# Patient Record
Sex: MALE | Race: BLACK OR AFRICAN AMERICAN | NOT HISPANIC OR LATINO | Employment: OTHER | URBAN - METROPOLITAN AREA
[De-identification: names, ages, dates, MRNs, and addresses within clinical notes are randomized per-mention and may not be internally consistent; named-entity substitution may affect disease eponyms.]

---

## 2017-01-30 ENCOUNTER — LAB VISIT (OUTPATIENT)
Dept: LAB | Facility: HOSPITAL | Age: 52
End: 2017-01-30
Attending: FAMILY MEDICINE
Payer: MEDICARE

## 2017-01-30 DIAGNOSIS — I11.9 BENIGN HYPERTENSIVE HEART DISEASE WITHOUT HEART FAILURE: ICD-10-CM

## 2017-01-30 DIAGNOSIS — M62.9 DISORDER OF MUSCLE: ICD-10-CM

## 2017-01-30 DIAGNOSIS — Z12.5 SCREENING FOR PROSTATE CANCER: ICD-10-CM

## 2017-01-30 LAB
ALBUMIN SERPL BCP-MCNC: 3.9 G/DL
ALP SERPL-CCNC: 99 U/L
ALT SERPL W/O P-5'-P-CCNC: 33 U/L
ANION GAP SERPL CALC-SCNC: 8 MMOL/L
AST SERPL-CCNC: 30 U/L
BASOPHILS # BLD AUTO: 0.03 K/UL
BASOPHILS NFR BLD: 0.6 %
BILIRUB SERPL-MCNC: 0.6 MG/DL
BUN SERPL-MCNC: 12 MG/DL
CALCIUM SERPL-MCNC: 9.5 MG/DL
CHLORIDE SERPL-SCNC: 105 MMOL/L
CHOLEST/HDLC SERPL: 4.3 {RATIO}
CO2 SERPL-SCNC: 27 MMOL/L
COMPLEXED PSA SERPL-MCNC: 0.76 NG/ML
CREAT SERPL-MCNC: 0.9 MG/DL
DIFFERENTIAL METHOD: ABNORMAL
EOSINOPHIL # BLD AUTO: 0.4 K/UL
EOSINOPHIL NFR BLD: 6.7 %
ERYTHROCYTE [DISTWIDTH] IN BLOOD BY AUTOMATED COUNT: 13.2 %
EST. GFR  (AFRICAN AMERICAN): >60 ML/MIN/1.73 M^2
EST. GFR  (NON AFRICAN AMERICAN): >60 ML/MIN/1.73 M^2
GLUCOSE SERPL-MCNC: 98 MG/DL
HCT VFR BLD AUTO: 42.1 %
HDL/CHOLESTEROL RATIO: 23.4 %
HDLC SERPL-MCNC: 197 MG/DL
HDLC SERPL-MCNC: 46 MG/DL
HGB BLD-MCNC: 14.1 G/DL
LDLC SERPL CALC-MCNC: 130 MG/DL
LYMPHOCYTES # BLD AUTO: 2.7 K/UL
LYMPHOCYTES NFR BLD: 50.8 %
MCH RBC QN AUTO: 30 PG
MCHC RBC AUTO-ENTMCNC: 33.5 %
MCV RBC AUTO: 90 FL
MONOCYTES # BLD AUTO: 0.4 K/UL
MONOCYTES NFR BLD: 7.8 %
NEUTROPHILS # BLD AUTO: 1.8 K/UL
NEUTROPHILS NFR BLD: 33.9 %
NONHDLC SERPL-MCNC: 151 MG/DL
PLATELET # BLD AUTO: 295 K/UL
PMV BLD AUTO: 9.8 FL
POTASSIUM SERPL-SCNC: 4.1 MMOL/L
PROT SERPL-MCNC: 7.8 G/DL
RBC # BLD AUTO: 4.7 M/UL
SODIUM SERPL-SCNC: 140 MMOL/L
TRIGL SERPL-MCNC: 105 MG/DL
TSH SERPL DL<=0.005 MIU/L-ACNC: 1.26 UIU/ML
WBC # BLD AUTO: 5.39 K/UL

## 2017-01-30 PROCEDURE — 84443 ASSAY THYROID STIM HORMONE: CPT

## 2017-01-30 PROCEDURE — 84153 ASSAY OF PSA TOTAL: CPT

## 2017-01-30 PROCEDURE — 85025 COMPLETE CBC W/AUTO DIFF WBC: CPT

## 2017-01-30 PROCEDURE — 36415 COLL VENOUS BLD VENIPUNCTURE: CPT | Mod: PO

## 2017-01-30 PROCEDURE — 80061 LIPID PANEL: CPT

## 2017-01-30 PROCEDURE — 80053 COMPREHEN METABOLIC PANEL: CPT

## 2017-02-01 ENCOUNTER — OFFICE VISIT (OUTPATIENT)
Dept: FAMILY MEDICINE | Facility: CLINIC | Age: 52
End: 2017-02-01
Payer: MEDICARE

## 2017-02-01 VITALS
WEIGHT: 203.5 LBS | HEIGHT: 71 IN | DIASTOLIC BLOOD PRESSURE: 76 MMHG | BODY MASS INDEX: 28.49 KG/M2 | OXYGEN SATURATION: 97 % | SYSTOLIC BLOOD PRESSURE: 124 MMHG | TEMPERATURE: 98 F | HEART RATE: 84 BPM

## 2017-02-01 DIAGNOSIS — Z00.00 ANNUAL PHYSICAL EXAM: Primary | ICD-10-CM

## 2017-02-01 PROCEDURE — 99213 OFFICE O/P EST LOW 20 MIN: CPT | Mod: PBBFAC,PO | Performed by: FAMILY MEDICINE

## 2017-02-01 PROCEDURE — 99999 PR PBB SHADOW E&M-EST. PATIENT-LVL III: CPT | Mod: PBBFAC,,, | Performed by: FAMILY MEDICINE

## 2017-02-01 PROCEDURE — 99396 PREV VISIT EST AGE 40-64: CPT | Mod: S$PBB,,, | Performed by: FAMILY MEDICINE

## 2017-02-01 NOTE — PROGRESS NOTES
Subjective:       Patient ID: Rito Conley is a 51 y.o. male.    Chief Complaint: Abdominal Pain    HPI Comments: Patient presents today for an annual exam. He has a bulging in his umbilicus. He states it is only tender to touch. He denies pain with activity.     Past Medical History:    Allergy                                                       DJD of shoulder                                 5/7/2014      Hypertension                                                  Lower back pain                                               Sleep apnea                                                  History reviewed. No pertinent surgical history.  Review of patient's family history indicates:    Hypertension                   Mother                    Cancer                         Maternal Grandmother        Comment: Breast    Cancer                         Maternal Aunt               Comment: breast    Amblyopia                      Neg Hx                    Blindness                      Neg Hx                    Cataracts                      Neg Hx                    Diabetes                       Neg Hx                    Glaucoma                       Neg Hx                    Macular degeneration           Neg Hx                    Retinal detachment             Neg Hx                    Strabismus                     Neg Hx                    Stroke                         Neg Hx                    Thyroid disease                Neg Hx                    Social History    Marital status:              Spouse name:                       Years of education:                 Number of children:               Occupational History    None on file    Social History Main Topics    Smoking status: Never Smoker                                                                   Smokeless status: Not on file                       Alcohol use: No              Drug use: No              Sexual activity: Not on  "file          Other Topics            Concern    None on file    Social History Narrative    None on file          Review of Systems   Constitutional: Negative for fatigue.   HENT: Negative for hearing loss, rhinorrhea, sneezing and sore throat.    Eyes: Negative for visual disturbance.   Respiratory: Negative for cough, chest tightness, shortness of breath and wheezing.    Cardiovascular: Negative for chest pain, palpitations and leg swelling.   Gastrointestinal: Negative for abdominal pain, constipation, diarrhea, nausea and vomiting.   Endocrine: Negative for polydipsia, polyphagia and polyuria.   Genitourinary: Negative for dysuria, frequency, hematuria and urgency.   Musculoskeletal: Negative for arthralgias and back pain.   Skin: Negative for rash.   Neurological: Negative for dizziness, syncope, light-headedness and headaches.       Objective:       Vitals:    02/01/17 1056   BP: 124/76   Pulse: 84   Temp: 98.4 °F (36.9 °C)   TempSrc: Oral   SpO2: 97%   Weight: 92.3 kg (203 lb 7.8 oz)   Height: 5' 11" (1.803 m)       Physical Exam   Constitutional: He is oriented to person, place, and time. He appears well-developed and well-nourished. No distress.   HENT:   Head: Normocephalic.   Right Ear: External ear normal.   Left Ear: External ear normal.   Mouth/Throat: Oropharynx is clear and moist. No oropharyngeal exudate.   Eyes: Conjunctivae are normal.   Neck: Normal range of motion. Neck supple. No thyromegaly present.   Cardiovascular: Normal rate, regular rhythm and normal heart sounds.  Exam reveals no gallop and no friction rub.    No murmur heard.  Pulmonary/Chest: Effort normal and breath sounds normal. No respiratory distress. He has no wheezes. He has no rales.   Abdominal: Soft. Bowel sounds are normal. He exhibits no distension and no mass. There is no tenderness. There is no rebound and no guarding.   Musculoskeletal: He exhibits no edema.   Lymphadenopathy:     He has no cervical adenopathy. "   Neurological: He is alert and oriented to person, place, and time.   Skin: No rash noted. He is not diaphoretic.   Psychiatric: He has a normal mood and affect.       Assessment:       1. Annual physical exam        Plan:       Rito was seen today for abdominal pain.    Diagnoses and all orders for this visit:    Annual physical exam       labs reviewed and noted to be normal.

## 2017-03-09 ENCOUNTER — HOSPITAL ENCOUNTER (EMERGENCY)
Facility: OTHER | Age: 52
Discharge: HOME | End: 2017-03-09
Attending: INTERNAL MEDICINE
Payer: COMMERCIAL

## 2017-03-09 VITALS
SYSTOLIC BLOOD PRESSURE: 138 MMHG | WEIGHT: 195 LBS | HEART RATE: 55 BPM | RESPIRATION RATE: 20 BRPM | HEIGHT: 71 IN | TEMPERATURE: 98 F | OXYGEN SATURATION: 100 % | DIASTOLIC BLOOD PRESSURE: 90 MMHG | BODY MASS INDEX: 27.3 KG/M2
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
## 2017-03-09 DIAGNOSIS — S39.92XA BACK INJURY, INITIAL ENCOUNTER: ICD-10-CM

## 2017-03-09 PROCEDURE — 99283 EMERGENCY DEPT VISIT LOW MDM: CPT

## 2017-03-09 RX ORDER — IBUPROFEN 800 MG/1
800 TABLET ORAL EVERY 8 HOURS PRN
Qty: 20 TABLET | Refills: 0 | Status: SHIPPED | OUTPATIENT
Start: 2017-03-09 | End: 2017-03-09

## 2017-03-09 RX ORDER — CYCLOBENZAPRINE HCL 10 MG
10 TABLET ORAL 3 TIMES DAILY PRN
Qty: 20 TABLET | Refills: 0 | Status: SHIPPED | OUTPATIENT
Start: 2017-03-09 | End: 2017-03-09

## 2017-03-09 RX ORDER — CYCLOBENZAPRINE HCL 10 MG
10 TABLET ORAL 3 TIMES DAILY PRN
Qty: 20 TABLET | Refills: 0 | Status: SHIPPED | OUTPATIENT
Start: 2017-03-09 | End: 2017-03-14

## 2017-03-09 RX ORDER — IBUPROFEN 800 MG/1
800 TABLET ORAL EVERY 8 HOURS PRN
Qty: 20 TABLET | Refills: 0 | Status: SHIPPED | OUTPATIENT
Start: 2017-03-09 | End: 2019-07-22
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
## 2017-03-10 ENCOUNTER — OFFICE VISIT (OUTPATIENT)
Dept: FAMILY MEDICINE | Facility: CLINIC | Age: 52
End: 2017-03-10
Payer: COMMERCIAL

## 2017-03-10 VITALS
HEART RATE: 69 BPM | DIASTOLIC BLOOD PRESSURE: 80 MMHG | WEIGHT: 202.38 LBS | BODY MASS INDEX: 28.33 KG/M2 | TEMPERATURE: 98 F | OXYGEN SATURATION: 99 % | HEIGHT: 71 IN | SYSTOLIC BLOOD PRESSURE: 126 MMHG
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
## 2017-03-10 DIAGNOSIS — M54.6 THORACIC SPINE PAIN: Primary | ICD-10-CM

## 2017-03-10 DIAGNOSIS — Z11.59 ENCOUNTER FOR HEPATITIS C SCREENING TEST FOR LOW RISK PATIENT: ICD-10-CM

## 2017-03-10 PROCEDURE — 99213 OFFICE O/P EST LOW 20 MIN: CPT | Mod: PBBFAC,PO | Performed by: FAMILY MEDICINE

## 2017-03-10 PROCEDURE — 99214 OFFICE O/P EST MOD 30 MIN: CPT | Mod: S$GLB,,, | Performed by: FAMILY MEDICINE

## 2017-03-10 PROCEDURE — 99999 PR PBB SHADOW E&M-EST. PATIENT-LVL III: CPT | Mod: PBBFAC,,, | Performed by: FAMILY MEDICINE
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
## 2017-03-14 ENCOUNTER — TELEPHONE (OUTPATIENT)
Dept: FAMILY MEDICINE | Facility: CLINIC | Age: 52
End: 2017-03-14

## 2017-03-14 DIAGNOSIS — M47.16 LUMBAR SPONDYLOSIS WITH MYELOPATHY: ICD-10-CM

## 2017-03-14 RX ORDER — OXYCODONE AND ACETAMINOPHEN 5; 325 MG/1; MG/1
1 TABLET ORAL EVERY 6 HOURS PRN
Qty: 60 TABLET | Refills: 0 | Status: SHIPPED | OUTPATIENT
Start: 2017-04-14 | End: 2017-06-07 | Stop reason: SDUPTHER

## 2017-03-14 RX ORDER — OXYCODONE AND ACETAMINOPHEN 5; 325 MG/1; MG/1
1 TABLET ORAL EVERY 6 HOURS PRN
Qty: 60 TABLET | Refills: 0 | Status: SHIPPED | OUTPATIENT
Start: 2017-03-14 | End: 2017-06-07 | Stop reason: SDUPTHER

## 2017-03-14 RX ORDER — OXYCODONE AND ACETAMINOPHEN 5; 325 MG/1; MG/1
1 TABLET ORAL EVERY 6 HOURS PRN
Qty: 60 TABLET | Refills: 0 | Status: SHIPPED | OUTPATIENT
Start: 2017-05-14 | End: 2017-06-07 | Stop reason: SDUPTHER

## 2017-03-14 NOTE — TELEPHONE ENCOUNTER
----- Message from Milli Joya sent at 3/14/2017  8:02 AM CDT -----  Contact: self  REFILL: oxycodone-acetaminophen (PERCOCET) 5-325 mg per tablet

## 2017-03-15 ENCOUNTER — HOSPITAL ENCOUNTER (OUTPATIENT)
Dept: RADIOLOGY | Facility: HOSPITAL | Age: 52
Discharge: HOME | End: 2017-03-15
Attending: FAMILY MEDICINE
Payer: MEDICARE

## 2017-03-15 DIAGNOSIS — M54.6 THORACIC SPINE PAIN: ICD-10-CM
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
## 2017-03-16 ENCOUNTER — TELEPHONE (OUTPATIENT)
Dept: FAMILY MEDICINE | Facility: CLINIC | Age: 52
End: 2017-03-16

## 2017-03-16 DIAGNOSIS — S22.000D COMPRESSION FRACTURE OF THORACIC VERTEBRA, WITH ROUTINE HEALING, SUBSEQUENT ENCOUNTER: Primary | ICD-10-CM

## 2017-03-16 NOTE — TELEPHONE ENCOUNTER
----- Message from Kay Bobo sent at 3/16/2017 12:30 PM CDT -----  Contact: Self  Good afternoon,    Pt is requesting orders for MRI-Sedation.    Pt can be reached at [phone].    Thank you!

## 2017-03-16 NOTE — TELEPHONE ENCOUNTER
Spoke with patient/ stated that when he was set to have his Open MRI, they informed him that due to the specific area of his back, they are not able to do an Open MRI, but then it was too late in the evening (10pm) to have patient sedated and have to now reschedule patient/ They are requesting that Dr. Huertas order an MRI- Sedation instead of an open MRI and then patient would be rescheduled/ please advise

## 2017-03-30 ENCOUNTER — HOSPITAL ENCOUNTER (OUTPATIENT)
Dept: RADIOLOGY | Facility: HOSPITAL | Age: 52
Discharge: HOME | End: 2017-03-30
Attending: FAMILY MEDICINE
Payer: MEDICARE

## 2017-03-30 DIAGNOSIS — S22.000D COMPRESSION FRACTURE OF THORACIC VERTEBRA, WITH ROUTINE HEALING, SUBSEQUENT ENCOUNTER: ICD-10-CM

## 2017-03-30 PROCEDURE — 72146 MRI CHEST SPINE W/O DYE: CPT | Mod: 26,GC,, | Performed by: RADIOLOGY

## 2017-03-30 PROCEDURE — 63600175 PHARM REV CODE 636 W HCPCS: Performed by: RADIOLOGY

## 2017-03-30 PROCEDURE — 72146 MRI CHEST SPINE W/O DYE: CPT | Mod: TC

## 2017-03-30 RX ORDER — MIDAZOLAM HYDROCHLORIDE 1 MG/ML
2 INJECTION INTRAMUSCULAR; INTRAVENOUS ONCE
Status: COMPLETED | OUTPATIENT
Start: 2017-03-30 | End: 2017-03-30

## 2017-03-30 RX ADMIN — MIDAZOLAM HYDROCHLORIDE 2 MG: 1 INJECTION, SOLUTION INTRAMUSCULAR; INTRAVENOUS at 09:03

## 2017-03-30 NOTE — PROGRESS NOTES
2mg of Versed administered prior to MRI start. Pt denies any allergies to the medication. Pt has a ride home (his spouse). Care of pt left in hands of MRI staff.

## 2017-04-04 ENCOUNTER — OFFICE VISIT (OUTPATIENT)
Dept: FAMILY MEDICINE | Facility: CLINIC | Age: 52
End: 2017-04-04
Payer: MEDICARE

## 2017-04-04 VITALS
TEMPERATURE: 98 F | DIASTOLIC BLOOD PRESSURE: 90 MMHG | WEIGHT: 201.25 LBS | SYSTOLIC BLOOD PRESSURE: 138 MMHG | BODY MASS INDEX: 28.07 KG/M2 | HEART RATE: 86 BPM | OXYGEN SATURATION: 97 %

## 2017-04-04 DIAGNOSIS — D18.09 HEMANGIOMA OF SPINE: ICD-10-CM

## 2017-04-04 DIAGNOSIS — M54.50 ACUTE MIDLINE LOW BACK PAIN WITHOUT SCIATICA: Primary | ICD-10-CM
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
## 2017-04-04 PROCEDURE — 99214 OFFICE O/P EST MOD 30 MIN: CPT | Mod: S$GLB,,, | Performed by: FAMILY MEDICINE

## 2017-04-04 PROCEDURE — 99999 PR PBB SHADOW E&M-EST. PATIENT-LVL III: CPT | Mod: PBBFAC,,, | Performed by: FAMILY MEDICINE

## 2017-04-04 PROCEDURE — 99213 OFFICE O/P EST LOW 20 MIN: CPT | Mod: PBBFAC,PO | Performed by: FAMILY MEDICINE
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
## 2017-04-19 ENCOUNTER — CLINICAL SUPPORT (OUTPATIENT)
Dept: REHABILITATION | Facility: HOSPITAL | Age: 52
End: 2017-04-19
Attending: FAMILY MEDICINE
Payer: COMMERCIAL

## 2017-04-19 DIAGNOSIS — M25.69 DECREASED ROM OF TRUNK AND BACK: Primary | ICD-10-CM

## 2017-04-19 PROCEDURE — 97161 PT EVAL LOW COMPLEX 20 MIN: CPT | Mod: PN

## 2017-04-19 PROCEDURE — G8978 MOBILITY CURRENT STATUS: HCPCS | Mod: CK,PN

## 2017-04-19 PROCEDURE — G8979 MOBILITY GOAL STATUS: HCPCS | Mod: CJ,PN

## 2017-04-19 PROCEDURE — 97110 THERAPEUTIC EXERCISES: CPT | Mod: PN
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
## 2017-04-25 ENCOUNTER — CLINICAL SUPPORT (OUTPATIENT)
Dept: REHABILITATION | Facility: HOSPITAL | Age: 52
End: 2017-04-25
Attending: FAMILY MEDICINE
Payer: COMMERCIAL

## 2017-04-25 DIAGNOSIS — M25.69 DECREASED ROM OF TRUNK AND BACK: Primary | ICD-10-CM

## 2017-04-25 PROCEDURE — 97110 THERAPEUTIC EXERCISES: CPT | Mod: PN

## 2017-04-25 NOTE — PROGRESS NOTES
Physical Therapy Daily Note     Name: Rito Moore [facility] Number: [id number]  Diagnosis:   Encounter Diagnosis   Name Primary?    Decreased ROM of trunk and back Yes     Physician: Jennifer Huertas MD    Visit #: 2 of 20  PTA Visit #: 1  Time In: 0935  Time Out: 1035  Total Treatment Time:  60'   POC expiration: 7/12/17    Subjective     Pt reports: that he was a little stiff after the eval, but he is feeling better.   Pain Scale: Rito rates pain on a scale of 0-10 to be 3 currently.    Objective     Rito received individual therapeutic exercises to develop strength, endurance, ROM, flexibility, posture and core stabilization for 45 minutes including:    Nustep x 8'   LTR with PB x 20   DKTC 20 x 3'' hold  PPT 2 x 10 x 3'' hold   Bridges 2 x 10   Supine HSS 3 x 30 '' ea    Supine hip flexor stretch 2 x 30''  Transverse Abdominus 2' x 5''   Piriformis Stretch 2 x 30''     Rito received the following manual therapy techniques STM to bilat lumbar paraspinals and superior gluteal musculature  x 10'         MHP in prone x 10'     Written Home Exercises reviewed from Eval.   Pt demo good understanding of the education provided. Rito demonstrated good return demonstration of activities.     Education provided re:   Rito verbalized good understanding of education provided.   No spiritual or educational barriers to learning provided    Assessment     Patient tolerated treatment session well.  Patient with good tolerance to exercises with decreased stiffness post treatment session.  Patient with decreased soft tissue mobility bilat lumbar paraspinals, superior gluteal musculature.  Plan to progress with strengthening and stretching as tolerated by the patient.   This is a 51 y.o. male referred to outpatient physical therapy and presents with a medical diagnosis of low back painand demonstrates limitations as described in the problem list. Pt  prognosis is Good. Pt will continue to benefit from skilled outpatient physical therapy to address the deficits listed in the problem list, provide pt/family education and to maximize pt's level of independence in the home and community environment.     Goals as follows:  GOALS  Short Term Goals: 4 weeks (5/17/17)  1. Pt will report <6/10 pain to improve activity tolerance.   2. Pt will increase MMT by 1/2 grade in order to improve functional stability and strength.   3. Pt will be able to lift medium weighted object from the floor on command with proper body mechanics in order to progress functional independence.  4. Pt will tolerate HEP to improve impairments and independence with ADL's.     Plan     Continue with established Plan of Care towards PT goals.    Therapist: Missy Hamlin, PTA  4/25/2017

## 2017-04-27 ENCOUNTER — CLINICAL SUPPORT (OUTPATIENT)
Dept: REHABILITATION | Facility: HOSPITAL | Age: 52
End: 2017-04-27
Attending: FAMILY MEDICINE
Payer: COMMERCIAL

## 2017-04-27 DIAGNOSIS — M25.69 DECREASED ROM OF TRUNK AND BACK: Primary | ICD-10-CM

## 2017-04-27 PROCEDURE — 97110 THERAPEUTIC EXERCISES: CPT | Mod: PN
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
## 2017-05-02 ENCOUNTER — CLINICAL SUPPORT (OUTPATIENT)
Dept: REHABILITATION | Facility: HOSPITAL | Age: 52
End: 2017-05-02
Attending: FAMILY MEDICINE
Payer: COMMERCIAL

## 2017-05-02 DIAGNOSIS — M25.69 DECREASED ROM OF TRUNK AND BACK: Primary | ICD-10-CM

## 2017-05-02 PROCEDURE — 97110 THERAPEUTIC EXERCISES: CPT | Mod: PN

## 2017-05-02 NOTE — PROGRESS NOTES
Physical Therapy Daily Note     Name: Rito Moore [facility] Number: [id number]  Diagnosis:   No diagnosis found.  Physician: Jennifer Huertas MD    Visit #: 4 of 20  PTA Visit #:3    Time In: 0830  Time Out:  0930  Total Treatment Time:  60'   POC expiration: 7/12/17    Subjective     Pt reports the he is doing okay today, reports that he is having some sinus trouble due to the weather.  Pt states that he continues to have stiffness in the morning.   Pain Scale: Rito rates pain on a scale of 0-10 to be 6 currently.    Objective     Rito received individual therapeutic exercises to develop strength, endurance, ROM, flexibility, posture and core stabilization for 50 minutes including:     Upright bike [place] Lvl 3 x 8'   Nustep x 8'  - np today  LTR with PB x 20   DKTC 20 x 5'' hold  PPT 2 x 10 x 3'' hold   Bridges 3 x 10   Supine HSS 3 x 30 '' ea     Supine hip flexor stretch 2 x 30''  Transverse Abdominus 2' x 5''   Piriformis Stretch 3 x 30'' ea LE   + Reverse Crunches 10 x 3'' hold   + Seated 3- way ball roll out x 3'     Rito received the following manual therapy techniques STM to bilat lumbar paraspinals and superior gluteal musculature  x 0        MHP in prone x 10'    Ice pack x 10' - np     Written Home Exercises reviewed from [facility].   Pt demo good understanding of the education provided. Rito demonstrated good return demonstration of activities.     Education provided re:   Rito verbalized good understanding of education provided.   No spiritual or educational barriers to learning provided    Assessment     Patient tolerated treatment session well.  Patient with good response to progression of core strengthening exercises without provocation of pain.  Plan to progress with strengthening and stretching as tolerated by the patient.   This is a 51 y.o. male referred to outpatient physical therapy and presents with a medical diagnosis of  low back painand demonstrates limitations as described in the problem list. Pt prognosis is Good. Pt will continue to benefit from skilled outpatient physical therapy to address the deficits listed in the problem list, provide pt/family education and to maximize pt's level of independence in the home and community environment.     Goals as follows:  GOALS  Short Term Goals: 4 weeks (5/17/17)  1. Pt will report <6/10 pain to improve activity tolerance.   2. Pt will increase MMT by 1/2 grade in order to improve functional stability and strength.   3. Pt will be able to lift medium weighted object from the floor on command with proper body mechanics in order to progress functional independence.  4. Pt will tolerate HEP to improve impairments and independence with ADL's.     Plan     Continue with established Plan of Care towards PT goals.    Therapist: Missy Hamlin, JULIA  5/2/2017

## 2017-05-03 ENCOUNTER — OFFICE VISIT (OUTPATIENT)
Dept: FAMILY MEDICINE | Facility: CLINIC | Age: 52
End: 2017-05-03
Payer: MEDICARE

## 2017-05-03 VITALS
RESPIRATION RATE: 16 BRPM | BODY MASS INDEX: 28.7 KG/M2 | HEART RATE: 84 BPM | WEIGHT: 205 LBS | OXYGEN SATURATION: 98 % | HEIGHT: 71 IN | SYSTOLIC BLOOD PRESSURE: 140 MMHG | TEMPERATURE: 99 F | DIASTOLIC BLOOD PRESSURE: 98 MMHG

## 2017-05-03 DIAGNOSIS — J01.10 ACUTE FRONTAL SINUSITIS, RECURRENCE NOT SPECIFIED: Primary | ICD-10-CM

## 2017-05-03 DIAGNOSIS — I11.9 BENIGN HYPERTENSIVE HEART DISEASE WITHOUT HEART FAILURE: ICD-10-CM

## 2017-05-03 PROCEDURE — 96372 THER/PROPH/DIAG INJ SC/IM: CPT | Mod: PBBFAC,PO

## 2017-05-03 PROCEDURE — 99214 OFFICE O/P EST MOD 30 MIN: CPT | Mod: PBBFAC,PO,25 | Performed by: PHYSICIAN ASSISTANT

## 2017-05-03 PROCEDURE — 99214 OFFICE O/P EST MOD 30 MIN: CPT | Mod: S$PBB,,, | Performed by: PHYSICIAN ASSISTANT

## 2017-05-03 PROCEDURE — 99999 PR PBB SHADOW E&M-EST. PATIENT-LVL IV: CPT | Mod: PBBFAC,,, | Performed by: PHYSICIAN ASSISTANT

## 2017-05-03 RX ORDER — TRIAMCINOLONE ACETONIDE 40 MG/ML
40 INJECTION, SUSPENSION INTRA-ARTICULAR; INTRAMUSCULAR
Status: COMPLETED | OUTPATIENT
Start: 2017-05-03 | End: 2017-05-03

## 2017-05-03 RX ADMIN — TRIAMCINOLONE ACETONIDE 40 MG: 40 INJECTION, SUSPENSION INTRA-ARTICULAR; INTRAMUSCULAR at 08:05

## 2017-05-03 NOTE — MR AVS SNAPSHOT
[facility]  [address]  [address]  [place] [address]  Phone: [phone]  Fax: [phone]                  Rito Conley   5/3/2017 8:20 AM   Office Visit    Description:  Male : 1965   Provider:  MARCELA Ley   Department:  [facility]           Reason for Visit     Sinus Problem           Diagnoses this Visit        Comments    Acute frontal sinusitis, recurrence not specified    -  Primary            To Do List           Future Appointments        Provider Department Dept Phone    2017 11:30 AM Missy Hamlin PTA [facility] [phone]    2017 9:30 AM Missy Hamlin PTA [facility] [phone]    2017 11:00 AM Lauren Shepley, PT [facility] [phone]      Goals (5 Years of Data)     None      [facility]     [facility] Nurse Care Line -  Assistance  Unless otherwise directed by your provider, please contact [facility], our nurse care line that is available for  assistance.     Registered nurses in the [facility] provide: appointment scheduling, clinical advisement, health education, and other advisory services.  Call: [phone] (toll free)               Medications           Message regarding Medications     Verify the changes and/or additions to your medication regime listed below are the same as discussed with your clinician today.  If any of these changes or additions are incorrect, please notify your healthcare provider.        These medications were administered today        Dose Freq    triamcinolone acetonide injection 40 mg 40 mg Clinic/HOD 1 time    Sig: Inject 1 mL (40 mg total) into the muscle one time.    Class: Normal    Route: Intramuscular    Cosign for Ordering: Required by Jennifer Huertas MD      STOP taking these medications     methylPREDNISolone acetate injection 40 mg            Verify  "that the below list of medications is an accurate representation of the medications you are currently taking.  If none reported, the list may be blank. If incorrect, please contact your healthcare provider. Carry this list with you in case of emergency.           Current Medications     amlodipine (NORVASC) 10 MG tablet Take 1 tablet (10 mg total) by mouth once daily.    buPROPion (WELLBUTRIN XL) 150 MG TB24 tablet Take 150 mg by mouth once daily.     fluticasone (FLONASE) 50 mcg/actuation nasal spray 1 spray by Each Nare route 2 (two) times daily.    hydrochlorothiazide (HYDRODIURIL) 50 MG tablet Take 1 tablet (50 mg total) by mouth once daily.    ibuprofen (ADVIL,MOTRIN) 800 MG tablet Take 1 tablet (800 mg total) by mouth every 8 (eight) hours as needed for Pain.    LIPITOR 40 mg tablet Take 40 mg by mouth once daily.     oxycodone-acetaminophen (PERCOCET) 5-325 mg per tablet Take 1 tablet by mouth every 6 (six) hours as needed for Pain.    oxycodone-acetaminophen (PERCOCET) 5-325 mg per tablet Take 1 tablet by mouth every 6 (six) hours as needed for Pain.    oxycodone-acetaminophen (PERCOCET) 5-325 mg per tablet Starting on May 14, 2017. Take 1 tablet by mouth every 6 (six) hours as needed for Pain.    paroxetine (PAXIL) 40 MG tablet Take 1 tablet (40 mg total) by mouth once daily.    prazosin (MINIPRESS) 2 MG Cap     valacyclovir (VALTREX) 1000 MG tablet Take 2 tablets (2,000 mg total) by mouth once daily.    zolpidem (AMBIEN) 10 mg Tab Take 1 tablet (10 mg total) by mouth nightly as needed.           Clinical Reference Information           Your Vitals Were     BP Pulse Temp Resp Height Weight    140/98 84 99 °F (37.2 °C) (Oral) 16 5' 11" (1.803 m) 93 kg (205 lb 0.4 oz)    SpO2 BMI             98% 28.6 kg/m2         Blood Pressure          Most Recent Value    BP  (!)  140/98      Allergies as of 5/3/2017     No Known Allergies      Immunizations Administered on Date of Encounter - 5/3/2017     None    "   Instructions    Call for fever 100.4 or higher, change in discharge color, no improvement in 7-10 days.     Sinusitis (No Antibiotics)    The sinuses are air-filled spaces within the bones of the face. They connect to the inside of the nose. Sinusitis is an inflammation of the tissue lining the sinus cavity. Sinus inflammation can occur during a cold. It can also be due to allergies to pollens and other particles in the air. It can cause symptoms such as sinus congestion, headache, sore throat, facial swelling and fullness. It may also cause a low-grade fever. No infection is present, and no antibiotic treatment is needed.  Home care  · Drink plenty of water, hot tea, and other liquids. This may help thin mucus. It also may promote sinus drainage.  · Heat may help soothe painful areas of the face. Use a towel soaked in hot water. Or,  the shower and direct the hot spray onto your face. Using a vaporizer along with a menthol rub at night may also help.   · An expectorant containing guaifenesin may help thin the mucus and promote drainage from the sinuses.  · Over-the-counter decongestants may be used unless a similar medicine was prescribed. Nasal sprays work the fastest. Use one that contains phenylephrine or oxymetazoline. First blow the nose gently. Then use the spray. Do not use these medicines more often than directed on the label or symptoms may get worse. You may also use tablets containing pseudoephedrine. Avoid products that combine ingredients, because side effects may be increased. Read labels. You can also ask the pharmacist for help. (NOTE: Persons with high blood pressure should not use decongestants. They can raise blood pressure.)  · Over-the-counter antihistamines may help if allergies contributed to your sinusitis.    · Use acetaminophen or ibuprofen to control pain, unless another pain medicine was prescribed. (If you have chronic liver or kidney disease or ever had a stomach ulcer, talk  with your doctor before using these medicines. Aspirin should never be used in anyone under 18 years of age who is ill with a fever. It may cause severe liver damage.)  · Use nasal rinses or irrigation as instructed by your health care provider.  · Don't smoke. This can worsen symptoms.  Follow-up care  Follow up with your healthcare provider or our staff if you are not improving within the next week.  When to seek medical advice  Call your healthcare provider if any of these occur:  · Green or yellow discharge from the nose or into the throat  · Facial pain or headache becoming more severe  · Stiff neck  · Unusual drowsiness or confusion  · Swelling of the forehead or eyelids  · Vision problems, including blurred or double vision  · Fever of 100.4ºF (38ºC) or higher, or as directed by your healthcare provider  · Seizure  · Breathing problems  · Symptoms not resolving within 10 days  Date Last Reviewed: 4/13/2015  © 9135-9649 [employer]. [address] [place] [address]. All rights reserved. This information is not intended as a substitute for professional medical care. Always follow your healthcare professional's instructions.             Language Assistance Services     ATTENTION: Language assistance services are available, free of charge. Please call [phone].      ATENCIÓN: Si habla guillaumeañol, tiene a ahumada disposición servicios gratuitos de asistencia lingüística. Llame al [phone].     [facility] Ý: N?u b?n nói Ti?ng Vi?t, có các d?ch v? h? tr? ngôn ng? mi?n phí dành cho b?n. G?i s? [phone].         [facility] Ayou[facility] complies with applicable Federal civil rights laws and does not discriminate on the basis of race, color, national origin, age, disability, or sex.

## 2017-05-03 NOTE — PATIENT INSTRUCTIONS
Call for fever 100.4 or higher, change in discharge color, no improvement in 7-10 days.     Sinusitis (No Antibiotics)    The sinuses are air-filled spaces within the bones of the face. They connect to the inside of the nose. Sinusitis is an inflammation of the tissue lining the sinus cavity. Sinus inflammation can occur during a cold. It can also be due to allergies to pollens and other particles in the air. It can cause symptoms such as sinus congestion, headache, sore throat, facial swelling and fullness. It may also cause a low-grade fever. No infection is present, and no antibiotic treatment is needed.  Home care  · Drink plenty of water, hot tea, and other liquids. This may help thin mucus. It also may promote sinus drainage.  · Heat may help soothe painful areas of the face. Use a towel soaked in hot water. Or,  the shower and direct the hot spray onto your face. Using a vaporizer along with a menthol rub at night may also help.   · An expectorant containing guaifenesin may help thin the mucus and promote drainage from the sinuses.  · Over-the-counter decongestants may be used unless a similar medicine was prescribed. Nasal sprays work the fastest. Use one that contains phenylephrine or oxymetazoline. First blow the nose gently. Then use the spray. Do not use these medicines more often than directed on the label or symptoms may get worse. You may also use tablets containing pseudoephedrine. Avoid products that combine ingredients, because side effects may be increased. Read labels. You can also ask the pharmacist for help. (NOTE: Persons with high blood pressure should not use decongestants. They can raise blood pressure.)  · Over-the-counter antihistamines may help if allergies contributed to your sinusitis.    · Use acetaminophen or ibuprofen to control pain, unless another pain medicine was prescribed. (If you have chronic liver or kidney disease or ever had a stomach ulcer, talk with your doctor  before using these medicines. Aspirin should never be used in anyone under 18 years of age who is ill with a fever. It may cause severe liver damage.)  · Use nasal rinses or irrigation as instructed by your health care provider.  · Don't smoke. This can worsen symptoms.  Follow-up care  Follow up with your healthcare provider or our staff if you are not improving within the next week.  When to seek medical advice  Call your healthcare provider if any of these occur:  · Green or yellow discharge from the nose or into the throat  · Facial pain or headache becoming more severe  · Stiff neck  · Unusual drowsiness or confusion  · Swelling of the forehead or eyelids  · Vision problems, including blurred or double vision  · Fever of 100.4ºF (38ºC) or higher, or as directed by your healthcare provider  · Seizure  · Breathing problems  · Symptoms not resolving within 10 days  Date Last Reviewed: 4/13/2015  © 7490-4510 [employer]. [address] [place] [address], [place] [address]. All rights reserved. This information is not intended as a substitute for professional medical care. Always follow your healthcare professional's instructions.

## 2017-05-03 NOTE — PROGRESS NOTES
Subjective:       Patient ID: Rito Conley is a 52 y.o. male with multiple medical diagnoses as listed in the medical history and problem list that presents for Sinus Problem  .    Chief Complaint: Sinus Problem      Sinus Problem   This is a new problem. The current episode started in the past 7 days (saturday). The problem has been gradually worsening since onset. There has been no fever. Associated symptoms include congestion, coughing, ear pain, headaches, sinus pressure (frontal ), sneezing and a sore throat (mild ). Pertinent negatives include no chills or shortness of breath. Treatments tried: mucinex, xyzal, flonase---yesterday was last use  The treatment provided mild relief.     Review of Systems   Constitutional: Negative for chills and fever.   HENT: Positive for congestion, ear pain, postnasal drip, rhinorrhea, sinus pressure (frontal ), sneezing, sore throat (mild ) and voice change.    Eyes: Negative for discharge and itching.   Respiratory: Positive for cough. Negative for chest tightness, shortness of breath and wheezing.    Gastrointestinal: Negative for diarrhea, nausea and vomiting.   Neurological: Positive for headaches.         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     Social History Main Topics    Smoking status: Never Smoker    Smokeless tobacco: Not on file    Alcohol use No    Drug use: No    Sexual activity: Not on file     Other Topics Concern    Not on file     Social History Narrative       ALLERGIES AND MEDICATIONS: updated and reviewed.  Review of patient's allergies indicates:  No Known Allergies  Current  "Outpatient Prescriptions   Medication Sig Dispense Refill    amlodipine (NORVASC) 10 MG tablet Take 1 tablet (10 mg total) by mouth once daily. 90 tablet 1    buPROPion (WELLBUTRIN XL) 150 MG TB24 tablet Take 150 mg by mouth once daily.       fluticasone (FLONASE) 50 mcg/actuation nasal spray 1 spray by Each Nare route 2 (two) times daily. 3 Bottle 3    hydrochlorothiazide (HYDRODIURIL) 50 MG tablet Take 1 tablet (50 mg total) by mouth once daily. 90 tablet 1    ibuprofen (ADVIL,MOTRIN) 800 MG tablet Take 1 tablet (800 mg total) by mouth every 8 (eight) hours as needed for Pain. 20 tablet 0    LIPITOR 40 mg tablet Take 40 mg by mouth once daily.       oxycodone-acetaminophen (PERCOCET) 5-325 mg per tablet Take 1 tablet by mouth every 6 (six) hours as needed for Pain. 60 tablet 0    oxycodone-acetaminophen (PERCOCET) 5-325 mg per tablet Take 1 tablet by mouth every 6 (six) hours as needed for Pain. 60 tablet 0    [START ON 5/14/2017] oxycodone-acetaminophen (PERCOCET) 5-325 mg per tablet Take 1 tablet by mouth every 6 (six) hours as needed for Pain. 60 tablet 0    paroxetine (PAXIL) 40 MG tablet Take 1 tablet (40 mg total) by mouth once daily. 90 tablet 1    prazosin (MINIPRESS) 2 MG Cap       valacyclovir (VALTREX) 1000 MG tablet Take 2 tablets (2,000 mg total) by mouth once daily. 60 tablet 5    zolpidem (AMBIEN) 10 mg Tab Take 1 tablet (10 mg total) by mouth nightly as needed. 30 tablet 2     No current facility-administered medications for this visit.          Objective:   BP (!) 140/98  Pulse 84  Temp 99 °F (37.2 °C) (Oral)   Resp 16  Ht 5' 11" (1.803 m)  Wt 93 kg (205 lb 0.4 oz)  SpO2 98%  BMI 28.6 kg/m2     Physical Exam   Constitutional: He is oriented to person, place, and time. No distress.   HENT:   Head: Normocephalic and atraumatic.   Right Ear: External ear and ear canal normal. Tympanic membrane is injected. No middle ear effusion.   Left Ear: External ear and ear canal normal. " Tympanic membrane is injected.  No middle ear effusion.   Nose: Mucosal edema present. No rhinorrhea. Right sinus exhibits frontal sinus tenderness. Right sinus exhibits no maxillary sinus tenderness. Left sinus exhibits frontal sinus tenderness. Left sinus exhibits no maxillary sinus tenderness.   Mouth/Throat: Uvula is midline and mucous membranes are normal. No oropharyngeal exudate or posterior oropharyngeal erythema.   PND drip  Injected pharynx    Eyes: Conjunctivae and EOM are normal.   Cardiovascular: Normal rate and regular rhythm.    Pulmonary/Chest: Effort normal and breath sounds normal. He has no wheezes.   Lymphadenopathy:     He has no cervical adenopathy.   Neurological: He is alert and oriented to person, place, and time.   Skin: Skin is warm. No erythema.           Assessment:       1. Acute frontal sinusitis, recurrence not specified    2. Benign hypertensive heart disease without heart failure        Plan:       Acute frontal sinusitis, recurrence not specified  -     triamcinolone acetonide injection 40 mg; Inject 1 mL (40 mg total) into the muscle one time.  Continue xyzal and flonase  Call for fever 100.4 or higher, change in discharge color, no improvement in 7-10 days.     If patient sends message, he can have amox 500 mg BID 10 days.    Benign hypertensive heart disease without heart failure  Patient did not take his BP medications today.   Will take it when he gets home  Aware to keep an eye on his blood pressure with shot given.             No Follow-up on file.

## 2017-05-04 ENCOUNTER — TELEPHONE (OUTPATIENT)
Dept: FAMILY MEDICINE | Facility: CLINIC | Age: 52
End: 2017-05-04

## 2017-05-04 ENCOUNTER — OFFICE VISIT (OUTPATIENT)
Dept: FAMILY MEDICINE | Facility: CLINIC | Age: 52
End: 2017-05-04
Payer: MEDICARE

## 2017-05-04 ENCOUNTER — TELEPHONE (OUTPATIENT)
Dept: REHABILITATION | Facility: HOSPITAL | Age: 52
End: 2017-05-04

## 2017-05-04 VITALS
OXYGEN SATURATION: 97 % | WEIGHT: 203.69 LBS | SYSTOLIC BLOOD PRESSURE: 142 MMHG | HEART RATE: 68 BPM | DIASTOLIC BLOOD PRESSURE: 100 MMHG | TEMPERATURE: 98 F | BODY MASS INDEX: 28.52 KG/M2 | HEIGHT: 71 IN | RESPIRATION RATE: 18 BRPM

## 2017-05-04 DIAGNOSIS — J01.10 ACUTE FRONTAL SINUSITIS, RECURRENCE NOT SPECIFIED: Primary | ICD-10-CM

## 2017-05-04 PROCEDURE — 99214 OFFICE O/P EST MOD 30 MIN: CPT | Mod: S$PBB,,, | Performed by: INTERNAL MEDICINE

## 2017-05-04 PROCEDURE — 99999 PR PBB SHADOW E&M-EST. PATIENT-LVL III: CPT | Mod: PBBFAC,,, | Performed by: INTERNAL MEDICINE

## 2017-05-04 PROCEDURE — 99213 OFFICE O/P EST LOW 20 MIN: CPT | Mod: PBBFAC,PO | Performed by: INTERNAL MEDICINE

## 2017-05-04 RX ORDER — AMOXICILLIN AND CLAVULANATE POTASSIUM 875; 125 MG/1; MG/1
1 TABLET, FILM COATED ORAL 2 TIMES DAILY
Qty: 14 TABLET | Refills: 0 | Status: SHIPPED | OUTPATIENT
Start: 2017-05-04 | End: 2017-05-11

## 2017-05-04 NOTE — PROGRESS NOTES
SUBJECTIVE     Chief Complaint   Patient presents with    Facial Pain     was seen yesterday, givenb a shot and feels worse    Cough    Chills       HPI  Rito Conley is a 52 y.o. male with multiple medical diagnoses as listed in the medical history and problem list that presents for follow-up for sinus issue x >1 week. Pt reports headache, sinus pressure, chills, and productive cough. Denies fever, but has night sweats. +post-nasal drip and B/L ear fullness. Pt was seen yesterday and received a steroid injection and reports symptoms are worse today.     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 No pertinent surgical history.    SOCIAL HISTORY:  Social History     Social History    Marital status:      Spouse name: N/A    Number of children: N/A    Years of education: N/A     Occupational History    Not on file.     Social History Main Topics    Smoking status: Never Smoker    Smokeless tobacco: Not on file    Alcohol use No    Drug use: No    Sexual activity: Not on file     Other Topics Concern    Not on file     Social History Narrative       FAMILY HISTORY:  Family History   Problem Relation Age of Onset    Hypertension Mother     Cancer Maternal Grandmother      Breast    Cancer Maternal Aunt      breast    Amblyopia Neg Hx     Blindness Neg Hx     Cataracts Neg Hx     Diabetes Neg Hx     Glaucoma Neg Hx     Macular degeneration Neg Hx     Retinal detachment Neg Hx     Strabismus Neg Hx     Stroke Neg Hx     Thyroid disease Neg Hx        ALLERGIES AND MEDICATIONS: updated and reviewed.  Review of patient's allergies indicates:  No Known Allergies  Current Outpatient Prescriptions   Medication Sig Dispense Refill    amlodipine (NORVASC) 10 MG tablet Take 1 tablet  (10 mg total) by mouth once daily. 90 tablet 1    buPROPion (WELLBUTRIN XL) 150 MG TB24 tablet Take 150 mg by mouth once daily.       fluticasone (FLONASE) 50 mcg/actuation nasal spray 1 spray by Each Nare route 2 (two) times daily. 3 Bottle 3    hydrochlorothiazide (HYDRODIURIL) 50 MG tablet Take 1 tablet (50 mg total) by mouth once daily. 90 tablet 1    ibuprofen (ADVIL,MOTRIN) 800 MG tablet Take 1 tablet (800 mg total) by mouth every 8 (eight) hours as needed for Pain. 20 tablet 0    LIPITOR 40 mg tablet Take 40 mg by mouth once daily.       oxycodone-acetaminophen (PERCOCET) 5-325 mg per tablet Take 1 tablet by mouth every 6 (six) hours as needed for Pain. 60 tablet 0    oxycodone-acetaminophen (PERCOCET) 5-325 mg per tablet Take 1 tablet by mouth every 6 (six) hours as needed for Pain. 60 tablet 0    [START ON 5/14/2017] oxycodone-acetaminophen (PERCOCET) 5-325 mg per tablet Take 1 tablet by mouth every 6 (six) hours as needed for Pain. 60 tablet 0    paroxetine (PAXIL) 40 MG tablet Take 1 tablet (40 mg total) by mouth once daily. 90 tablet 1    prazosin (MINIPRESS) 2 MG Cap       valacyclovir (VALTREX) 1000 MG tablet Take 2 tablets (2,000 mg total) by mouth once daily. 60 tablet 5    zolpidem (AMBIEN) 10 mg Tab Take 1 tablet (10 mg total) by mouth nightly as needed. 30 tablet 2    amoxicillin-clavulanate 875-125mg (AUGMENTIN) 875-125 mg per tablet Take 1 tablet by mouth 2 (two) times daily. 14 tablet 0     No current facility-administered medications for this visit.        ROS  Review of Systems   Constitutional: Positive for chills. Negative for fever.   HENT: Positive for postnasal drip and sinus pressure. Negative for hearing loss and sore throat.    Eyes: Negative for visual disturbance.   Respiratory: Positive for cough. Negative for shortness of breath.    Cardiovascular: Negative for chest pain, palpitations and leg swelling.   Gastrointestinal: Negative for abdominal pain, constipation,  "diarrhea, nausea and vomiting.   Genitourinary: Negative for dysuria, frequency and urgency.   Musculoskeletal: Negative for arthralgias, joint swelling and myalgias.   Skin: Negative for rash and wound.   Neurological: Positive for headaches.   Psychiatric/Behavioral: Negative for agitation and confusion. The patient is not nervous/anxious.          OBJECTIVE     Physical Exam  Vitals:    05/04/17 1038   BP: (!) 142/100   Pulse: 68   Resp: 18   Temp: 98 °F (36.7 °C)    Body mass index is 28.41 kg/(m^2).  Weight: 92.4 kg (203 lb 11.3 oz)   Height: 5' 11" (180.3 cm)     Physical Exam   Constitutional: He is oriented to person, place, and time. He appears well-developed and well-nourished. No distress.   HENT:   Head: Normocephalic and atraumatic.   Right Ear: Hearing, tympanic membrane and external ear normal.   Left Ear: Hearing, tympanic membrane and external ear normal.   Nose: Nose normal. Right sinus exhibits no maxillary sinus tenderness and no frontal sinus tenderness. Left sinus exhibits no maxillary sinus tenderness and no frontal sinus tenderness.   Mouth/Throat: No uvula swelling. Posterior oropharyngeal edema and posterior oropharyngeal erythema present. No tonsillar exudate.   Eyes: Conjunctivae and EOM are normal. Pupils are equal, round, and reactive to light. Right eye exhibits no discharge. Left eye exhibits no discharge. No scleral icterus.   Neck: Normal range of motion. Neck supple. No JVD present. No tracheal deviation present.   Cardiovascular: Normal rate, regular rhythm, normal heart sounds and intact distal pulses.  Exam reveals no gallop and no friction rub.    No murmur heard.  Pulmonary/Chest: Effort normal and breath sounds normal. No respiratory distress. He has no wheezes.   Abdominal: Soft. Bowel sounds are normal. He exhibits no distension and no mass. There is no tenderness. There is no rebound and no guarding.   Musculoskeletal: Normal range of motion. He exhibits no edema, " tenderness or deformity.   Neurological: He is alert and oriented to person, place, and time. He exhibits normal muscle tone. Coordination normal.   Skin: Skin is warm and dry. No rash noted. No erythema.   Psychiatric: He has a normal mood and affect. His behavior is normal. Judgment and thought content normal.         Health Maintenance       Date Due Completion Date    Hepatitis C Screening 1965 ---    Lipid Panel 1/30/2022 1/30/2017    Colonoscopy 6/17/2025 6/17/2015            ASSESSMENT     52 y.o. male with     1. Acute frontal sinusitis, recurrence not specified        PLAN:     1. Acute frontal sinusitis, recurrence not specified  - Continue therapy as discussed yesterday with Flonase and Xyzal; start Abx as below and take to completion  - amoxicillin-clavulanate 875-125mg (AUGMENTIN) 875-125 mg per tablet; Take 1 tablet by mouth 2 (two) times daily.  Dispense: 14 tablet; Refill: 0      RTC in 3 months with PCP-Dr. Kiya Puente MD  05/04/2017 11:21 AM        No Follow-up on file.

## 2017-05-04 NOTE — TELEPHONE ENCOUNTER
Spoke with patient, his not feeling any better, headaches, chills, coughing, no greenish or yellowish mucus, could you please call in antibodies per patient if so he will  script.

## 2017-05-04 NOTE — TELEPHONE ENCOUNTER
----- Message from Imelda Walls sent at 5/4/2017  8:13 AM CDT -----  Contact: self  [phone]  Pt was seen on 5-3-17, pt states that he feels worst this morning , he has the chills , bad cough and losing voice. Pt requesting antibiotics . Pls call pt [phone] to discuss. Thanks.........Melony

## 2017-05-09 ENCOUNTER — CLINICAL SUPPORT (OUTPATIENT)
Dept: REHABILITATION | Facility: HOSPITAL | Age: 52
End: 2017-05-09
Attending: FAMILY MEDICINE
Payer: MEDICARE

## 2017-05-09 DIAGNOSIS — M25.69 DECREASED ROM OF TRUNK AND BACK: Primary | ICD-10-CM

## 2017-05-09 PROCEDURE — 97110 THERAPEUTIC EXERCISES: CPT | Mod: PN

## 2017-05-09 NOTE — PROGRESS NOTES
Physical Therapy Daily Note     Name: Rito Moore [facility] Number: [id number]  Diagnosis:   Encounter Diagnosis   Name Primary?    Decreased ROM of trunk and back Yes     Physician: Jennifer Huertas MD    Visit #: 5 of 20  PTA Visit #4    Time In: 0838  Time Out:  1045  Total Treatment Time:  67' ( 1:1 with PTA for 45' of treatment session)   POC expiration: 7/12/17    Subjective     Pt reports the he is doing well today.  Patient states that he is feeling much better than the previous session.  Pain Scale: Rito rates pain on a scale of 0-10 to be 5 currently.    Objective     Rito received individual therapeutic exercises to develop strength, endurance, ROM, flexibility, posture and core stabilization for 51 minutes including:     Upright bike [place] Lvl 3 x 8'   Nustep x 8'  - np today  LTR with PB x 20   DKTC 20 x 5'' hold  PPT 2 x 10 x 3'' hold   + HL hip abduction with GTB 2 x 10   + HL hip adduction with ball squeeze 20 x 3'' hold  Bridges 3 x 10   Supine HSS 3 x 30 '' ea     Supine hip flexor stretch 2 x 30''  Transverse Abdominus 2' x 5''   Piriformis Stretch 3 x 30'' ea LE   Reverse Crunches 15 x 3'' hold   + prone hip flexor stretch 2 x 30''   Seated 3- way ball roll out x 3'     Rito received the following manual therapy techniques IASTM/ STM to bilat lumbar paraspinals and superior gluteal musculature  x 6'         MHP concurrently with exercise x 10'   Ice pack x 10' - np     Written Home Exercises reviewed from Eval.   Pt demo good understanding of the education provided. Rito demonstrated good return demonstration of activities.     Education provided re:   Rito verbalized good understanding of education provided.   No spiritual or educational barriers to learning provided    Assessment     Mr. Veras tolerated treatment session very well.  Patient continues with soft tissue restrictions throughout the bilateral lumbar  paraspinals, piriformis and quads.  Patient with good tolerance to added stretches with decreased stiffness reported.  Patient able to perform stretches/ exercises without provocation of pain and appropriate training effect achieved.  Plan to progress with strengthening and stretching as tolerated by the patient.   This is a 52 y.o. male referred to outpatient physical therapy and presents with a medical diagnosis of low back painand demonstrates limitations as described in the problem list. Pt prognosis is Good. Pt will continue to benefit from skilled outpatient physical therapy to address the deficits listed in the problem list, provide pt/family education and to maximize pt's level of independence in the home and community environment.     Goals as follows:  GOALS  Short Term Goals: 4 weeks (5/17/17)  1. Pt will report <6/10 pain to improve activity tolerance.   2. Pt will increase MMT by 1/2 grade in order to improve functional stability and strength.   3. Pt will be able to lift medium weighted object from the floor on command with proper body mechanics in order to progress functional independence.  4. Pt will tolerate HEP to improve impairments and independence with ADL's.     Plan     Continue with established Plan of Care towards PT goals.    Therapist: Missy Hamlin, PTA  5/9/2017

## 2017-05-11 ENCOUNTER — CLINICAL SUPPORT (OUTPATIENT)
Dept: REHABILITATION | Facility: HOSPITAL | Age: 52
End: 2017-05-11
Attending: FAMILY MEDICINE
Payer: MEDICARE

## 2017-05-11 DIAGNOSIS — M25.69 DECREASED ROM OF TRUNK AND BACK: Primary | ICD-10-CM

## 2017-05-11 PROCEDURE — 97110 THERAPEUTIC EXERCISES: CPT | Mod: PN

## 2017-05-11 NOTE — PROGRESS NOTES
Physical Therapy Daily Note     Name: Rito Moore [facility] Number: [id number]  Diagnosis:   Encounter Diagnosis   Name Primary?    Decreased ROM of trunk and back Yes     Physician: Jennifer Huertas MD    Visit #: 6 of 20  PTA Visit # 5    Time In: 1105  Time Out:  1145  Total Treatment Time:  40' ( 1:1 with PTA for duration of treatment session)   POC expiration: 7/12/17    Subjective     Pt reports the he is continues to feel better.  Patient requesting a shortened treatment session due to having to go to an appt at his [employer].  Pain Scale: Rito rates pain on a scale of 0-10 to be 4 currently in his low back.     Objective     Rito received individual therapeutic exercises to develop strength, endurance, ROM, flexibility, posture and core stabilization for 40 minutes including:     Upright bike [place] Lvl 3 x 8'   Nustep x 8'  - np today  LTR with PB x 20   DKTC 10 x 5'' hold  PPT 2 x 10 x 3'' hold   HL hip abduction with GTB 2 x 10   HL hip adduction with ball squeeze 20 x 3'' hold  Bridges 3 x 10   Supine HSS 3 x 30 '' ea     Supine hip flexor stretch 2 x 30''  Transverse Abdominus 2' x 5''   Piriformis Stretch 3 x 30'' ea LE   Reverse Crunches 15 x 3'' hold   Prone hip flexor stretch 2 x 30''   Seated 3- way ball roll out x 3'     Rito received the following manual therapy techniques IASTM/ STM to bilat lumbar paraspinals and superior gluteal musculature  X 0'         MHP concurrently with exercise x 10'   Ice pack x 10' - np     Written Home Exercises reviewed from Eval.   Pt demo good understanding of the education provided. Rito demonstrated good return demonstration of activities.     Education provided re:   Rito verbalized good understanding of education provided.   No spiritual or educational barriers to learning provided    Assessment     Mr. Veras tolerated treatment session very well.  Per patient request  treatment session modified.  Patient with good tolerance to exercises without provocation of pain.  Plan to progress with strengthening and stretching as tolerated by the patient.   This is a 52 y.o. male referred to outpatient physical therapy and presents with a medical diagnosis of low back painand demonstrates limitations as described in the problem list. Pt prognosis is Good. Pt will continue to benefit from skilled outpatient physical therapy to address the deficits listed in the problem list, provide pt/family education and to maximize pt's level of independence in the home and community environment.     Goals as follows:  GOALS  Short Term Goals: 4 weeks (5/17/17)  1. Pt will report <6/10 pain to improve activity tolerance.   2. Pt will increase MMT by 1/2 grade in order to improve functional stability and strength.   3. Pt will be able to lift medium weighted object from the floor on command with proper body mechanics in order to progress functional independence.  4. Pt will tolerate HEP to improve impairments and independence with ADL's.     Plan     Continue with established Plan of Care towards PT goals.    Therapist: Missy Hamlin, PTA  5/11/2017

## 2017-05-17 ENCOUNTER — CLINICAL SUPPORT (OUTPATIENT)
Dept: REHABILITATION | Facility: HOSPITAL | Age: 52
End: 2017-05-17
Attending: FAMILY MEDICINE
Payer: MEDICARE

## 2017-05-17 DIAGNOSIS — M25.69 DECREASED ROM OF TRUNK AND BACK: Primary | ICD-10-CM

## 2017-05-17 PROCEDURE — G8979 MOBILITY GOAL STATUS: HCPCS | Mod: CJ,PN

## 2017-05-17 PROCEDURE — 97110 THERAPEUTIC EXERCISES: CPT | Mod: PN

## 2017-05-17 PROCEDURE — G8978 MOBILITY CURRENT STATUS: HCPCS | Mod: CK,PN

## 2017-05-17 NOTE — PROGRESS NOTES
Physical Therapy Daily Note     Name: Rito Conley  Clinic Number: [id number]  Diagnosis:   No diagnosis found.  Physician: Jennifer Huertas MD    Visit #: 7 of 20  PTA Visit # 5    Time In: 1:05  Time Out: 2:09  Total Treatment Time: 65 minutes (1:1 with PT for 30 min)   POC expiration: 7/12/17    Subjective     Pt reports the he is getting better overall, he states 40-50% improvement since beginning therapy; continues to have some back pain but it is more tolerable.  Pain Scale: Rito rates pain on a scale of 0-10 to be 4 currently in his low back.     Objective     SFMA Screen  Squat: weight through forefeet, poor form, decreased lordosis  SLS: 20 seconds bilat  Gait: stiff     Lumbar Active Range of Motion:     % Normal Pain   Flexion Decreased, no curve reversal -      Extension Decreased slightly +        L Side Bend WFL -       R Side Bend WFL + (mild, more of a stretch)      L Rotation WFL -       R Rotation WFL + (mild)          Strength    Right LE Left LE   Hip flexion 5/5 5/5   Gluts 5/5 5/5   Hip ER 5/5 5/5   Hip IR 5/5 5/5   Hip extension 5/5 5/5   Hip abduction 5/5 5/5   Hip adduction 5/5 5/5   Knee flexion 5/5 5/5   Knee extension 5/5 5/5   Ankle dorsiflexion 5/5 5/5   Ankle plantarflexion 5/5 5/5   Ankle eversion 5/5 5/5   Ankle inversion 5/5 5/5   Great toe extension 5/5 5/5      Abdominals WFL Transversus Abdominus Palpable, improved isolation      Decreased flexibility: LE's bilaterally    Rito received individual therapeutic exercises to develop strength, endurance, ROM, flexibility, posture and core stabilization for 60 minutes including:    Upright bikex 10 min, level 7  LTR with PB x 20   DKTC with PB and UE flexion, red dowel 2 x 10  Transverse Abdominus with heel taps from table top 3 x 10  Shuttle 3 x 10, 2 cords  Piriformis stretch on shuttle 3 x 30'' with verbal cueing  Standing HS stretch 3 x 30'' ea  Standing calf  stretch 3 x 30'' ea  Ankle grab x 2 lengths of turf  Prone quad/hip flexor stretch with strap 3 x 30'' ea  Seated 3- way ball roll out x 3'       DKTC 10 x 5'' hold  PPT 2 x 10 x 3'' hold   HL hip abduction with GTB 2 x 10   HL hip adduction with ball squeeze 20 x 3'' hold  Bridges 3 x 10   Supine HSS 3 x 30 '' ea     Supine hip flexor stretch 2 x 30''  Reverse Crunches 15 x 3'' hold       Rito received the following manual therapy techniques IASTM/ STM to bilat lumbar paraspinals and superior gluteal musculature  X 0'         MHP concurrently with exercise x 0'  Ice pack x 10' - np    Education provided re: discussed current functional status, progress, and POC. Pt was instructed in HEP including: supine clams, ball squeeze, calf stretch, HS stretch, prone quad stretch, and bridges; pt demonstrated and verbalized understanding and was provided with a handout.  Rito verbalized good understanding of education provided.   No spiritual or educational barriers to learning provided    Assessment     Mr. Veras tolerated treatment session very well without visible adverse effects. Pt reported improvement in back pain following tx today. Pt with improve LE and core strength and stability, he continues to display increased tightness and decreased soft tissue mobility grossly with stiff gait pattern. Pt also presents with decreased lumbar mobility and tissue extensibility. Plt will continue to benefit from skilled PT intervention to address these limitations in order to maximize pain free functional independence.    Pt is making good progress towards established goals.    GOALS  Short Term Goals: 4 weeks (5/17/17)  1. Pt will report <6/10 pain to improve activity tolerance. Met 5/17/17  2. Pt will increase MMT by 1/2 grade in order to improve functional stability and strength. Met 5/17/17  3. Pt will be able to lift medium weighted object from the floor on command with proper body mechanics in order to progress  functional independence.  4. Pt will tolerate HEP to improve impairments and independence with ADL's. Met 5/17/17    Long Term Goals: 12 weeks (7/12/17)  1. Pt will report <5/10 pain to improve activity tolerance.  2. Pt will report <40% on FOTO to demonstrate a decrease in disability and improvement in independence with functional activities.   3. Pt will demonstrate improvement in LE flexibility in order to improve functional mobility.  4. Pt will improve lumbar ROM in order to improve functional mobility and tolerance for ADLs. progressing  5. Pt will be I with HEP for self-management of symptoms.     CMS Impairment/Limitation/Restriction for FOTO Lumbar Spine Survey  Status Limitation G-Code CMS Severity Modifier  Intake 50% 50%  Predicted 60% 40% Goal Status+ CK - At least 40 percent but less than 60 percent  5/17/2017 55% 45% Current Status CK - At least 40 percent but less than 60 percent     Plan     Continue with established Plan of Care towards PT goals.

## 2017-05-19 ENCOUNTER — CLINICAL SUPPORT (OUTPATIENT)
Dept: REHABILITATION | Facility: HOSPITAL | Age: 52
End: 2017-05-19
Attending: FAMILY MEDICINE
Payer: MEDICARE

## 2017-05-19 DIAGNOSIS — M25.69 DECREASED ROM OF TRUNK AND BACK: Primary | ICD-10-CM

## 2017-05-19 PROCEDURE — 97110 THERAPEUTIC EXERCISES: CPT | Mod: PN

## 2017-05-19 NOTE — PROGRESS NOTES
Physical Therapy Daily Note     Name: Rito Moore [facility] Number: [id number]  Diagnosis:   Encounter Diagnosis   Name Primary?    Decreased ROM of trunk and back Yes     Physician: Jennifer Huertas MD    Visit #: 8 of 20  PTA Visit # 1    Time In: 0900  Time Out: 0954  Total Treatment Time: 54 minutes (1:1 with PTA for 30 min)   POC expiration: 7/12/17    Subjective     Pt reports that he definitely got a workout last session.   Pain Scale: Rito rates pain on a scale of 0-10 to be 4 currently in his low back.     Objective     Rito received individual therapeutic exercises to develop strength, endurance, ROM, flexibility, posture and core stabilization for 54 minutes including:    Upright bikex 10 min, level 7  Dynamic warm up: Ankle grab, Monster kicks, walking lunges x 2 laps     LTR with PB x 20   DKTC with PB and UE flexion, red dowel 2 x 10  Transverse Abdominus with heel taps from table top 3 x 10  Reverse Crunches 10 x 5'' hold   Shuttle 3 x 10, 2 cords  Piriformis stretch on shuttle 3 x 30'' with verbal cueing  Standing HS stretch 3 x 30'' ea  Standing calf stretch 3 x 30'' ea  Prone quad/hip flexor stretch with strap 3 x 30'' ea  Seated 3- way ball roll out x 3'     NP:   DKTC 10 x 5'' hold  PPT 2 x 10 x 3'' hold   HL hip abduction with GTB 2 x 10   HL hip adduction with ball squeeze 20 x 3'' hold  Bridges 3 x 10   Supine HSS 3 x 30 '' ea     Supine hip flexor stretch 2 x 30''      Rito received the following manual therapy techniques IASTM/ STM to bilat lumbar paraspinals and superior gluteal musculature  X 0'         MHP concurrently with exercise x 0'  Ice pack x 10' - np    Education provided re: discussed current functional status, progress, and POC. Pt was instructed in HEP including: supine clams, ball squeeze, calf stretch, HS stretch, prone quad stretch, and bridges; pt demonstrated and verbalized understanding and was  provided with a handout.  Rito verbalized good understanding of education provided.   No spiritual or educational barriers to learning provided    Assessment     Patient tolerated treatment session well.  Patient with good tolerance to progression of dynamic warm up activities with no complaints of increased pain.  Patient with appropriate training effect achieved. Plan to progress with core strengthening and flexibility as tolerated by the patient.  Pt will continue to benefit from skilled PT intervention to address these limitations in order to maximize pain free functional independence.    Pt is making good progress towards established goals.    GOALS  Short Term Goals: 4 weeks (5/17/17)  1. Pt will report <6/10 pain to improve activity tolerance. Met 5/17/17  2. Pt will increase MMT by 1/2 grade in order to improve functional stability and strength. Met 5/17/17  3. Pt will be able to lift medium weighted object from the floor on command with proper body mechanics in order to progress functional independence.  4. Pt will tolerate HEP to improve impairments and independence with ADL's. Met 5/17/17    Long Term Goals: 12 weeks (7/12/17)  1. Pt will report <5/10 pain to improve activity tolerance.  2. Pt will report <40% on FOTO to demonstrate a decrease in disability and improvement in independence with functional activities.   3. Pt will demonstrate improvement in LE flexibility in order to improve functional mobility.  4. Pt will improve lumbar ROM in order to improve functional mobility and tolerance for ADLs. progressing  5. Pt will be I with HEP for self-management of symptoms.     CMS Impairment/Limitation/Restriction for FOTO Lumbar Spine Survey  Status Limitation G-Code CMS Severity Modifier  Intake 50% 50%  Predicted 60% 40% Goal Status+ CK - At least 40 percent but less than 60 percent  5/17/2017 55% 45% Current Status CK - At least 40 percent but less than 60 percent     Plan     Continue with  established Plan of Care towards PT goals.

## 2017-05-31 ENCOUNTER — CLINICAL SUPPORT (OUTPATIENT)
Dept: REHABILITATION | Facility: HOSPITAL | Age: 52
End: 2017-05-31
Attending: FAMILY MEDICINE
Payer: MEDICARE

## 2017-05-31 DIAGNOSIS — M25.69 DECREASED ROM OF TRUNK AND BACK: Primary | ICD-10-CM

## 2017-05-31 PROCEDURE — 97110 THERAPEUTIC EXERCISES: CPT | Mod: PN

## 2017-05-31 NOTE — PROGRESS NOTES
Physical Therapy Daily Note     Name: Rito Moore [facility] Number: [id number]  Diagnosis:   Encounter Diagnosis   Name Primary?    Decreased ROM of trunk and back Yes     Physician: Jennifer Huertas MD    Visit #: 9 of 20  PTA Visit # 2    Time In: 0805  Time Out: 0848  Total Treatment Time: 43 minutes (1:1 with PTA for 40 min)   POC expiration: 7/12/17    Subjective     Pt reports that he is having some increased back pain.  Pt states he did a lot of walking and standing on vacation.    Pain Scale: Rito rates pain on a scale of 0-10 to be 8 currently in his low back.     Objective     Rito received individual therapeutic exercises to develop strength, endurance, ROM, flexibility, posture and core stabilization for 33 minutes including:    Upright bikex 10 min, level 7  Dynamic warm up: Ankle grab, Monster kicks, walking lunges x 2 laps     LTR with PB x 20   DKTC with PB and UE flexion, red dowel 2 x 10  Transverse Abdominus with heel taps from table top 3 x 10  Reverse Crunches 10 x 5'' hold   Shuttle 3 x 10, 2 cords  Piriformis stretch on shuttle 3 x 30'' with verbal cueing  Standing HS stretch 3 x 30'' ea  Standing calf stretch 3 x 30'' ea  Prone quad/hip flexor stretch with strap 3 x 30'' ea   Seated 3- way ball roll out x 3'     NP:   DKTC 10 x 5'' hold  PPT 2 x 10 x 3'' hold   HL hip abduction with GTB 2 x 10   HL hip adduction with ball squeeze 20 x 3'' hold  Bridges 3 x 10   Supine HSS 3 x 30 '' ea     Supine hip flexor stretch 2 x 30''      Rito received the following manual therapy techniques IASTM/ STM to bilat lumbar paraspinals and superior gluteal musculature  X 0'         MHP  x 10'  Ice pack x 10' - np    Education provided re: discussed current functional status, progress, and POC. Pt was instructed in HEP including: supine clams, ball squeeze, calf stretch, HS stretch, prone quad stretch, and bridges; pt demonstrated and  verbalized understanding and was provided with a handout.  Rito verbalized good understanding of education provided.   No spiritual or educational barriers to learning provided    Assessment     Patient tolerated treatment session fairly today due to increased pain.  Patient with modified treatment session per pt request.  Pt with good tolerance to stretches with decreased pain reported post treatment session.  Plan to progress with core strengthening and flexibility as tolerated by the patient.  Pt will continue to benefit from skilled PT intervention to address these limitations in order to maximize pain free functional independence.    Pt is making good progress towards established goals.    GOALS  Short Term Goals: 4 weeks (5/17/17)  1. Pt will report <6/10 pain to improve activity tolerance. Met 5/17/17  2. Pt will increase MMT by 1/2 grade in order to improve functional stability and strength. Met 5/17/17  3. Pt will be able to lift medium weighted object from the floor on command with proper body mechanics in order to progress functional independence.  4. Pt will tolerate HEP to improve impairments and independence with ADL's. Met 5/17/17    Long Term Goals: 12 weeks (7/12/17)  1. Pt will report <5/10 pain to improve activity tolerance.  2. Pt will report <40% on FOTO to demonstrate a decrease in disability and improvement in independence with functional activities.   3. Pt will demonstrate improvement in LE flexibility in order to improve functional mobility.  4. Pt will improve lumbar ROM in order to improve functional mobility and tolerance for ADLs. progressing  5. Pt will be I with HEP for self-management of symptoms.     CMS Impairment/Limitation/Restriction for FOTO Lumbar Spine Survey  Status Limitation G-Code CMS Severity Modifier  Intake 50% 50%  Predicted 60% 40% Goal Status+ CK - At least 40 percent but less than 60 percent  5/17/2017 55% 45% Current Status CK - At least 40 percent but less  than 60 percent     Plan     Continue with established Plan of Care towards PT goals.

## 2017-06-06 ENCOUNTER — CLINICAL SUPPORT (OUTPATIENT)
Dept: REHABILITATION | Facility: HOSPITAL | Age: 52
End: 2017-06-06
Attending: FAMILY MEDICINE
Payer: MEDICARE

## 2017-06-06 DIAGNOSIS — M25.69 DECREASED ROM OF TRUNK AND BACK: Primary | ICD-10-CM

## 2017-06-06 PROCEDURE — 97110 THERAPEUTIC EXERCISES: CPT | Mod: PN

## 2017-06-06 NOTE — PROGRESS NOTES
Physical Therapy Daily Note     Name: Rito Moore [facility] Number: [id number]  Diagnosis:   Encounter Diagnosis   Name Primary?    Decreased ROM of trunk and back Yes     Physician: Jennifer Huertas MD    Visit #: 9 of 20  PTA Visit # 2    Time In: 0708  Time Out: 0800  Total Treatment Time: 52 minutes (1:1 with PTA for 30 min)   POC expiration: 7/12/17    Subjective     Pt reports that he is still having some increased pain from his vacation.  Patient reports that he had to take a pain pill prior to coming to therapy.   Pain Scale: Rito rates pain on a scale of 0-10 to be 6 currently in his low back.     Objective     Rito received individual therapeutic exercises to develop strength, endurance, ROM, flexibility, posture and core stabilization for 52 minutes including:    Upright bikex 8 min, level 5 hills   Dynamic warm up: Ankle grab, Monster kicks, walking lunges x 2 laps     LTR with PB with CL UE flexion x 20   DKTC with PB and UE flexion, red dowel 2 x 10  Transverse Abdominus with heel taps from table top 3 x 10  +Sidelying Clams 2 x 10 GTB   Bridges 3 x 10   Reverse Crunches 10 x 5'' hold   Shuttle 3 x 10, 2.5 cords  Piriformis stretch on shuttle 3 x 30'' with verbal cueing  Standing HS stretch 3 x 30'' ea  Standing calf stretch 3 x 30'' ea  Prone quad/hip flexor stretch with strap 3 x 30'' ea   Seated 3- way ball roll out x 3'     NP:   DKTC 10 x 5'' hold  PPT 2 x 10 x 3'' hold   HL hip abduction with GTB 2 x 10   HL hip adduction with ball squeeze 20 x 3'' hold  Supine HSS 3 x 30 '' ea     Supine hip flexor stretch 2 x 30''      Rito received the following manual therapy techniques IASTM/ STM to bilat lumbar paraspinals and superior gluteal musculature  X 0'         MHP  x 10' concurrently with exercises  Ice pack x 10' - np    Education provided re: discussed current functional status, progress, and POC. Pt was instructed in HEP  including: supine clams, ball squeeze, calf stretch, HS stretch, prone quad stretch, and bridges; pt demonstrated and verbalized understanding and was provided with a handout.  Rito verbalized good understanding of education provided.   No spiritual or educational barriers to learning provided    Assessment     Patient tolerated treatment session well today.  Patient with good tolerance to incorporation of supine dynamic stretching.  Patient continues to demonstrate decreased core and hip strength as needed for ease with ADLs.  Plan to progress with core strengthening and flexibility as tolerated by the patient.  Pt will continue to benefit from skilled PT intervention to address these limitations in order to maximize pain free functional independence.    Pt is making good progress towards established goals.    GOALS  Short Term Goals: 4 weeks (5/17/17)  1. Pt will report <6/10 pain to improve activity tolerance. Met 5/17/17  2. Pt will increase MMT by 1/2 grade in order to improve functional stability and strength. Met 5/17/17  3. Pt will be able to lift medium weighted object from the floor on command with proper body mechanics in order to progress functional independence.  4. Pt will tolerate HEP to improve impairments and independence with ADL's. Met 5/17/17    Long Term Goals: 12 weeks (7/12/17)  1. Pt will report <5/10 pain to improve activity tolerance.  2. Pt will report <40% on FOTO to demonstrate a decrease in disability and improvement in independence with functional activities.   3. Pt will demonstrate improvement in LE flexibility in order to improve functional mobility.  4. Pt will improve lumbar ROM in order to improve functional mobility and tolerance for ADLs. progressing  5. Pt will be I with HEP for self-management of symptoms.     CMS Impairment/Limitation/Restriction for FOTO Lumbar Spine Survey  Status Limitation G-Code CMS Severity Modifier  Intake 50% 50%  Predicted 60% 40% Goal Status+ CK  - At least 40 percent but less than 60 percent  5/17/2017 55% 45% Current Status CK - At least 40 percent but less than 60 percent     Plan     Continue with established Plan of Care towards PT goals.

## 2017-06-07 ENCOUNTER — OFFICE VISIT (OUTPATIENT)
Dept: FAMILY MEDICINE | Facility: CLINIC | Age: 52
End: 2017-06-07
Payer: MEDICARE

## 2017-06-07 VITALS
SYSTOLIC BLOOD PRESSURE: 124 MMHG | WEIGHT: 201.94 LBS | HEIGHT: 71 IN | DIASTOLIC BLOOD PRESSURE: 84 MMHG | BODY MASS INDEX: 28.27 KG/M2 | TEMPERATURE: 99 F | HEART RATE: 75 BPM | OXYGEN SATURATION: 97 %

## 2017-06-07 DIAGNOSIS — M47.16 LUMBAR SPONDYLOSIS WITH MYELOPATHY: ICD-10-CM

## 2017-06-07 DIAGNOSIS — M19.019 SHOULDER ARTHRITIS: Primary | ICD-10-CM

## 2017-06-07 PROCEDURE — 99214 OFFICE O/P EST MOD 30 MIN: CPT | Mod: S$PBB,,, | Performed by: FAMILY MEDICINE

## 2017-06-07 PROCEDURE — 99999 PR PBB SHADOW E&M-EST. PATIENT-LVL IV: CPT | Mod: PBBFAC,,, | Performed by: FAMILY MEDICINE

## 2017-06-07 PROCEDURE — 99214 OFFICE O/P EST MOD 30 MIN: CPT | Mod: PBBFAC,PO | Performed by: FAMILY MEDICINE

## 2017-06-07 RX ORDER — OXYCODONE AND ACETAMINOPHEN 5; 325 MG/1; MG/1
1 TABLET ORAL EVERY 6 HOURS PRN
Qty: 60 TABLET | Refills: 0 | Status: SHIPPED | OUTPATIENT
Start: 2017-06-15 | End: 2017-06-07 | Stop reason: SDUPTHER

## 2017-06-07 RX ORDER — OXYCODONE AND ACETAMINOPHEN 5; 325 MG/1; MG/1
1 TABLET ORAL EVERY 6 HOURS PRN
Qty: 60 TABLET | Refills: 0 | Status: SHIPPED | OUTPATIENT
Start: 2017-07-15 | End: 2017-08-17 | Stop reason: SDUPTHER

## 2017-06-07 RX ORDER — OXYCODONE AND ACETAMINOPHEN 5; 325 MG/1; MG/1
1 TABLET ORAL EVERY 6 HOURS PRN
Qty: 60 TABLET | Refills: 0 | Status: SHIPPED | OUTPATIENT
Start: 2017-08-15 | End: 2017-09-14 | Stop reason: SDUPTHER

## 2017-06-07 RX ORDER — OXYCODONE AND ACETAMINOPHEN 5; 325 MG/1; MG/1
1 TABLET ORAL EVERY 6 HOURS PRN
Qty: 60 TABLET | Refills: 0 | Status: SHIPPED | OUTPATIENT
Start: 2017-06-07 | End: 2017-07-19 | Stop reason: SDUPTHER
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
## 2017-06-08 ENCOUNTER — CLINICAL SUPPORT (OUTPATIENT)
Dept: REHABILITATION | Facility: HOSPITAL | Age: 52
End: 2017-06-08
Attending: FAMILY MEDICINE
Payer: MEDICARE

## 2017-06-08 DIAGNOSIS — M25.69 DECREASED ROM OF TRUNK AND BACK: Primary | ICD-10-CM

## 2017-06-08 PROCEDURE — G8978 MOBILITY CURRENT STATUS: HCPCS | Mod: CK,PN

## 2017-06-08 PROCEDURE — G8979 MOBILITY GOAL STATUS: HCPCS | Mod: CJ,PN

## 2017-06-08 PROCEDURE — 97110 THERAPEUTIC EXERCISES: CPT | Mod: PN

## 2017-06-08 NOTE — PROGRESS NOTES
Physical Therapy Daily Note     Name: Rito Moore [facility] Number: [id number]  Diagnosis:   Encounter Diagnosis   Name Primary?    Decreased ROM of trunk and back Yes     Physician: Jennifer Huertas MD    Visit #: 10 of 20  PTA Visit # 3    Time In: 1020  Time Out: 1105  Total Treatment Time: 45 minutes (1:1 with PTA for 40 min)   POC expiration: 7/12/17    Subjective     Pt reports that he is feeling better today.  Patient reports decreased stiffness in his back today.   Pain Scale: Rito rates pain on a scale of 0-10 to be 6 currently in his low back.     Objective     Rito received individual therapeutic exercises to develop strength, endurance, ROM, flexibility, posture and core stabilization for 45 minutes including:    Upright bikex 6 min, level 5 hills   Dynamic warm up: Ankle grab, Monster kicks, walking lunges, modified drinking birds x 1 lap    LTR with PB with CL UE flexion x 20   DKTC with PB and UE flexion, red dowel 2 x 10  Transverse Abdominus with heel taps from table top 3 x 10  Prone quad/hip flexor stretch with strap 3 x 30'' ea   Sidelying Clams 2 x 10 GTB   Bridges 3 x 10   Reverse Crunches 10 x 5'' hold   Shuttle 3 x 10, 2.5 cords  Piriformis stretch on shuttle 3 x 30'' with verbal cueing  Standing HS stretch 2 x 30'' ea  Standing calf stretch 2 x 30'' ea  Seated 3- way ball roll out x 3'     NP:   DKTC 10 x 5'' hold  PPT 2 x 10 x 3'' hold   HL hip abduction with GTB 2 x 10   HL hip adduction with ball squeeze 20 x 3'' hold  Supine HSS 3 x 30 '' ea     Supine hip flexor stretch 2 x 30''      Rito received the following manual therapy techniques IASTM/ STM to bilat lumbar paraspinals and superior gluteal musculature  X 0'         MHP  x 10' concurrently with exercises  Ice pack x 10' - np    Education provided re: discussed current functional status, progress, and POC. Pt was instructed in HEP including: supine clams, ball  squeeze, calf stretch, HS stretch, prone quad stretch, and bridges; pt demonstrated and verbalized understanding and was provided with a handout.  Rito verbalized good understanding of education provided.   No spiritual or educational barriers to learning provided    Assessment     Patient tolerated treatment session well today.  Patient with good tolerance to dynamic stretching with appropriate training effect achieved.  Patient continues to demonstrate decreased core and hip strength as needed for ease with ADLs.  Plan to progress with core strengthening and flexibility as tolerated by the patient.  Pt will continue to benefit from skilled PT intervention to address these limitations in order to maximize pain free functional independence.    Pt is making good progress towards established goals.    GOALS  Short Term Goals: 4 weeks (5/17/17)  1. Pt will report <6/10 pain to improve activity tolerance. Met 5/17/17  2. Pt will increase MMT by 1/2 grade in order to improve functional stability and strength. Met 5/17/17  3. Pt will be able to lift medium weighted object from the floor on command with proper body mechanics in order to progress functional independence.  4. Pt will tolerate HEP to improve impairments and independence with ADL's. Met 5/17/17    Long Term Goals: 12 weeks (7/12/17)  1. Pt will report <5/10 pain to improve activity tolerance.  2. Pt will report <40% on FOTO to demonstrate a decrease in disability and improvement in independence with functional activities.   3. Pt will demonstrate improvement in LE flexibility in order to improve functional mobility.  4. Pt will improve lumbar ROM in order to improve functional mobility and tolerance for ADLs. progressing  5. Pt will be I with HEP for self-management of symptoms.     G-codes entered and interpreted by supervising PT  CMS Impairment/Limitation/Restriction for FOTO Lumbar Spine Survey  Status Limitation G-Code CMS Severity Modifier  Intake 50%  50%  Predicted 60% 40% Goal Status+ CK - At least 40 percent but less than 60 percent  5/17/2017 55% 45%  6/8/2017 54% 46% Current Status CK - At least 40 percent but less than 60 percent     Plan     Continue with established Plan of Care towards PT goals.

## 2017-06-13 ENCOUNTER — TELEPHONE (OUTPATIENT)
Dept: REHABILITATION | Facility: HOSPITAL | Age: 52
End: 2017-06-13

## 2017-06-15 ENCOUNTER — CLINICAL SUPPORT (OUTPATIENT)
Dept: REHABILITATION | Facility: HOSPITAL | Age: 52
End: 2017-06-15
Attending: FAMILY MEDICINE
Payer: MEDICARE

## 2017-06-15 DIAGNOSIS — M25.69 DECREASED ROM OF TRUNK AND BACK: Primary | ICD-10-CM

## 2017-06-15 PROCEDURE — 97110 THERAPEUTIC EXERCISES: CPT | Mod: PN

## 2017-06-15 NOTE — PROGRESS NOTES
Physical Therapy Daily Note     Name: Rito Conley  Clinic Number: [id number]  Diagnosis:   Encounter Diagnosis   Name Primary?    Decreased ROM of trunk and back Yes     Physician: Jennifer Huertas MD    Visit #: 12 of 20    Time In: 10:00  Time Out: 10:45  Total Treatment Time: 45 minutes (1:1 with PT)   Referral expiration: 12/31/17  POC expiration: 7/12/17    Subjective     Pt reports that he has to leave at 10:45 today. His back pain is not bad. Pt states that overall he is very pleased with his therapy experience, with the clinic as a whole and with his therapist. Pt reports that overall he thinks his back has improved by 75-80%. Pt also states that when his back starts to flare up he is able to do some of his exercises and it feels better. Pt reports that he has an old injury and arthritis in his back and understands that this is something he will have to manage moving forward after therapy.  Pain Scale: Rito rates pain on a scale of 0-10 to be 4-5 currently in his low back.     Objective     Rito received individual therapeutic exercises to develop strength, endurance, ROM, flexibility, posture and core stabilization for 45 minutes including:    Elliptical x 5 min  Upright bikex 6 min, level 5 hills   Dynamic warm up: Ankle grab, Monster kicks, walking lunges, modified drinking birds x 1 lap    LTR with PB with CL UE flexion x 3'  DKTC with PB and UE flexion, red dowel 2 x 10  Transverse Abdominus with heel taps from table top 3 x 10  Prone quad/hip flexor stretch with strap 3 x 30'' ea   Sidelying Clams 2 x 10 GTB   Bridges 3 x 10   Reverse Crunches 10 x 5'' hold   Shuttle 3 x 10, 2.5 cords  Piriformis stretch on shuttle 3 x 30'' with verbal cueing  Standing HS stretch 3 x 30'' ea  Standing calf stretch 3 x 30'' ea  Low lunge, back knee lifted, x 30'' ea  Low lunge hip flexor stretch 3 x 30'' ea  Open book x 10 ea  Seated 3- way ball roll  out x 3'     DKTC 10 x 5'' hold  PPT 2 x 10 x 3'' hold   HL hip abduction with GTB 2 x 10   HL hip adduction with ball squeeze 20 x 3'' hold  Supine HSS 3 x 30 '' ea     Supine hip flexor stretch 2 x 30''      Rito received the following manual therapy techniques IASTM/ STM to bilat lumbar paraspinals and superior gluteal musculature  X 0'         MHP  x 10' concurrently with exercises - np  Ice pack x 10' - np    Education provided re: discussed current functional status, progress, and POC. Pt was instructed in HEP including: supine clams, ball squeeze, calf stretch, HS stretch, prone quad stretch, and bridges; pt demonstrated and verbalized understanding and was provided with a handout.  Rito verbalized good understanding of education provided.   No spiritual or educational barriers to learning provided    Assessment     Patient tolerated treatment session well today without visible adverse effects. Pt reports good overall improvement in his back; he states he will always be somewhat limited due to having an old injury and arthritis in his back. Pt continues to display decreased LE flexibility, though improving with good tolerance for stretching. Pt with improving carry over of activities and improved body awareness. Will address proper lifting mechanics moving forward in therapy to progress towards goals and will continue to progress core strengthening and flexibility/mobility. Pt will continue to benefit from skilled PT intervention to address these limitations in order to maximize pain free functional independence.    Pt is making good progress towards established goals.    GOALS  Short Term Goals: 4 weeks (5/17/17)  1. Pt will report <6/10 pain to improve activity tolerance. Met 5/17/17  2. Pt will increase MMT by 1/2 grade in order to improve functional stability and strength. Met 5/17/17  3. Pt will be able to lift medium weighted object from the floor on command with proper body mechanics in order to  progress functional independence.  4. Pt will tolerate HEP to improve impairments and independence with ADL's. Met 5/17/17    Long Term Goals: 12 weeks (7/12/17)  1. Pt will report <5/10 pain to improve activity tolerance.  2. Pt will report <40% on FOTO to demonstrate a decrease in disability and improvement in independence with functional activities.   3. Pt will demonstrate improvement in LE flexibility in order to improve functional mobility.  4. Pt will improve lumbar ROM in order to improve functional mobility and tolerance for ADLs. progressing  5. Pt will be I with HEP for self-management of symptoms.     G-codes entered and interpreted by supervising PT  CMS Impairment/Limitation/Restriction for FOTO Lumbar Spine Survey  Status Limitation G-Code CMS Severity Modifier  Intake 50% 50%  Predicted 60% 40% Goal Status+ CK - At least 40 percent but less than 60 percent  5/17/2017 55% 45%  6/8/2017 54% 46% Current Status CK - At least 40 percent but less than 60 percent     Plan     Continue with established Plan of Care towards PT goals.

## 2017-06-20 ENCOUNTER — CLINICAL SUPPORT (OUTPATIENT)
Dept: REHABILITATION | Facility: HOSPITAL | Age: 52
End: 2017-06-20
Attending: FAMILY MEDICINE
Payer: MEDICARE

## 2017-06-20 DIAGNOSIS — M25.69 DECREASED ROM OF TRUNK AND BACK: Primary | ICD-10-CM

## 2017-06-20 PROCEDURE — 97110 THERAPEUTIC EXERCISES: CPT | Mod: PN
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
## 2017-06-27 ENCOUNTER — CLINICAL SUPPORT (OUTPATIENT)
Dept: REHABILITATION | Facility: HOSPITAL | Age: 52
End: 2017-06-27
Attending: FAMILY MEDICINE
Payer: MEDICARE

## 2017-06-27 DIAGNOSIS — M25.69 DECREASED ROM OF TRUNK AND BACK: Primary | ICD-10-CM

## 2017-06-27 PROCEDURE — 97110 THERAPEUTIC EXERCISES: CPT | Mod: PN

## 2017-06-27 NOTE — PROGRESS NOTES
Physical Therapy Daily Note     Name: Rito Moore [facility] Number: [id number]  Diagnosis:   Encounter Diagnosis   Name Primary?    Decreased ROM of trunk and back Yes     Physician: Jennifer Huertas MD    Visit #: 14 of 20    Time In: 0650  Time Out:  0744  Total Treatment Time: 54  minutes (1:1 with PTA)   Referral expiration: 12/31/17  POC expiration: 7/12/17    Subjective     Pt reports that he is doing good today.    Pain Scale: Rito rates tightness on a scale of 0-10 to be 3 currently in his low back.     Objective       Rito received individual therapeutic exercises to develop strength, endurance, ROM, flexibility, posture and core stabilization for 54  minutes including:    Elliptical x 5 min  Upright bikex 6 min, level 5 hills   Dynamic warm up: Ankle grab, Monster kicks, walking lunges, modified drinking birds x 1 lap    LTR with PB with CL UE flexion x 10  DKTC with PB and UE flexion, red dowel 2 x 10  Reverse Crunches 15 x 5'' hold   Transverse Abdominus with heel taps from table top 3 x 10    Modified Planks  3 x 20''  Bridges 3 x 10   Standing HS stretch 3 x 30'' ea  Standing calf stretch 3 x 30'' ea  Low lunge, back knee lifted, 3 x 30'' ea  Low lunge hip flexor stretch 3 x 30'' ea  Shuttle 3 x 10, 2.5 cords  Piriformis stretch on shuttle 3 x 30'' with verbal cueing  Open book x 10 ea    + Core Torso Rotation 1 x 15 x 22#   + Lumbar Extension 1 x 15 x 40#     Not performed:   Seated 3- way ball roll out x 3'   Prone quad/hip flexor stretch with strap 3 x 30'' ea   Sidelying Clams 2 x 10 GTB         Rito received the following manual therapy techniques IASTM/ STM to bilat lumbar paraspinals and superior gluteal musculature  X 0'         MHP  x 10' concurrently with exercises - np  Ice pack x 10' - np    Education provided re: discussed current functional status, progress, and POC.   Rito verbalized good understanding of education  provided.   No spiritual or educational barriers to learning provided    Assessment     Mr. Veras tolerated treatment session well today.  Patient with good tolerance to exercises with appropriate training effect achieved.  Patient able to progress with core strengthening exercises without provocation of pain.   Plan to progress with strengthening and flexibility to ensure progress towards discharge goals.  Pt will continue to benefit from skilled PT intervention to address these limitations in order to maximize pain free functional independence.    Pt is making good progress towards established goals.    GOALS  Short Term Goals: 4 weeks (5/17/17)  1. Pt will report <6/10 pain to improve activity tolerance. Met 5/17/17  2. Pt will increase MMT by 1/2 grade in order to improve functional stability and strength. Met 5/17/17  3. Pt will be able to lift medium weighted object from the floor on command with proper body mechanics in order to progress functional independence.  4. Pt will tolerate HEP to improve impairments and independence with ADL's. Met 5/17/17    Long Term Goals: 12 weeks (7/12/17)  1. Pt will report <5/10 pain to improve activity tolerance.  2. Pt will report <40% on FOTO to demonstrate a decrease in disability and improvement in independence with functional activities.   3. Pt will demonstrate improvement in LE flexibility in order to improve functional mobility.  4. Pt will improve lumbar ROM in order to improve functional mobility and tolerance for ADLs. progressing  5. Pt will be I with HEP for self-management of symptoms.     G-codes entered and interpreted by supervising PT  CMS Impairment/Limitation/Restriction for FOTO Lumbar Spine Survey  Status Limitation G-Code CMS Severity Modifier  Intake 50% 50%  Predicted 60% 40% Goal Status+ CK - At least 40 percent but less than 60 percent  5/17/2017 55% 45%  6/8/2017 54% 46% Current Status CK - At least 40 percent but less than 60 percent     Plan      Continue with established Plan of Care towards PT goals.

## 2017-06-29 ENCOUNTER — CLINICAL SUPPORT (OUTPATIENT)
Dept: REHABILITATION | Facility: HOSPITAL | Age: 52
End: 2017-06-29
Attending: FAMILY MEDICINE
Payer: MEDICARE

## 2017-06-29 DIAGNOSIS — M25.69 DECREASED ROM OF TRUNK AND BACK: Primary | ICD-10-CM

## 2017-06-29 DIAGNOSIS — G89.29 CHRONIC BILATERAL LOW BACK PAIN WITHOUT SCIATICA: ICD-10-CM

## 2017-06-29 DIAGNOSIS — M54.50 CHRONIC BILATERAL LOW BACK PAIN WITHOUT SCIATICA: ICD-10-CM

## 2017-06-29 PROCEDURE — 97110 THERAPEUTIC EXERCISES: CPT | Mod: PN

## 2017-06-29 NOTE — PROGRESS NOTES
Physical Therapy Daily Note     Name: Rito Moore [facility] Number: [id number]  Diagnosis:   No diagnosis found.  Physician: Jennifer Huertas MD    Visit #: 15 of 20    Time In: 10:20  Time Out: 10:42  Total Treatment Time: 22 minutes (1:1 with PT)   Referral expiration: 12/31/17  POC expiration: 7/12/17    Subjective     Pt reports that he s not feeling well today, he has an upset stomach and his back is also bothering him; he states he thinks he bent to pick something up and made it hurt but also his stomach is making him feel bad overall.  Pain Scale: Rito rates tightness/LBP on a scale of 0-10 to be 6 currently in his low back.     Objective       Rito received individual therapeutic exercises to develop strength, endurance, ROM, flexibility, posture and core stabilization for 15 minutes including:    Elliptical x 5 min  Upright bikex 6 min, level 5 hills   Dynamic warm up: Ankle grab, Monster kicks, walking lunges, modified drinking birds x 1 lap  Shuttle 3 x 10, 2.5 cords  Piriformis stretch on shuttle 3 x 30'' with verbal cueing    Open book x 10 ea  LTR with PB with CL UE flexion x 10  DKTC with PB and UE flexion, red dowel 2 x 10  Reverse Crunches 15 x 5'' hold   Transverse Abdominus with heel taps from table top 3 x 10    Modified Planks  3 x 20''  Bridges 3 x 10   Standing HS stretch 3 x 30'' ea  Standing calf stretch 3 x 30'' ea  Low lunge, back knee lifted, 3 x 30'' ea  Low lunge hip flexor stretch 3 x 30'' ea    + Core Torso Rotation 1 x 15 x 22#   + Lumbar Extension 1 x 15 x 40#     Seated 3- way ball roll out x 3'   Prone quad/hip flexor stretch with strap 3 x 30'' ea   Sidelying Clams 2 x 10 GTB     Rito received the following manual therapy techniques IASTM/ STM to bilat lumbar paraspinals and superior gluteal musculature  X 0'         MHP  x 10' concurrently with exercises - np  Ice pack x 10' - np    Education provided re:  discussed current functional status, progress, and POC. Rito verbalized good understanding of education provided.   No spiritual or educational barriers to learning provided    Assessment     Pt's session cut short today due to stomach upset. Discussed POC today with discussion of having two more weeks of therapy in order to continue to progress therapeutic exercise and progress towards discharge. Pt continues to present with decreased lumbar mobility, decreased flexibility and decreased core strength and stability. Pt will continue to benefit from skilled PT intervention to address these limitations in order to maximize pain free functional independence.    Pt is making good progress towards established goals.    GOALS  Short Term Goals: 4 weeks (5/17/17)  1. Pt will report <6/10 pain to improve activity tolerance. Met 5/17/17  2. Pt will increase MMT by 1/2 grade in order to improve functional stability and strength. Met 5/17/17  3. Pt will be able to lift medium weighted object from the floor on command with proper body mechanics in order to progress functional independence.  4. Pt will tolerate HEP to improve impairments and independence with ADL's. Met 5/17/17    Long Term Goals: 12 weeks (7/12/17)  1. Pt will report <5/10 pain to improve activity tolerance.  2. Pt will report <40% on FOTO to demonstrate a decrease in disability and improvement in independence with functional activities.   3. Pt will demonstrate improvement in LE flexibility in order to improve functional mobility.  4. Pt will improve lumbar ROM in order to improve functional mobility and tolerance for ADLs. progressing  5. Pt will be I with HEP for self-management of symptoms.     G-codes entered and interpreted by supervising PT  CMS Impairment/Limitation/Restriction for FOTO Lumbar Spine Survey  Status Limitation G-Code CMS Severity Modifier  Intake 50% 50%  Predicted 60% 40% Goal Status+ CK - At least 40 percent but less than 60  percent  5/17/2017 55% 45%  6/8/2017 54% 46% Current Status CK - At least 40 percent but less than 60 percent     Plan     Continue with established Plan of Care towards PT goals.

## 2017-07-06 ENCOUNTER — CLINICAL SUPPORT (OUTPATIENT)
Dept: REHABILITATION | Facility: HOSPITAL | Age: 52
End: 2017-07-06
Attending: FAMILY MEDICINE
Payer: MEDICARE

## 2017-07-06 DIAGNOSIS — M25.69 DECREASED ROM OF TRUNK AND BACK: Primary | ICD-10-CM

## 2017-07-06 DIAGNOSIS — M54.50 CHRONIC BILATERAL LOW BACK PAIN WITHOUT SCIATICA: ICD-10-CM

## 2017-07-06 DIAGNOSIS — G89.29 CHRONIC BILATERAL LOW BACK PAIN WITHOUT SCIATICA: ICD-10-CM

## 2017-07-06 PROCEDURE — 97110 THERAPEUTIC EXERCISES: CPT | Mod: PN

## 2017-07-06 NOTE — PROGRESS NOTES
Physical Therapy Daily Note     Name: Rito Moore [place]  Clinic Number: [id number]  Diagnosis:   Encounter Diagnoses   Name Primary?    Decreased ROM of trunk and back Yes    Chronic bilateral low back pain without sciatica      Physician: Jennifer Huertas MD    Visit #: 16 of 20    Time In: 0940  Time Out: 1030  Total Treatment Time: 50'  minutes (1:1 with PTA for 30' of treatment session)   Referral expiration: 12/31/17  POC expiration: 7/12/17    Subjective     Pt reports that he is feeling better today.  Pt states that he is feeling much better since the start of therapy.  Pt states that he knows that he isn't going to fully get rid of the pain and its something that he is going to have to deal with.  Pt reports very inimal pain in his back today.  Pain Scale: Rito rates tightness/LBP on a scale of 0-10 to be 3 currently in his low back.     Objective       Rito received individual therapeutic exercises to develop strength, endurance, ROM, flexibility, posture and core stabilization for 50 minutes including:    Elliptical x 5 min  Upright bikex 6 min, level 5 hills   Dynamic warm up: Ankle grab, Monster kicks, walking lunges, modified drinking birds x 1 lap  Shuttle 3 x 10, 3 cords  Piriformis stretch on shuttle 3 x 30'' with verbal cueing    Open book x 10 ea  LTR with PB with CL UE flexion x 10  DKTC with PB and UE flexion, red dowel 2 x 10  Reverse Crunches 15 x 5'' hold   Transverse Abdominus with heel taps from table top 3 x 10    Modified Planks  3 x 20''  Bridges 3 x 10   Standing HS stretch 3 x 30'' ea  Standing calf stretch 3 x 30'' ea  Low lunge, back knee lifted, 3 x 30'' ea  Low lunge hip flexor stretch 3 x 30'' ea    Core Torso Rotation 2x 10 x 22#    Lumbar Extension 2 x 10 x 68#     Seated 3- way ball roll out x 3'   Prone quad/hip flexor stretch with strap 3 x 30'' ea   Sidelying Clams 2 x 10 GTB     Rito received the following  manual therapy techniques IASTM/ STM to bilat lumbar paraspinals and superior gluteal musculature  X 0'         MHP  x 10' concurrently with exercises - np  Ice pack x 10' - np    Education provided re: discussed current functional status, progress, and POC. Rito verbalized good understanding of education provided.   No spiritual or educational barriers to learning provided    Assessment     Mr. Veras tolerated treatment session very well.  Pt continues to respond well to exercises with appropriate training effect achieved.  Patient continues with decreased core strength and flexibility.  Pt will continue to benefit from skilled PT intervention to address these limitations in order to maximize pain free functional independence.    Pt is making good progress towards established goals.    GOALS  Short Term Goals: 4 weeks (5/17/17)  1. Pt will report <6/10 pain to improve activity tolerance. Met 5/17/17  2. Pt will increase MMT by 1/2 grade in order to improve functional stability and strength. Met 5/17/17  3. Pt will be able to lift medium weighted object from the floor on command with proper body mechanics in order to progress functional independence.  4. Pt will tolerate HEP to improve impairments and independence with ADL's. Met 5/17/17    Long Term Goals: 12 weeks (7/12/17)  1. Pt will report <5/10 pain to improve activity tolerance.  2. Pt will report <40% on FOTO to demonstrate a decrease in disability and improvement in independence with functional activities.   3. Pt will demonstrate improvement in LE flexibility in order to improve functional mobility.  4. Pt will improve lumbar ROM in order to improve functional mobility and tolerance for ADLs. progressing  5. Pt will be I with HEP for self-management of symptoms.     G-codes entered and interpreted by supervising PT  CMS Impairment/Limitation/Restriction for FOTO Lumbar Spine Survey  Status Limitation G-Code CMS Severity Modifier  Intake 50%  50%  Predicted 60% 40% Goal Status+ CK - At least 40 percent but less than 60 percent  5/17/2017 55% 45%  6/8/2017 54% 46% Current Status CK - At least 40 percent but less than 60 percent     Plan     Continue with established Plan of Care towards PT goals.

## 2017-07-11 ENCOUNTER — TELEPHONE (OUTPATIENT)
Dept: REHABILITATION | Facility: HOSPITAL | Age: 52
End: 2017-07-11

## 2017-07-13 ENCOUNTER — CLINICAL SUPPORT (OUTPATIENT)
Dept: REHABILITATION | Facility: HOSPITAL | Age: 52
End: 2017-07-13
Attending: FAMILY MEDICINE
Payer: MEDICARE

## 2017-07-13 DIAGNOSIS — M25.69 DECREASED ROM OF TRUNK AND BACK: Primary | ICD-10-CM

## 2017-07-13 DIAGNOSIS — M54.50 CHRONIC BILATERAL LOW BACK PAIN WITHOUT SCIATICA: ICD-10-CM

## 2017-07-13 DIAGNOSIS — G89.29 CHRONIC BILATERAL LOW BACK PAIN WITHOUT SCIATICA: ICD-10-CM

## 2017-07-13 PROCEDURE — 97110 THERAPEUTIC EXERCISES: CPT | Mod: PN

## 2017-07-13 NOTE — PROGRESS NOTES
Physical Therapy Daily Note     Name: Rito Moore [place]  Clinic Number: [id number]  Diagnosis:   Encounter Diagnoses   Name Primary?    Decreased ROM of trunk and back Yes    Chronic bilateral low back pain without sciatica      Physician: Jennifer Huertas MD    Visit #: 17 of 20    Time In: 1030  Time Out: 1130  Total Treatment Time: 60'  minutes (1:1 with PTA for 30' of treatment session)   Referral expiration: 12/31/17  POC expiration: 7/12/17    Subjective     Pt reports that he is doing well today.   Pain Scale: Rito rates tightness/LBP on a scale of 0-10 to be 0 currently in his low back.     Objective       Rito received individual therapeutic exercises to develop strength, endurance, ROM, flexibility, posture and core stabilization for 50 minutes including:    Elliptical x 5 min  Upright bikex 6 min, level 5 hills   Dynamic warm up: Ankle grab, Monster kicks, walking lunges, modified drinking birds x 1 lap  Shuttle 3 x 10, 3 cords  Piriformis stretch on shuttle 3 x 30'' with verbal cueing    Open book x 10 ea  LTR with PB with CL UE flexion x 10  DKTC with PB and UE flexion, red dowel 2 x 10  Reverse Crunches 15 x 5'' hold   Transverse Abdominus with heel taps from table top 3 x 10    Modified Planks  3 x 20''  Bridges 3 x 10   Standing HS stretch 3 x 30'' ea  Standing calf stretch 3 x 30'' ea  Low lunge, back knee lifted, 3 x 30'' ea  Low lunge hip flexor stretch 3 x 30'' ea    Core Torso Rotation 2x 10 x 22#    Lumbar Extension 2 x 10 x 68#     Seated 3- way ball roll out x 3'   Prone quad/hip flexor stretch with strap 3 x 30'' ea   Sidelying Clams 2 x 10 GTB     Rito received the following manual therapy techniques IASTM/ STM to bilat lumbar paraspinals and superior gluteal musculature  X 0'         MHP  x 10' concurrently with exercises - np  Ice pack x 10' - np    Education provided re: discussed current functional status, progress, and  FREDO Veras verbalized good understanding of education provided.   No spiritual or educational barriers to learning provided    Assessment     Mr. Veras tolerated treatment session very well.  Pt continues to respond well to exercises with appropriate training effect achieved.  Patient continues with decreased core strength and flexibility.  Pt will continue to benefit from skilled PT intervention to address these limitations in order to maximize pain free functional independence.    Pt is making good progress towards established goals.    GOALS  Short Term Goals: 4 weeks (5/17/17)  1. Pt will report <6/10 pain to improve activity tolerance. Met 5/17/17  2. Pt will increase MMT by 1/2 grade in order to improve functional stability and strength. Met 5/17/17  3. Pt will be able to lift medium weighted object from the floor on command with proper body mechanics in order to progress functional independence.  4. Pt will tolerate HEP to improve impairments and independence with ADL's. Met 5/17/17    Long Term Goals: 12 weeks (7/12/17)  1. Pt will report <5/10 pain to improve activity tolerance.  2. Pt will report <40% on FOTO to demonstrate a decrease in disability and improvement in independence with functional activities.   3. Pt will demonstrate improvement in LE flexibility in order to improve functional mobility.  4. Pt will improve lumbar ROM in order to improve functional mobility and tolerance for ADLs. progressing  5. Pt will be I with HEP for self-management of symptoms.     G-codes entered and interpreted by supervising PT  CMS Impairment/Limitation/Restriction for FOTO Lumbar Spine Survey  Status Limitation G-Code CMS Severity Modifier  Intake 50% 50%  Predicted 60% 40% Goal Status+ CK - At least 40 percent but less than 60 percent  5/17/2017 55% 45%  6/8/2017 54% 46% Current Status CK - At least 40 percent but less than 60 percent     Plan     Continue with established Plan of Care towards PT goals.

## 2017-07-18 ENCOUNTER — CLINICAL SUPPORT (OUTPATIENT)
Dept: REHABILITATION | Facility: HOSPITAL | Age: 52
End: 2017-07-18
Attending: FAMILY MEDICINE
Payer: MEDICARE

## 2017-07-18 DIAGNOSIS — G89.29 CHRONIC BILATERAL LOW BACK PAIN WITHOUT SCIATICA: ICD-10-CM

## 2017-07-18 DIAGNOSIS — M25.69 DECREASED ROM OF TRUNK AND BACK: Primary | ICD-10-CM

## 2017-07-18 DIAGNOSIS — M54.50 CHRONIC BILATERAL LOW BACK PAIN WITHOUT SCIATICA: ICD-10-CM

## 2017-07-18 PROCEDURE — 97110 THERAPEUTIC EXERCISES: CPT | Mod: PN

## 2017-07-18 PROCEDURE — G8980 MOBILITY D/C STATUS: HCPCS | Mod: CK,PN

## 2017-07-18 PROCEDURE — G8979 MOBILITY GOAL STATUS: HCPCS | Mod: CK,PN

## 2017-07-18 NOTE — PROGRESS NOTES
"                                                    Physical Therapy Discharge Note     Name: Rito Moore [place]  Clinic Number: [id number]  Diagnosis:   Encounter Diagnoses   Name Primary?    Decreased ROM of trunk and back Yes    Chronic bilateral low back pain without sciatica      Physician: Jennifer Huertas MD    Visit #: 18 of 20    Time In: 905  Time Out: 1000  Total Treatment Time: 55'  minutes (1:1 with PT for 55' of treatment session)   Referral expiration: 12/31/17  POC expiration: 7/12/17    Subjective     Pt reports that he is experiencing "a little tightness" in low back. He is prepared for discharge this session. He experienced 6/10 pain in past 2 weeks while performing lifting object from floor.  Pain Scale: Rito rates tightness/LBP on a scale of 0-10 to be 0 currently in his low back.     Objective     Lumbar Active Range of Motion: 2 inclinometers for Flex/Ext (T12/L1 and S2); Goniometer for SB; Observation for Rot    % Normal Pain   Flexion 70/40 - 30    Extension  25/10 - 15                                           + ( mild at end range)    L Side Bend 15 -       R Side Bend 25    L Rotation WFL -       R Rotation WFL           Strength    Right LE Left LE   Hip flexion 5/5 5/5   Gluts 5/5 5/5   Hip ER 5/5 5/5   Hip IR 5/5 5/5   Hip extension 5/5 5/5   Hip abduction 5/5 5/5   Hip adduction 5/5 5/5   Knee flexion 5/5 4+/5   Knee extension 5/5 5/5   Ankle dorsiflexion 5/5 5/5   Ankle plantarflexion 5/5 5/5   Ankle eversion 5/5 5/5   Ankle inversion 5/5 5/5   Great toe extension 5/5 5/5        Rito received individual therapeutic exercises to develop strength, endurance, ROM, flexibility, posture and core stabilization for 50 minutes including:    Upright bikex 6 min, level 5 hills   Dynamic warm up: Ankle grab, Monster kicks, walking lunges, modified drinking birds x 1 lap  Shuttle 3 x 10, 3 cords  Piriformis stretch on shuttle 3 x 30'' with verbal cueing  Standing HS stretch 3 x 30'' " ea  Standing calf stretch 3 x 30'' ea  Core Torso Rotation 2x 10 x 22#   Lumbar Extension 2 x 10 x 68#   Lifting Body Mechancs with weighed-box from floor to chest-height shelf      Rito received the following manual therapy techniques IASTM/ STM to bilat lumbar paraspinals and superior gluteal musculature  X 0'         MHP  x 10' concurrently with exercises - np  Ice pack x 10' - np    Education provided re: discussed current functional status, progress, and POC. Rito verbalized good understanding of education provided.   No spiritual or educational barriers to learning provided    Assessment     Mr. Veras tolerated treatment session very well, with discharge note performed. Pt has achieved 4/4 short term goals and 3/5 long term goals, with recent heightened report of subjective pain. Pt demonstrates normalized, age-appropriate LE strength, with no symptom provocation with maximal isometric contraction at available end-range. Pt with improved lumbar AROM during this episode of care, with minimal tightness/pain experienced. Pt with good demonstration and understanding of lifting/body mechanics with weighted objects from floor, with decrease of symptoms following education. Pt is safe to be discharged from outpatient physical therapy and is at low risk for re-injury.    GOALS  Short Term Goals: 4 weeks (5/17/17)  1. Pt will report <6/10 pain to improve activity tolerance. Met 5/17/17  2. Pt will increase MMT by 1/2 grade in order to improve functional stability and strength. Met 5/17/17  3. Pt will be able to lift medium weighted object from the floor on command with proper body mechanics in order to progress functional independence. Met  4. Pt will tolerate HEP to improve impairments and independence with ADL's. Met 5/17/17    Long Term Goals: 12 weeks (7/12/17)  1. Pt will report <5/10 pain to improve activity tolerance. Not Met   2. Pt will report <40% on FOTO to demonstrate a decrease in disability and  improvement in independence with functional activities. Not Met  3. Pt will demonstrate improvement in LE flexibility in order to improve functional mobility. Met  4. Pt will improve lumbar ROM in order to improve functional mobility and tolerance for ADLs. Met  5. Pt will be I with HEP for self-management of symptoms. Met    G-codes entered and interpreted by supervising PT  CMS Impairment/Limitation/Restriction for FOTO Lumbar Spine Survey  Status Limitation G-Code CMS Severity Modifier  Intake 50% 50%  Predicted 60% 40% Goal Status+ CK - At least 40 percent but less than 60 percent  5/17/2017 55% 45%  6/8/2017 54% 46% Current Status CK - At least 40 percent but less than 60 percent  7/18/17 60% 41% Discharge Status CK - At least 40 percent but less than 60 percent     Plan     Patient to be discharged from outpatient physical therapy. Pt will contact MD or PT for any significant decline in functional status.    Rito Sheriff, PT  07/18/2017

## 2017-07-19 ENCOUNTER — OFFICE VISIT (OUTPATIENT)
Dept: FAMILY MEDICINE | Facility: CLINIC | Age: 52
End: 2017-07-19
Payer: MEDICARE

## 2017-07-19 DIAGNOSIS — I10 ESSENTIAL HYPERTENSION: Primary | ICD-10-CM

## 2017-07-19 DIAGNOSIS — E78.5 HYPERLIPIDEMIA, UNSPECIFIED HYPERLIPIDEMIA TYPE: ICD-10-CM

## 2017-07-19 DIAGNOSIS — M47.816 OSTEOARTHRITIS OF LUMBAR SPINE, UNSPECIFIED SPINAL OSTEOARTHRITIS COMPLICATION STATUS: ICD-10-CM

## 2017-07-19 DIAGNOSIS — Z11.59 ENCOUNTER FOR HEPATITIS C SCREENING TEST FOR LOW RISK PATIENT: ICD-10-CM

## 2017-07-19 PROCEDURE — 99999 PR PBB SHADOW E&M-EST. PATIENT-LVL III: CPT | Mod: PBBFAC,,, | Performed by: FAMILY MEDICINE

## 2017-07-19 PROCEDURE — 99213 OFFICE O/P EST LOW 20 MIN: CPT | Mod: PBBFAC,PO | Performed by: FAMILY MEDICINE

## 2017-07-19 PROCEDURE — 99214 OFFICE O/P EST MOD 30 MIN: CPT | Mod: S$PBB,,, | Performed by: FAMILY MEDICINE

## 2017-07-19 NOTE — PROGRESS NOTES
"Subjective:       Patient ID: Rito Conley is a 52 y.o. male.    Chief Complaint: Follow Up/ PT/ Release for work    Patient presents for follow up. He is here for a release to return to work. He is pleased with his results from physical therapy. He states the stiffness and pain have improved significantly and his range of motions.     He has hypertension, but has just taken his medication and has had coffee. He states his blood pressure is normally controlled 120's-130's systolic over 80's diastolic.       Review of Systems   Constitutional: Positive for activity change. Negative for fatigue.   Respiratory: Negative for cough, chest tightness, shortness of breath and wheezing.    Cardiovascular: Negative for chest pain, palpitations and leg swelling.   Gastrointestinal: Negative for abdominal pain, nausea and vomiting.   Endocrine: Negative for polydipsia, polyphagia and polyuria.   Neurological: Negative for dizziness, syncope, light-headedness and headaches.       Objective:       Vitals:    07/19/17 0856 07/19/17 0915   BP: (!) 140/100 124/84   Pulse: 73    Temp: 98.4 °F (36.9 °C)    TempSrc: Oral    SpO2: 98%    Weight: 93.4 kg (205 lb 14.6 oz)    Height: 5' 11" (1.803 m)        Physical Exam   Constitutional: He is oriented to person, place, and time. He appears well-developed and well-nourished. No distress.   HENT:   Head: Normocephalic and atraumatic.   Eyes: Conjunctivae are normal.   Neck: Normal range of motion. Neck supple. Carotid bruit is not present.   Cardiovascular: Normal rate, regular rhythm and normal heart sounds.  Exam reveals no gallop and no friction rub.    No murmur heard.  Pulmonary/Chest: Effort normal and breath sounds normal. No respiratory distress. He has no wheezes. He has no rales.   Musculoskeletal: He exhibits no edema.        Lumbar back: He exhibits normal range of motion, no tenderness, no bony tenderness, no swelling, no pain and no spasm.   Lymphadenopathy:     He " has no cervical adenopathy.   Neurological: He is alert and oriented to person, place, and time.   Skin: He is not diaphoretic.   Psychiatric: He has a normal mood and affect.       Assessment:       1. Essential hypertension    2. Hyperlipidemia, unspecified hyperlipidemia type    3. Encounter for hepatitis C screening test for low risk patient    4. Osteoarthritis of lumbar spine, unspecified spinal osteoarthritis complication status        Plan:       Rito was seen today for follow up/ pt/ release for work.    Diagnoses and all orders for this visit:    Essential hypertension  -     Comprehensive metabolic panel; Future  -     Lipid panel; Future  -     TSH; Future  -     CBC auto differential; Future  Blood pressure is controlled on current regimen and he is due for labs. He will come in for documented normal blood pressure    Hyperlipidemia, unspecified hyperlipidemia type  -     Comprehensive metabolic panel; Future  -     Lipid panel; Future  -     TSH; Future  Due for labs    Encounter for hepatitis C screening test for low risk patient  -     Hepatitis C antibody; Future  Ordered hepatitis C screening    Osteoarthritis of lumbar spine, unspecified spinal osteoarthritis complication status  He was given a note to return to normal activiety.

## 2017-07-23 VITALS
SYSTOLIC BLOOD PRESSURE: 124 MMHG | HEIGHT: 71 IN | DIASTOLIC BLOOD PRESSURE: 84 MMHG | TEMPERATURE: 98 F | BODY MASS INDEX: 28.83 KG/M2 | HEART RATE: 73 BPM | WEIGHT: 205.94 LBS | OXYGEN SATURATION: 98 %

## 2017-07-25 ENCOUNTER — CLINICAL SUPPORT (OUTPATIENT)
Dept: FAMILY MEDICINE | Facility: CLINIC | Age: 52
End: 2017-07-25
Payer: MEDICARE

## 2017-07-25 ENCOUNTER — TELEPHONE (OUTPATIENT)
Dept: FAMILY MEDICINE | Facility: CLINIC | Age: 52
End: 2017-07-25

## 2017-07-25 VITALS — DIASTOLIC BLOOD PRESSURE: 82 MMHG | SYSTOLIC BLOOD PRESSURE: 128 MMHG

## 2017-07-25 PROCEDURE — 99211 OFF/OP EST MAY X REQ PHY/QHP: CPT | Mod: PBBFAC,PO

## 2017-07-25 PROCEDURE — 99999 PR PBB SHADOW E&M-EST. PATIENT-LVL I: CPT | Mod: PBBFAC,,,

## 2017-07-25 NOTE — TELEPHONE ENCOUNTER
Patient here for Blood pressure check, [phone].  Patient states that he has been compliant with his blood pressure medication.  He took his last dose this morning.  Message will be routed to Dr Huertas.

## 2017-07-25 NOTE — PROGRESS NOTES
Patient here for Blood pressure check, [phone].  Patient states that he has been compliant with his blood pressure medication.  He took his last dose this morning.  Message will be routed to Dr Huertas.

## 2017-08-15 ENCOUNTER — LAB VISIT (OUTPATIENT)
Dept: LAB | Facility: HOSPITAL | Age: 52
End: 2017-08-15
Attending: FAMILY MEDICINE
Payer: MEDICARE

## 2017-08-15 DIAGNOSIS — I10 ESSENTIAL HYPERTENSION: ICD-10-CM

## 2017-08-15 DIAGNOSIS — Z11.59 ENCOUNTER FOR HEPATITIS C SCREENING TEST FOR LOW RISK PATIENT: ICD-10-CM

## 2017-08-15 DIAGNOSIS — E78.5 HYPERLIPIDEMIA, UNSPECIFIED HYPERLIPIDEMIA TYPE: ICD-10-CM

## 2017-08-15 LAB
ALBUMIN SERPL BCP-MCNC: 4 G/DL
ALP SERPL-CCNC: 100 U/L
ALT SERPL W/O P-5'-P-CCNC: 45 U/L
ANION GAP SERPL CALC-SCNC: 9 MMOL/L
AST SERPL-CCNC: 38 U/L
BASOPHILS # BLD AUTO: 0.02 K/UL
BASOPHILS NFR BLD: 0.4 %
BILIRUB SERPL-MCNC: 0.6 MG/DL
BUN SERPL-MCNC: 13 MG/DL
CALCIUM SERPL-MCNC: 10 MG/DL
CHLORIDE SERPL-SCNC: 103 MMOL/L
CHOLEST/HDLC SERPL: 3.9 {RATIO}
CO2 SERPL-SCNC: 28 MMOL/L
CREAT SERPL-MCNC: 1.1 MG/DL
DIFFERENTIAL METHOD: NORMAL
EOSINOPHIL # BLD AUTO: 0.2 K/UL
EOSINOPHIL NFR BLD: 4.3 %
ERYTHROCYTE [DISTWIDTH] IN BLOOD BY AUTOMATED COUNT: 14.4 %
EST. GFR  (AFRICAN AMERICAN): >60 ML/MIN/1.73 M^2
EST. GFR  (NON AFRICAN AMERICAN): >60 ML/MIN/1.73 M^2
GLUCOSE SERPL-MCNC: 96 MG/DL
HCT VFR BLD AUTO: 44.3 %
HCV AB SERPL QL IA: NEGATIVE
HCV AB SERPL QL IA: NEGATIVE
HDL/CHOLESTEROL RATIO: 25.5 %
HDLC SERPL-MCNC: 239 MG/DL
HDLC SERPL-MCNC: 61 MG/DL
HGB BLD-MCNC: 14.7 G/DL
LDLC SERPL CALC-MCNC: 162.4 MG/DL
LYMPHOCYTES # BLD AUTO: 2.2 K/UL
LYMPHOCYTES NFR BLD: 47.2 %
MCH RBC QN AUTO: 30.3 PG
MCHC RBC AUTO-ENTMCNC: 33.2 G/DL
MCV RBC AUTO: 91 FL
MONOCYTES # BLD AUTO: 0.4 K/UL
MONOCYTES NFR BLD: 8.8 %
NEUTROPHILS # BLD AUTO: 1.8 K/UL
NEUTROPHILS NFR BLD: 39.1 %
NONHDLC SERPL-MCNC: 178 MG/DL
PLATELET # BLD AUTO: 285 K/UL
PMV BLD AUTO: 10.2 FL
POTASSIUM SERPL-SCNC: 4.4 MMOL/L
PROT SERPL-MCNC: 8.4 G/DL
RBC # BLD AUTO: 4.85 M/UL
SODIUM SERPL-SCNC: 140 MMOL/L
TRIGL SERPL-MCNC: 78 MG/DL
TSH SERPL DL<=0.005 MIU/L-ACNC: 1.45 UIU/ML
WBC # BLD AUTO: 4.66 K/UL

## 2017-08-15 PROCEDURE — 36415 COLL VENOUS BLD VENIPUNCTURE: CPT | Mod: PO

## 2017-08-15 PROCEDURE — 84443 ASSAY THYROID STIM HORMONE: CPT

## 2017-08-15 PROCEDURE — 86803 HEPATITIS C AB TEST: CPT

## 2017-08-15 PROCEDURE — 80053 COMPREHEN METABOLIC PANEL: CPT

## 2017-08-15 PROCEDURE — 80061 LIPID PANEL: CPT

## 2017-08-15 PROCEDURE — 85025 COMPLETE CBC W/AUTO DIFF WBC: CPT

## 2017-08-17 ENCOUNTER — OFFICE VISIT (OUTPATIENT)
Dept: FAMILY MEDICINE | Facility: CLINIC | Age: 52
End: 2017-08-17
Payer: MEDICARE

## 2017-08-17 VITALS
BODY MASS INDEX: 28.61 KG/M2 | DIASTOLIC BLOOD PRESSURE: 90 MMHG | TEMPERATURE: 98 F | WEIGHT: 204.38 LBS | HEIGHT: 71 IN | SYSTOLIC BLOOD PRESSURE: 140 MMHG | HEART RATE: 80 BPM | OXYGEN SATURATION: 97 %

## 2017-08-17 DIAGNOSIS — E78.5 HYPERLIPIDEMIA, UNSPECIFIED HYPERLIPIDEMIA TYPE: ICD-10-CM

## 2017-08-17 DIAGNOSIS — R35.0 URINARY FREQUENCY: Primary | ICD-10-CM

## 2017-08-17 LAB
BILIRUB SERPL-MCNC: NORMAL MG/DL
BLOOD URINE, POC: NORMAL
COLOR, POC UA: NORMAL
GLUCOSE UR QL STRIP: NORMAL
KETONES UR QL STRIP: NORMAL
LEUKOCYTE ESTERASE URINE, POC: NORMAL
NITRITE, POC UA: NORMAL
PH, POC UA: 5
PROTEIN, POC: NORMAL
SPECIFIC GRAVITY, POC UA: 1.02
UROBILINOGEN, POC UA: NORMAL

## 2017-08-17 PROCEDURE — 87086 URINE CULTURE/COLONY COUNT: CPT

## 2017-08-17 PROCEDURE — 99214 OFFICE O/P EST MOD 30 MIN: CPT | Mod: PBBFAC,PO | Performed by: FAMILY MEDICINE

## 2017-08-17 PROCEDURE — 99999 PR PBB SHADOW E&M-EST. PATIENT-LVL IV: CPT | Mod: PBBFAC,,, | Performed by: FAMILY MEDICINE

## 2017-08-17 PROCEDURE — 99214 OFFICE O/P EST MOD 30 MIN: CPT | Mod: S$PBB,,, | Performed by: FAMILY MEDICINE

## 2017-08-17 PROCEDURE — 3080F DIAST BP >= 90 MM HG: CPT | Mod: ,,, | Performed by: FAMILY MEDICINE

## 2017-08-17 PROCEDURE — 81002 URINALYSIS NONAUTO W/O SCOPE: CPT | Mod: PBBFAC,PO | Performed by: FAMILY MEDICINE

## 2017-08-17 PROCEDURE — 3077F SYST BP >= 140 MM HG: CPT | Mod: ,,, | Performed by: FAMILY MEDICINE

## 2017-08-17 RX ORDER — ATORVASTATIN CALCIUM 40 MG
40 TABLET ORAL DAILY
Qty: 30 TABLET | Refills: 5 | Status: SHIPPED | OUTPATIENT
Start: 2017-08-17 | End: 2017-08-17 | Stop reason: SDUPTHER

## 2017-08-17 RX ORDER — TAMSULOSIN HYDROCHLORIDE 0.4 MG/1
0.4 CAPSULE ORAL DAILY
Qty: 30 CAPSULE | Refills: 11 | Status: SHIPPED | OUTPATIENT
Start: 2017-08-17 | End: 2018-04-09 | Stop reason: SDUPTHER

## 2017-08-17 RX ORDER — TAMSULOSIN HYDROCHLORIDE 0.4 MG/1
0.4 CAPSULE ORAL DAILY
Qty: 30 CAPSULE | Refills: 11 | Status: SHIPPED | OUTPATIENT
Start: 2017-08-17 | End: 2017-08-17 | Stop reason: SDUPTHER

## 2017-08-17 RX ORDER — ATORVASTATIN CALCIUM 40 MG
40 TABLET ORAL DAILY
Qty: 30 TABLET | Refills: 5 | Status: SHIPPED | OUTPATIENT
Start: 2017-08-17 | End: 2017-08-25 | Stop reason: SDUPTHER

## 2017-08-17 NOTE — PROGRESS NOTES
"Subjective:       Patient ID: Rito Conley is a 52 y.o. male.    Chief Complaint: Urinary Frequency and Results    Patient presents with urinary frequency. He states he has to urinate frequently. He states he is worried his bladder is not emptying. He has to go 2-3 times at night. He states he urinates and he feels like he doesn't empty is bladder.   He is not and has never been a smoker. He states he has problems maintaining erections, but feels it is due to his blood pressure medications. He states when he is off his blood pressure medication he has no issues with this.     his labs were reviewed and he has not been taking his lipitor.   Review of Systems   Genitourinary: Positive for frequency and urgency. Negative for dysuria and hematuria.       Objective:       Vitals:    08/17/17 1002   BP: (!) 140/90   Pulse: 80   Temp: 98.2 °F (36.8 °C)   TempSrc: Oral   SpO2: 97%   Weight: 92.7 kg (204 lb 5.9 oz)   Height: 5' 11" (1.803 m)       Physical Exam   Constitutional: He is oriented to person, place, and time. He appears well-developed and well-nourished. No distress.   HENT:   Head: Normocephalic and atraumatic.   Neck: Normal range of motion. Neck supple.   Cardiovascular: Normal rate, regular rhythm and normal heart sounds.  Exam reveals no gallop and no friction rub.    No murmur heard.  Pulmonary/Chest: Effort normal and breath sounds normal. No respiratory distress. He has no wheezes. He has no rales. He exhibits no tenderness.   Abdominal: There is no CVA tenderness.   Musculoskeletal: He exhibits no edema.   Neurological: He is oriented to person, place, and time.   Skin: He is not diaphoretic.   Psychiatric: He has a normal mood and affect.       Assessment:       1. Urinary frequency    2. Hyperlipidemia, unspecified hyperlipidemia type        Plan:       Rito was seen today for urinary frequency and results.    Diagnoses and all orders for this visit:    Urinary frequency  -     POCT urine " dipstick without microscope  -     Urine culture  -     Ambulatory referral to Urology  -     tamsulosin (FLOMAX) 0.4 mg Cp24; Take 1 capsule (0.4 mg total) by mouth once daily.  Ordered urine culture and restarting flomax for his prostates    Hyperlipidemia, unspecified hyperlipidemia type    -     LIPITOR 40 mg tablet; Take 1 tablet (40 mg total) by mouth once daily.  Refilled lipitor. He will start taking this as he has not been taking his medication and recheck in 2 months.

## 2017-08-19 LAB — BACTERIA UR CULT: NORMAL

## 2017-08-25 DIAGNOSIS — E78.5 HYPERLIPIDEMIA, UNSPECIFIED HYPERLIPIDEMIA TYPE: ICD-10-CM

## 2017-08-25 RX ORDER — ATORVASTATIN CALCIUM 40 MG
40 TABLET ORAL DAILY
Qty: 30 TABLET | Refills: 5 | Status: SHIPPED | OUTPATIENT
Start: 2017-08-25 | End: 2017-12-04 | Stop reason: SDUPTHER

## 2017-08-29 ENCOUNTER — OFFICE VISIT (OUTPATIENT)
Dept: FAMILY MEDICINE | Facility: CLINIC | Age: 52
End: 2017-08-29
Payer: MEDICARE

## 2017-08-29 VITALS
OXYGEN SATURATION: 96 % | BODY MASS INDEX: 28.73 KG/M2 | SYSTOLIC BLOOD PRESSURE: 140 MMHG | HEART RATE: 71 BPM | DIASTOLIC BLOOD PRESSURE: 100 MMHG | TEMPERATURE: 98 F | HEIGHT: 71 IN | WEIGHT: 205.25 LBS

## 2017-08-29 DIAGNOSIS — K21.9 GASTROESOPHAGEAL REFLUX DISEASE, ESOPHAGITIS PRESENCE NOT SPECIFIED: Primary | ICD-10-CM

## 2017-08-29 PROCEDURE — 3077F SYST BP >= 140 MM HG: CPT | Mod: ,,, | Performed by: FAMILY MEDICINE

## 2017-08-29 PROCEDURE — 3080F DIAST BP >= 90 MM HG: CPT | Mod: ,,, | Performed by: FAMILY MEDICINE

## 2017-08-29 PROCEDURE — 99999 PR PBB SHADOW E&M-EST. PATIENT-LVL III: CPT | Mod: PBBFAC,,, | Performed by: FAMILY MEDICINE

## 2017-08-29 PROCEDURE — 99213 OFFICE O/P EST LOW 20 MIN: CPT | Mod: PBBFAC,PO | Performed by: FAMILY MEDICINE

## 2017-08-29 PROCEDURE — 99214 OFFICE O/P EST MOD 30 MIN: CPT | Mod: S$PBB,,, | Performed by: FAMILY MEDICINE

## 2017-08-29 NOTE — PROGRESS NOTES
"Subjective:       Patient ID: Rito Conley is a 52 y.o. male.    Chief Complaint: Gastroesophageal Reflux (burning in throat and belching)    Patient presents with a burning sensation in his esophagus and in his stomach. He states he ate a hamburger and felt like it wouldn't digest and felt like it was sitting in his stomach and had indigestion. He states it started this Friday. He states he only had it this week. He has a lot of belching.       Gastroesophageal Reflux   He complains of abdominal pain, belching, chest pain and heartburn. He reports no coughing, no dysphagia, no nausea or no sore throat. This is a chronic problem. The current episode started in the past 7 days. The problem has been unchanged. The heartburn is located in the substernum. The symptoms are aggravated by certain foods. He has tried nothing for the symptoms. The treatment provided no relief.     Review of Systems   HENT: Negative for sore throat.    Respiratory: Negative for cough.    Cardiovascular: Positive for chest pain.   Gastrointestinal: Positive for abdominal pain and heartburn. Negative for dysphagia and nausea.       Objective:       Vitals:    08/29/17 0830   BP: (!) 140/100   Pulse: 71   Temp: 97.9 °F (36.6 °C)   TempSrc: Oral   SpO2: 96%   Weight: 93.1 kg (205 lb 4 oz)   Height: 5' 11" (1.803 m)       Physical Exam   Constitutional: He is oriented to person, place, and time. He appears well-developed and well-nourished. No distress.   HENT:   Head: Normocephalic and atraumatic.   Eyes: Conjunctivae are normal.   Neck: Normal range of motion. Neck supple.   Cardiovascular: Normal rate, regular rhythm and normal heart sounds.  Exam reveals no gallop and no friction rub.    No murmur heard.  Pulmonary/Chest: Effort normal and breath sounds normal. No respiratory distress. He has no wheezes. He has no rales.   Abdominal: Soft. Bowel sounds are normal. He exhibits no distension and no mass. There is tenderness in the " epigastric area. There is no guarding.   Neurological: He is alert and oriented to person, place, and time.   Skin: He is not diaphoretic.       Assessment:       1. Gastroesophageal reflux disease, esophagitis presence not specified        Plan:       Rito was seen today for gastroesophageal reflux.    Diagnoses and all orders for this visit:    Gastroesophageal reflux disease, esophagitis presence not specified  -     ranitidine (ZANTAC) 150 MG tablet; Take 1 tablet (150 mg total) by mouth 2 (two) times daily.    Other orders  -     Discontinue: ranitidine (ZANTAC) 150 MG tablet; Take 1 tablet (150 mg total) by mouth 2 (two) times daily.

## 2017-09-14 ENCOUNTER — OFFICE VISIT (OUTPATIENT)
Dept: FAMILY MEDICINE | Facility: CLINIC | Age: 52
End: 2017-09-14
Payer: MEDICARE

## 2017-09-14 VITALS
HEART RATE: 86 BPM | WEIGHT: 206.81 LBS | DIASTOLIC BLOOD PRESSURE: 82 MMHG | BODY MASS INDEX: 28.84 KG/M2 | OXYGEN SATURATION: 97 % | TEMPERATURE: 98 F | SYSTOLIC BLOOD PRESSURE: 132 MMHG

## 2017-09-14 DIAGNOSIS — M47.16 LUMBAR SPONDYLOSIS WITH MYELOPATHY: ICD-10-CM

## 2017-09-14 DIAGNOSIS — I11.9 BENIGN HYPERTENSIVE HEART DISEASE WITHOUT HEART FAILURE: ICD-10-CM

## 2017-09-14 PROCEDURE — 99213 OFFICE O/P EST LOW 20 MIN: CPT | Mod: PBBFAC,PO | Performed by: FAMILY MEDICINE

## 2017-09-14 PROCEDURE — 99999 PR PBB SHADOW E&M-EST. PATIENT-LVL III: CPT | Mod: PBBFAC,,, | Performed by: FAMILY MEDICINE

## 2017-09-14 PROCEDURE — 3075F SYST BP GE 130 - 139MM HG: CPT | Mod: ,,, | Performed by: FAMILY MEDICINE

## 2017-09-14 PROCEDURE — 99214 OFFICE O/P EST MOD 30 MIN: CPT | Mod: S$PBB,,, | Performed by: FAMILY MEDICINE

## 2017-09-14 PROCEDURE — 3079F DIAST BP 80-89 MM HG: CPT | Mod: ,,, | Performed by: FAMILY MEDICINE

## 2017-09-14 RX ORDER — OXYCODONE AND ACETAMINOPHEN 5; 325 MG/1; MG/1
1 TABLET ORAL EVERY 6 HOURS PRN
Qty: 60 TABLET | Refills: 0 | Status: SHIPPED | OUTPATIENT
Start: 2017-09-14 | End: 2017-11-06 | Stop reason: SDUPTHER

## 2017-09-14 RX ORDER — AMLODIPINE BESYLATE 10 MG/1
10 TABLET ORAL DAILY
Qty: 90 TABLET | Refills: 1 | Status: SHIPPED | OUTPATIENT
Start: 2017-09-14 | End: 2018-05-08 | Stop reason: SDUPTHER

## 2017-09-14 RX ORDER — OXYCODONE AND ACETAMINOPHEN 5; 325 MG/1; MG/1
1 TABLET ORAL EVERY 6 HOURS PRN
Qty: 60 TABLET | Refills: 0 | Status: SHIPPED | OUTPATIENT
Start: 2017-10-14 | End: 2017-11-06 | Stop reason: SDUPTHER

## 2017-09-14 NOTE — PROGRESS NOTES
Subjective:       Patient ID: Rito Conley is a 52 y.o. male.    Chief Complaint: Renew Meds    Patient presents for follow up on his back pain. He went to a football game and did a lot of standing and his back pain flared up. He has not had worsening back pain than his typical pain or flare ups.  HE HAS NEUROPATHY, BUT IT HAS NOT WORSENED. HE HAD NO LOSS OF URINE OR BOWEL CONTROL.     He has hypertension and it is improving with blood pressure medication. He needs refills of his medication.       Review of Systems   Constitutional: Negative for fatigue.   Respiratory: Negative for cough, chest tightness, shortness of breath and wheezing.    Cardiovascular: Negative for chest pain, palpitations and leg swelling.   Gastrointestinal: Negative for abdominal pain, nausea and vomiting.   Endocrine: Negative for polydipsia, polyphagia and polyuria.   Neurological: Negative for dizziness, syncope, light-headedness and headaches.       Objective:       Vitals:    09/14/17 1148   BP: 132/82   Pulse: 86   Temp: 98.3 °F (36.8 °C)   TempSrc: Oral   SpO2: 97%   Weight: 93.8 kg (206 lb 12.7 oz)       Physical Exam   Constitutional: He is oriented to person, place, and time. He appears well-developed and well-nourished. No distress.   HENT:   Head: Normocephalic and atraumatic.   Eyes: Conjunctivae are normal.   Neck: Normal range of motion. Neck supple. Carotid bruit is not present.   Cardiovascular: Normal rate, regular rhythm and normal heart sounds.  Exam reveals no gallop and no friction rub.    No murmur heard.  Pulmonary/Chest: Effort normal and breath sounds normal. No respiratory distress. He has no wheezes. He has no rales.   Musculoskeletal:        Lumbar back: He exhibits decreased range of motion, tenderness and pain. He exhibits no bony tenderness, no edema, no deformity, no laceration and no spasm.   Lymphadenopathy:     He has no cervical adenopathy.   Neurological: He is alert and oriented to person,  place, and time.   Skin: He is not diaphoretic.   Psychiatric: He has a normal mood and affect.       Assessment:       1. Benign hypertensive heart disease without heart failure    2. Lumbar spondylosis with myelopathy        Plan:       Rito was seen today for renew meds.    Diagnoses and all orders for this visit:    Benign hypertensive heart disease without heart failure  -     amlodipine (NORVASC) 10 MG tablet; Take 1 tablet (10 mg total) by mouth once daily.  Stable. Refilled meds.     Lumbar spondylosis with myelopathy  -     oxycodone-acetaminophen (PERCOCET) 5-325 mg per tablet; Take 1 tablet by mouth every 6 (six) hours as needed for Pain.  -     oxycodone-acetaminophen (PERCOCET) 5-325 mg per tablet; Take 1 tablet by mouth every 6 (six) hours as needed for Pain.  Given a 2 months supply of medication as he is only having a flareup

## 2017-09-21 ENCOUNTER — OFFICE VISIT (OUTPATIENT)
Dept: UROLOGY | Facility: CLINIC | Age: 52
End: 2017-09-21
Payer: MEDICARE

## 2017-09-21 VITALS
WEIGHT: 205.94 LBS | HEART RATE: 70 BPM | BODY MASS INDEX: 28.83 KG/M2 | SYSTOLIC BLOOD PRESSURE: 102 MMHG | DIASTOLIC BLOOD PRESSURE: 62 MMHG | HEIGHT: 71 IN

## 2017-09-21 DIAGNOSIS — R35.1 NOCTURIA MORE THAN TWICE PER NIGHT: ICD-10-CM

## 2017-09-21 DIAGNOSIS — R35.0 URINARY FREQUENCY: ICD-10-CM

## 2017-09-21 DIAGNOSIS — N40.1 BPH WITH OBSTRUCTION/LOWER URINARY TRACT SYMPTOMS: Primary | ICD-10-CM

## 2017-09-21 DIAGNOSIS — N13.8 BPH WITH OBSTRUCTION/LOWER URINARY TRACT SYMPTOMS: Primary | ICD-10-CM

## 2017-09-21 DIAGNOSIS — N52.9 ED (ERECTILE DYSFUNCTION) OF ORGANIC ORIGIN: ICD-10-CM

## 2017-09-21 LAB
BILIRUB SERPL-MCNC: NORMAL MG/DL
BLOOD URINE, POC: NORMAL
COLOR, POC UA: YELLOW
GLUCOSE UR QL STRIP: NORMAL
KETONES UR QL STRIP: NORMAL
LEUKOCYTE ESTERASE URINE, POC: NORMAL
NITRITE, POC UA: NORMAL
PH, POC UA: 5
PROTEIN, POC: NORMAL
SPECIFIC GRAVITY, POC UA: 1030
UROBILINOGEN, POC UA: NORMAL

## 2017-09-21 PROCEDURE — 3078F DIAST BP <80 MM HG: CPT | Mod: ,,, | Performed by: UROLOGY

## 2017-09-21 PROCEDURE — 81001 URINALYSIS AUTO W/SCOPE: CPT | Mod: PBBFAC | Performed by: UROLOGY

## 2017-09-21 PROCEDURE — 99213 OFFICE O/P EST LOW 20 MIN: CPT | Mod: PBBFAC | Performed by: UROLOGY

## 2017-09-21 PROCEDURE — 99999 PR PBB SHADOW E&M-EST. PATIENT-LVL III: CPT | Mod: PBBFAC,,, | Performed by: UROLOGY

## 2017-09-21 PROCEDURE — 99204 OFFICE O/P NEW MOD 45 MIN: CPT | Mod: S$PBB,,, | Performed by: UROLOGY

## 2017-09-21 PROCEDURE — 3074F SYST BP LT 130 MM HG: CPT | Mod: ,,, | Performed by: UROLOGY

## 2017-09-21 RX ORDER — TADALAFIL 20 MG/1
20 TABLET ORAL DAILY PRN
Qty: 6 TABLET | Refills: 11 | Status: SHIPPED | OUTPATIENT
Start: 2017-09-21 | End: 2017-12-04 | Stop reason: SDUPTHER

## 2017-09-21 NOTE — LETTER
September 21, 2017      Jennifer Huertas MD  [address]  [place] [address]           [facility]  [address] [facility] [address]  [facility] [address]  Phone: [phone]          Patient: Rito Conley   MR Number: [id number]   YOB: 1965   Date of Visit: 9/21/2017       Dear Dr. Jennifer Huertas:    Thank you for referring Rito Conley to me for evaluation. Attached you will find relevant portions of my assessment and plan of care.    If you have questions, please do not hesitate to call me. I look forward to following Rito Conley along with you.    Sincerely,    TREVOR Colindres MD    Enclosure  CC:  No Recipients    If you would like to receive this communication electronically, please contact [email or web address] or [phone] to request more information on [facility] [facility] access.    For providers and/or their staff who would like to refer a patient to [facility], please contact us through our one-stop-shop provider referral line, [facility], at [phone].    If you feel you have received this communication in error or would no longer like to receive these types of communications, please e-mail [email or web address]

## 2017-09-21 NOTE — PROGRESS NOTES
Subjective:       Patient ID: Rito Conley is a 52 y.o. male who was referred by Jennifer Huertas MD    Chief Complaint:   Chief Complaint   Patient presents with    Urinary Frequency     new pt coming in for urinary freq. an prostate exam        Benign Prostatic Hyperplasia  He patient reports frequency, incomplete emptying and nocturia one time a night. He denies straining, urgency and weak stream. The patient states symptoms are of mild severity. Onset of symptoms was a few years ago and was gradual in onset.  He has no personal history and no family history of prostate cancer. He reports a history of no complicating symptoms. He denies flank pain, gross hematuria, kidney stones and recurrent UTI.  He is currently taking Flomax.  He started this about one month ago and feels that his urinary issues have resolved..    Erectile Dysfunction  Patient complains of erectile dysfunction. Onset of dysfunction was several years ago and was gradual in onset.  Patient states the nature of difficulty is maintaining erection. Full erections occur with intercourse. Partial erections occur with intercourse. Libido is not affected. Risk factors for ED include cardiovascular disease. Patient denies history of pelvic radiation. Previous treatment of ED includes Viagra.      ACTIVE MEDICAL ISSUES:  Patient Active Problem List   Diagnosis    Benign hypertensive heart disease without heart failure    DJD of shoulder    DJD (degenerative joint disease), lumbar    Allergic rhinitis    Chronic bilateral low back pain without sciatica    Lumbar scoliosis    Back injury    Decreased ROM of trunk and back       PAST MEDICAL HISTORY  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    Hypertension     Lower back pain     PTSD (post-traumatic stress disorder)     Sleep apnea        PAST SURGICAL HISTORY:  History reviewed. No pertinent surgical history.    SOCIAL HISTORY:  Social  History   Substance Use Topics    Smoking status: Never Smoker    Smokeless tobacco: Never Used    Alcohol use No       FAMILY HISTORY:  Family History   Problem Relation Age of Onset    Hypertension Mother     Cancer Maternal Grandmother      Breast    Cancer Maternal Aunt      breast    Amblyopia Neg Hx     Blindness Neg Hx     Cataracts Neg Hx     Diabetes Neg Hx     Glaucoma Neg Hx     Macular degeneration Neg Hx     Retinal detachment Neg Hx     Strabismus Neg Hx     Stroke Neg Hx     Thyroid disease Neg Hx        ALLERGIES AND MEDICATIONS: updated and reviewed.  Review of patient's allergies indicates:  No Known Allergies  Current Outpatient Prescriptions   Medication Sig    amlodipine (NORVASC) 10 MG tablet Take 1 tablet (10 mg total) by mouth once daily.    buPROPion (WELLBUTRIN XL) 150 MG TB24 tablet Take 150 mg by mouth once daily.     fluticasone (FLONASE) 50 mcg/actuation nasal spray 1 spray by Each Nare route 2 (two) times daily.    hydrochlorothiazide (HYDRODIURIL) 50 MG tablet Take 1 tablet (50 mg total) by mouth once daily.    ibuprofen (ADVIL,MOTRIN) 800 MG tablet Take 1 tablet (800 mg total) by mouth every 8 (eight) hours as needed for Pain.    LIPITOR 40 mg tablet Take 1 tablet (40 mg total) by mouth once daily.    oxycodone-acetaminophen (PERCOCET) 5-325 mg per tablet Take 1 tablet by mouth every 6 (six) hours as needed for Pain.    [START ON 10/14/2017] oxycodone-acetaminophen (PERCOCET) 5-325 mg per tablet Take 1 tablet by mouth every 6 (six) hours as needed for Pain.    paroxetine (PAXIL) 40 MG tablet Take 1 tablet (40 mg total) by mouth once daily.    ranitidine (ZANTAC) 150 MG tablet Take 1 tablet (150 mg total) by mouth 2 (two) times daily.    tamsulosin (FLOMAX) 0.4 mg Cp24 Take 1 capsule (0.4 mg total) by mouth once daily.    zolpidem (AMBIEN) 10 mg Tab Take 1 tablet (10 mg total) by mouth nightly as needed.    tadalafil (CIALIS) 20 MG Tab Take 1 tablet (20 mg  "total) by mouth daily as needed.     No current facility-administered medications for this visit.        Review of Systems   Constitutional: Negative for activity change, fatigue, fever and unexpected weight change.   HENT: Negative for congestion.    Eyes: Negative for redness.   Respiratory: Negative for chest tightness and shortness of breath.    Cardiovascular: Negative for chest pain and leg swelling.   Gastrointestinal: Negative for abdominal pain, constipation, diarrhea, nausea and vomiting.   Genitourinary: Negative for dysuria, flank pain, frequency, hematuria, penile pain, penile swelling, scrotal swelling, testicular pain and urgency.   Musculoskeletal: Negative for arthralgias and back pain.   Neurological: Negative for dizziness and light-headedness.   Psychiatric/Behavioral: Negative for behavioral problems and confusion. The patient is not nervous/anxious.    All other systems reviewed and are negative.      Objective:      Vitals:    09/21/17 0947   BP: 102/62   Pulse: 70   Weight: 93.4 kg (205 lb 14.6 oz)   Height: 5' 11" (1.803 m)     Physical Exam   Nursing note and vitals reviewed.  Constitutional: He is oriented to person, place, and time. He appears well-developed and well-nourished.   HENT:   Head: Normocephalic.   Eyes: Conjunctivae are normal.   Neck: Normal range of motion. No tracheal deviation present. No thyromegaly present.   Cardiovascular: Normal rate and normal heart sounds.    Pulmonary/Chest: Effort normal and breath sounds normal. No respiratory distress. He has no wheezes.   Abdominal: Soft. He exhibits no distension and no mass. There is no hepatosplenomegaly. There is no tenderness. There is no rebound, no guarding and no CVA tenderness. No hernia. Hernia confirmed negative in the right inguinal area and confirmed negative in the left inguinal area.   Genitourinary: Rectum normal, testes normal and penis normal. Rectal exam shows no external hemorrhoid, no mass and no " tenderness. Prostate is enlarged. Prostate is not tender. Right testis shows no mass and no tenderness. Left testis shows no mass and no tenderness.       Musculoskeletal: He exhibits no edema or tenderness.   Lymphadenopathy: No inguinal adenopathy noted on the right or left side.   Neurological: He is alert and oriented to person, place, and time.   Skin: Skin is warm and dry. No rash noted. No erythema.     Psychiatric: He has a normal mood and affect. His behavior is normal. Judgment and thought content normal.       Urine dipstick shows negative for all components.  Micro exam: negative for WBC's or RBC's.    Assessment:       1. BPH with obstruction/lower urinary tract symptoms    2. Urinary frequency    3. Nocturia more than twice per night    4. ED (erectile dysfunction) of organic origin          Plan:       1. BPH with obstruction/lower urinary tract symptoms    - POCT urinalysis, dipstick or tablet reag  - Prostate Specific Antigen, Diagnostic; Future    2. Urinary frequency  Stay on Flomax    3. Nocturia more than twice per night  Limit evening fluids    4. ED (erectile dysfunction) of organic origin    - tadalafil (CIALIS) 20 MG Tab; Take 1 tablet (20 mg total) by mouth daily as needed.  Dispense: 6 tablet; Refill: 11            Return in about 6 months (around 3/21/2018) for Follow up, Review PSA.

## 2017-11-06 ENCOUNTER — OFFICE VISIT (OUTPATIENT)
Dept: FAMILY MEDICINE | Facility: CLINIC | Age: 52
End: 2017-11-06
Payer: MEDICARE

## 2017-11-06 VITALS
SYSTOLIC BLOOD PRESSURE: 120 MMHG | HEART RATE: 92 BPM | RESPIRATION RATE: 16 BRPM | BODY MASS INDEX: 28.92 KG/M2 | DIASTOLIC BLOOD PRESSURE: 84 MMHG | WEIGHT: 206.56 LBS | HEIGHT: 71 IN | OXYGEN SATURATION: 97 %

## 2017-11-06 DIAGNOSIS — A08.4 VIRAL GASTROENTERITIS: Primary | ICD-10-CM

## 2017-11-06 DIAGNOSIS — M47.16 LUMBAR SPONDYLOSIS WITH MYELOPATHY: ICD-10-CM

## 2017-11-06 DIAGNOSIS — K43.9 VENTRAL HERNIA WITHOUT OBSTRUCTION OR GANGRENE: ICD-10-CM

## 2017-11-06 PROCEDURE — 99213 OFFICE O/P EST LOW 20 MIN: CPT | Mod: PBBFAC,PO | Performed by: FAMILY MEDICINE

## 2017-11-06 PROCEDURE — 99214 OFFICE O/P EST MOD 30 MIN: CPT | Mod: S$PBB,,, | Performed by: FAMILY MEDICINE

## 2017-11-06 PROCEDURE — 99999 PR PBB SHADOW E&M-EST. PATIENT-LVL III: CPT | Mod: PBBFAC,,, | Performed by: FAMILY MEDICINE

## 2017-11-06 RX ORDER — OXYCODONE AND ACETAMINOPHEN 5; 325 MG/1; MG/1
1 TABLET ORAL EVERY 6 HOURS PRN
Qty: 60 TABLET | Refills: 0 | Status: SHIPPED | OUTPATIENT
Start: 2017-11-06 | End: 2017-12-04 | Stop reason: SDUPTHER

## 2017-11-06 RX ORDER — LEVOCETIRIZINE DIHYDROCHLORIDE 5 MG/1
5 TABLET, FILM COATED ORAL NIGHTLY
Qty: 30 TABLET | Refills: 5 | Status: SHIPPED | OUTPATIENT
Start: 2017-11-06 | End: 2018-04-03

## 2017-11-06 RX ORDER — LEVOCETIRIZINE DIHYDROCHLORIDE 5 MG/1
5 TABLET, FILM COATED ORAL NIGHTLY
Qty: 30 TABLET | Refills: 5 | Status: SHIPPED | OUTPATIENT
Start: 2017-11-06 | End: 2017-11-06 | Stop reason: SDUPTHER

## 2017-11-06 NOTE — PROGRESS NOTES
Subjective:       Patient ID: Rito Conley is a 52 y.o. male.    Chief Complaint: Abdominal Pain (Hernia); Emesis; and Renew Sinus Medications    Abdominal Pain   This is a recurrent problem. The current episode started yesterday. The onset quality is sudden. The problem occurs intermittently. The problem has been unchanged. The pain is located in the periumbilical region. The pain is at a severity of 8/10. The pain is moderate. The quality of the pain is sharp. Associated symptoms include nausea and vomiting. Pertinent negatives include no constipation, diarrhea or fever. Associated symptoms comments: +vomiting x 1. The pain is aggravated by vomiting. The pain is relieved by being still. He has tried antacids for the symptoms. The treatment provided mild relief. His past medical history is significant for GERD.     Review of Systems   Constitutional: Negative for fever.   Gastrointestinal: Positive for abdominal pain, nausea and vomiting. Negative for constipation and diarrhea.       Objective:      Physical Exam   Constitutional: He is oriented to person, place, and time. He appears well-developed and well-nourished. No distress.   HENT:   Head: Normocephalic and atraumatic.   Cardiovascular: Normal rate, regular rhythm and normal heart sounds.  Exam reveals no gallop and no friction rub.    No murmur heard.  Abdominal: Soft. Normal appearance and bowel sounds are normal. There is tenderness in the periumbilical area. There is no rigidity, no rebound, no guarding, no CVA tenderness, no tenderness at McBurney's point and negative Mendez's sign.       Neurological: He is alert and oriented to person, place, and time.   Skin: He is not diaphoretic.       Assessment:       1. Viral gastroenteritis    2. Lumbar spondylosis with myelopathy    3. Ventral hernia without obstruction or gangrene        Plan:       Rito was seen today for abdominal pain (hernia), emesis and renew sinus medications.    Diagnoses  and all orders for this visit:    Viral gastroenteritis  resolved  Lumbar spondylosis with myelopathy  -     oxyCODONE-acetaminophen (PERCOCET) 5-325 mg per tablet; Take 1 tablet by mouth every 6 (six) hours as needed for Pain.    Ventral hernia without obstruction or gangrene  Wait a few days and if it doesn't resolve general surgery referral.   Other orders  -     levocetirizine (XYZAL) 5 MG tablet; Take 1 tablet (5 mg total) by mouth every evening.

## 2017-12-04 ENCOUNTER — OFFICE VISIT (OUTPATIENT)
Dept: FAMILY MEDICINE | Facility: CLINIC | Age: 52
End: 2017-12-04
Payer: MEDICARE

## 2017-12-04 VITALS
DIASTOLIC BLOOD PRESSURE: 90 MMHG | WEIGHT: 206.13 LBS | SYSTOLIC BLOOD PRESSURE: 140 MMHG | BODY MASS INDEX: 28.86 KG/M2 | OXYGEN SATURATION: 97 % | TEMPERATURE: 98 F | HEART RATE: 88 BPM | HEIGHT: 71 IN

## 2017-12-04 DIAGNOSIS — G89.29 CHRONIC LEFT SHOULDER PAIN: Primary | ICD-10-CM

## 2017-12-04 DIAGNOSIS — M25.512 CHRONIC LEFT SHOULDER PAIN: Primary | ICD-10-CM

## 2017-12-04 DIAGNOSIS — K21.9 GASTROESOPHAGEAL REFLUX DISEASE, ESOPHAGITIS PRESENCE NOT SPECIFIED: ICD-10-CM

## 2017-12-04 DIAGNOSIS — E78.5 HYPERLIPIDEMIA, UNSPECIFIED HYPERLIPIDEMIA TYPE: ICD-10-CM

## 2017-12-04 DIAGNOSIS — M47.16 LUMBAR SPONDYLOSIS WITH MYELOPATHY: ICD-10-CM

## 2017-12-04 DIAGNOSIS — T75.3XXD MOTION SICKNESS, SUBSEQUENT ENCOUNTER: ICD-10-CM

## 2017-12-04 PROCEDURE — 99213 OFFICE O/P EST LOW 20 MIN: CPT | Mod: PBBFAC,PO | Performed by: FAMILY MEDICINE

## 2017-12-04 PROCEDURE — 99999 PR PBB SHADOW E&M-EST. PATIENT-LVL III: CPT | Mod: PBBFAC,,, | Performed by: FAMILY MEDICINE

## 2017-12-04 PROCEDURE — 96372 THER/PROPH/DIAG INJ SC/IM: CPT | Mod: S$PBB,,, | Performed by: FAMILY MEDICINE

## 2017-12-04 PROCEDURE — 99214 OFFICE O/P EST MOD 30 MIN: CPT | Mod: S$PBB,25,, | Performed by: FAMILY MEDICINE

## 2017-12-04 RX ORDER — SCOLOPAMINE TRANSDERMAL SYSTEM 1 MG/1
1 PATCH, EXTENDED RELEASE TRANSDERMAL
Qty: 24 PATCH | Refills: 0 | Status: SHIPPED | OUTPATIENT
Start: 2017-12-04 | End: 2017-12-14

## 2017-12-04 RX ORDER — ATORVASTATIN CALCIUM 40 MG
40 TABLET ORAL DAILY
Qty: 30 TABLET | Refills: 5 | Status: SHIPPED | OUTPATIENT
Start: 2017-12-04 | End: 2018-07-25 | Stop reason: SDUPTHER

## 2017-12-04 RX ORDER — OXYCODONE AND ACETAMINOPHEN 5; 325 MG/1; MG/1
1 TABLET ORAL EVERY 6 HOURS PRN
Qty: 60 TABLET | Refills: 0 | Status: SHIPPED | OUTPATIENT
Start: 2017-12-04 | End: 2018-02-16

## 2017-12-04 RX ORDER — SCOLOPAMINE TRANSDERMAL SYSTEM 1 MG/1
1 PATCH, EXTENDED RELEASE TRANSDERMAL
Qty: 24 PATCH | Refills: 0 | Status: SHIPPED | OUTPATIENT
Start: 2017-12-04 | End: 2017-12-04 | Stop reason: SDUPTHER

## 2017-12-04 RX ORDER — TADALAFIL 20 MG/1
TABLET, FILM COATED ORAL
COMMUNITY
Start: 2017-11-07 | End: 2018-05-01

## 2017-12-04 RX ORDER — METHYLPREDNISOLONE ACETATE 80 MG/ML
40 INJECTION, SUSPENSION INTRA-ARTICULAR; INTRALESIONAL; INTRAMUSCULAR; SOFT TISSUE
Status: COMPLETED | OUTPATIENT
Start: 2017-12-04 | End: 2017-12-04

## 2017-12-04 RX ORDER — OXYCODONE AND ACETAMINOPHEN 5; 325 MG/1; MG/1
1 TABLET ORAL EVERY 6 HOURS PRN
Qty: 60 TABLET | Refills: 0 | Status: ON HOLD | OUTPATIENT
Start: 2018-02-04 | End: 2018-02-19 | Stop reason: HOSPADM

## 2017-12-04 RX ORDER — OXYCODONE AND ACETAMINOPHEN 5; 325 MG/1; MG/1
1 TABLET ORAL EVERY 6 HOURS PRN
Qty: 60 TABLET | Refills: 0 | Status: SHIPPED | OUTPATIENT
Start: 2018-01-04 | End: 2018-02-16

## 2017-12-04 RX ADMIN — METHYLPREDNISOLONE ACETATE 40 MG: 80 INJECTION, SUSPENSION INTRA-ARTICULAR; INTRALESIONAL; INTRAMUSCULAR; SOFT TISSUE at 11:12

## 2017-12-04 NOTE — PROGRESS NOTES
"Subjective:       Patient ID: Rito Conley is a 52 y.o. male.    Chief Complaint: Hernia; Medication Refill; and Injections (L shoulder)    Patient presents for refills of his medications.     He also wants to have an injection in his shoulder. He states his left shoulder is worsening. He feels it more when he is sleeping and when he starts moving in the morning. He takes percocet for back pain, but finds it is achy despite this. He is going on a cruise and wants to have his medications done.       Medication Refill       Review of Systems    Objective:       Vitals:    12/04/17 0955   BP: (!) 140/90   Pulse: 88   Temp: 98.1 °F (36.7 °C)   TempSrc: Oral   SpO2: 97%   Weight: 93.5 kg (206 lb 2.1 oz)   Height: 5' 11" (1.803 m)       Physical Exam   Constitutional: He is oriented to person, place, and time. He appears well-developed and well-nourished. No distress.   HENT:   Head: Normocephalic and atraumatic.   Cardiovascular: Normal rate, regular rhythm and normal heart sounds.  Exam reveals no gallop and no friction rub.    No murmur heard.  Pulmonary/Chest: Effort normal and breath sounds normal. No respiratory distress. He has no wheezes. He has no rales.   Musculoskeletal: He exhibits no edema.        Left shoulder: He exhibits decreased range of motion, tenderness and pain. He exhibits no swelling, no effusion, no crepitus and normal strength.   Neurological: He is alert and oriented to person, place, and time.   Skin: He is not diaphoretic.         Date: 12/4/2017  Time:10:48 AM  Name of the procedure being done: SHOULDER INJECTION, LEFT  Indications: SHOULDER PAIN AND LIMITED RANGE OF MOTION  Patient consent:  Patient was given informed consent. Informed of risks associated with this, which include bleeding, infection, and pain. Patient advised that oral anti-inflammatories are another option other than an injection. He has had the procedure before and was successful so would like to proceed with " this. Pertinent Lab Values: N/A  Type of Anesthesia used: 1% lidocaine with epinephrine  Description of the procedure: Using sterile prep, local anesthesia with 40mg of kenalog was injected into the posterior aspect of the shoulder joint under the acromion.. A bandage was applied after.   Complications:none  Estimated blood loss: none  Disposition: Pt tolerated the procedure well      Assessment:       1. Chronic left shoulder pain    2. Gastroesophageal reflux disease, esophagitis presence not specified    3. Hyperlipidemia, unspecified hyperlipidemia type    4. Lumbar spondylosis with myelopathy    5. Motion sickness, subsequent encounter        Plan:       Rito was seen today for hernia, medication refill and injections.    Diagnoses and all orders for this visit:    Chronic left shoulder pain  -     methylPREDNISolone acetate injection 40 mg; Inject 0.5 mLs (40 mg total) into the articular space one time.  shoulder injection given without complication    Gastroesophageal reflux disease, esophagitis presence not specified  -     ranitidine (ZANTAC) 150 MG tablet; Take 1 tablet (150 mg total) by mouth 2 (two) times daily.    Hyperlipidemia, unspecified hyperlipidemia type  -     LIPITOR 40 mg tablet; Take 1 tablet (40 mg total) by mouth once daily.  Stable. Refilled meds.     Lumbar spondylosis with myelopathy  -     oxyCODONE-acetaminophen (PERCOCET) 5-325 mg per tablet; Take 1 tablet by mouth every 6 (six) hours as needed for Pain.  -     oxyCODONE-acetaminophen (PERCOCET) 5-325 mg per tablet; Take 1 tablet by mouth every 6 (six) hours as needed for Pain.  -     oxyCODONE-acetaminophen (PERCOCET) 5-325 mg per tablet; Take 1 tablet by mouth every 6 (six) hours as needed for Pain.  Stable. Refilled meds.     Motion sickness, subsequent encounter  -     scopolamine (TRANSDERM-SCOP) 1.3-1.5 mg (1 mg over 3 days); Place 1 patch onto the skin Every 3 (three) days. Apply behind ear 30 min prior to event. Replace q3  days.

## 2018-01-11 ENCOUNTER — OFFICE VISIT (OUTPATIENT)
Dept: FAMILY MEDICINE | Facility: CLINIC | Age: 53
End: 2018-01-11
Payer: MEDICARE

## 2018-01-11 VITALS
RESPIRATION RATE: 12 BRPM | SYSTOLIC BLOOD PRESSURE: 110 MMHG | WEIGHT: 207.88 LBS | OXYGEN SATURATION: 97 % | HEART RATE: 77 BPM | DIASTOLIC BLOOD PRESSURE: 70 MMHG | BODY MASS INDEX: 29.1 KG/M2 | TEMPERATURE: 98 F | HEIGHT: 71 IN

## 2018-01-11 DIAGNOSIS — K43.9 VENTRAL HERNIA WITHOUT OBSTRUCTION OR GANGRENE: Primary | ICD-10-CM

## 2018-01-11 PROCEDURE — 99999 PR PBB SHADOW E&M-EST. PATIENT-LVL III: CPT | Mod: PBBFAC,,, | Performed by: FAMILY MEDICINE

## 2018-01-11 PROCEDURE — 99213 OFFICE O/P EST LOW 20 MIN: CPT | Mod: S$PBB,,, | Performed by: FAMILY MEDICINE

## 2018-01-11 PROCEDURE — 99213 OFFICE O/P EST LOW 20 MIN: CPT | Mod: PBBFAC,PO | Performed by: FAMILY MEDICINE

## 2018-01-11 NOTE — PROGRESS NOTES
"Subjective:       Patient ID: Rito Conley is a 52 y.o. male.    Chief Complaint: Hernia    Patient presents for his abdominal hernia concerns. He states it has become more uncomfortable more recently. He states he would like to get this over with and have the surgery to correct this  He gabriel bowel obstruction. He has regular stools.     "This note will not be shared with the patient."      Review of Systems    Objective:       Vitals:    01/11/18 1138   BP: 110/70   Pulse: 77   Resp: 12   Temp: 97.9 °F (36.6 °C)   TempSrc: Oral   SpO2: 97%   Weight: 94.3 kg (207 lb 14.3 oz)   Height: 5' 11" (1.803 m)       Physical Exam   Constitutional: He is oriented to person, place, and time. He appears well-developed and well-nourished. No distress.   HENT:   Head: Normocephalic and atraumatic.   Cardiovascular: Normal rate, regular rhythm and normal heart sounds.  Exam reveals no gallop and no friction rub.    No murmur heard.  Pulmonary/Chest: Effort normal and breath sounds normal. No respiratory distress. He has no wheezes. He has no rales.   Abdominal: Soft. Normal appearance and bowel sounds are normal. A hernia is present. Hernia confirmed positive in the ventral area.       Neurological: He is alert and oriented to person, place, and time.   Skin: He is not diaphoretic.       Assessment:       1. Ventral hernia without obstruction or gangrene        Plan:       Rito was seen today for hernia.    Diagnoses and all orders for this visit:    Ventral hernia without obstruction or gangrene  -     Ambulatory referral to General Surgery           "

## 2018-01-16 ENCOUNTER — TELEPHONE (OUTPATIENT)
Dept: FAMILY MEDICINE | Facility: CLINIC | Age: 53
End: 2018-01-16

## 2018-01-24 ENCOUNTER — OFFICE VISIT (OUTPATIENT)
Dept: SURGERY | Facility: CLINIC | Age: 53
End: 2018-01-24
Payer: MEDICARE

## 2018-01-24 VITALS
SYSTOLIC BLOOD PRESSURE: 143 MMHG | HEART RATE: 81 BPM | BODY MASS INDEX: 28.63 KG/M2 | HEIGHT: 71 IN | TEMPERATURE: 98 F | WEIGHT: 204.5 LBS | DIASTOLIC BLOOD PRESSURE: 89 MMHG

## 2018-01-24 DIAGNOSIS — K42.9 UMBILICAL HERNIA WITHOUT OBSTRUCTION AND WITHOUT GANGRENE: Primary | ICD-10-CM

## 2018-01-24 PROCEDURE — 99999 PR PBB SHADOW E&M-EST. PATIENT-LVL III: CPT | Mod: PBBFAC,,, | Performed by: SURGERY

## 2018-01-24 PROCEDURE — 99203 OFFICE O/P NEW LOW 30 MIN: CPT | Mod: S$PBB,,, | Performed by: SURGERY

## 2018-01-24 PROCEDURE — 99213 OFFICE O/P EST LOW 20 MIN: CPT | Mod: PBBFAC | Performed by: SURGERY

## 2018-01-24 NOTE — PATIENT INSTRUCTIONS
What Is a Hernia?    A hernia is when an organ or tissue pushes through a weak area in the belly (abdominal) wall. This weak area may be present at birth. Or it may be caused by abdominal strain over time. If not treated, a hernia can get worse with time and physical stress.  When a bulge forms  When there is a weak area in the abdominal wall, an organ or tissue can push outward. This often causes a bulge that you can see under your skin. The bulge may get bigger when you stand up. It may go away when you lie down. You may also feel some pressure or mild pain when lifting, coughing, urinating, or doing other activities.  Types of hernias  The type of hernia you have depends on its location. Most hernias form in the groin at or near the internal ring. This is the entrance to a canal between the abdomen and groin. Hernias can also occur in the abdomen, thigh, or genitals.  · An incisional hernia occurs at the site of a previous surgical incision.  · An umbilical hernia occurs at the navel.  · An indirect inguinal hernia occurs in the groin at the internal ring.  · A direct inguinal hernia occurs in the groin near the internal ring.  · A femoral hernia occurs just below the groin.  · An epigastric hernia occurs in the upper abdomen at the midline.  Diagnosis  In most cases, your healthcare provider can diagnose a hernia by doing a physical exam.  In some cases it might not be clear why you have a swelling in the belly wall. Then your provider may order an imaging test such as an ultrasound. This can help with the diagnosis.  Surgery  A hernia will not heal on its own. Surgery is needed to fix the weak spot in the abdominal wall. If not treated, a hernia can get larger. It can also cause serious health problems. The good news is that hernia surgery can be done quickly and safely. In some cases, you can go home the same day as your surgery.   When to call your provider  Call your healthcare provider right away if the  swelling around your hernia becomes larger, firmer, or more painful. These may be signs that your intestines are stuck in the abdominal wall. This is an emergency. The hernia must be repaired right away to avoid serious problems.  Date Last Reviewed: 7/1/2016  © 7231-6346 [employer]. [address] [place] [address]. All rights reserved. This information is not intended as a substitute for professional medical care. Always follow your healthcare professional's instructions.        How a Hernia Develops  Although a hernia bulge may appear suddenly, hernias often take years to develop. They grow larger as pressure inside the body presses the intestines or other tissues out through a weak area in the abdominal wall, often at the belly button or a site of previous surgery. With time, these tissues can bulge out beneath the skin.  Stages of hernia development    The wall weakens or tears. The abdominal lining bulges out through a weak area and begins to form a hernia sac. The sac may contain fat, intestine, or other tissues. At this point, the hernia may or may not cause a visible bulge.        The intestine pushes into the sac. As the intestine pushes further into the sac, it forms a visible bulge. The bulge may flatten when you lie down or push against it. This is called a reducible hernia and does not cause any immediate danger.             The intestine may become trapped. The sac containing the intestine may become trapped by muscle (incarcerated). If this happens, you wont be able to flatten the bulge. You may also have pain. Prompt treatment is needed.        The intestine may become strangulated. If the intestine is tightly trapped, it becomes strangulated. The strangulated area loses blood supply and may die. This can cause severe pain and block the intestine. Emergency surgery is needed.   Date Last Reviewed: 8/1/2016  © 2247-5484 [employer]. [address] [place] [address],  [place] [address]. All rights reserved. This information is not intended as a substitute for professional medical care. Always follow your healthcare professional's instructions.        Having Hernia Surgery: Patch Repair  Surgery treats a hernia by repairing the weakness in the abdominal wall. An incision is made so the surgeon has a direct view of the hernia. The repair is then done through this incision (open surgery). To repair the defect, special mesh materials are used to patch the weak area and make a tension-free repair. Follow your healthcare providers advice on how to get ready for the procedure. You can usually go home the same day as your surgery. In some cases, though, you may need to stay in the hospital overnight.  Getting ready for surgery  Your healthcare provider will talk with you about preparing for surgery. Follow all the instructions youre given and be sure to:   · Tell your healthcare provider about any medicines, supplements, or herbs you take. This includes both prescription and over-the-counter items.  · Stop taking aspirin, ibuprofen, naproxen and other NSAIDs as directed.  · Arrange for an adult family member or friend to give you a ride home after surgery.  · Stop smoking. Smoking affects blood flow and can slow healing.  · Gently wash the surgical area the night before surgery.  · Follow any directions you are given for not eating or drinking before surgery.     Repair in Front           Repair in Back           Combination Repair      The day of surgery  Arrive at the hospital or surgical center at your scheduled time. Youll be asked to change into a patient gown. Youll then be given an IV to provide fluids and medicine. Shortly before surgery, an anesthesiologist or nurse anesthetist will talk with you. He or she will explain the types of anesthesia used to prevent pain during surgery. You will have one or more of the following:  · Monitored sedation to make you relaxed and  sleepy.  · Local anesthesia to numb the surgical site.  · Regional anesthesia to numb specific areas of your body.  · General anesthesia to let you sleep during surgery.  During the surgery  Most hernias are treated using tension-free repairs. This is surgery that uses special mesh materials to repair the weak area. Unlike traditional repairs, the abdominal fascia (tissue around the muscle that gives strength to the abdominal wall) isnt sewn together. Instead, the mesh covers the weak area like a patch. This repairs the defect without tension on the muscles. It also makes it less likely to happen again. The mesh is made of strong, flexible plastic that stays in the body. Over time, nearby tissues grow into the mesh to strengthen the repair.  After surgery  When the procedure is over, youll be taken to the recovery area to rest. Your blood pressure and heart rate will be monitored. Youll also have a bandage over the surgical site. To help reduce discomfort, youll be given pain medicines. You may also be given breathing exercises to keep your lungs clear. Later, youll be asked to get up and walk. This helps prevent blood clots in the legs. You can go home when your healthcare provider says youre ready.     Risks and complications  Hernia surgery is safe, but does have risks including:  · Bleeding  · Infection  · Anesthesia risks  · Mesh complications  · Inability to urinate   · Bowel or bladder injury   · Numbness or pain in the groin or leg  · Risk the hernia will happen again  · Damage to the testicles or testicular function      Date Last Reviewed: 10/1/2016  © 7565-9924 [employer]. [address] [place] [address], [place] [address]. All rights reserved. This information is not intended as a substitute for professional medical care. Always follow your healthcare professional's instructions.

## 2018-01-24 NOTE — LETTER
January 24, 2018      Jennifer Huertas MD  [address] [address]           [facility] - General Surgery  [address]  [place] [address]  Phone: [phone]          Patient: Rito Conley   MR Number: [id number]   YOB: 1965   Date of Visit: 1/24/2018       Dear Dr. Jennifer Huertas:    Thank you for referring Rito Conley to me for evaluation. Attached you will find relevant portions of my assessment and plan of care.    If you have questions, please do not hesitate to call me. I look forward to following Rito Conley along with you.    Sincerely,    Gael Benson MD    Enclosure  CC:  No Recipients    If you would like to receive this communication electronically, please contact [email or web address] or [phone] to request more information on [employer] Link access.    For providers and/or their staff who would like to refer a patient to [facility], please contact us through our one-stop-shop provider referral line, [facility], at [phone].    If you feel you have received this communication in error or would no longer like to receive these types of communications, please e-mail [email or web address]

## 2018-01-24 NOTE — PROGRESS NOTES
"GENERAL SURGERY CLINIC NOTE    Rito Cnoley is a 52 y.o.  male with Hx of HTN, PTSD, LADI, umbilical hernia who presents to clinic today for evaluation. He reports first noticing a bulge at the umbilicus in Feb 2017 but was asymptomatic at that time. He began having intermittent pain at the site in Aug 2017. Pain described as sudden and sharp. Worse with prolonged standing and sexual intercourse. Improved with lying supine. Bulge has always been reducible. Feels that is becoming larger and occasionally feels "more stuck." Episodes of pain occur approx once every 1-2 weeks. No longer feels that the pain is something he can tolerate long-term and would like to have this repaired.    No prior abdominal surgeries.  No antiplatelet or anticoagulant agents.  Never smoker.    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    Social History     Social History    Marital status:      Spouse name: N/A    Number of children: N/A    Years of education: N/A     Occupational History    Not on file.     Social History Main Topics    Smoking status: Never Smoker    Smokeless tobacco: Never Used    Alcohol use No    Drug use: No    Sexual activity: Yes     Other Topics Concern    Not on file     Social History Narrative    No narrative on file     Review of patient's allergies indicates:  No Known Allergies      PHYSICAL EXAM:  Vitals:    01/24/18 1108   BP: (!) 143/89   Pulse: 81   Temp: 98.1 °F (36.7 °C) "       General: NAD  Neuro: AAOx3  Cardio: S1 and S2, RRR  Resp: CTAB, breathing even and unlabored  Abd: Soft, ND, reducible (with firm gentle pressure while supine) umbilical hernia, no overlying skin changes, mild tenderness to palpation at base of umbilicus  Ext: Warm and well perfused      ASSESSMENT/PLAN:  52 y.o. male with symptomatic umbilical hernia    - Plan for umbilical hernia repair with/without mesh  - Risks, benefits, alternatives to the procedure discussed with the patient who wishes to proceed  - All questions and concerns addressed  - Consents obtained today      Charan Conti MD  Surgery Resident, PGY-III  Pager: [phone]  1/24/2018 11:17 AM

## 2018-01-25 ENCOUNTER — HOSPITAL ENCOUNTER (OUTPATIENT)
Dept: CARDIOLOGY | Facility: HOSPITAL | Age: 53
Discharge: HOME | End: 2018-01-25
Attending: SURGERY
Payer: MEDICARE

## 2018-01-25 DIAGNOSIS — K42.9 UMBILICAL HERNIA WITHOUT OBSTRUCTION AND WITHOUT GANGRENE: Primary | ICD-10-CM

## 2018-01-25 DIAGNOSIS — K42.9 UMBILICAL HERNIA WITHOUT OBSTRUCTION AND WITHOUT GANGRENE: ICD-10-CM

## 2018-01-25 PROCEDURE — 93010 ELECTROCARDIOGRAM REPORT: CPT | Mod: ,,, | Performed by: INTERNAL MEDICINE

## 2018-02-14 DIAGNOSIS — K42.9 UMBILICAL HERNIA: ICD-10-CM

## 2018-02-14 RX ORDER — SODIUM CHLORIDE 9 MG/ML
INJECTION, SOLUTION INTRAVENOUS CONTINUOUS
Status: CANCELLED | OUTPATIENT
Start: 2018-02-14

## 2018-02-16 ENCOUNTER — TELEPHONE (OUTPATIENT)
Dept: SURGERY | Facility: CLINIC | Age: 53
End: 2018-02-16

## 2018-02-19 ENCOUNTER — ANESTHESIA (OUTPATIENT)
Dept: SURGERY | Facility: HOSPITAL | Age: 53
End: 2018-02-19
Payer: MEDICARE

## 2018-02-19 ENCOUNTER — HOSPITAL ENCOUNTER (OUTPATIENT)
Facility: HOSPITAL | Age: 53
Discharge: HOME | End: 2018-02-19
Attending: SURGERY | Admitting: SURGERY
Payer: MEDICARE

## 2018-02-19 ENCOUNTER — ANESTHESIA EVENT (OUTPATIENT)
Dept: SURGERY | Facility: HOSPITAL | Age: 53
End: 2018-02-19
Payer: MEDICARE

## 2018-02-19 ENCOUNTER — SURGERY (OUTPATIENT)
Age: 53
End: 2018-02-19

## 2018-02-19 VITALS
SYSTOLIC BLOOD PRESSURE: 141 MMHG | RESPIRATION RATE: 16 BRPM | OXYGEN SATURATION: 95 % | HEART RATE: 71 BPM | DIASTOLIC BLOOD PRESSURE: 97 MMHG | TEMPERATURE: 97 F

## 2018-02-19 DIAGNOSIS — K42.9 UMBILICAL HERNIA: ICD-10-CM

## 2018-02-19 PROCEDURE — 25000003 PHARM REV CODE 250: Performed by: ANESTHESIOLOGY

## 2018-02-19 PROCEDURE — 36000706: Performed by: SURGERY

## 2018-02-19 PROCEDURE — 36000707: Performed by: SURGERY

## 2018-02-19 PROCEDURE — 63600175 PHARM REV CODE 636 W HCPCS: Performed by: PHYSICIAN ASSISTANT

## 2018-02-19 PROCEDURE — D9220A PRA ANESTHESIA: Mod: ANES,,, | Performed by: ANESTHESIOLOGY

## 2018-02-19 PROCEDURE — 94761 N-INVAS EAR/PLS OXIMETRY MLT: CPT

## 2018-02-19 PROCEDURE — 49585 PR REPAIR UMBILICAL HERN,5+Y/O,REDUC: CPT | Mod: ,,, | Performed by: SURGERY

## 2018-02-19 PROCEDURE — 37000009 HC ANESTHESIA EA ADD 15 MINS: Performed by: SURGERY

## 2018-02-19 PROCEDURE — S0020 INJECTION, BUPIVICAINE HYDRO: HCPCS | Performed by: SURGERY

## 2018-02-19 PROCEDURE — 27000221 HC OXYGEN, UP TO 24 HOURS

## 2018-02-19 PROCEDURE — 25000003 PHARM REV CODE 250: Performed by: PHYSICIAN ASSISTANT

## 2018-02-19 PROCEDURE — 71000015 HC POSTOP RECOV 1ST HR: Performed by: SURGERY

## 2018-02-19 PROCEDURE — 25000003 PHARM REV CODE 250: Performed by: STUDENT IN AN ORGANIZED HEALTH CARE EDUCATION/TRAINING PROGRAM

## 2018-02-19 PROCEDURE — 63600175 PHARM REV CODE 636 W HCPCS: Performed by: NURSE ANESTHETIST, CERTIFIED REGISTERED

## 2018-02-19 PROCEDURE — 25000003 PHARM REV CODE 250: Performed by: NURSE ANESTHETIST, CERTIFIED REGISTERED

## 2018-02-19 PROCEDURE — 71000033 HC RECOVERY, INTIAL HOUR: Performed by: SURGERY

## 2018-02-19 PROCEDURE — 37000008 HC ANESTHESIA 1ST 15 MINUTES: Performed by: SURGERY

## 2018-02-19 PROCEDURE — 25000003 PHARM REV CODE 250: Performed by: SURGERY

## 2018-02-19 PROCEDURE — D9220A PRA ANESTHESIA: Mod: CRNA,,, | Performed by: NURSE ANESTHETIST, CERTIFIED REGISTERED

## 2018-02-19 RX ORDER — LIDOCAINE HYDROCHLORIDE 10 MG/ML
1 INJECTION, SOLUTION EPIDURAL; INFILTRATION; INTRACAUDAL; PERINEURAL ONCE
Status: COMPLETED | OUTPATIENT
Start: 2018-02-19 | End: 2018-02-19

## 2018-02-19 RX ORDER — LIDOCAINE HCL/PF 100 MG/5ML
SYRINGE (ML) INTRAVENOUS
Status: DISCONTINUED | OUTPATIENT
Start: 2018-02-19 | End: 2018-02-19

## 2018-02-19 RX ORDER — GLYCOPYRROLATE 0.2 MG/ML
INJECTION INTRAMUSCULAR; INTRAVENOUS
Status: DISCONTINUED | OUTPATIENT
Start: 2018-02-19 | End: 2018-02-19

## 2018-02-19 RX ORDER — DEXAMETHASONE SODIUM PHOSPHATE 4 MG/ML
INJECTION, SOLUTION INTRA-ARTICULAR; INTRALESIONAL; INTRAMUSCULAR; INTRAVENOUS; SOFT TISSUE
Status: DISCONTINUED | OUTPATIENT
Start: 2018-02-19 | End: 2018-02-19

## 2018-02-19 RX ORDER — FENTANYL CITRATE 50 UG/ML
INJECTION, SOLUTION INTRAMUSCULAR; INTRAVENOUS
Status: DISCONTINUED | OUTPATIENT
Start: 2018-02-19 | End: 2018-02-19

## 2018-02-19 RX ORDER — ROCURONIUM BROMIDE 10 MG/ML
INJECTION, SOLUTION INTRAVENOUS
Status: DISCONTINUED | OUTPATIENT
Start: 2018-02-19 | End: 2018-02-19

## 2018-02-19 RX ORDER — CEFAZOLIN SODIUM 1 G/3ML
2 INJECTION, POWDER, FOR SOLUTION INTRAMUSCULAR; INTRAVENOUS
Status: COMPLETED | OUTPATIENT
Start: 2018-02-19 | End: 2018-02-19

## 2018-02-19 RX ORDER — FENTANYL CITRATE 50 UG/ML
25 INJECTION, SOLUTION INTRAMUSCULAR; INTRAVENOUS EVERY 5 MIN PRN
Status: DISCONTINUED | OUTPATIENT
Start: 2018-02-19 | End: 2018-02-19

## 2018-02-19 RX ORDER — ONDANSETRON 2 MG/ML
4 INJECTION INTRAMUSCULAR; INTRAVENOUS ONCE AS NEEDED
Status: DISCONTINUED | OUTPATIENT
Start: 2018-02-19 | End: 2018-02-19 | Stop reason: HOSPADM

## 2018-02-19 RX ORDER — BUPIVACAINE HYDROCHLORIDE 5 MG/ML
INJECTION, SOLUTION EPIDURAL; INTRACAUDAL
Status: DISCONTINUED | OUTPATIENT
Start: 2018-02-19 | End: 2018-02-19 | Stop reason: HOSPADM

## 2018-02-19 RX ORDER — PROPOFOL 10 MG/ML
VIAL (ML) INTRAVENOUS
Status: DISCONTINUED | OUTPATIENT
Start: 2018-02-19 | End: 2018-02-19

## 2018-02-19 RX ORDER — MIDAZOLAM HYDROCHLORIDE 1 MG/ML
INJECTION, SOLUTION INTRAMUSCULAR; INTRAVENOUS
Status: DISCONTINUED | OUTPATIENT
Start: 2018-02-19 | End: 2018-02-19

## 2018-02-19 RX ORDER — SODIUM CHLORIDE 0.9 % (FLUSH) 0.9 %
3 SYRINGE (ML) INJECTION
Status: DISCONTINUED | OUTPATIENT
Start: 2018-02-19 | End: 2018-02-19 | Stop reason: HOSPADM

## 2018-02-19 RX ORDER — NEOSTIGMINE METHYLSULFATE 1 MG/ML
INJECTION, SOLUTION INTRAVENOUS
Status: DISCONTINUED | OUTPATIENT
Start: 2018-02-19 | End: 2018-02-19

## 2018-02-19 RX ORDER — OXYCODONE HYDROCHLORIDE 5 MG/1
5 TABLET ORAL EVERY 4 HOURS PRN
Status: DISCONTINUED | OUTPATIENT
Start: 2018-02-19 | End: 2018-02-19 | Stop reason: HOSPADM

## 2018-02-19 RX ORDER — OXYCODONE AND ACETAMINOPHEN 5; 325 MG/1; MG/1
1 TABLET ORAL EVERY 4 HOURS PRN
Qty: 30 TABLET | Refills: 0 | Status: SHIPPED | OUTPATIENT
Start: 2018-02-19 | End: 2018-02-19

## 2018-02-19 RX ORDER — SODIUM CHLORIDE 9 MG/ML
INJECTION, SOLUTION INTRAVENOUS CONTINUOUS
Status: DISCONTINUED | OUTPATIENT
Start: 2018-02-19 | End: 2018-02-19 | Stop reason: HOSPADM

## 2018-02-19 RX ORDER — OXYCODONE AND ACETAMINOPHEN 5; 325 MG/1; MG/1
1 TABLET ORAL EVERY 4 HOURS PRN
Qty: 30 TABLET | Refills: 0 | Status: SHIPPED | OUTPATIENT
Start: 2018-02-19 | End: 2018-02-21 | Stop reason: SDUPTHER

## 2018-02-19 RX ORDER — FENTANYL CITRATE 50 UG/ML
25 INJECTION, SOLUTION INTRAMUSCULAR; INTRAVENOUS EVERY 5 MIN PRN
Status: DISCONTINUED | OUTPATIENT
Start: 2018-02-19 | End: 2018-02-19 | Stop reason: HOSPADM

## 2018-02-19 RX ORDER — ONDANSETRON 2 MG/ML
INJECTION INTRAMUSCULAR; INTRAVENOUS
Status: DISCONTINUED | OUTPATIENT
Start: 2018-02-19 | End: 2018-02-19

## 2018-02-19 RX ADMIN — ROCURONIUM BROMIDE 5 MG: 10 INJECTION, SOLUTION INTRAVENOUS at 09:02

## 2018-02-19 RX ADMIN — BUPIVACAINE HYDROCHLORIDE 8 ML: 5 INJECTION, SOLUTION EPIDURAL; INTRACAUDAL; PERINEURAL at 09:02

## 2018-02-19 RX ADMIN — FENTANYL CITRATE 50 MCG: 50 INJECTION, SOLUTION INTRAMUSCULAR; INTRAVENOUS at 09:02

## 2018-02-19 RX ADMIN — MIDAZOLAM HYDROCHLORIDE 2 MG: 1 INJECTION, SOLUTION INTRAMUSCULAR; INTRAVENOUS at 09:02

## 2018-02-19 RX ADMIN — ONDANSETRON 4 MG: 2 INJECTION INTRAMUSCULAR; INTRAVENOUS at 09:02

## 2018-02-19 RX ADMIN — LIDOCAINE HYDROCHLORIDE 150 MG: 20 INJECTION, SOLUTION INTRAVENOUS at 09:02

## 2018-02-19 RX ADMIN — SODIUM CHLORIDE: 0.9 INJECTION, SOLUTION INTRAVENOUS at 08:02

## 2018-02-19 RX ADMIN — OXYCODONE HYDROCHLORIDE 5 MG: 5 TABLET ORAL at 10:02

## 2018-02-19 RX ADMIN — LIDOCAINE HYDROCHLORIDE 50 MG: 20 INJECTION, SOLUTION INTRAVENOUS at 09:02

## 2018-02-19 RX ADMIN — LIDOCAINE HYDROCHLORIDE 10 MG: 10 INJECTION, SOLUTION EPIDURAL; INFILTRATION; INTRACAUDAL; PERINEURAL at 08:02

## 2018-02-19 RX ADMIN — ROCURONIUM BROMIDE 30 MG: 10 INJECTION, SOLUTION INTRAVENOUS at 09:02

## 2018-02-19 RX ADMIN — DEXAMETHASONE SODIUM PHOSPHATE 4 MG: 4 INJECTION, SOLUTION INTRAMUSCULAR; INTRAVENOUS at 09:02

## 2018-02-19 RX ADMIN — NEOSTIGMINE METHYLSULFATE 5 MG: 1 INJECTION INTRAVENOUS at 09:02

## 2018-02-19 RX ADMIN — GLYCOPYRROLATE 0.8 MG: 0.2 INJECTION, SOLUTION INTRAMUSCULAR; INTRAVENOUS at 09:02

## 2018-02-19 RX ADMIN — PROPOFOL 150 MG: 10 INJECTION, EMULSION INTRAVENOUS at 09:02

## 2018-02-19 RX ADMIN — CEFAZOLIN 2 G: 330 INJECTION, POWDER, FOR SOLUTION INTRAMUSCULAR; INTRAVENOUS at 09:02

## 2018-02-19 NOTE — ANESTHESIA PREPROCEDURE EVALUATION
2018  Rito Conley is a 52 y.o., male.  Pre-operative evaluation for REPAIR-HERNIA-UMBILICAL (5 YRS +) Open with or without Mesh (N/A)    Chief Complaint: umbilical hernia    PMH:  HTN  HLP  GERD  Lumbar spondylosis    History reviewed. No pertinent surgical history.      Vital Signs Range (Last 24H):  Temp:  [36.7 °C (98 °F)]   Pulse:  [73]   Resp:  [16]   BP: (130)/(85)   SpO2:  [97 %]       CBC:     Recent Labs  Lab 18  0958   WBC 5.59   RBC 4.82   HGB 14.6   HCT 43.0      MCV 89   MCH 30.3   MCHC 34.0       CMP:   Recent Labs  Lab 18  0958      K 3.8      CO2 26   BUN 14   CREATININE 1.0      CALCIUM 9.5       INR:  No results for input(s): PT, INR, PROTIME, APTT in the last 720 hours.      Diagnostic Studies:      EKD Echo:    Anesthesia Evaluation    I have reviewed the Patient Summary Reports.    I have reviewed the Nursing Notes.   I have reviewed the Medications.     Review of Systems  Anesthesia Hx:  No problems with previous Anesthesia    Social:  Non-Smoker, No Alcohol Use    Pulmonary:  Pulmonary Normal    Neurological:  Neurology Normal        Physical Exam  General:  Obesity    Airway/Jaw/Neck:  Airway Findings: Mouth Opening: Normal Tongue: Normal  General Airway Assessment: Good  Mallampati: I  TM Distance: Normal, at least 6 cm  Jaw/Neck Findings:  Neck ROM: Normal ROM      Dental:  Dental Findings: In tact   Chest/Lungs:  Chest/Lungs Findings: Clear to auscultation, Normal Respiratory Rate     Heart/Vascular:  Heart Findings: Rate: Normal  Rhythm: Regular Rhythm  Sounds: Normal        Mental Status:  Mental Status Findings:  Cooperative, Alert and Oriented         Anesthesia Plan  Type of Anesthesia, risks & benefits discussed:  Anesthesia Type:  general  Patient's Preference:   Intra-op Monitoring Plan:   Intra-op Monitoring  Plan Comments:   Post Op Pain Control Plan:   Post Op Pain Control Plan Comments:   Induction:   IV  Beta Blocker:  Patient is not currently on a Beta-Blocker (No further documentation required).       Informed Consent: Patient understands risks and agrees with Anesthesia plan.  Questions answered. Anesthesia consent signed with patient.  ASA Score: 2     Day of Surgery Review of History & Physical:    H&P update referred to the surgeon.         Ready For Surgery From Anesthesia Perspective.

## 2018-02-19 NOTE — OP NOTE
DATE OF PROCEDURE:  02/19/2018.    PREOPERATIVE DIAGNOSIS:  Umbilical hernia.    POSTOPERATIVE DIAGNOSIS:  Umbilical hernia.    PROCEDURE PERFORMED:  Repair of umbilical hernia.    SURGEON:  Gael Benson M.D.    ASSISTANT:  Kapil Wagner M.D. (RES).    ANESTHESIA:  General.    CLINICAL NOTE:  The patient is a 52-year-old male with a symptomatic umbilical   hernia.    PROCEDURE NOTE IN DETAIL:  Following induction of adequate general anesthesia,   the patient's abdomen was prepped and draped with ChloraPrep.  We made a   transverse incision over the lower aspect of the abdominal wall and dissected   down to the aponeurosis.  We dissected about the umbilical stalk and transected   it with cautery.  This revealed the defect, which was small, at the area of the   umbilical stalk.  We closed this with interrupted 0-Prolene sutures and then   tacked down the umbilical stalk with 3-0 Vicryl sutures and closed the   subcutaneous tissue with 3-0 Vicryl.  We then closed the skin with subcuticular   4-0 Monocryl suture.  Sterile dressings were applied and the procedure was   terminated with the patient tolerating it well.    ESTIMATED BLOOD LOSS:  5 mL.      MCT/HN  dd: 02/19/2018 10:38:19 (CST)  td: 02/19/2018 11:44:18 (CST)  Doc ID   #[id number]  Job ID #[id number]    CC:

## 2018-02-19 NOTE — BRIEF OP NOTE
[facility]  Brief Operative Note     SUMMARY     Surgery Date: 2/19/2018     Surgeon(s) and Role:     * Kapil Wagner MD - Resident - Assisting     * Gael Benson MD - Primary        Pre-op Diagnosis:  Umbilical hernia without obstruction and without gangrene [K42.9]    Post-op Diagnosis:  Post-Op Diagnosis Codes:     * Umbilical hernia without obstruction and without gangrene [K42.9]    Procedure(s) (LRB):  REPAIR-HERNIA-UMBILICAL (5 YRS +) Open without Mesh (N/A)    Anesthesia: General    Description of the findings of the procedure: umbilical hernia repair    Findings/Key Components: Sub-centimeter defect closed primarily    Estimated Blood Loss: * No values recorded between 2/19/2018  9:24 AM and 2/19/2018  9:48 AM *         Specimens:   Specimen (12h ago through future)    None          Discharge Note    SUMMARY     Admit Date: 2/19/2018    Discharge Date and Time:  02/19/2018 9:49 AM    Hospital Course (synopsis of major diagnoses, care, treatment, and services provided during the course of the hospital stay): Pt was brought to OR for repair of umbilical hernia. Pt tolerated the procedure well and was brought to PACU for recovery. Once patient recovered from anesthesia and met discharge criteria, he was discharged home.         Final Diagnosis: Post-Op Diagnosis Codes:     * Umbilical hernia without obstruction and without gangrene [K42.9]    Disposition: Home or Self Care    Follow Up/Patient Instructions:     Medications:  Reconciled Home Medications:   Current Discharge Medication List      CONTINUE these medications which have CHANGED    Details   oxyCODONE-acetaminophen (PERCOCET) 5-325 mg per tablet Take 1 tablet by mouth every 4 (four) hours as needed for Pain ((after surgery)).  Qty: 30 tablet, Refills: 0         CONTINUE these medications which have NOT CHANGED    Details   amlodipine (NORVASC) 10 MG tablet Take 1 tablet (10 mg total) by mouth once daily.  Qty:  90 tablet, Refills: 1    Associated Diagnoses: Benign hypertensive heart disease without heart failure      buPROPion (WELLBUTRIN XL) 150 MG TB24 tablet Take 150 mg by mouth once daily.       fluticasone (FLONASE) 50 mcg/actuation nasal spray 1 spray by Each Nare route 2 (two) times daily.  Qty: 3 Bottle, Refills: 3    Associated Diagnoses: Asthma with allergic rhinitis, unspecified asthma severity, uncomplicated      hydrochlorothiazide (HYDRODIURIL) 50 MG tablet Take 1 tablet (50 mg total) by mouth once daily.  Qty: 90 tablet, Refills: 1    Associated Diagnoses: Benign hypertensive heart disease without heart failure      ibuprofen (ADVIL,MOTRIN) 800 MG tablet Take 1 tablet (800 mg total) by mouth every 8 (eight) hours as needed for Pain.  Qty: 20 tablet, Refills: 0      levocetirizine (XYZAL) 5 MG tablet Take 1 tablet (5 mg total) by mouth every evening.  Qty: 30 tablet, Refills: 5      LIPITOR 40 mg tablet Take 1 tablet (40 mg total) by mouth once daily.  Qty: 30 tablet, Refills: 5    Associated Diagnoses: Hyperlipidemia, unspecified hyperlipidemia type      paroxetine (PAXIL) 40 MG tablet Take 1 tablet (40 mg total) by mouth once daily.  Qty: 90 tablet, Refills: 1    Associated Diagnoses: Major depression      ranitidine (ZANTAC) 150 MG tablet Take 1 tablet (150 mg total) by mouth 2 (two) times daily.  Qty: 60 tablet, Refills: 5    Associated Diagnoses: Gastroesophageal reflux disease, esophagitis presence not specified      tamsulosin (FLOMAX) 0.4 mg Cp24 Take 1 capsule (0.4 mg total) by mouth once daily.  Qty: 30 capsule, Refills: 11    Associated Diagnoses: Urinary frequency      zolpidem (AMBIEN) 10 mg Tab Take 1 tablet (10 mg total) by mouth nightly as needed.  Qty: 30 tablet, Refills: 2    Associated Diagnoses: Insomnia, unspecified type      CIALIS 20 mg Tab              Discharge Procedure Orders  Diet general     Other restrictions (specify):   Order Comments: May resume diet as tolerated.   No heavy  lifting or strenuous exercise until cleared by physician.  May shower in 48 hours; no baths or submerging in water (swimming,etc) for at least 2 weeks  Keep incision clean with soap and water.  Leave steri strips in place they will fall off on their own  Monitor for temp > 101.4, bleeding, redness, purulent drainage, or any extreme pain. If any occur, please call MD or go to ER.  Please resume all home meds and take newly prescribed meds.  Follow up with Dr. Benson in 2 weeks in clinic for post-op check. If no appointment made in 1 week, please call clinic.       Follow-up Information     Gael Benson MD In 2 weeks.    Specialty:  General Surgery  Why:  s/p umbilical hernia repair  Contact information:  Joanna ARCOS  [facility] [id number] [phone]

## 2018-02-19 NOTE — DISCHARGE INSTRUCTIONS
Hernia (Adult)    A hernia can happen when there is a weakness or defect in the wall of the abdomen or groin. Intestines or nearby tissues may move from their usual location and push through the weakness in the wall. This can cause a hernia (bulge) you may see or feel.  Causes and Risk Factors   A hernia may be present at birth. Or it may be caused by the wear and tear of daily living. Certain factors can make a hernia more likely. These can include:  · Heavy lifting  · Straining, whether from lifting, movement, or constipation  · Chronic cough  · Injury to the abdominal wall  · Excess weight  · Pregnancy  · Prior surgery  · Older age  · Family history of hernia  Symptoms  Symptoms of a hernia may come on suddenly. Or they may appear slowly over time. Some common symptoms include:  · Bulge in the groin area, around the navel, or in the scrotum (the bulge may get bigger when you stand and go away when you lie down)  · Pain or pressure around the bulge  · Pain during activities such as lifting, coughing, or sneezing  · A feeling of weakness or pressure in the groin  · Pain or swelling in the scrotum  Types of hernias  There are different types of hernia. The type you have depends on its location:  · Inguinal: This type is in the groin or scrotum. It is more common in men.  · Femoral: This type is in the groin, upper thigh (where the leg bends), or labia. It is more common in women.  · Ventral: This type is in the abdominal wall.  · Umbilical: This type occurs around the navel (belly button).  · Incisional: This type occurs at the site of a previous surgery.  The condition of the hernia can help determine how urgently it needs to be treated.  · Reducible: It goes back in by itself, or it can be pushed back in.  · Irreducible: It cant be pushed back in.  · Incarcerated/Strangulated: The intestine is trapped (incarcerated). If this happens, you wont be able to push the bulge back in. If the incarcerated hernia isnt  treated, it may become strangulated. This means the area loses blood supply and the tissue may die. This requires emergency surgery! Treatment is needed right away!  In most cases, a hernia will not heal on its own. Surgery is usually needed to repair the defect in the abdominal wall or groin. Youll be told more about surgery, if needed.  If your symptoms are not severe, treatment may sometimes be delayed. In such cases, regular follow-up visits with the provider will be needed. Youll be asked to keep track of your symptoms and to watch for signs of more serious problems. You may also be given guidelines similar to the home care instructions below.  Home Care  To help keep a hernia from getting worse, you may be advised to:  · Avoid heavy lifting and straining as directed.  · Take steps to prevent constipation, such as eating more fiber and drinking more water. This may help reduce straining that can occur when having a bowel movement. Reducing straining may help keep your symptoms from getting worse.  · Maintain a healthy weight or lose excess weight. This can help reduce strain on abdominal muscles and tissues.  · Stop smoking. This can help prevent coughing that may also strain abdominal muscles and tissues.  Follow-up care  Follow up with your healthcare provider, or as directed. If imaging tests were done, they will be reviewed a doctor. You will be told the results and any new findings that may affect your care.  When to seek medical advice  Call your healthcare provider right away if any of these occur:  · Hernia hardens, swells, or grows larger  · Hernia can no longer be pushed back in  · Pain moves to the lower right abdomen (just below the waistline), or spreads to the back  Call 911  Call 911 right away if any of these occur:  · Nausea and vomiting  · Severe pain, redness, or tenderness in the area near the hernia  · Pain worsens quickly and doesnt get better  · Inability to have a bowel movement or  pass gas  · Fever of 100.4°F (38°C) or higher  · Trouble breathing  · Fainting  · Rapid heart rate  · Vomiting blood  · Large amounts of blood in stool  Date Last Reviewed: 6/9/2015  © 9157-2416 [employer] [address] [place] [address], [place] [address]. All rights reserved. This information is not intended as a substitute for professional medical care. Always follow your healthcare professional's instructions.

## 2018-02-19 NOTE — TRANSFER OF CARE
Anesthesia Transfer of Care Note    Patient: Rito Conley    Procedure(s) Performed: Procedure(s) (LRB):  REPAIR-HERNIA-UMBILICAL (5 YRS +) Open without Mesh (N/A)    Patient location: PACU    Anesthesia Type: general    Transport from OR: Transported from OR on 6-10 L/min O2 by face mask with adequate spontaneous ventilation    Post pain: adequate analgesia    Post assessment: no apparent anesthetic complications and tolerated procedure well    Post vital signs: stable    Level of consciousness: awake and responds to stimulation    Nausea/Vomiting: no nausea/vomiting    Complications: none    Transfer of care protocol was followed      Last vitals:   Visit Vitals  BP (!) 163/107 (BP Location: Left arm, Patient Position: Lying)   Pulse 83   Temp 36.2 °C (97.2 °F) (Axillary)   Resp 16   SpO2 100%

## 2018-02-19 NOTE — INTERVAL H&P NOTE
The patient has been examined and the H&P has been reviewed:    I concur with the findings and no changes have occurred since H&P was written.    Anesthesia/Surgery risks, benefits and alternative options discussed and understood by patient/family.

## 2018-02-19 NOTE — ANESTHESIA POSTPROCEDURE EVALUATION
Anesthesia Post Evaluation    Patient: Rito Conley    Procedure(s) Performed: Procedure(s) (LRB):  REPAIR-HERNIA-UMBILICAL (5 YRS +) Open without Mesh (N/A)    Final Anesthesia Type: general  Patient location during evaluation: PACU  Patient participation: Yes- Able to Participate  Level of consciousness: awake and alert and oriented  Post-procedure vital signs: reviewed and stable  Pain management: adequate  Airway patency: patent  PONV status at discharge: No PONV  Anesthetic complications: no      Cardiovascular status: hemodynamically stable  Respiratory status: unassisted  Hydration status: euvolemic  Follow-up not needed.        Visit Vitals  BP (!) 141/97 (BP Location: Left arm, Patient Position: Lying)   Pulse 71   Temp 36.3 °C (97.3 °F) (Temporal)   Resp 16   SpO2 95%       Pain/Rodo Score: Pain Assessment Performed: Yes (2/19/2018 11:28 AM)  Presence of Pain: complains of pain/discomfort (2/19/2018 11:28 AM)  Pain Rating Prior to Med Admin: 4 (2/19/2018 10:10 AM)  Rodo Score: 10 (2/19/2018 10:50 AM)

## 2018-02-19 NOTE — H&P (VIEW-ONLY)
"GENERAL SURGERY CLINIC NOTE    Rito Conley is a 52 y.o.  male with Hx of HTN, PTSD, LADI, umbilical hernia who presents to clinic today for evaluation. He reports first noticing a bulge at the umbilicus in Feb 2017 but was asymptomatic at that time. He began having intermittent pain at the site in Aug 2017. Pain described as sudden and sharp. Worse with prolonged standing and sexual intercourse. Improved with lying supine. Bulge has always been reducible. Feels that is becoming larger and occasionally feels "more stuck." Episodes of pain occur approx once every 1-2 weeks. No longer feels that the pain is something he can tolerate long-term and would like to have this repaired.    No prior abdominal surgeries.  No antiplatelet or anticoagulant agents.  Never smoker.    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    Social History     Social History    Marital status:      Spouse name: N/A    Number of children: N/A    Years of education: N/A     Occupational History    Not on file.     Social History Main Topics    Smoking status: Never Smoker    Smokeless tobacco: Never Used    Alcohol use No    Drug use: No    Sexual activity: Yes     Other Topics Concern    Not on file     Social History Narrative    No narrative on file     Review of patient's allergies indicates:  No Known Allergies      PHYSICAL EXAM:  Vitals:    01/24/18 1108   BP: (!) 143/89   Pulse: 81   Temp: 98.1 °F (36.7 °C) "       General: NAD  Neuro: AAOx3  Cardio: S1 and S2, RRR  Resp: CTAB, breathing even and unlabored  Abd: Soft, ND, reducible (with firm gentle pressure while supine) umbilical hernia, no overlying skin changes, mild tenderness to palpation at base of umbilicus  Ext: Warm and well perfused      ASSESSMENT/PLAN:  52 y.o. male with symptomatic umbilical hernia    - Plan for umbilical hernia repair with/without mesh  - Risks, benefits, alternatives to the procedure discussed with the patient who wishes to proceed  - All questions and concerns addressed  - Consents obtained today      Charan Conti MD  Surgery Resident, PGY-III  Pager: [phone]  1/24/2018 11:17 AM

## 2018-02-21 ENCOUNTER — OFFICE VISIT (OUTPATIENT)
Dept: FAMILY MEDICINE | Facility: CLINIC | Age: 53
End: 2018-02-21
Payer: MEDICARE

## 2018-02-21 VITALS
HEART RATE: 72 BPM | OXYGEN SATURATION: 97 % | DIASTOLIC BLOOD PRESSURE: 84 MMHG | SYSTOLIC BLOOD PRESSURE: 126 MMHG | WEIGHT: 205.69 LBS | BODY MASS INDEX: 28.8 KG/M2 | HEIGHT: 71 IN | TEMPERATURE: 98 F

## 2018-02-21 DIAGNOSIS — G89.29 CHRONIC BILATERAL LOW BACK PAIN WITHOUT SCIATICA: Primary | ICD-10-CM

## 2018-02-21 DIAGNOSIS — M54.50 CHRONIC BILATERAL LOW BACK PAIN WITHOUT SCIATICA: Primary | ICD-10-CM

## 2018-02-21 DIAGNOSIS — K59.00 CONSTIPATION, UNSPECIFIED CONSTIPATION TYPE: ICD-10-CM

## 2018-02-21 PROCEDURE — 99214 OFFICE O/P EST MOD 30 MIN: CPT | Mod: S$PBB,,, | Performed by: FAMILY MEDICINE

## 2018-02-21 PROCEDURE — 99213 OFFICE O/P EST LOW 20 MIN: CPT | Mod: PBBFAC,PO | Performed by: FAMILY MEDICINE

## 2018-02-21 PROCEDURE — 99999 PR PBB SHADOW E&M-EST. PATIENT-LVL III: CPT | Mod: PBBFAC,,, | Performed by: FAMILY MEDICINE

## 2018-02-21 RX ORDER — OXYCODONE AND ACETAMINOPHEN 5; 325 MG/1; MG/1
1 TABLET ORAL EVERY 4 HOURS PRN
Qty: 60 TABLET | Refills: 0 | Status: SHIPPED | OUTPATIENT
Start: 2018-02-21 | End: 2018-03-06

## 2018-02-21 RX ORDER — OXYCODONE AND ACETAMINOPHEN 5; 325 MG/1; MG/1
1 TABLET ORAL EVERY 4 HOURS PRN
Qty: 60 TABLET | Refills: 0 | Status: SHIPPED | OUTPATIENT
Start: 2018-03-21 | End: 2018-03-06 | Stop reason: SDUPTHER

## 2018-02-21 RX ORDER — DOCUSATE SODIUM 100 MG/1
100 CAPSULE, LIQUID FILLED ORAL 2 TIMES DAILY
Qty: 60 CAPSULE | Refills: 1 | Status: SHIPPED | OUTPATIENT
Start: 2018-02-21 | End: 2018-03-06

## 2018-02-21 RX ORDER — OXYCODONE AND ACETAMINOPHEN 5; 325 MG/1; MG/1
1 TABLET ORAL EVERY 4 HOURS PRN
Qty: 60 TABLET | Refills: 0 | Status: ON HOLD | OUTPATIENT
Start: 2018-04-21 | End: 2018-05-02 | Stop reason: HOSPADM

## 2018-02-21 NOTE — PROGRESS NOTES
"Subjective:       Patient ID: Rito Conley is a 52 y.o. male.    Chief Complaint: Constipation (Had hernia surgery on Monday. No Bm since then) and Medication Refill    Patient presents for follow-up after his surgery . He had has hernia repair 2 days ago. He has not had a bowel movement since then. He has no problems with urination. He has no fever or chills. He has no swelling in his legs. He has a little pain in the navel. He has used minimal pain medication, but has not used a stool softner with this.     He also needs a refill of his medicaitons for his chronic lower back pain. He is functional and is able to get things done that he needs to take care of. He denies side effects other than constipation. His bacck pain is stable and has no new symptoms associated with his back pain.       Review of Systems   Constitutional: Positive for activity change. Negative for appetite change, chills, diaphoresis, fatigue and fever.   Respiratory: Negative for apnea, cough and shortness of breath.    Gastrointestinal: Positive for constipation. Negative for abdominal distention, blood in stool, diarrhea, nausea and vomiting.   Genitourinary: Negative for dysuria and hematuria.       Objective:       Vitals:    02/21/18 0931   BP: 126/84   Pulse: 72   Temp: 97.6 °F (36.4 °C)   TempSrc: Oral   SpO2: 97%   Weight: 93.3 kg (205 lb 11 oz)   Height: 5' 11" (1.803 m)       Physical Exam   Constitutional: He is oriented to person, place, and time. He appears well-developed and well-nourished. No distress.   HENT:   Head: Normocephalic and atraumatic.   Eyes: Conjunctivae are normal.   Neck: Normal range of motion. Neck supple.   Cardiovascular: Normal rate, regular rhythm and normal heart sounds.  Exam reveals no gallop and no friction rub.    No murmur heard.  Pulmonary/Chest: Effort normal and breath sounds normal. No respiratory distress. He has no wheezes. He has no rales.   Abdominal: Soft. Bowel sounds are normal. " He exhibits no distension and no mass. There is no guarding.       Neurological: He is alert and oriented to person, place, and time.   Skin: He is not diaphoretic.       Assessment:       1. Chronic bilateral low back pain without sciatica    2. Constipation, unspecified constipation type        Plan:       Rito was seen today for constipation and medication refill.    Diagnoses and all orders for this visit:    Chronic bilateral low back pain without sciatica  -     oxyCODONE-acetaminophen (PERCOCET) 5-325 mg per tablet; Take 1 tablet by mouth every 4 (four) hours as needed for Pain ((after surgery)).  -     oxyCODONE-acetaminophen (PERCOCET) 5-325 mg per tablet; Take 1 tablet by mouth every 4 (four) hours as needed for Pain ((after surgery)).  -     oxyCODONE-acetaminophen (PERCOCET) 5-325 mg per tablet; Take 1 tablet by mouth every 4 (four) hours as needed for Pain ((after surgery)).  Stable. Refilled meds.   Follow-up in about 3 months (around 5/21/2018).  Constipation, unspecified constipation type  -     docusate sodium (COLACE) 100 MG capsule; Take 1 capsule (100 mg total) by mouth 2 (two) times daily.    He needs to take a stool softener with his pain medication.

## 2018-03-06 ENCOUNTER — OFFICE VISIT (OUTPATIENT)
Dept: SURGERY | Facility: CLINIC | Age: 53
End: 2018-03-06
Payer: MEDICARE

## 2018-03-06 VITALS
SYSTOLIC BLOOD PRESSURE: 143 MMHG | HEIGHT: 71 IN | WEIGHT: 205 LBS | DIASTOLIC BLOOD PRESSURE: 95 MMHG | HEART RATE: 80 BPM | BODY MASS INDEX: 28.7 KG/M2

## 2018-03-06 DIAGNOSIS — Z09 S/P UMBILICAL HERNIA REPAIR, FOLLOW-UP EXAM: Primary | ICD-10-CM

## 2018-03-06 PROCEDURE — 99999 PR PBB SHADOW E&M-EST. PATIENT-LVL III: CPT | Mod: PBBFAC,,, | Performed by: PHYSICIAN ASSISTANT

## 2018-03-06 PROCEDURE — 99024 POSTOP FOLLOW-UP VISIT: CPT | Mod: POP,,, | Performed by: PHYSICIAN ASSISTANT

## 2018-03-06 PROCEDURE — 99213 OFFICE O/P EST LOW 20 MIN: CPT | Mod: PBBFAC | Performed by: PHYSICIAN ASSISTANT

## 2018-03-06 NOTE — PROGRESS NOTES
SUBJECTIVE:  The patient is a 52 y.o. y/o male 2 weeks s/p umbilical hernia repair. He denies pain, fevers, chills, nausea, vomiting, diarrhea, or constipation. Eating well with normal appetite and bowel function. Denies redness around or drainage from incisions.    OBJECTIVE:  GEN: male in NAD  ABD: soft, non-tender, non-distended  INCISIONS: clean, dry and intact, healing well without signs of infection or hernia, small seroma palpated    ASSESSMENT/PLAN:  Doing well 2 weeks s/p umbilical hernia repair. Patient is advised to avoid heavy lifting or strenuous activity for another 2-4 weeks. May resume light cardio. Patient may bathe and continue to take a regular diet. Will follow-up with me on an as-needed basis. All questions answered; patient is comfortable with follow-up plan.

## 2018-03-26 ENCOUNTER — OFFICE VISIT (OUTPATIENT)
Dept: FAMILY MEDICINE | Facility: CLINIC | Age: 53
End: 2018-03-26
Payer: MEDICARE

## 2018-03-26 VITALS
SYSTOLIC BLOOD PRESSURE: 130 MMHG | HEART RATE: 83 BPM | OXYGEN SATURATION: 95 % | WEIGHT: 209.44 LBS | HEIGHT: 71 IN | DIASTOLIC BLOOD PRESSURE: 90 MMHG | TEMPERATURE: 98 F | BODY MASS INDEX: 29.32 KG/M2 | RESPIRATION RATE: 17 BRPM

## 2018-03-26 DIAGNOSIS — J30.2 ACUTE SEASONAL ALLERGIC RHINITIS, UNSPECIFIED TRIGGER: Primary | ICD-10-CM

## 2018-03-26 LAB
CTP QC/QA: YES
S PYO RRNA THROAT QL PROBE: NEGATIVE

## 2018-03-26 PROCEDURE — 87880 STREP A ASSAY W/OPTIC: CPT | Mod: PBBFAC,PN | Performed by: FAMILY MEDICINE

## 2018-03-26 PROCEDURE — 99213 OFFICE O/P EST LOW 20 MIN: CPT | Mod: PBBFAC,PN | Performed by: FAMILY MEDICINE

## 2018-03-26 PROCEDURE — 99214 OFFICE O/P EST MOD 30 MIN: CPT | Mod: S$PBB,,, | Performed by: FAMILY MEDICINE

## 2018-03-26 PROCEDURE — 99999 PR PBB SHADOW E&M-EST. PATIENT-LVL III: CPT | Mod: PBBFAC,,, | Performed by: FAMILY MEDICINE

## 2018-03-26 RX ORDER — LEVOCETIRIZINE DIHYDROCHLORIDE 5 MG/1
5 TABLET, FILM COATED ORAL NIGHTLY
Qty: 30 TABLET | Refills: 3 | Status: SHIPPED | OUTPATIENT
Start: 2018-03-26 | End: 2019-01-23 | Stop reason: SDUPTHER

## 2018-03-26 RX ORDER — PREDNISONE 20 MG/1
20 TABLET ORAL DAILY
Qty: 3 TABLET | Refills: 0 | Status: SHIPPED | OUTPATIENT
Start: 2018-03-26 | End: 2018-03-29

## 2018-03-26 RX ORDER — BENZONATATE 200 MG/1
200 CAPSULE ORAL 3 TIMES DAILY PRN
Qty: 30 CAPSULE | Refills: 0 | Status: SHIPPED | OUTPATIENT
Start: 2018-03-26 | End: 2018-04-05

## 2018-03-26 RX ORDER — FLUTICASONE PROPIONATE 50 MCG
2 SPRAY, SUSPENSION (ML) NASAL DAILY
Qty: 1 BOTTLE | Refills: 3 | Status: SHIPPED | OUTPATIENT
Start: 2018-03-26 | End: 2018-04-03

## 2018-03-26 NOTE — PROGRESS NOTES
"Subjective:       Patient ID: Rito Conley is a 52 y.o. male.    Chief Complaint: Sinus Problem; Cough; and Nasal Congestion    HPI   52 year old male comes in because of 3 days of worsening cough. Reports that cough is productive of mucus, he feels a postnasal drip, is having sneezing and some runny nose, and nose feels very congested. He is also having sinus pressure. States that yesterday voice started to change some. There is no fever of chills. He has a slightly sore throat but is able to swallow. There is no shortness of breath.    Review of Systems   Constitutional: Negative for chills and fever.   HENT: Positive for congestion, rhinorrhea, sinus pressure, sneezing, sore throat and voice change.    Eyes: Negative for pain, redness and itching.   Respiratory: Positive for cough. Negative for shortness of breath and wheezing.    Cardiovascular: Negative for chest pain.       Objective:     BP (!) 130/90 (BP Location: Right arm, Patient Position: Sitting, BP Method: Medium (Manual))   Pulse 83   Temp 98.4 °F (36.9 °C) (Oral)   Resp 17   Ht 5' 11" (1.803 m)   Wt 95 kg (209 lb 7 oz)   SpO2 95%   BMI 29.21 kg/m²     Physical Exam   Constitutional: He appears well-developed and well-nourished. No distress.   HENT:   Head: Normocephalic and atraumatic.   Right Ear: Tympanic membrane, external ear and ear canal normal.   Left Ear: External ear and ear canal normal.   Nose: Mucosal edema and rhinorrhea present. No epistaxis.  No foreign bodies. Right sinus exhibits no maxillary sinus tenderness and no frontal sinus tenderness. Left sinus exhibits no maxillary sinus tenderness and no frontal sinus tenderness.   Mouth/Throat: Posterior oropharyngeal erythema present. No oropharyngeal exudate. Tonsils are 1+ on the right. Tonsils are 1+ on the left. No tonsillar exudate.   Eyes: EOM are normal. Pupils are equal, round, and reactive to light. Right conjunctiva is injected. Left conjunctiva is injected. "   Neck: Normal range of motion. Neck supple. No tracheal deviation present.   Cardiovascular: Normal rate, regular rhythm, normal heart sounds and intact distal pulses.    No murmur heard.  Pulmonary/Chest: Effort normal and breath sounds normal. He has no wheezes. He has no rales.   Lymphadenopathy:     He has cervical adenopathy.   Neurological: He is alert.   Skin: He is not diaphoretic.       Assessment:       1. Acute seasonal allergic rhinitis, unspecified trigger    2. Sore throat    3. Cough        Plan:     Rito was seen today for sinus problem, cough and nasal congestion.    Diagnoses and all orders for this visit:    Acute seasonal allergic rhinitis, unspecified trigger  -     predniSONE (DELTASONE) 20 MG tablet; Take 1 tablet (20 mg total) by mouth once daily.  -     levocetirizine (XYZAL) 5 MG tablet; Take 1 tablet (5 mg total) by mouth every evening.  -     fluticasone (FLONASE) 50 mcg/actuation nasal spray; 2 sprays (100 mcg total) by Each Nare route once daily.  -     benzonatate (TESSALON) 200 MG capsule; Take 1 capsule (200 mg total) by mouth 3 (three) times daily as needed for Cough.  Patient educated on allergies and symptoms they can cause.  Continue Flonase.  Start 3 day course of steroid to calm down symptoms.  Start Xyzal so that by time presdnisone done Xyzal in system.    Sore throat  -     POCT Rapid Strep A  Rapid strep negative. Likely from postnasal drip.    Cough  -     benzonatate (TESSALON) 200 MG capsule; Take 1 capsule (200 mg total) by mouth 3 (three) times daily as needed for Cough.  Tessalon for symptom control

## 2018-04-02 ENCOUNTER — LAB VISIT (OUTPATIENT)
Dept: LAB | Facility: HOSPITAL | Age: 53
End: 2018-04-02
Attending: UROLOGY
Payer: MEDICARE

## 2018-04-02 DIAGNOSIS — N13.8 BPH WITH OBSTRUCTION/LOWER URINARY TRACT SYMPTOMS: ICD-10-CM

## 2018-04-02 DIAGNOSIS — N40.1 BPH WITH OBSTRUCTION/LOWER URINARY TRACT SYMPTOMS: ICD-10-CM

## 2018-04-02 LAB — COMPLEXED PSA SERPL-MCNC: 0.43 NG/ML

## 2018-04-02 PROCEDURE — 36415 COLL VENOUS BLD VENIPUNCTURE: CPT | Mod: PO

## 2018-04-02 PROCEDURE — 84153 ASSAY OF PSA TOTAL: CPT

## 2018-04-03 ENCOUNTER — OFFICE VISIT (OUTPATIENT)
Dept: UROLOGY | Facility: CLINIC | Age: 53
End: 2018-04-03
Payer: MEDICARE

## 2018-04-03 VITALS
HEART RATE: 68 BPM | WEIGHT: 208.31 LBS | HEIGHT: 71 IN | BODY MASS INDEX: 29.16 KG/M2 | DIASTOLIC BLOOD PRESSURE: 82 MMHG | SYSTOLIC BLOOD PRESSURE: 120 MMHG

## 2018-04-03 DIAGNOSIS — R33.9 INCOMPLETE EMPTYING OF BLADDER: ICD-10-CM

## 2018-04-03 DIAGNOSIS — N40.1 BPH WITH OBSTRUCTION/LOWER URINARY TRACT SYMPTOMS: Primary | ICD-10-CM

## 2018-04-03 DIAGNOSIS — R35.1 NOCTURIA MORE THAN TWICE PER NIGHT: ICD-10-CM

## 2018-04-03 DIAGNOSIS — N52.9 ED (ERECTILE DYSFUNCTION) OF ORGANIC ORIGIN: ICD-10-CM

## 2018-04-03 DIAGNOSIS — N13.8 BPH WITH OBSTRUCTION/LOWER URINARY TRACT SYMPTOMS: Primary | ICD-10-CM

## 2018-04-03 LAB
BILIRUB SERPL-MCNC: NORMAL MG/DL
BLOOD URINE, POC: NORMAL
COLOR, POC UA: YELLOW
GLUCOSE UR QL STRIP: NORMAL
KETONES UR QL STRIP: NORMAL
LEUKOCYTE ESTERASE URINE, POC: NORMAL
NITRITE, POC UA: NORMAL
PH, POC UA: 6
PROTEIN, POC: NORMAL
SPECIFIC GRAVITY, POC UA: 1030
UROBILINOGEN, POC UA: NORMAL

## 2018-04-03 PROCEDURE — 99999 PR PBB SHADOW E&M-EST. PATIENT-LVL III: CPT | Mod: PBBFAC,,, | Performed by: UROLOGY

## 2018-04-03 PROCEDURE — 99214 OFFICE O/P EST MOD 30 MIN: CPT | Mod: S$PBB,,, | Performed by: UROLOGY

## 2018-04-03 PROCEDURE — 81001 URINALYSIS AUTO W/SCOPE: CPT | Mod: PBBFAC | Performed by: UROLOGY

## 2018-04-03 PROCEDURE — 99213 OFFICE O/P EST LOW 20 MIN: CPT | Mod: PBBFAC | Performed by: UROLOGY

## 2018-04-03 NOTE — PROGRESS NOTES
Subjective:       Patient ID: Rito Conley is a 52 y.o. male who was last seen in this office Visit date not found    Chief Complaint:   Chief Complaint   Patient presents with    Benign Prostatic Hypertrophy     6 month f/u with psa        Benign Prostatic Hyperplasia  He patient reports frequency, incomplete emptying and nocturia one time a night. He denies straining, urgency and weak stream. The patient states symptoms are of mild severity. Onset of symptoms was a few years ago and was gradual in onset.  He has no personal history and no family history of prostate cancer. He reports a history of no complicating symptoms. He denies flank pain, gross hematuria, kidney stones and recurrent UTI.  He is currently taking Flomax.     Erectile Dysfunction  Patient complains of erectile dysfunction. Onset of dysfunction was several years ago and was gradual in onset.  Patient states the nature of difficulty is maintaining erection. Full erections occur with intercourse. Partial erections occur with intercourse. Libido is not affected. Risk factors for ED include cardiovascular disease. Patient denies history of pelvic radiation. Previous treatment of ED includes Viagra.      ACTIVE MEDICAL ISSUES:  Patient Active Problem List   Diagnosis    Benign hypertensive heart disease without heart failure    DJD of shoulder    DJD (degenerative joint disease), lumbar    Allergic rhinitis    Chronic bilateral low back pain without sciatica    Lumbar scoliosis    Back injury    Decreased ROM of trunk and back    Umbilical hernia       ALLERGIES AND MEDICATIONS: updated and reviewed.  Review of patient's allergies indicates:  No Known Allergies  Current Outpatient Prescriptions   Medication Sig    amlodipine (NORVASC) 10 MG tablet Take 1 tablet (10 mg total) by mouth once daily.    benzonatate (TESSALON) 200 MG capsule Take 1 capsule (200 mg total) by mouth 3 (three) times daily as needed for Cough.     buPROPion (WELLBUTRIN XL) 150 MG TB24 tablet Take 150 mg by mouth once daily.     CIALIS 20 mg Tab     fluticasone (FLONASE) 50 mcg/actuation nasal spray 1 spray by Each Nare route 2 (two) times daily. (Patient taking differently: 1 spray by Each Nare route 2 (two) times daily as needed. )    hydrochlorothiazide (HYDRODIURIL) 50 MG tablet Take 1 tablet (50 mg total) by mouth once daily.    ibuprofen (ADVIL,MOTRIN) 800 MG tablet Take 1 tablet (800 mg total) by mouth every 8 (eight) hours as needed for Pain.    levocetirizine (XYZAL) 5 MG tablet Take 1 tablet (5 mg total) by mouth every evening.    LIPITOR 40 mg tablet Take 1 tablet (40 mg total) by mouth once daily.    [START ON 4/21/2018] oxyCODONE-acetaminophen (PERCOCET) 5-325 mg per tablet Take 1 tablet by mouth every 4 (four) hours as needed for Pain ((after surgery)).    paroxetine (PAXIL) 40 MG tablet Take 1 tablet (40 mg total) by mouth once daily.    ranitidine (ZANTAC) 150 MG tablet Take 1 tablet (150 mg total) by mouth 2 (two) times daily. (Patient taking differently: Take 150 mg by mouth once daily. )    tamsulosin (FLOMAX) 0.4 mg Cp24 Take 1 capsule (0.4 mg total) by mouth once daily. (Patient taking differently: Take 0.4 mg by mouth every evening. )    zolpidem (AMBIEN) 10 mg Tab Take 1 tablet (10 mg total) by mouth nightly as needed.     No current facility-administered medications for this visit.        Review of Systems   Constitutional: Negative for activity change, fatigue, fever and unexpected weight change.   HENT: Negative for congestion.    Eyes: Negative for redness.   Respiratory: Negative for chest tightness and shortness of breath.    Cardiovascular: Negative for chest pain and leg swelling.   Gastrointestinal: Negative for abdominal pain, constipation, diarrhea, nausea and vomiting.   Genitourinary: Negative for dysuria, flank pain, frequency, hematuria, penile pain, penile swelling, scrotal swelling, testicular pain and  "urgency.   Musculoskeletal: Negative for arthralgias and back pain.   Neurological: Negative for dizziness and light-headedness.   Psychiatric/Behavioral: Negative for behavioral problems and confusion. The patient is not nervous/anxious.    All other systems reviewed and are negative.      Objective:      Vitals:    04/03/18 1610   BP: 120/82   Pulse: 68   Weight: 94.5 kg (208 lb 5.4 oz)   Height: 5' 11" (1.803 m)     Physical Exam   Nursing note and vitals reviewed.  Constitutional: He is oriented to person, place, and time. He appears well-developed and well-nourished.   HENT:   Head: Normocephalic.   Eyes: Conjunctivae are normal.   Neck: Normal range of motion. Neck supple. No tracheal deviation present. No thyromegaly present.   Cardiovascular: Normal rate and normal heart sounds.    Pulmonary/Chest: Effort normal and breath sounds normal. No respiratory distress. He has no wheezes.   Abdominal: Soft. Bowel sounds are normal. There is no hepatosplenomegaly. There is no tenderness. There is no rebound and no CVA tenderness. No hernia.   Musculoskeletal: Normal range of motion. He exhibits no edema or tenderness.   Lymphadenopathy:     He has no cervical adenopathy.   Neurological: He is alert and oriented to person, place, and time.   Skin: Skin is warm and dry. No rash noted. No erythema.     Psychiatric: He has a normal mood and affect. His behavior is normal. Judgment and thought content normal.       Urine dipstick shows negative for all components.  Micro exam: negative for WBC's or RBC's.    PSA DIAGNOSTIC 0.00 - 4.00 ng/mL 0.43    Comments: PSA Expected levels:   Hormonal Therapy: <0.05 ng/ml   Prostatectomy: <0.01 ng/ml   Radiation Therapy: <1.00 ng/ml    Resulting Agency  [facility]   Narrative     6 months      Specimen Collected: 04/02/18 07:29   Last Resulted: 04/02/18 12:51          Assessment:       1. BPH with obstruction/lower urinary tract symptoms    2. Nocturia more than twice per night    3. " Incomplete emptying of bladder    4. ED (erectile dysfunction) of organic origin          Plan:       1. BPH with obstruction/lower urinary tract symptoms  Stay on Flomax  - POCT urinalysis, dipstick or tablet reag    2. Nocturia more than twice per night  Limit evening fluids    3. Incomplete emptying of bladder      4. ED (erectile dysfunction) of organic origin  Cialis            Follow-up in about 6 months (around 10/3/2018) for Follow up.

## 2018-04-09 ENCOUNTER — OFFICE VISIT (OUTPATIENT)
Dept: FAMILY MEDICINE | Facility: CLINIC | Age: 53
End: 2018-04-09
Payer: MEDICARE

## 2018-04-09 VITALS
DIASTOLIC BLOOD PRESSURE: 80 MMHG | HEART RATE: 80 BPM | WEIGHT: 207.88 LBS | TEMPERATURE: 98 F | OXYGEN SATURATION: 99 % | BODY MASS INDEX: 29.1 KG/M2 | HEIGHT: 71 IN | SYSTOLIC BLOOD PRESSURE: 130 MMHG

## 2018-04-09 DIAGNOSIS — M94.0 COSTOCHONDRITIS: Primary | ICD-10-CM

## 2018-04-09 DIAGNOSIS — I10 ESSENTIAL HYPERTENSION: ICD-10-CM

## 2018-04-09 DIAGNOSIS — R35.0 URINARY FREQUENCY: ICD-10-CM

## 2018-04-09 DIAGNOSIS — K21.9 GASTROESOPHAGEAL REFLUX DISEASE, ESOPHAGITIS PRESENCE NOT SPECIFIED: ICD-10-CM

## 2018-04-09 PROCEDURE — 99214 OFFICE O/P EST MOD 30 MIN: CPT | Mod: S$PBB,,, | Performed by: FAMILY MEDICINE

## 2018-04-09 PROCEDURE — 99999 PR PBB SHADOW E&M-EST. PATIENT-LVL IV: CPT | Mod: PBBFAC,,, | Performed by: FAMILY MEDICINE

## 2018-04-09 PROCEDURE — 99214 OFFICE O/P EST MOD 30 MIN: CPT | Mod: PBBFAC,PO | Performed by: FAMILY MEDICINE

## 2018-04-09 RX ORDER — TAMSULOSIN HYDROCHLORIDE 0.4 MG/1
0.4 CAPSULE ORAL DAILY
Qty: 30 CAPSULE | Refills: 11 | Status: SHIPPED | OUTPATIENT
Start: 2018-04-09 | End: 2019-03-04

## 2018-04-09 NOTE — PROGRESS NOTES
"Subjective:       Patient ID: Rito Conley is a 52 y.o. male.    Chief Complaint: Chest Congestion    Chest Pain    This is a new problem. The current episode started 1 to 4 weeks ago. The onset quality is sudden. The problem has been unchanged. The pain is present in the substernal region. The pain is mild. Quality: aching and pulling sensation. The pain does not radiate. Pertinent negatives include no cough, dizziness, exertional chest pressure, near-syncope or shortness of breath.   he has hypertension and his blood pressure is controlled. He is due for labs. He has no swelling, shortness of breath, headache, or palpitations  Review of Systems   Respiratory: Negative for cough and shortness of breath.    Cardiovascular: Positive for chest pain. Negative for near-syncope.   Neurological: Negative for dizziness.       Objective:       Vitals:    04/09/18 1520   BP: 130/80   Pulse: 80   Temp: 98.1 °F (36.7 °C)   TempSrc: Oral   SpO2: 99%   Weight: 94.3 kg (207 lb 14.3 oz)   Height: 5' 11" (1.803 m)       Physical Exam   Constitutional: He appears well-developed and well-nourished. No distress.   HENT:   Head: Normocephalic and atraumatic.   Cardiovascular: Normal rate, regular rhythm and normal heart sounds.  Exam reveals no gallop and no friction rub.    No murmur heard.  Pulmonary/Chest: Effort normal and breath sounds normal. No respiratory distress. He has no wheezes. He has no rales. He exhibits tenderness. He exhibits no mass, no crepitus, no deformity, no swelling and no retraction.   Skin: He is not diaphoretic.       Assessment:       1. Costochondritis    2. Urinary frequency    3. Gastroesophageal reflux disease, esophagitis presence not specified    4. Essential hypertension        Plan:       Rito was seen today for chest congestion.    Diagnoses and all orders for this visit:    Costochondritis  NSAID as needed for chest pain    Urinary frequency  -     tamsulosin (FLOMAX) 0.4 mg Cp24; " Take 1 capsule (0.4 mg total) by mouth once daily.    Gastroesophageal reflux disease, esophagitis presence not specified  -     ranitidine (ZANTAC) 150 MG tablet; Take 1 tablet (150 mg total) by mouth 2 (two) times daily.    Essential hypertension  -     CBC auto differential; Future  -     Comprehensive metabolic panel; Future  -     Lipid panel; Future  -     TSH; Future  Stable AND DUE FOR LABS

## 2018-04-16 ENCOUNTER — PATIENT MESSAGE (OUTPATIENT)
Dept: RESEARCH | Facility: HOSPITAL | Age: 53
End: 2018-04-16

## 2018-05-01 ENCOUNTER — TELEPHONE (OUTPATIENT)
Dept: SURGERY | Facility: CLINIC | Age: 53
End: 2018-05-01

## 2018-05-01 ENCOUNTER — ANESTHESIA EVENT (OUTPATIENT)
Dept: SURGERY | Facility: HOSPITAL | Age: 53
DRG: 355 | End: 2018-05-01
Payer: MEDICARE

## 2018-05-01 ENCOUNTER — HOSPITAL ENCOUNTER (INPATIENT)
Facility: HOSPITAL | Age: 53
LOS: 1 days | Discharge: HOME | DRG: 355 | End: 2018-05-02
Attending: EMERGENCY MEDICINE | Admitting: SURGERY
Payer: MEDICARE

## 2018-05-01 DIAGNOSIS — K46.0 HERNIA WITH OBSTRUCTION: ICD-10-CM

## 2018-05-01 DIAGNOSIS — K43.6 VENTRAL HERNIA WITH OBSTRUCTION AND WITHOUT GANGRENE: ICD-10-CM

## 2018-05-01 DIAGNOSIS — K42.0 UMBILICAL HERNIA WITH OBSTRUCTION, WITHOUT GANGRENE: Primary | ICD-10-CM

## 2018-05-01 PROBLEM — K43.9 VENTRAL HERNIA WITHOUT OBSTRUCTION OR GANGRENE: Status: ACTIVE | Noted: 2018-05-01

## 2018-05-01 PROCEDURE — 63600175 PHARM REV CODE 636 W HCPCS: Performed by: SURGERY

## 2018-05-01 PROCEDURE — 96376 TX/PRO/DX INJ SAME DRUG ADON: CPT

## 2018-05-01 PROCEDURE — 96374 THER/PROPH/DIAG INJ IV PUSH: CPT

## 2018-05-01 PROCEDURE — 99024 POSTOP FOLLOW-UP VISIT: CPT | Mod: AI,,, | Performed by: SURGERY

## 2018-05-01 PROCEDURE — 11000001 HC ACUTE MED/SURG PRIVATE ROOM

## 2018-05-01 PROCEDURE — 96375 TX/PRO/DX INJ NEW DRUG ADDON: CPT

## 2018-05-01 PROCEDURE — 25000003 PHARM REV CODE 250: Performed by: SURGERY

## 2018-05-01 PROCEDURE — 63600175 PHARM REV CODE 636 W HCPCS: Performed by: EMERGENCY MEDICINE

## 2018-05-01 PROCEDURE — 25000003 PHARM REV CODE 250: Performed by: EMERGENCY MEDICINE

## 2018-05-01 PROCEDURE — 96361 HYDRATE IV INFUSION ADD-ON: CPT

## 2018-05-01 PROCEDURE — 96372 THER/PROPH/DIAG INJ SC/IM: CPT

## 2018-05-01 PROCEDURE — 99283 EMERGENCY DEPT VISIT LOW MDM: CPT | Mod: ,,, | Performed by: EMERGENCY MEDICINE

## 2018-05-01 PROCEDURE — 99285 EMERGENCY DEPT VISIT HI MDM: CPT | Mod: 25

## 2018-05-01 RX ORDER — ENOXAPARIN SODIUM 100 MG/ML
40 INJECTION SUBCUTANEOUS EVERY 24 HOURS
Status: DISCONTINUED | OUTPATIENT
Start: 2018-05-01 | End: 2018-05-02 | Stop reason: HOSPADM

## 2018-05-01 RX ORDER — HYDROMORPHONE HYDROCHLORIDE 1 MG/ML
1 INJECTION, SOLUTION INTRAMUSCULAR; INTRAVENOUS; SUBCUTANEOUS
Status: COMPLETED | OUTPATIENT
Start: 2018-05-01 | End: 2018-05-01

## 2018-05-01 RX ORDER — SODIUM CHLORIDE 0.9 % (FLUSH) 0.9 %
3 SYRINGE (ML) INJECTION EVERY 8 HOURS
Status: DISCONTINUED | OUTPATIENT
Start: 2018-05-01 | End: 2018-05-02 | Stop reason: HOSPADM

## 2018-05-01 RX ORDER — ONDANSETRON 2 MG/ML
8 INJECTION INTRAMUSCULAR; INTRAVENOUS
Status: COMPLETED | OUTPATIENT
Start: 2018-05-01 | End: 2018-05-01

## 2018-05-01 RX ORDER — DEXTROSE MONOHYDRATE, SODIUM CHLORIDE, AND POTASSIUM CHLORIDE 50; 1.49; 9 G/1000ML; G/1000ML; G/1000ML
INJECTION, SOLUTION INTRAVENOUS CONTINUOUS
Status: DISCONTINUED | OUTPATIENT
Start: 2018-05-01 | End: 2018-05-02

## 2018-05-01 RX ORDER — HYDROMORPHONE HYDROCHLORIDE 1 MG/ML
0.5 INJECTION, SOLUTION INTRAMUSCULAR; INTRAVENOUS; SUBCUTANEOUS
Status: DISCONTINUED | OUTPATIENT
Start: 2018-05-01 | End: 2018-05-02 | Stop reason: HOSPADM

## 2018-05-01 RX ORDER — ONDANSETRON 8 MG/1
8 TABLET, ORALLY DISINTEGRATING ORAL EVERY 8 HOURS PRN
Status: DISCONTINUED | OUTPATIENT
Start: 2018-05-01 | End: 2018-05-02 | Stop reason: HOSPADM

## 2018-05-01 RX ADMIN — HYDROMORPHONE HYDROCHLORIDE 0.5 MG: 1 INJECTION, SOLUTION INTRAMUSCULAR; INTRAVENOUS; SUBCUTANEOUS at 11:05

## 2018-05-01 RX ADMIN — DEXTROSE MONOHYDRATE, SODIUM CHLORIDE, AND POTASSIUM CHLORIDE: 50; 9; 1.49 INJECTION, SOLUTION INTRAVENOUS at 07:05

## 2018-05-01 RX ADMIN — HYDROMORPHONE HYDROCHLORIDE 1 MG: 1 INJECTION, SOLUTION INTRAMUSCULAR; INTRAVENOUS; SUBCUTANEOUS at 05:05

## 2018-05-01 RX ADMIN — HYDROMORPHONE HYDROCHLORIDE 0.5 MG: 1 INJECTION, SOLUTION INTRAMUSCULAR; INTRAVENOUS; SUBCUTANEOUS at 08:05

## 2018-05-01 RX ADMIN — SODIUM CHLORIDE 1000 ML: 0.9 INJECTION, SOLUTION INTRAVENOUS at 05:05

## 2018-05-01 RX ADMIN — ENOXAPARIN SODIUM 40 MG: 100 INJECTION SUBCUTANEOUS at 07:05

## 2018-05-01 RX ADMIN — ONDANSETRON 8 MG: 2 INJECTION INTRAMUSCULAR; INTRAVENOUS at 05:05

## 2018-05-01 NOTE — TELEPHONE ENCOUNTER
Pt called to report that he has severe abdominal pain, 'a hard knot' at the umbilicus with nausea and vomiting.  He states that he passing very little gas.  He is s/p Umbilical Hernia repair from 2/19/18.  Per NAREN Bailey, he is to go to the ER for evaluation.  He verbalized understanding.

## 2018-05-01 NOTE — TELEPHONE ENCOUNTER
----- Message from Mercedes Ham sent at 5/1/2018  6:39 AM CDT -----  Contact: self  Patient states had hernia surgery 2/19/2018.   Pt states experiencing hard knot and severe pain in the navel area.   Pt states throwing up need appointment for today   Please call pt at [phone]

## 2018-05-01 NOTE — TELEPHONE ENCOUNTER
Pt called to report that he went to Urgent Care, a CT Scan was performed and he has an umbilical hernia containing obstructed small bowel.  He will be transferred to the hospital for further care.

## 2018-05-01 NOTE — ED PROVIDER NOTES
Encounter Date: 5/1/2018    SCRIBE #1 NOTE: I, Abdi Boland, am scribing for, and in the presence of,  Dr. Chavez . I have scribed the entire note.     I, Dr. Jaden Chavez, personally performed the services described in this documentation. All medical record entries made by the scribe were at my direction and in my presence.  I have reviewed the chart and agree that the record reflects my personal performance and is accurate and complete.  Jaden Chavez MD.  6:08 PM 05/02/2018      History     Chief Complaint   Patient presents with    Abdominal Pain     Pt transferred from [facility] for general surgery consult.  Pt dx with umbilical hernia and bowel obstruction.  Pt s/p hernia repair in February.      Time patient was seen by the provider: 4:50 PM    The patient is a 52 y.o. male with co-morbidities including: HTN, and lower back pain s/p hernia repair who presents to the ED as a transfer from [facility] for a general surgery consult. Pt dx with an umbilical hernia and bowel obstruction. He endorses abdominal pain with associated nausea and vomiting for 2 days.      The history is provided by the patient.     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    Hypertension     Lower back pain     PTSD (post-traumatic stress disorder)     Sleep apnea     uses CPAP 1-2x/week     Past Surgical History:   Procedure Laterality Date    HERNIA REPAIR       Family History   Problem Relation Age of Onset    Hypertension Mother     Cancer Maternal Grandmother      Breast    Cancer Maternal Aunt      breast    Amblyopia Neg Hx     Blindness Neg Hx     Cataracts Neg Hx     Diabetes Neg Hx     Glaucoma Neg Hx     Macular degeneration Neg Hx     Retinal detachment Neg Hx     Strabismus Neg Hx     Stroke Neg Hx     Thyroid disease Neg Hx      Social History   Substance Use Topics    Smoking status: Never Smoker     Smokeless tobacco: Never Used    Alcohol use 2.4 - 3.6 oz/week     4 - 6 Glasses of wine per week      Comment: a week.      Review of Systems   Constitutional: Negative for fever.   HENT: Negative for sore throat.    Respiratory: Negative for shortness of breath.    Cardiovascular: Negative for chest pain.   Gastrointestinal: Positive for abdominal pain, nausea and vomiting.   Genitourinary: Negative for dysuria.   Musculoskeletal: Negative for back pain.   Skin: Negative for rash.   Neurological: Negative for weakness.   Hematological: Does not bruise/bleed easily.       Physical Exam     Initial Vitals [05/01/18 1531]   BP Pulse Resp Temp SpO2   (!) 178/115 86 18 98.4 °F (36.9 °C) 96 %      MAP       136         Physical Exam    Nursing note and vitals reviewed.  Constitutional: He appears well-developed and well-nourished.   HENT:   Head: Normocephalic and atraumatic.   Eyes: Conjunctivae and EOM are normal. Pupils are equal, round, and reactive to light.   Neck: Normal range of motion. Neck supple. No tracheal deviation present.   Cardiovascular: Normal rate, regular rhythm, normal heart sounds and intact distal pulses.   Pulmonary/Chest: Breath sounds normal. No respiratory distress. He has no wheezes. He has no rhonchi. He has no rales. He exhibits no tenderness.   Ventral hernia that is firm and tender to palpation just above his belly button.    Abdominal: Soft. Bowel sounds are normal. He exhibits no distension and no mass. There is no tenderness. There is no rebound and no guarding.   Musculoskeletal: Normal range of motion. He exhibits no edema.   Neurological: He is alert and oriented to person, place, and time. He has normal strength. No sensory deficit.   Skin: Skin is warm and dry. Capillary refill takes less than 2 seconds.   Psychiatric: He has a normal mood and affect. His behavior is normal. Judgment and thought content normal.         ED Course   Procedures  Labs Reviewed             Medical  Decision Making:   History:   Old Medical Records: I decided to obtain old medical records.            Scribe Attestation:   Scribe #1: I performed the above scribed service and the documentation accurately describes the services I performed. I attest to the accuracy of the note.               Clinical Impression:   The primary encounter diagnosis was Umbilical hernia with obstruction, without gangrene. Diagnoses of Ventral hernia with obstruction and without gangrene and Hernia with obstruction were also pertinent to this visit.                           Manuel Chavez MD  05/02/18 [id number]

## 2018-05-01 NOTE — ED NOTES
LOC: The patient is awake, alert, aware of environment with an appropriate affect. Oriented x4, speaking appropriately  APPEARANCE: Pt resting comfortably, in no acute distress, pt is clean and well groomed, clothing properly fastened  SKIN:The skin is warm and dry, color consistent with ethnicity, patient has normal skin turgor and moist mucus membranes  MUSCULOSKELETAL: Patient moving all extremities spontaneously, no obvious swelling or deformities noted.  NEUROLOGIC: PERRLA, facial expression is symmetrical, patient moving all extremities spontaneously, normal sensation in all extremities when touched with a finger.  Follows all commands appropriately  HEAD/FACE: The head is rounded; normocephalic and symmetrical.  RESPIRATORY:Airway is open and patent, respirations are spontaneous, patient has a normal effort and rate, no accessory muscle use noted. RR 20.   CARDIAC: Normal rate and rhythm, no peripheral edema noted, capillary refill < 3 seconds, bilateral radial pulses 2+.  ABDOMEN: tenderness noted, abdominal distention noted, normoactive bowel sounds present in all four quadrants. Normal stool pattern. Jugular veins are non-distended.

## 2018-05-01 NOTE — H&P
Surgery H and P  Attending: Marcelino  Resident: Davi   CC: Abdominal pain, nausea, and vomiting.    HPI: Rito Conley is a pleasant 52 y.o. man with umbilical hernia repaired on 2/19/18, who presents with obstructive symptoms.    The patient had been doing well since surgery.  The repair was primary, as the defect was small.    Last night, around 11 PM, started having nausea, vomiting, and abdominal pain.  The vomiting was non stop last night.  Today, he has thrown up 4 times. He is still passing gas, but has not had a bowel movement since then.    Pain is well controlled with IV medication given in the ED.    He first presented to an [facility] urgent care.  WBC 6.3, CO2 25.  HR in 70s here.  CT scan revealed incarcerated bowel ventral hernia, without signs of strangulation.  It is causing a small bowel obstruction.     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    Hypertension     Lower back pain     PTSD (post-traumatic stress disorder)     Sleep apnea     uses CPAP 1-2x/week       Past Surgical History:   Procedure Laterality Date    HERNIA REPAIR         Family History   Problem Relation Age of Onset    Hypertension Mother     Cancer Maternal Grandmother      Breast    Cancer Maternal Aunt      breast    Amblyopia Neg Hx     Blindness Neg Hx     Cataracts Neg Hx     Diabetes Neg Hx     Glaucoma Neg Hx     Macular degeneration Neg Hx     Retinal detachment Neg Hx     Strabismus Neg Hx     Stroke Neg Hx     Thyroid disease Neg Hx        Social History     Social History    Marital status:      Spouse name: N/A    Number of children: N/A    Years of education: N/A     Occupational History    Not on file.     Social History Main Topics    Smoking status: Never Smoker    Smokeless tobacco: Never Used    Alcohol use 2.4 - 3.6 oz/week     4 - 6 Glasses of wine per week      Comment: a week.     Drug use: No    Sexual activity: Yes      Other Topics Concern    Not on file     Social History Narrative    No narrative on file       Current Facility-Administered Medications on File Prior to Encounter   Medication Dose Route Frequency Provider Last Rate Last Dose    [COMPLETED] ketorolac injection 30 mg  30 mg Intravenous ED 1 Time Hung Osman MD   30 mg at 05/01/18 1006     Current Outpatient Prescriptions on File Prior to Encounter   Medication Sig Dispense Refill    amlodipine (NORVASC) 10 MG tablet Take 1 tablet (10 mg total) by mouth once daily. 90 tablet 1    buPROPion (WELLBUTRIN XL) 150 MG TB24 tablet Take 150 mg by mouth once daily.       fluticasone (FLONASE) 50 mcg/actuation nasal spray 1 spray by Each Nare route 2 (two) times daily. (Patient taking differently: 1 spray by Each Nare route 2 (two) times daily as needed. ) 3 Bottle 3    hydrochlorothiazide (HYDRODIURIL) 50 MG tablet Take 1 tablet (50 mg total) by mouth once daily. 90 tablet 1    levocetirizine (XYZAL) 5 MG tablet Take 1 tablet (5 mg total) by mouth every evening. 30 tablet 3    LIPITOR 40 mg tablet Take 1 tablet (40 mg total) by mouth once daily. 30 tablet 5    oxyCODONE-acetaminophen (PERCOCET) 5-325 mg per tablet Take 1 tablet by mouth every 4 (four) hours as needed for Pain ((after surgery)). 60 tablet 0    paroxetine (PAXIL) 40 MG tablet Take 1 tablet (40 mg total) by mouth once daily. 90 tablet 1    ranitidine (ZANTAC) 150 MG tablet Take 1 tablet (150 mg total) by mouth 2 (two) times daily. 60 tablet 5    tamsulosin (FLOMAX) 0.4 mg Cp24 Take 1 capsule (0.4 mg total) by mouth once daily. 30 capsule 11    zolpidem (AMBIEN) 10 mg Tab Take 1 tablet (10 mg total) by mouth nightly as needed. 30 tablet 2    ibuprofen (ADVIL,MOTRIN) 800 MG tablet Take 1 tablet (800 mg total) by mouth every 8 (eight) hours as needed for Pain. 20 tablet 0    [DISCONTINUED] CIALIS 20 mg Tab          Review of patient's allergies indicates:  No Known Allergies    ROS:  "Constitutional: no fever or chills, pain controlled   Respiratory: no cough or shortness of breath   Cardiovascular: no chest pain or palpitations   Genitourinary: no hematuria or dysuria   Hematologic/Lymphatic: no easy bruising or lymphadenopathy   Musculoskeletal: no arthralgias or myalgias   Neurological: no seizures or tremors     Phys:  Vitals:    05/01/18 1531   BP: (!) 178/115   Pulse: 86   Resp: 18   Temp: 98.4 °F (36.9 °C)   SpO2: 96%   Weight: 93 kg (205 lb 0.4 oz)   Height: 5' 11" (1.803 m)     Gen: NAD   HEENT: NCAT, trachea midline  CV: RRR, no m/r/g   Pulm: Unlabored  Abd: Soft, minimal ttp to a bulge just superior to umbilicus.  Otherwise nttp. No rebound, guarding.   Extremities: no cyanosis or edema, or clubbing  Skin: Skin color, texture, turgor normal. No rashes or lesions     A/P 52 year old man with recurrent, incarcerated ventral hernia causing SBO.    Admit to Surgery, Dr. Benson  NGT, NPO, IVF  Pain and nausea control  Serial abdominal exams  To OR tomorrow morning for umbilical hernia repair, possible SBR, possible with mesh  Will monitor for need for more urgent surgery tonight  Consents obtained    John Tomlinson MD  General Surgery, PGY-4  [phone]     "

## 2018-05-01 NOTE — ED TRIAGE NOTES
Pt arrives to the ED via EMS with CC of abdominal pain. Pt reports that he had hernia repair in February. Pt reports vomiting that started last night and has continued to today. Pt reports that he came from Urgent Care and was told he had a bowel obstruction. Pt denies any fever or diarrhea. Pt is alert and oriented and in no acute distress at this time.

## 2018-05-01 NOTE — TELEPHONE ENCOUNTER
----- Message from Hipolito Hunt sent at 5/1/2018 11:28 AM CDT -----  Lea    Pt said he spoke with you earlier and was told to go to Urgent Care. Pt said he is being transferred and want to know should he come to [facility]. Please call pt at [phone].    Pt said he had a CT scan done and it is hernia.

## 2018-05-02 ENCOUNTER — ANESTHESIA (OUTPATIENT)
Dept: SURGERY | Facility: HOSPITAL | Age: 53
DRG: 355 | End: 2018-05-02
Payer: MEDICARE

## 2018-05-02 VITALS
SYSTOLIC BLOOD PRESSURE: 160 MMHG | DIASTOLIC BLOOD PRESSURE: 94 MMHG | HEART RATE: 83 BPM | TEMPERATURE: 98 F | WEIGHT: 205 LBS | OXYGEN SATURATION: 95 % | RESPIRATION RATE: 16 BRPM | BODY MASS INDEX: 28.7 KG/M2 | HEIGHT: 71 IN

## 2018-05-02 LAB
ANION GAP SERPL CALC-SCNC: 9 MMOL/L
BASOPHILS # BLD AUTO: 0.01 K/UL
BASOPHILS NFR BLD: 0.2 %
BUN SERPL-MCNC: 12 MG/DL
CALCIUM SERPL-MCNC: 9 MG/DL
CHLORIDE SERPL-SCNC: 104 MMOL/L
CO2 SERPL-SCNC: 24 MMOL/L
CREAT SERPL-MCNC: 1 MG/DL
DIFFERENTIAL METHOD: NORMAL
EOSINOPHIL # BLD AUTO: 0.1 K/UL
EOSINOPHIL NFR BLD: 1.2 %
ERYTHROCYTE [DISTWIDTH] IN BLOOD BY AUTOMATED COUNT: 13.7 %
EST. GFR  (AFRICAN AMERICAN): >60 ML/MIN/1.73 M^2
EST. GFR  (NON AFRICAN AMERICAN): >60 ML/MIN/1.73 M^2
GLUCOSE SERPL-MCNC: 126 MG/DL
HCT VFR BLD AUTO: 43.5 %
HGB BLD-MCNC: 14 G/DL
IMM GRANULOCYTES # BLD AUTO: 0.03 K/UL
IMM GRANULOCYTES NFR BLD AUTO: 0.5 %
LYMPHOCYTES # BLD AUTO: 1.3 K/UL
LYMPHOCYTES NFR BLD: 19 %
MAGNESIUM SERPL-MCNC: 2.2 MG/DL
MCH RBC QN AUTO: 29.3 PG
MCHC RBC AUTO-ENTMCNC: 32.2 G/DL
MCV RBC AUTO: 91 FL
MONOCYTES # BLD AUTO: 0.6 K/UL
MONOCYTES NFR BLD: 8.9 %
NEUTROPHILS # BLD AUTO: 4.7 K/UL
NEUTROPHILS NFR BLD: 70.2 %
NRBC BLD-RTO: 0 /100 WBC
PHOSPHATE SERPL-MCNC: 3.2 MG/DL
PLATELET # BLD AUTO: 324 K/UL
PMV BLD AUTO: 9.3 FL
POTASSIUM SERPL-SCNC: 3.5 MMOL/L
RBC # BLD AUTO: 4.78 M/UL
SODIUM SERPL-SCNC: 137 MMOL/L
WBC # BLD AUTO: 6.63 K/UL

## 2018-05-02 PROCEDURE — 71000033 HC RECOVERY, INTIAL HOUR: Performed by: SURGERY

## 2018-05-02 PROCEDURE — 25000003 PHARM REV CODE 250: Performed by: SURGERY

## 2018-05-02 PROCEDURE — 37000008 HC ANESTHESIA 1ST 15 MINUTES: Performed by: SURGERY

## 2018-05-02 PROCEDURE — D9220A PRA ANESTHESIA: Mod: CRNA,,, | Performed by: NURSE ANESTHETIST, CERTIFIED REGISTERED

## 2018-05-02 PROCEDURE — 36000707: Performed by: SURGERY

## 2018-05-02 PROCEDURE — 83735 ASSAY OF MAGNESIUM: CPT

## 2018-05-02 PROCEDURE — 63600175 PHARM REV CODE 636 W HCPCS: Performed by: SURGERY

## 2018-05-02 PROCEDURE — C1781 MESH (IMPLANTABLE): HCPCS | Performed by: SURGERY

## 2018-05-02 PROCEDURE — 63600175 PHARM REV CODE 636 W HCPCS: Performed by: NURSE ANESTHETIST, CERTIFIED REGISTERED

## 2018-05-02 PROCEDURE — 85025 COMPLETE CBC W/AUTO DIFF WBC: CPT

## 2018-05-02 PROCEDURE — 36415 COLL VENOUS BLD VENIPUNCTURE: CPT

## 2018-05-02 PROCEDURE — D9220A PRA ANESTHESIA: Mod: ANES,,, | Performed by: ANESTHESIOLOGY

## 2018-05-02 PROCEDURE — 25000003 PHARM REV CODE 250: Performed by: ANESTHESIOLOGY

## 2018-05-02 PROCEDURE — 63600175 PHARM REV CODE 636 W HCPCS: Performed by: ANESTHESIOLOGY

## 2018-05-02 PROCEDURE — 49561 PR REPAIR INCISIONAL HERNIA,STRANG: CPT | Mod: 79,GC,, | Performed by: SURGERY

## 2018-05-02 PROCEDURE — 25000003 PHARM REV CODE 250: Performed by: NURSE ANESTHETIST, CERTIFIED REGISTERED

## 2018-05-02 PROCEDURE — 94761 N-INVAS EAR/PLS OXIMETRY MLT: CPT

## 2018-05-02 PROCEDURE — 84100 ASSAY OF PHOSPHORUS: CPT

## 2018-05-02 PROCEDURE — 88302 TISSUE EXAM BY PATHOLOGIST: CPT | Mod: 26,,, | Performed by: PATHOLOGY

## 2018-05-02 PROCEDURE — 49568 PR IMPLANT MESH HERNIA REPAIR/DEBRIDEMENT CLOSURE: CPT | Mod: 79,GC,, | Performed by: SURGERY

## 2018-05-02 PROCEDURE — 36000706: Performed by: SURGERY

## 2018-05-02 PROCEDURE — 80048 BASIC METABOLIC PNL TOTAL CA: CPT

## 2018-05-02 PROCEDURE — 71000039 HC RECOVERY, EACH ADD'L HOUR: Performed by: SURGERY

## 2018-05-02 PROCEDURE — 0WUF0JZ SUPPLEMENT ABDOMINAL WALL WITH SYNTHETIC SUBSTITUTE, OPEN APPROACH: ICD-10-PCS | Performed by: SURGERY

## 2018-05-02 PROCEDURE — 88302 TISSUE EXAM BY PATHOLOGIST: CPT | Performed by: PATHOLOGY

## 2018-05-02 PROCEDURE — S0020 INJECTION, BUPIVICAINE HYDRO: HCPCS | Performed by: SURGERY

## 2018-05-02 PROCEDURE — 27000221 HC OXYGEN, UP TO 24 HOURS

## 2018-05-02 PROCEDURE — 37000009 HC ANESTHESIA EA ADD 15 MINS: Performed by: SURGERY

## 2018-05-02 PROCEDURE — A4216 STERILE WATER/SALINE, 10 ML: HCPCS | Performed by: SURGERY

## 2018-05-02 DEVICE — PATCH HERNIA MESH VENTRALEX: Type: IMPLANTABLE DEVICE | Site: ABDOMEN | Status: FUNCTIONAL

## 2018-05-02 RX ORDER — ONDANSETRON 2 MG/ML
INJECTION INTRAMUSCULAR; INTRAVENOUS
Status: DISCONTINUED
Start: 2018-05-02 | End: 2018-05-02 | Stop reason: HOSPADM

## 2018-05-02 RX ORDER — BUPROPION HYDROCHLORIDE 150 MG/1
150 TABLET ORAL DAILY
Status: DISCONTINUED | OUTPATIENT
Start: 2018-05-02 | End: 2018-05-02 | Stop reason: HOSPADM

## 2018-05-02 RX ORDER — OXYCODONE AND ACETAMINOPHEN 5; 325 MG/1; MG/1
1 TABLET ORAL EVERY 4 HOURS PRN
Status: DISCONTINUED | OUTPATIENT
Start: 2018-05-02 | End: 2018-05-02 | Stop reason: HOSPADM

## 2018-05-02 RX ORDER — CEFAZOLIN SODIUM 1 G/3ML
2 INJECTION, POWDER, FOR SOLUTION INTRAMUSCULAR; INTRAVENOUS
Status: DISCONTINUED | OUTPATIENT
Start: 2018-05-02 | End: 2018-05-02

## 2018-05-02 RX ORDER — PAROXETINE 10 MG/1
40 TABLET, FILM COATED ORAL DAILY
Status: DISCONTINUED | OUTPATIENT
Start: 2018-05-02 | End: 2018-05-02 | Stop reason: HOSPADM

## 2018-05-02 RX ORDER — PHENYLEPHRINE HYDROCHLORIDE 10 MG/ML
INJECTION INTRAVENOUS
Status: DISCONTINUED | OUTPATIENT
Start: 2018-05-02 | End: 2018-05-02

## 2018-05-02 RX ORDER — HYDRALAZINE HYDROCHLORIDE 20 MG/ML
10 INJECTION INTRAMUSCULAR; INTRAVENOUS EVERY 6 HOURS PRN
Status: DISCONTINUED | OUTPATIENT
Start: 2018-05-02 | End: 2018-05-02 | Stop reason: HOSPADM

## 2018-05-02 RX ORDER — DEXAMETHASONE SODIUM PHOSPHATE 4 MG/ML
INJECTION, SOLUTION INTRA-ARTICULAR; INTRALESIONAL; INTRAMUSCULAR; INTRAVENOUS; SOFT TISSUE
Status: DISCONTINUED | OUTPATIENT
Start: 2018-05-02 | End: 2018-05-02

## 2018-05-02 RX ORDER — SUCCINYLCHOLINE CHLORIDE 20 MG/ML
INJECTION INTRAMUSCULAR; INTRAVENOUS
Status: DISCONTINUED | OUTPATIENT
Start: 2018-05-02 | End: 2018-05-02

## 2018-05-02 RX ORDER — FENTANYL CITRATE 50 UG/ML
INJECTION, SOLUTION INTRAMUSCULAR; INTRAVENOUS
Status: DISCONTINUED | OUTPATIENT
Start: 2018-05-02 | End: 2018-05-02

## 2018-05-02 RX ORDER — PROPOFOL 10 MG/ML
VIAL (ML) INTRAVENOUS
Status: DISCONTINUED | OUTPATIENT
Start: 2018-05-02 | End: 2018-05-02

## 2018-05-02 RX ORDER — OXYCODONE AND ACETAMINOPHEN 5; 325 MG/1; MG/1
1 TABLET ORAL EVERY 4 HOURS PRN
Qty: 31 TABLET | Refills: 0 | Status: SHIPPED | OUTPATIENT
Start: 2018-05-02 | End: 2018-05-08 | Stop reason: SDUPTHER

## 2018-05-02 RX ORDER — LIDOCAINE HCL/PF 100 MG/5ML
SYRINGE (ML) INTRAVENOUS
Status: DISCONTINUED | OUTPATIENT
Start: 2018-05-02 | End: 2018-05-02

## 2018-05-02 RX ORDER — ACETAMINOPHEN 10 MG/ML
INJECTION, SOLUTION INTRAVENOUS
Status: DISCONTINUED | OUTPATIENT
Start: 2018-05-02 | End: 2018-05-02

## 2018-05-02 RX ORDER — ROCURONIUM BROMIDE 10 MG/ML
INJECTION, SOLUTION INTRAVENOUS
Status: DISCONTINUED | OUTPATIENT
Start: 2018-05-02 | End: 2018-05-02

## 2018-05-02 RX ORDER — TAMSULOSIN HYDROCHLORIDE 0.4 MG/1
0.4 CAPSULE ORAL DAILY
Status: DISCONTINUED | OUTPATIENT
Start: 2018-05-02 | End: 2018-05-02 | Stop reason: HOSPADM

## 2018-05-02 RX ORDER — SODIUM CHLORIDE 0.9 % (FLUSH) 0.9 %
3 SYRINGE (ML) INJECTION
Status: DISCONTINUED | OUTPATIENT
Start: 2018-05-02 | End: 2018-05-02 | Stop reason: HOSPADM

## 2018-05-02 RX ORDER — BUPIVACAINE HYDROCHLORIDE 5 MG/ML
INJECTION, SOLUTION EPIDURAL; INTRACAUDAL
Status: DISCONTINUED | OUTPATIENT
Start: 2018-05-02 | End: 2018-05-02 | Stop reason: HOSPADM

## 2018-05-02 RX ORDER — ATORVASTATIN CALCIUM 20 MG/1
40 TABLET, FILM COATED ORAL DAILY
Status: DISCONTINUED | OUTPATIENT
Start: 2018-05-02 | End: 2018-05-02 | Stop reason: HOSPADM

## 2018-05-02 RX ORDER — FENTANYL CITRATE 50 UG/ML
25 INJECTION, SOLUTION INTRAMUSCULAR; INTRAVENOUS EVERY 5 MIN PRN
Status: DISCONTINUED | OUTPATIENT
Start: 2018-05-02 | End: 2018-05-02 | Stop reason: HOSPADM

## 2018-05-02 RX ORDER — MIDAZOLAM HYDROCHLORIDE 1 MG/ML
INJECTION, SOLUTION INTRAMUSCULAR; INTRAVENOUS
Status: DISCONTINUED | OUTPATIENT
Start: 2018-05-02 | End: 2018-05-02

## 2018-05-02 RX ORDER — FLUTICASONE PROPIONATE 50 MCG
1 SPRAY, SUSPENSION (ML) NASAL DAILY
Status: DISCONTINUED | OUTPATIENT
Start: 2018-05-02 | End: 2018-05-02 | Stop reason: HOSPADM

## 2018-05-02 RX ORDER — AMLODIPINE BESYLATE 10 MG/1
10 TABLET ORAL DAILY
Status: DISCONTINUED | OUTPATIENT
Start: 2018-05-02 | End: 2018-05-02 | Stop reason: HOSPADM

## 2018-05-02 RX ORDER — BACITRACIN 50000 [IU]/1
INJECTION, POWDER, FOR SOLUTION INTRAMUSCULAR
Status: DISCONTINUED | OUTPATIENT
Start: 2018-05-02 | End: 2018-05-02 | Stop reason: HOSPADM

## 2018-05-02 RX ORDER — SODIUM CHLORIDE 9 MG/ML
INJECTION, SOLUTION INTRAVENOUS CONTINUOUS PRN
Status: DISCONTINUED | OUTPATIENT
Start: 2018-05-02 | End: 2018-05-02

## 2018-05-02 RX ORDER — ONDANSETRON 2 MG/ML
INJECTION INTRAMUSCULAR; INTRAVENOUS
Status: DISCONTINUED | OUTPATIENT
Start: 2018-05-02 | End: 2018-05-02

## 2018-05-02 RX ADMIN — ACETAMINOPHEN 1000 MG: 10 INJECTION, SOLUTION INTRAVENOUS at 08:05

## 2018-05-02 RX ADMIN — DEXTROSE MONOHYDRATE, SODIUM CHLORIDE, AND POTASSIUM CHLORIDE: 50; 9; 1.49 INJECTION, SOLUTION INTRAVENOUS at 05:05

## 2018-05-02 RX ADMIN — PAROXETINE 40 MG: 10 TABLET, FILM COATED ORAL at 01:05

## 2018-05-02 RX ADMIN — SODIUM CHLORIDE: 0.9 INJECTION, SOLUTION INTRAVENOUS at 07:05

## 2018-05-02 RX ADMIN — HYDROMORPHONE HYDROCHLORIDE 0.5 MG: 1 INJECTION, SOLUTION INTRAMUSCULAR; INTRAVENOUS; SUBCUTANEOUS at 06:05

## 2018-05-02 RX ADMIN — DEXTROSE MONOHYDRATE, SODIUM CHLORIDE, AND POTASSIUM CHLORIDE: 50; 9; 1.49 INJECTION, SOLUTION INTRAVENOUS at 09:05

## 2018-05-02 RX ADMIN — PROPOFOL 170 MG: 10 INJECTION, EMULSION INTRAVENOUS at 07:05

## 2018-05-02 RX ADMIN — FENTANYL CITRATE 75 MCG: 50 INJECTION, SOLUTION INTRAMUSCULAR; INTRAVENOUS at 07:05

## 2018-05-02 RX ADMIN — BUPROPION HYDROCHLORIDE 150 MG: 150 TABLET, EXTENDED RELEASE ORAL at 11:05

## 2018-05-02 RX ADMIN — HYDROMORPHONE HYDROCHLORIDE 0.5 MG: 1 INJECTION, SOLUTION INTRAMUSCULAR; INTRAVENOUS; SUBCUTANEOUS at 02:05

## 2018-05-02 RX ADMIN — MIDAZOLAM HYDROCHLORIDE 2 MG: 1 INJECTION, SOLUTION INTRAMUSCULAR; INTRAVENOUS at 07:05

## 2018-05-02 RX ADMIN — CEFAZOLIN 2 G: 330 INJECTION, POWDER, FOR SOLUTION INTRAMUSCULAR; INTRAVENOUS at 08:05

## 2018-05-02 RX ADMIN — ATORVASTATIN CALCIUM 40 MG: 20 TABLET, FILM COATED ORAL at 09:05

## 2018-05-02 RX ADMIN — HYDROMORPHONE HYDROCHLORIDE 0.5 MG: 1 INJECTION, SOLUTION INTRAMUSCULAR; INTRAVENOUS; SUBCUTANEOUS at 03:05

## 2018-05-02 RX ADMIN — SODIUM CHLORIDE, PRESERVATIVE FREE 3 ML: 5 INJECTION INTRAVENOUS at 05:05

## 2018-05-02 RX ADMIN — SUCCINYLCHOLINE CHLORIDE 160 MG: 20 INJECTION, SOLUTION INTRAMUSCULAR; INTRAVENOUS at 07:05

## 2018-05-02 RX ADMIN — DEXAMETHASONE SODIUM PHOSPHATE 4 MG: 4 INJECTION, SOLUTION INTRAMUSCULAR; INTRAVENOUS at 08:05

## 2018-05-02 RX ADMIN — FENTANYL CITRATE 75 MCG: 50 INJECTION, SOLUTION INTRAMUSCULAR; INTRAVENOUS at 08:05

## 2018-05-02 RX ADMIN — ONDANSETRON 4 MG: 2 INJECTION INTRAMUSCULAR; INTRAVENOUS at 08:05

## 2018-05-02 RX ADMIN — OXYCODONE HYDROCHLORIDE AND ACETAMINOPHEN 1 TABLET: 5; 325 TABLET ORAL at 09:05

## 2018-05-02 RX ADMIN — TAMSULOSIN HYDROCHLORIDE 0.4 MG: 0.4 CAPSULE ORAL at 09:05

## 2018-05-02 RX ADMIN — ROCURONIUM BROMIDE 5 MG: 10 INJECTION, SOLUTION INTRAVENOUS at 07:05

## 2018-05-02 RX ADMIN — AMLODIPINE BESYLATE 10 MG: 10 TABLET ORAL at 09:05

## 2018-05-02 RX ADMIN — LIDOCAINE HYDROCHLORIDE 100 MG: 20 INJECTION, SOLUTION INTRAVENOUS at 07:05

## 2018-05-02 RX ADMIN — ROCURONIUM BROMIDE 45 MG: 10 INJECTION, SOLUTION INTRAVENOUS at 08:05

## 2018-05-02 RX ADMIN — PHENYLEPHRINE HYDROCHLORIDE 100 MCG: 10 INJECTION INTRAVENOUS at 08:05

## 2018-05-02 NOTE — OP NOTE
DATE OF PROCEDURE:  05/02/2018    PREOPERATIVE DIAGNOSIS:  Incarcerated ventral hernia with obstruction.    POSTOPERATIVE DIAGNOSIS:  Incarcerated ventral hernia with obstruction.    PROCEDURES:  1.  Open repair of incarcerated ventral hernia.  2.  Implantation of prosthetic mesh.    SURGEON:  Gael Benson M.D.    ASSISTANT:  Antonio Ivory M.D. (RES).    ANESTHESIA:  General.    CLINICAL NOTE:  The patient is a 52-year-old male with an incarcerated ventral   hernia and evidence of intestinal obstruction.    PROCEDURE NOTE:  Following induction of adequate general anesthesia, the   patient's abdomen was prepped and draped with ChloraPrep.  We then made a   supraumbilical incision over the palpable hernia, dissected down and encountered   an incarcerated hernia sac.  We dissected the hernia sac away from its   attachments to surrounding subcutaneous tissue and then opened the sac and   reduced the contents of small bowel, which was viable.  We then excised the sac   and placed an underlay of prosthetic mesh and secured it to the abdominal wall   with U stitches of #1 PDS.  This closed the defect in a tension free fashion and   also allowed primary repair of the defect on top of the mesh with an additional   layer of #1 PDS.  We then irrigated and obtained hemostasis with cautery and   then closed the subcutaneous tissue with Vicryl and the skin with subcuticular   4-0 Monocryl suture.  Sterile dressings were applied and the procedure was   terminated with the patient tolerating it well.    ESTIMATED BLOOD LOSS:  20 mL      MCT/HN  dd: 05/02/2018 12:52:48 (CDT)  td: 05/02/2018 15:52:35 (CDT)  Doc ID   #[id number]  Job ID #[id number]    CC:

## 2018-05-02 NOTE — BRIEF OP NOTE
[facility]  Brief Operative Note    SUMMARY     Surgery Date: 5/2/2018     Surgeon(s) and Role:     * Gael Benson MD - Primary     * Antonio Ivoyr MD - Resident - Assisting        Pre-op Diagnosis:  Umbilical hernia with obstruction, without gangrene [K42.0]    Post-op Diagnosis:  Post-Op Diagnosis Codes:     * Umbilical hernia with obstruction, without gangrene [K42.0]    Procedure(s) (LRB):  REPAIR-HERNIA-UMBILICAL-INCARC (5 YRS +) (N/A)    Anesthesia: General    Description of Procedure: ventral hernia, separate from prior repair, reduced and repaired with underlay mesh, closed fascia overtop      Estimated Blood Loss: 10 cc         Specimens:   Specimen (12h ago through future)    Start     Ordered    Signed and Held  Specimen to Pathology - Surgery  Once      Signed and Held

## 2018-05-02 NOTE — NURSING TRANSFER
Nursing Transfer Note      5/2/2018     Transfer from PACU to room 505    Transfer via stretcher    Transfer with IVFs/pump    Transported by PACU PCT    Medicines sent: none    Chart send with patient:yes      Notified: 5th floor RN, wife

## 2018-05-02 NOTE — ED NOTES
Attempted NG tube. As I was advancing, pt pulled out tube and stated that he couldn't do it. Pt refused a second attempt. Will notify sx. Pt denies any pain at present.

## 2018-05-02 NOTE — ANESTHESIA PREPROCEDURE EVALUATION
05/01/2018  Rito Conley is a 52 y.o., male.  Pre-operative evaluation for REPAIR-HERNIA-UMBILICAL-INCARC (5 YRS +) (N/A)    Chief Complaint: umbilical hernia    PMH:  HTN  HLP  GERD  Lumbar spondylosis    Past Surgical History:   Procedure Laterality Date    HERNIA REPAIR       Vital Signs Range (Last 24H):  Temp:  [36.3 °C (97.4 °F)-36.9 °C (98.4 °F)]   Pulse:  [70-86]   Resp:  [16-18]   BP: (134-178)/()   SpO2:  [94 %-99 %]       CBC:   No results for input(s): WBC, RBC, HGB, HCT, PLT, MCV, MCH, MCHC in the last 720 hours.    CMP: No results for input(s): NA, K, CL, CO2, BUN, CREATININE, GLU, MG, PHOS, CALCIUM, ALBUMIN, PROT, ALKPHOS, ALT, AST, BILITOT in the last 720 hours.    INR:  No results for input(s): PT, INR, PROTIME, APTT in the last 720 hours.    Diagnostic Studies:    EKG:  Normal sinus rhythm  ST and T wave abnormality, consider inferior ischemia  Abnormal ECG  No previous ECGs available  Confirmed by Omid Jones MD ([id number]) on 1/25/2018 6:54:03 PM    Pre-op Assessment    I have reviewed the Patient Summary Reports.     I have reviewed the Nursing Notes.   I have reviewed the Medications.     Review of Systems  Anesthesia Hx:  No problems with previous Anesthesia  History of prior surgery of interest to airway management or planning: Denies Family Hx of Anesthesia complications.   Denies Personal Hx of Anesthesia complications.   Social:  Non-Smoker, No Alcohol Use    Cardiovascular:   Hypertension    Pulmonary:   Sleep Apnea    Hepatic/GI:   GERD    Musculoskeletal:  Spine Disorders: lumbar    Neurological:  Neurology Normal        Physical Exam  General:  Obesity    Airway/Jaw/Neck:  Airway Findings: Mouth Opening: Normal Tongue: Normal  General Airway Assessment: Good  Mallampati: I  TM Distance: Normal, at least 6 cm  Jaw/Neck Findings:  Neck ROM: Normal ROM       Dental:  Dental Findings: In tact   Chest/Lungs:  Chest/Lungs Findings: Clear to auscultation, Normal Respiratory Rate     Heart/Vascular:  Heart Findings: Rate: Normal  Rhythm: Regular Rhythm  Sounds: Normal        Mental Status:  Mental Status Findings:  Cooperative, Alert and Oriented         Anesthesia Plan  Type of Anesthesia, risks & benefits discussed:  Anesthesia Type:  general  Patient's Preference:   Intra-op Monitoring Plan: standard ASA monitors  Intra-op Monitoring Plan Comments:   Post Op Pain Control Plan: multimodal analgesia, IV/PO Opioids PRN and per primary service following discharge from PACU  Post Op Pain Control Plan Comments:   Induction:   IV  Beta Blocker:  Patient is not currently on a Beta-Blocker (No further documentation required).       Informed Consent: Patient understands risks and agrees with Anesthesia plan.  Questions answered. Anesthesia consent signed with patient.  ASA Score: 2     Day of Surgery Review of History & Physical:    H&P update referred to the surgeon.     Anesthesia Plan Notes: True RSI        Ready For Surgery From Anesthesia Perspective.

## 2018-05-02 NOTE — PROGRESS NOTES
Discharge Note: Pt discharged home alert and oriented x4, skin warm to touch. abd drsging clean ,dry and intact.Pt ambulating in [place], states feels great.

## 2018-05-02 NOTE — PROGRESS NOTES
[facility]  General Surgery  Progress Note    Subjective:     Post-Op Info:  Procedure(s) (LRB):  REPAIR-HERNIA-UMBILICAL-INCARC (5 YRS +) (N/A)         Interval History:   Patient seen and examined, no acute events overnight  Reports feeling better this AM  Denies nausea, vomiting  Has been NPO p MN in anticipation of hernia repair    Medications:  Continuous Infusions:   dextrose 5 % and 0.9 % NaCl with KCl 20 mEq 125 mL/hr at 05/02/18 0533     Scheduled Meds:   enoxaparin  40 mg Subcutaneous Daily    sodium chloride 0.9%  3 mL Intravenous Q8H     PRN Meds:HYDROmorphone, ondansetron     Review of patient's allergies indicates:  No Known Allergies  Objective:     Vital Signs (Most Recent):  Temp: 98.5 °F (36.9 °C) (05/02/18 0411)  Pulse: 76 (05/02/18 0411)  Resp: 18 (05/02/18 0411)  BP: (!) 153/97 (05/02/18 0411)  SpO2: (!) 94 % (05/02/18 0411) Vital Signs (24h Range):  Temp:  [97.4 °F (36.3 °C)-98.5 °F (36.9 °C)] 98.5 °F (36.9 °C)  Pulse:  [70-86] 76  Resp:  [16-18] 18  SpO2:  [94 %-99 %] 94 %  BP: (134-178)/() 153/97     Weight: 93 kg (205 lb)  Body mass index is 28.59 kg/m².    Intake/Output - Last 3 Shifts       04/30 0700 - 05/01 0659 05/01 0700 - 05/02 0659    IV Piggyback  1000    Total Intake(mL/kg)  1000 (10.8)    Urine (mL/kg/hr)  325    Total Output   325    Net   +675                Physical Exam   Constitutional: He appears well-developed and well-nourished. No distress.   HENT:   Head: Normocephalic and atraumatic.   Cardiovascular: Normal rate and regular rhythm.    Pulmonary/Chest: Effort normal. No respiratory distress.   Abdominal:   Soft, +TTP around umbilicus, hernia palpated, unable to be reduced, no rebound, or guarding, no peritoneal signs     Significant Diagnostics:  CT 5/1/18  Midline anterior abdominal wall hernia containing small bowel with associated obstruction as further discussed above.  There is mild mesenteric stranding as well as trace fluid collecting  in the pelvis.    Assessment/Plan:     * Abdominal hernia with obstruction and without gangrene    51 yo male s/p umbilical hernia repair 2/19/18, now with incarcerated incisional hernia with obstruction    -NPO  -IVF - d5 @ 125  -to OR today for hernia repair  -consents obtained and in chart  -pain/nausea meds PRN  -DVT/GI prophylaxis            Lorie Gage PA-C   [id number]  General Surgery  [facility]

## 2018-05-02 NOTE — ED NOTES
Sx at bedside. Sx wanted to attempt NG tube but patient refused. Pt understands risks of not having a NG tube over night.

## 2018-05-02 NOTE — PROGRESS NOTES
Reexamined patient.  Some increase in abdominal pain.    HR 80s.    Hernia is mildly tender to palpation, and without rebound tenderness.  The remainder of the abdomen is soft, nontender, and without peritoneal signs.    The patient had undergone one attempt at NGT placement, and is refusing further attempts.  I counseled him that refusing NGT may lead to bowel ischemia requiring resection, which may preclude an optimal hernia repair.  Furthermore, NGT placement is strongly recommended prior to anesthesia induction in order to help with risks of aspiration.  He understands and still declines.    Will reexamine him later tonight

## 2018-05-02 NOTE — DISCHARGE INSTRUCTIONS
POST-OPERATIVE INSTRUCTIONS FOR INGUINAL AND VENTRAL HERNIAS     1. Please do not lift anything over 10 pounds until you have been released to do so during your office visit (post-operatively).    2. Please do not drive while taking pain medications.    3. We recommend a high fiber diet and use of stool softeners while on narcotics as they might cause constipation.     4. The dressing that you have should be removed 48 hours after surgery, after which time the wound may be cleaned with Dial soap and water.  You may shower at this time. No bathes or submerging incision in water for at least 2 weeks.      5. You have small pieces of tape called steri strips which will either fall off on their own or may be removed in 10-14 days time.     6. Please call general surgery office and make an appointment for 10 - 14 days after surgery for a post-operative visit.     7. If you have a ventral hernia repair, you will need to wear an abdominal binder while you are up and about.

## 2018-05-02 NOTE — ASSESSMENT & PLAN NOTE
53 yo male s/p umbilical hernia repair 2/19/18, now with incarcerated incisional hernia with obstruction    -NPO  -IVF - d5 @ 125  -to OR today for hernia repair  -consents obtained and in chart  -pain/nausea meds PRN  -DVT/GI prophylaxis

## 2018-05-02 NOTE — PROGRESS NOTES
MD on call paged for PRN med for pain 1-6/10.    MD BONILLA'd administration of Hydromorphone 0.5 mg for pain 7-10/10 as a one time dose.  Will continue to monitor

## 2018-05-02 NOTE — SUBJECTIVE & OBJECTIVE
Interval History:   Patient seen and examined, no acute events overnight  Reports feeling better this AM  Denies nausea, vomiting  Has been NPO p MN in anticipation of hernia repair    Medications:  Continuous Infusions:   dextrose 5 % and 0.9 % NaCl with KCl 20 mEq 125 mL/hr at 05/02/18 0533     Scheduled Meds:   enoxaparin  40 mg Subcutaneous Daily    sodium chloride 0.9%  3 mL Intravenous Q8H     PRN Meds:HYDROmorphone, ondansetron     Review of patient's allergies indicates:  No Known Allergies  Objective:     Vital Signs (Most Recent):  Temp: 98.5 °F (36.9 °C) (05/02/18 0411)  Pulse: 76 (05/02/18 0411)  Resp: 18 (05/02/18 0411)  BP: (!) 153/97 (05/02/18 0411)  SpO2: (!) 94 % (05/02/18 0411) Vital Signs (24h Range):  Temp:  [97.4 °F (36.3 °C)-98.5 °F (36.9 °C)] 98.5 °F (36.9 °C)  Pulse:  [70-86] 76  Resp:  [16-18] 18  SpO2:  [94 %-99 %] 94 %  BP: (134-178)/() 153/97     Weight: 93 kg (205 lb)  Body mass index is 28.59 kg/m².    Intake/Output - Last 3 Shifts       04/30 0700 - 05/01 0659 05/01 0700 - 05/02 0659    IV Piggyback  1000    Total Intake(mL/kg)  1000 (10.8)    Urine (mL/kg/hr)  325    Total Output   325    Net   +675                Physical Exam   Constitutional: He appears well-developed and well-nourished. No distress.   HENT:   Head: Normocephalic and atraumatic.   Cardiovascular: Normal rate and regular rhythm.    Pulmonary/Chest: Effort normal. No respiratory distress.   Abdominal:   Soft, +TTP around umbilicus, hernia palpated, unable to be reduced, no rebound, or guarding, no peritoneal signs     Significant Diagnostics:  CT 5/1/18  Midline anterior abdominal wall hernia containing small bowel with associated obstruction as further discussed above.  There is mild mesenteric stranding as well as trace fluid collecting in the pelvis.

## 2018-05-02 NOTE — PROGRESS NOTES
Pt asked to call  To get an update on his discharge. Dr. Norah Dexter ordered an advanced regular diet and stated if the pt can tolerate dinner without N/V she will put in pt discharge orders.

## 2018-05-02 NOTE — ED NOTES
Attempted calling report multiple times. Another nurse is assigned to patient. Will call back in 10 min.

## 2018-05-02 NOTE — TRANSFER OF CARE
"Anesthesia Transfer of Care Note    Patient: Rito Conley    Procedure(s) Performed: Procedure(s) (LRB):  REPAIR-HERNIA-UMBILICAL-INCARC (5 YRS +) (N/A)    Patient location: PACU    Anesthesia Type: general    Transport from OR: Transported from OR on 6-10 L/min O2 by face mask with adequate spontaneous ventilation    Post pain: adequate analgesia    Post assessment: no apparent anesthetic complications    Post vital signs: stable    Level of consciousness: awake    Nausea/Vomiting: no nausea/vomiting    Complications: none    Transfer of care protocol was followed      Last vitals:   Visit Vitals  BP (!) 158/98 (BP Location: Right arm, Patient Position: Lying)   Pulse 74   Temp 36.3 °C (97.3 °F) (Axillary)   Resp 20   Ht 5' 11" (1.803 m)   Wt 93 kg (205 lb)   SpO2 100%   BMI 28.59 kg/m²     "

## 2018-05-03 ENCOUNTER — PATIENT OUTREACH (OUTPATIENT)
Dept: ADMINISTRATIVE | Facility: CLINIC | Age: 53
End: 2018-05-03

## 2018-05-03 ENCOUNTER — TELEPHONE (OUTPATIENT)
Dept: FAMILY MEDICINE | Facility: CLINIC | Age: 53
End: 2018-05-03

## 2018-05-03 RX ORDER — TRAMADOL HYDROCHLORIDE 50 MG/1
50 TABLET ORAL EVERY 12 HOURS PRN
Qty: 30 TABLET | Refills: 0 | Status: SHIPPED | OUTPATIENT
Start: 2018-05-03 | End: 2019-07-22

## 2018-05-03 NOTE — DISCHARGE SUMMARY
[facility]  General Surgery  Discharge Summary      Patient Name: Rito Conley  MRN: [id number]  Admission Date: 5/1/2018  Hospital Length of Stay: 1 days  Discharge Date and Time:  05/03/2018 7:30 AM  Attending Physician: Gael Benson MD  Discharging Provider: Lorie Gage PA-C  Primary Care Provider: Jennifer Huertas MD    HPI:   HPI: Rito Conley is a pleasant 52 y.o. man with umbilical hernia repaired on 2/19/18, who presents with obstructive symptoms.     The patient had been doing well since surgery.  The repair was primary, as the defect was small.     Last night, around 11 PM, started having nausea, vomiting, and abdominal pain.  The vomiting was non stop last night.  Today, he has thrown up 4 times. He is still passing gas, but has not had a bowel movement since then.     Pain is well controlled with IV medication given in the ED.     He first presented to an [facility] urgent care.  WBC 6.3, CO2 25.  HR in 70s here.  CT scan revealed incarcerated bowel ventral hernia, without signs of strangulation.  It is causing a small bowel obstruction.     Procedure(s):  REPAIR-HERNIA-VENTRAL-INITIAL with MESH      Indwelling Lines/Drains at time of discharge:   Lines/Drains/Airways          No matching active lines, drains, or airways        Hospital Course:   Patient presented to [facility] with incarcerated ventral hernia. He was transferred to [facility] for higher level of care/ He underwent: Procedure(s):  REPAIR-HERNIA-VENTRAL-INITIAL with MESH. He tolerated the procedure well and was transferred to the floor after recovery from anesthesia. Please see the operative note for further procedure details. Vitals remained stable, and he was afebrile all throughout the post operative period. Labs were reviewed and electrolytes were replaced appropriately. Physical exam was appropriate for post operative state.  Diet was advanced, and he was able to tolerate a regular diet prior to  discharge. He was ambulating without difficulty and had normal bowel function prior to discharge. Patient was deemed suitable for discharge on 1 Day Post-Op. He was discharged home with medications and instructions as below. He voiced understanding of the instructions prior to discharge.     For more thorough information, please refer to the hospital record and operative report.    Consults:   Consults         Status Ordering Provider     Inpatient consult to General surgery  Once     Provider:  (Not yet assigned)    Completed KRISTI VARGAS          Significant Diagnostic Studies: Labs:   BMP:   Recent Labs  Lab 05/02/18  0449   *      K 3.5      CO2 24   BUN 12   CREATININE 1.0   CALCIUM 9.0   MG 2.2   , CMP   Recent Labs  Lab 05/02/18  0449      K 3.5      CO2 24   *   BUN 12   CREATININE 1.0   CALCIUM 9.0   ANIONGAP 9   ESTGFRAFRICA >60.0   EGFRNONAA >60.0   , CBC   Recent Labs  Lab 05/02/18  0449   WBC 6.63   HGB 14.0   HCT 43.5       All labs within the past 24 hours have been reviewed    Pending Diagnostic Studies:     None        Final Active Diagnoses:    Diagnosis Date Noted POA    PRINCIPAL PROBLEM:  Abdominal hernia with obstruction and without gangrene [K46.0] 05/01/2018 Yes      Problems Resolved During this Admission:    Diagnosis Date Noted Date Resolved POA      Patient Active Problem List   Diagnosis    Benign hypertensive heart disease without heart failure    DJD of shoulder    DJD (degenerative joint disease), lumbar    Allergic rhinitis    Chronic bilateral low back pain without sciatica    Lumbar scoliosis    Back injury    Decreased ROM of trunk and back    Umbilical hernia    Ventral hernia without obstruction or gangrene    Abdominal hernia with obstruction and without gangrene     Discharged Condition: good    Disposition: Home or Self Care    Follow Up:  Follow-up Information     Gael Benson MD In 2 weeks.    Specialty:   General Surgery  Contact information:  [address]  [facility] [address]  [phone]                 Patient Instructions:     Activity as tolerated     Shower on day dressing removed (No bath)     Weight bearing restrictions (specify)     Notify your health care provider if you experience any of the following:  temperature >100.4     Notify your health care provider if you experience any of the following:  persistent nausea and vomiting or diarrhea     Notify your health care provider if you experience any of the following:  severe uncontrolled pain     Notify your health care provider if you experience any of the following:  redness, tenderness, or signs of infection (pain, swelling, redness, odor or green/yellow discharge around incision site)     Notify your health care provider if you experience any of the following:  difficulty breathing or increased cough     Notify your health care provider if you experience any of the following:  severe persistent headache     Notify your health care provider if you experience any of the following:  worsening rash     Notify your health care provider if you experience any of the following:  persistent dizziness, light-headedness, or visual disturbances     Notify your health care provider if you experience any of the following:  increased confusion or weakness     Remove dressing in 24 hours       Medications:  Reconciled Home Medications:      Medication List      CHANGE how you take these medications    fluticasone 50 mcg/actuation nasal spray  Commonly known as:  FLONASE  1 spray by Each Nare route 2 (two) times daily.  What changed:  · when to take this  · reasons to take this     oxyCODONE-acetaminophen 5-325 mg per tablet  Commonly known as:  PERCOCET  Take 1 tablet by mouth every 4 (four) hours as needed.  What changed:  reasons to take this        CONTINUE taking these medications    amLODIPine 10 MG tablet  Commonly known as:  NORVASC  Take 1 tablet (10 mg  total) by mouth once daily.     buPROPion 150 MG TB24 tablet  Commonly known as:  WELLBUTRIN XL  Take 150 mg by mouth once daily.     hydroCHLOROthiazide 50 MG tablet  Commonly known as:  HYDRODIURIL  Take 1 tablet (50 mg total) by mouth once daily.     ibuprofen 800 MG tablet  Commonly known as:  ADVIL,MOTRIN  Take 1 tablet (800 mg total) by mouth every 8 (eight) hours as needed for Pain.     levocetirizine 5 MG tablet  Commonly known as:  XYZAL  Take 1 tablet (5 mg total) by mouth every evening.     LIPITOR 40 MG tablet  Generic drug:  atorvastatin  Take 1 tablet (40 mg total) by mouth once daily.     paroxetine 40 MG tablet  Commonly known as:  PAXIL  Take 1 tablet (40 mg total) by mouth once daily.     ranitidine 150 MG tablet  Commonly known as:  ZANTAC  Take 1 tablet (150 mg total) by mouth 2 (two) times daily.     tamsulosin 0.4 mg Cp24  Commonly known as:  FLOMAX  Take 1 capsule (0.4 mg total) by mouth once daily.     zolpidem 10 mg Tab  Commonly known as:  AMBIEN  Take 1 tablet (10 mg total) by mouth nightly as needed.        STOP taking these medications    CIALIS 20 MG Tab  Generic drug:  tadalafil          Time spent on the discharge of patient: 2 minutes    Lorie Gage PA-C  General Surgery  [facility]

## 2018-05-03 NOTE — ASSESSMENT & PLAN NOTE
51 yo male s/p umbilical hernia repair 2/19/18, now with incarcerated incisional hernia with obstruction    -NPO  -IVF - d5 @ 125  -to OR today for hernia repair  -consents obtained and in chart  -pain/nausea meds PRN  -DVT/GI prophylaxis

## 2018-05-03 NOTE — TELEPHONE ENCOUNTER
Patient stated he was prescribed Percocet yesterday for post surgery use.  Requesting Tramadol, because it is not that strong,  to use during the day for pain Please advise.

## 2018-05-03 NOTE — PATIENT INSTRUCTIONS
Discharge Instructions for Open Hernia Repair  You had a procedure called open hernia repair. Also called a rupture, a hernia is a tear or weakness in the wall of the belly. This weakness may be present at birth. Or it can be caused by the wear and tear of daily living. Hernias may get worse with time or with physical stress. But surgery can help repair the weakness and eliminate symptoms.  Activity after surgery  Recommendations include the following:  · After surgery, take it easy for the rest of the day. If you had general anesthesia, dont use machinery or power tools, drink alcohol, or make any major decisions for at least the first 24 hours.  · Dont drive while you are still taking opioid pain medicine, and dont drive until you are able to step firmly on the brake pedal without hesitation.  · Ask others to help with chores and errands while you recover.  · Dont lift anything heavier than 10 pounds until your healthcare provider says its OK.  · Dont mow the lawn, use a vacuum , or do other strenuous activities until your healthcare provider says its OK.  · Walk as often as you feel able.  · Continue the coughing and deep breathing exercises that you learned in the hospital.  · Ask your healthcare provider when you can expect to return to work.  · Avoid constipation:  ? Eat fruits, vegetables, and whole grains.  ? Drink 6 to 8 glasses of water a day, unless otherwise instructed.  ? Use a laxative or a mild stool softener as instructed by your healthcare provider.  Bandage and incision care  Tips include the following:  · Do not get the bandage or wound wet for 48 hours.  · If strips of tape were used to close your incision, dont pull them off. Let them fall off on their own.  · Remove any gauze bandage in 48 hours.  · Wash your incision with mild soap and water. Pat it dry. Dont use oils, powders, or lotions on your incision. Do not soak your incision or take tub baths until cleared by your  healthcare provider.  Follow-up care  Keep follow-up appointments during your recovery. These allow your healthcare provider to check your progress and make sure youre healing well. You may also need to have your stitches, staples, or bandage removed. During office visits, tell your healthcare provider if you have any new symptoms. And be sure to ask any questions you have.     When to call your healthcare provider  Call your healthcare provider immediately if you have any of the following:  · A large amount of swelling or bruising (some testicular swelling and bruising is common)  · Bleeding  · Increasing pain  · Increased redness or drainage of the incision  · Fever of 100.4°F (38.0°C) or higher, or as directed by your healthcare provider  · Trouble urinating  · Nausea or vomiting   Date Last Reviewed: 7/1/2016  © 5428-1697 [employer]. [address] [place] [address], [place] [address]. All rights reserved. This information is not intended as a substitute for professional medical care. Always follow your healthcare professional's instructions.

## 2018-05-03 NOTE — TELEPHONE ENCOUNTER
----- Message from Milli Joya sent at 5/3/2018  7:23 AM CDT -----  Contact: self  Patient called requesting Tramadol, that he can take during the day for pain. Please contact him at [phone].    Thanks!

## 2018-05-08 ENCOUNTER — OFFICE VISIT (OUTPATIENT)
Dept: FAMILY MEDICINE | Facility: CLINIC | Age: 53
End: 2018-05-08
Payer: MEDICARE

## 2018-05-08 VITALS
TEMPERATURE: 98 F | DIASTOLIC BLOOD PRESSURE: 90 MMHG | BODY MASS INDEX: 28.35 KG/M2 | WEIGHT: 203.25 LBS | HEART RATE: 70 BPM | OXYGEN SATURATION: 97 % | SYSTOLIC BLOOD PRESSURE: 140 MMHG

## 2018-05-08 DIAGNOSIS — I11.9 BENIGN HYPERTENSIVE HEART DISEASE WITHOUT HEART FAILURE: ICD-10-CM

## 2018-05-08 DIAGNOSIS — Z09 HOSPITAL DISCHARGE FOLLOW-UP: Primary | ICD-10-CM

## 2018-05-08 PROCEDURE — 99214 OFFICE O/P EST MOD 30 MIN: CPT | Mod: S$PBB,,, | Performed by: FAMILY MEDICINE

## 2018-05-08 PROCEDURE — 99213 OFFICE O/P EST LOW 20 MIN: CPT | Mod: PBBFAC,PO | Performed by: FAMILY MEDICINE

## 2018-05-08 PROCEDURE — 99999 PR PBB SHADOW E&M-EST. PATIENT-LVL III: CPT | Mod: PBBFAC,,, | Performed by: FAMILY MEDICINE

## 2018-05-08 RX ORDER — OXYCODONE AND ACETAMINOPHEN 5; 325 MG/1; MG/1
1 TABLET ORAL EVERY 4 HOURS PRN
Qty: 31 TABLET | Refills: 0 | Status: SHIPPED | OUTPATIENT
Start: 2018-07-02 | End: 2018-07-25 | Stop reason: SDUPTHER

## 2018-05-08 RX ORDER — AMLODIPINE BESYLATE 10 MG/1
10 TABLET ORAL DAILY
Qty: 90 TABLET | Refills: 1 | Status: SHIPPED | OUTPATIENT
Start: 2018-05-08 | End: 2019-03-11 | Stop reason: SDUPTHER

## 2018-05-08 RX ORDER — OXYCODONE AND ACETAMINOPHEN 5; 325 MG/1; MG/1
1 TABLET ORAL EVERY 4 HOURS PRN
Qty: 31 TABLET | Refills: 0 | Status: SHIPPED | OUTPATIENT
Start: 2018-06-02 | End: 2018-07-25 | Stop reason: SDUPTHER

## 2018-05-08 NOTE — PROGRESS NOTES
Subjective:       Patient ID: Rito Conley is a 53 y.o. male.    Chief Complaint: Hospital Follow Up (Hernia)    PATIENT PRESENTS FOR HOSPITAL DISHARGE. HE STATES HE WENT TO THE HOSPITAL WITH ABDOMINAL TIGHTNESS AND VOMITING. HE WENT TO THE ED AND WAS DIAGNOSED WITH AN OBSTRUCTION.   HPI:   HPI: Rito Conley is a pleasant 52 y.o. man with umbilical hernia repaired on 2/19/18, who presents with obstructive symptoms.     The patient had been doing well since surgery.  The repair was primary, as the defect was small.     Last night, around 11 PM, started having nausea, vomiting, and abdominal pain.  The vomiting was non stop last night.  Today, he has thrown up 4 times. He is still passing gas, but has not had a bowel movement since then.     Pain is well controlled with IV medication given in the ED.     He first presented to an [facility] urgent care.  WBC 6.3, CO2 25.  HR in 70s here.  CT scan revealed incarcerated bowel ventral hernia, without signs of strangulation.  It is causing a small bowel obstruction.      Procedure(s):  REPAIR-HERNIA-VENTRAL-INITIAL with MESH      Indwelling Lines/Drains at time of discharge:   Lines/Drains/Airways          No matching active lines, drains, or airways        Hospital Course:   Patient presented to OSH with incarcerated ventral hernia. He was transferred to [facility] for higher level of care/ He underwent: Procedure(s):  REPAIR-HERNIA-VENTRAL-INITIAL with MESH. He tolerated the procedure well and was transferred to the floor after recovery from anesthesia. Please see the operative note for further procedure details. Vitals remained stable, and he was afebrile all throughout the post operative period. Labs were reviewed and electrolytes were replaced appropriately. Physical exam was appropriate for post operative state.  Diet was advanced, and he was able to tolerate a regular diet prior to discharge. He was ambulating without difficulty and had normal bowel  function prior to discharge. Patient was deemed suitable for discharge on 1 Day Post-Op. He was discharged home with medications and instructions as below. He voiced understanding of the instructions prior to discharge.      For more thorough information, please refer to the hospital record and operative report.     Consults:   Consults         Status Ordering Provider        Inpatient consult to General surgery  Once    Provider:  (Not yet assigned)   Completed KRISTI VARGAS           HE NOW IS HERE AND STATES HE IS FEELING MUCH BETTER. HE STATES HE HAS HAD A BOWEL MOVEMENT YESTERDAY AND TODAY. HE IS USING TRAMADOL FOR MILD PAIN AND HAS HELD OFF ONT HE PERCOCET UNLESS HE NEEDS. HE HAS FEVER, BUT HAS SOME CHILLS. HE STATES THE PAIN HAS IMPROVED. HE IS DUE TO SEE THE SURGEON NEXT WEEK.       Review of Systems    Objective:       Vitals:    05/08/18 0834   BP: (!) 140/90   Pulse: 70   Temp: 97.7 °F (36.5 °C)   TempSrc: Oral   SpO2: 97%   Weight: 92.2 kg (203 lb 4.2 oz)       Physical Exam   Constitutional: He is oriented to person, place, and time. He appears well-developed and well-nourished. No distress.   HENT:   Head: Normocephalic and atraumatic.   Mouth/Throat: No oropharyngeal exudate.   Eyes: Conjunctivae are normal.   Neck: Normal range of motion. Neck supple. Carotid bruit is not present.   Pulmonary/Chest: Effort normal and breath sounds normal. No respiratory distress. He has no wheezes. He has no rales.   Abdominal: Bowel sounds are normal. He exhibits no distension and no mass. There is no tenderness. There is no rebound and no guarding. No hernia.   Incisions are clean, dry, and intact   Musculoskeletal: He exhibits no edema.   Lymphadenopathy:     He has no cervical adenopathy.   Neurological: He is alert and oriented to person, place, and time.   Skin: He is not diaphoretic.   Psychiatric: He has a normal mood and affect.       Assessment:       1. Hospital discharge follow-up    2. Benign  hypertensive heart disease without heart failure        Plan:       Rito was seen today for hospital follow up.    Diagnoses and all orders for this visit:    Hospital discharge follow-up  She is doing well. He has no fever, chills, abdominal pain. He is eating regularly and having normal bowel movements. Return to ED  For recurrent abdominal pain. He needs followup with surgeon next week for routine checkup.       Benign hypertensive heart disease without heart failure  -     amLODIPine (NORVASC) 10 MG tablet; Take 1 tablet (10 mg total) by mouth once daily.  Stable. Refilled meds.     Other orders  -     oxyCODONE-acetaminophen (PERCOCET) 5-325 mg per tablet; Take 1 tablet by mouth every 4 (four) hours as needed.  -     oxyCODONE-acetaminophen (PERCOCET) 5-325 mg per tablet; Take 1 tablet by mouth every 4 (four) hours as needed.

## 2018-05-09 ENCOUNTER — OFFICE VISIT (OUTPATIENT)
Dept: SURGERY | Facility: CLINIC | Age: 53
End: 2018-05-09
Payer: MEDICARE

## 2018-05-09 VITALS
HEART RATE: 96 BPM | DIASTOLIC BLOOD PRESSURE: 80 MMHG | HEIGHT: 71 IN | BODY MASS INDEX: 28.29 KG/M2 | WEIGHT: 202.06 LBS | SYSTOLIC BLOOD PRESSURE: 125 MMHG | TEMPERATURE: 98 F

## 2018-05-09 DIAGNOSIS — Z98.890 S/P REPAIR OF VENTRAL HERNIA: Primary | ICD-10-CM

## 2018-05-09 DIAGNOSIS — Z87.19 S/P REPAIR OF VENTRAL HERNIA: Primary | ICD-10-CM

## 2018-05-09 PROCEDURE — 99024 POSTOP FOLLOW-UP VISIT: CPT | Mod: POP,,, | Performed by: PHYSICIAN ASSISTANT

## 2018-05-09 PROCEDURE — 99213 OFFICE O/P EST LOW 20 MIN: CPT | Mod: PBBFAC | Performed by: PHYSICIAN ASSISTANT

## 2018-05-09 PROCEDURE — 99999 PR PBB SHADOW E&M-EST. PATIENT-LVL III: CPT | Mod: PBBFAC,,, | Performed by: PHYSICIAN ASSISTANT

## 2018-05-09 NOTE — PROGRESS NOTES
HPI:  The patient is status post-ventral hernia repair on 5/3/18. He has 0/10 pain. He is eating well. The patient denies nausea, vomiting, fever, sweats or problems with the bowels. The patient complains of drainage from his midline incision. He states this drainage began yesterday, and it is constant. He has been covering it with gauze and mepilex.     PHYSICAL EXAM:  Physical Exam   Constitutional: He is oriented to person, place, and time. He appears well-developed and well-nourished. No distress.   HENT:   Head: Normocephalic and atraumatic.   Eyes: EOM are normal. No scleral icterus.   Neck: Normal range of motion. Neck supple.   Cardiovascular: Normal rate and regular rhythm.    Pulmonary/Chest: Effort normal. No respiratory distress.   Abdominal:   Soft, no TTP, steri-strips in place - old blood draining from middle of incision, no erythema/no signs or symptoms of infection   Neurological: He is alert and oriented to person, place, and time.   Skin: Skin is warm and dry.       A/P  54 yo male s/p ventral hernia repair  -likely old hematoma at incision  -placed pressure dressing over wound, change daily  -ok to shower  -regular diet  -RTC one week for wound check

## 2018-05-16 ENCOUNTER — OFFICE VISIT (OUTPATIENT)
Dept: SURGERY | Facility: CLINIC | Age: 53
End: 2018-05-16
Payer: MEDICARE

## 2018-05-16 VITALS
TEMPERATURE: 98 F | WEIGHT: 201.81 LBS | SYSTOLIC BLOOD PRESSURE: 131 MMHG | HEIGHT: 71 IN | HEART RATE: 80 BPM | DIASTOLIC BLOOD PRESSURE: 79 MMHG | BODY MASS INDEX: 28.25 KG/M2

## 2018-05-16 DIAGNOSIS — Z98.890 S/P REPAIR OF VENTRAL HERNIA: Primary | ICD-10-CM

## 2018-05-16 DIAGNOSIS — Z87.19 S/P REPAIR OF VENTRAL HERNIA: Primary | ICD-10-CM

## 2018-05-16 PROCEDURE — 99999 PR PBB SHADOW E&M-EST. PATIENT-LVL III: CPT | Mod: PBBFAC,,, | Performed by: PHYSICIAN ASSISTANT

## 2018-05-16 PROCEDURE — 99213 OFFICE O/P EST LOW 20 MIN: CPT | Mod: PBBFAC | Performed by: PHYSICIAN ASSISTANT

## 2018-05-16 PROCEDURE — 99024 POSTOP FOLLOW-UP VISIT: CPT | Mod: POP,,, | Performed by: PHYSICIAN ASSISTANT

## 2018-05-16 NOTE — PROGRESS NOTES
HPI:  The patient is status post-ventral hernia repair on 5/3/18. He has 0/10 pain. He is eating well. The patient denies nausea, vomiting, fever, sweats or problems with the bowels. Drainage from midline incision stopped a few days ago. He is no longer taking any pain medicine, and is in good spirits.     PHYSICAL EXAM:  Physical Exam   Constitutional: He is oriented to person, place, and time. He appears well-developed and well-nourished. No distress.   HENT:   Head: Normocephalic and atraumatic.   Eyes: EOM are normal. No scleral icterus.   Neck: Normal range of motion. Neck supple.   Cardiovascular: Normal rate and regular rhythm.    Pulmonary/Chest: Effort normal. No respiratory distress.   Abdominal:   Soft, no TTP, non-distended, incision healing well   Neurological: He is alert and oriented to person, place, and time.   Skin: Skin is warm and dry.       A/P  54 yo male s/p ventral hernia repair  -patient doing well after procedure  -ok to shower  -regular diet  -RTC PRN

## 2018-06-19 ENCOUNTER — PATIENT MESSAGE (OUTPATIENT)
Dept: ADMINISTRATIVE | Facility: OTHER | Age: 53
End: 2018-06-19

## 2018-07-25 ENCOUNTER — OFFICE VISIT (OUTPATIENT)
Dept: FAMILY MEDICINE | Facility: CLINIC | Age: 53
End: 2018-07-25
Payer: MEDICARE

## 2018-07-25 VITALS
TEMPERATURE: 98 F | WEIGHT: 207.25 LBS | HEIGHT: 71 IN | HEART RATE: 70 BPM | OXYGEN SATURATION: 99 % | BODY MASS INDEX: 29.02 KG/M2

## 2018-07-25 DIAGNOSIS — E78.5 HYPERLIPIDEMIA, UNSPECIFIED HYPERLIPIDEMIA TYPE: ICD-10-CM

## 2018-07-25 DIAGNOSIS — M54.5 CHRONIC LOW BACK PAIN, UNSPECIFIED BACK PAIN LATERALITY, WITH SCIATICA PRESENCE UNSPECIFIED: ICD-10-CM

## 2018-07-25 DIAGNOSIS — N40.0 BENIGN PROSTATIC HYPERPLASIA, UNSPECIFIED WHETHER LOWER URINARY TRACT SYMPTOMS PRESENT: Primary | ICD-10-CM

## 2018-07-25 DIAGNOSIS — G89.29 CHRONIC LOW BACK PAIN, UNSPECIFIED BACK PAIN LATERALITY, WITH SCIATICA PRESENCE UNSPECIFIED: ICD-10-CM

## 2018-07-25 DIAGNOSIS — R35.1 NOCTURIA: ICD-10-CM

## 2018-07-25 PROCEDURE — 99214 OFFICE O/P EST MOD 30 MIN: CPT | Mod: S$PBB,,, | Performed by: FAMILY MEDICINE

## 2018-07-25 PROCEDURE — 99212 OFFICE O/P EST SF 10 MIN: CPT | Mod: PBBFAC,PO | Performed by: FAMILY MEDICINE

## 2018-07-25 PROCEDURE — 99999 PR PBB SHADOW E&M-EST. PATIENT-LVL II: CPT | Mod: PBBFAC,,, | Performed by: FAMILY MEDICINE

## 2018-07-25 RX ORDER — OXYCODONE AND ACETAMINOPHEN 5; 325 MG/1; MG/1
1 TABLET ORAL EVERY 4 HOURS PRN
Qty: 31 TABLET | Refills: 0 | Status: SHIPPED | OUTPATIENT
Start: 2018-09-02 | End: 2018-10-11 | Stop reason: SDUPTHER

## 2018-07-25 RX ORDER — OXYCODONE AND ACETAMINOPHEN 5; 325 MG/1; MG/1
1 TABLET ORAL EVERY 4 HOURS PRN
Qty: 31 TABLET | Refills: 0 | Status: SHIPPED | OUTPATIENT
Start: 2018-08-02 | End: 2018-10-11 | Stop reason: SDUPTHER

## 2018-07-25 RX ORDER — ATORVASTATIN CALCIUM 40 MG
40 TABLET ORAL DAILY
Qty: 30 TABLET | Refills: 5 | Status: SHIPPED | OUTPATIENT
Start: 2018-07-25 | End: 2019-01-23 | Stop reason: SDUPTHER

## 2018-07-25 NOTE — PROGRESS NOTES
"Subjective:       Patient ID: Rito Conley is a 53 y.o. male.    Chief Complaint: Urinary Frequency and Discuss Medications/ Renew    Patient is concerned that he is urinating more at night. He states his urine flow is normal. He was taking Flomax and [facility]. He states he felt a little nauseated and like he didn't sleep well on this. He stopped this and restarted his Flomax. He drinks at  Up to 830 in the evening and goes to bed at 9-10 p.m.. He states he has strong flow and has no discomfort. His urine is a normal color      Urinary Frequency    Associated symptoms include frequency.     Review of Systems   Genitourinary: Positive for frequency.       Objective:       Vitals:    07/25/18 1127   Pulse: 70   Temp: 98.3 °F (36.8 °C)   TempSrc: Oral   SpO2: 99%   Weight: 94 kg (207 lb 3.7 oz)   Height: 5' 11" (1.803 m)       Physical Exam   Constitutional: He appears well-developed and well-nourished. No distress.   HENT:   Head: Normocephalic and atraumatic.   Neck: Normal range of motion. Neck supple.   Cardiovascular: Normal rate, regular rhythm and normal heart sounds.  Exam reveals no gallop and no friction rub.    No murmur heard.  Pulmonary/Chest: Effort normal and breath sounds normal. No respiratory distress. He has no wheezes. He has no rales. He exhibits no tenderness.   Abdominal: There is no CVA tenderness.   Skin: He is not diaphoretic.   Psychiatric: He has a normal mood and affect.       Assessment:       1. Benign prostatic hyperplasia, unspecified whether lower urinary tract symptoms present    2. Hyperlipidemia, unspecified hyperlipidemia type    3. Nocturia    4. Chronic low back pain, unspecified back pain laterality, with sciatica presence unspecified        Plan:       Rito was seen today for urinary frequency and discuss medications/ renew.    Diagnoses and all orders for this visit:    Benign prostatic hyperplasia, unspecified whether lower " urinary tract symptoms present    Hyperlipidemia, unspecified hyperlipidemia type  -     LIPITOR 40 mg tablet; Take 1 tablet (40 mg total) by mouth once daily.  -     Lipid panel; Future  Stable AND DUE FOR LABS      Nocturia  -     POCT urine dipstick without microscope  -     Urinalysis; Future  Will culture urine as urinalysis appears normal. Likely due to BPH. Decrease fluid intake at least 2 hours prior to bedtime.     Chronic low back pain, unspecified back pain laterality, with sciatica presence unspecified  -     oxyCODONE-acetaminophen (PERCOCET) 5-325 mg per tablet; Take 1 tablet by mouth every 4 (four) hours as needed.  -     oxyCODONE-acetaminophen (PERCOCET) 5-325 mg per tablet; Take 1 tablet by mouth every 4 (four) hours as needed.  sgiven meds for august and september

## 2018-08-16 ENCOUNTER — OFFICE VISIT (OUTPATIENT)
Dept: FAMILY MEDICINE | Facility: CLINIC | Age: 53
End: 2018-08-16
Payer: MEDICARE

## 2018-08-16 VITALS
BODY MASS INDEX: 28.83 KG/M2 | SYSTOLIC BLOOD PRESSURE: 144 MMHG | HEART RATE: 79 BPM | HEIGHT: 71 IN | DIASTOLIC BLOOD PRESSURE: 88 MMHG | WEIGHT: 205.94 LBS | OXYGEN SATURATION: 98 % | TEMPERATURE: 99 F

## 2018-08-16 DIAGNOSIS — M54.5 CHRONIC LOW BACK PAIN, UNSPECIFIED BACK PAIN LATERALITY, WITH SCIATICA PRESENCE UNSPECIFIED: Primary | ICD-10-CM

## 2018-08-16 DIAGNOSIS — G89.29 CHRONIC LOW BACK PAIN, UNSPECIFIED BACK PAIN LATERALITY, WITH SCIATICA PRESENCE UNSPECIFIED: Primary | ICD-10-CM

## 2018-08-16 PROCEDURE — 99213 OFFICE O/P EST LOW 20 MIN: CPT | Mod: S$PBB,,, | Performed by: FAMILY MEDICINE

## 2018-08-16 PROCEDURE — 99213 OFFICE O/P EST LOW 20 MIN: CPT | Mod: PBBFAC,PO | Performed by: FAMILY MEDICINE

## 2018-08-16 PROCEDURE — 99999 PR PBB SHADOW E&M-EST. PATIENT-LVL III: CPT | Mod: PBBFAC,,, | Performed by: FAMILY MEDICINE
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
## 2018-10-11 ENCOUNTER — OFFICE VISIT (OUTPATIENT)
Dept: FAMILY MEDICINE | Facility: CLINIC | Age: 53
End: 2018-10-11
Payer: MEDICARE

## 2018-10-11 VITALS
TEMPERATURE: 98 F | HEIGHT: 71 IN | WEIGHT: 209.19 LBS | OXYGEN SATURATION: 97 % | DIASTOLIC BLOOD PRESSURE: 80 MMHG | HEART RATE: 78 BPM | BODY MASS INDEX: 29.29 KG/M2 | SYSTOLIC BLOOD PRESSURE: 136 MMHG

## 2018-10-11 DIAGNOSIS — M54.5 CHRONIC LOW BACK PAIN, UNSPECIFIED BACK PAIN LATERALITY, WITH SCIATICA PRESENCE UNSPECIFIED: ICD-10-CM

## 2018-10-11 DIAGNOSIS — K21.9 GASTROESOPHAGEAL REFLUX DISEASE, ESOPHAGITIS PRESENCE NOT SPECIFIED: ICD-10-CM

## 2018-10-11 DIAGNOSIS — G89.29 CHRONIC LOW BACK PAIN, UNSPECIFIED BACK PAIN LATERALITY, WITH SCIATICA PRESENCE UNSPECIFIED: ICD-10-CM

## 2018-10-11 DIAGNOSIS — M77.8 TENDINITIS OF SHOULDER, UNSPECIFIED LATERALITY: Primary | ICD-10-CM

## 2018-10-11 PROCEDURE — 99999 PR PBB SHADOW E&M-EST. PATIENT-LVL III: CPT | Mod: PBBFAC,,, | Performed by: FAMILY MEDICINE

## 2018-10-11 PROCEDURE — 99214 OFFICE O/P EST MOD 30 MIN: CPT | Mod: S$PBB,,, | Performed by: FAMILY MEDICINE

## 2018-10-11 PROCEDURE — 99213 OFFICE O/P EST LOW 20 MIN: CPT | Mod: PBBFAC,PO | Performed by: FAMILY MEDICINE

## 2018-10-11 RX ORDER — OXYCODONE AND ACETAMINOPHEN 5; 325 MG/1; MG/1
1 TABLET ORAL EVERY 4 HOURS PRN
Qty: 31 TABLET | Refills: 0 | Status: SHIPPED | OUTPATIENT
Start: 2018-10-11 | End: 2019-01-23 | Stop reason: SDUPTHER

## 2018-10-11 RX ORDER — OXYCODONE AND ACETAMINOPHEN 5; 325 MG/1; MG/1
1 TABLET ORAL EVERY 4 HOURS PRN
Qty: 31 TABLET | Refills: 0 | Status: SHIPPED | OUTPATIENT
Start: 2018-11-11 | End: 2019-01-23 | Stop reason: SDUPTHER

## 2018-10-11 RX ORDER — OXYCODONE AND ACETAMINOPHEN 5; 325 MG/1; MG/1
1 TABLET ORAL EVERY 4 HOURS PRN
Qty: 31 TABLET | Refills: 0 | Status: SHIPPED | OUTPATIENT
Start: 2018-12-11 | End: 2019-01-23 | Stop reason: SDUPTHER

## 2018-10-11 NOTE — PROGRESS NOTES
Subjective:       Patient ID: Rito Conley is a 53 y.o. male.    Chief Complaint: Left Shoulder Pain    Patient presents with recurrence of his left shoulder pain. He states he has pain rotation. He states it doesn't limit his activity. He fatmata numbness or tingling down his arm and he had no chest pain.     PATIENT PRESENTS FOR CONCERNS ABOUT HER CHRONIC LOWER BACK PAIN, WHICH HE HAS HAD FOR SEVERAL YEARS. HE HAS HAD CHRONIC BACK PAIN, WHICH HE HAS BEEN TREATED OR SEVERAL YEARS. HE HAS BEEN TREATED HERE SINCE 2015. HE WAS DIAGNOSED WITH  SERVICE CONNECTED DJD OF THE LUMBAR SPINE SINCE October 2006. HE HAS RECEIVED CONTINUOUS AND ONGOING TREATMENT WITH PERCOCET, PHYSICAL THERAPY, AND A [facility]-ISSUED BACK BRACE.      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  No date: Hypertension  No date: Lower back pain  No date: PTSD (post-traumatic stress disorder)  No date: Sleep apnea      Comment:  uses CPAP 1-2x/week   Past Surgical History:  6/17/2015: COLONOSCOPY; N/A      Comment:  Performed by West Bertrand MD at [facility] ENDO  No date: HERNIA REPAIR  2/19/2018: REPAIR-HERNIA-UMBILICAL (5 YRS +) Open without Mesh; N/A      Comment:  Performed by Gael Benson MD at [facility] OR [address] [place] [address]  5/2/2018: REPAIR-HERNIA-VENTRAL-INITIAL with MESH      Comment:  Performed by Gael Benson MD at [facility] OR [address] [place] [address]  Review of patient's family history indicates:  Problem: Hypertension      Relation: Mother          Age of Onset: (Not Specified)  Problem: Cancer      Relation: Maternal Grandmother          Age of Onset: (Not Specified)          Comment: Breast  Problem: Cancer      Relation: Maternal Aunt          Age of Onset: (Not Specified)          Comment: breast  Problem: Amblyopia      Relation: Neg Hx          Age of Onset: (Not Specified)  Problem: Blindness      Relation: Neg Hx          Age of Onset: (Not Specified)  Problem: Cataracts      Relation: Neg Hx          Age of Onset:  "(Not Specified)  Problem: Diabetes      Relation: Neg Hx          Age of Onset: (Not Specified)  Problem: Glaucoma      Relation: Neg Hx          Age of Onset: (Not Specified)  Problem: Macular degeneration      Relation: Neg Hx          Age of Onset: (Not Specified)  Problem: Retinal detachment      Relation: Neg Hx          Age of Onset: (Not Specified)  Problem: Strabismus      Relation: Neg Hx          Age of Onset: (Not Specified)  Problem: Stroke      Relation: Neg Hx          Age of Onset: (Not Specified)  Problem: Thyroid disease      Relation: Neg Hx          Age of Onset: (Not Specified)    Social History    Socioeconomic History      Marital status:       Spouse name: Not on file      Number of children: Not on file      Years of education: Not on file      Highest education level: Not on file    Social Needs      Financial resource strain: Not on file      Food insecurity - worry: Not on file      Food insecurity - inability: Not on file      Transportation needs - medical: Not on file      Transportation needs - non-medical: Not on file    Occupational History      Not on file    Tobacco Use      Smoking status: Never Smoker      Smokeless tobacco: Never Used    Substance and Sexual Activity      Alcohol use: Yes        Alcohol/week: 2.4 - 3.6 oz        Types: 4 - 6 Glasses of wine per week        Comment: a week.       Drug use: No      Sexual activity: Yes    Other Topics      Concerns:        Not on file    Social History Narrative      Not on file          Review of Systems    Objective:       Vitals:    10/11/18 0954   BP: 136/80   Pulse: 78   Temp: 98.3 °F (36.8 °C)   TempSrc: Oral   SpO2: 97%   Weight: 94.9 kg (209 lb 3.5 oz)   Height: 5' 11" (1.803 m)       Physical Exam      Date: 10/11/2018  Time:10:50 AM  Name of the procedure being done: SHOULDER INJECTION, RIGHT  Indications: SHOULDER PAIN AND LIMITED RANGE OF MOTION  Patient consent:  Patient was given informed consent. Informed of " risks associated with this, which include bleeding, infection, and pain. Patient advised that oral anti-inflammatories are another option other than an injection. He has had the procedure before and was successful so would like to proceed with this. Pertinent Lab Values: N/A  Type of Anesthesia used: 1% lidocaine with epinephrine  Description of the procedure: Using sterile prep, local anesthesia with 40mg of depomedrol was injected into the posterior aspect of the shoulder joint under the acromion.. A bandage was applied after.   Complications:none  Estimated blood loss: none  Disposition: Pt tolerated the procedure well      Assessment:       1. Chronic low back pain, unspecified back pain laterality, with sciatica presence unspecified    2. Gastroesophageal reflux disease, esophagitis presence not specified        Plan:       Rito was seen today for left shoulder pain.    Diagnoses and all orders for this visit:    Chronic low back pain, unspecified back pain laterality, with sciatica presence unspecified  -     oxyCODONE-acetaminophen (PERCOCET) 5-325 mg per tablet; Take 1 tablet by mouth every 4 (four) hours as needed.  -     oxyCODONE-acetaminophen (PERCOCET) 5-325 mg per tablet; Take 1 tablet by mouth every 4 (four) hours as needed.  -     oxyCODONE-acetaminophen (PERCOCET) 5-325 mg per tablet; Take 1 tablet by mouth every 4 (four) hours as needed.    Gastroesophageal reflux disease, esophagitis presence not specified  -     ranitidine (ZANTAC) 150 MG tablet; Take 1 tablet (150 mg total) by mouth 2 (two) times daily.    Other orders  -     methylPREDNISolone acetate injection 40 mg; Inject 1 mL (40 mg total) into the muscle one time.

## 2018-10-16 RX ORDER — METHYLPREDNISOLONE ACETATE 40 MG/ML
40 INJECTION, SUSPENSION INTRA-ARTICULAR; INTRALESIONAL; INTRAMUSCULAR; SOFT TISSUE
Status: DISCONTINUED | OUTPATIENT
Start: 2018-10-11 | End: 2018-10-16

## 2018-10-16 RX ORDER — METHYLPREDNISOLONE ACETATE 40 MG/ML
40 INJECTION, SUSPENSION INTRA-ARTICULAR; INTRALESIONAL; INTRAMUSCULAR; SOFT TISSUE
Status: DISCONTINUED | OUTPATIENT
Start: 2018-10-16 | End: 2019-03-11 | Stop reason: ALTCHOICE

## 2018-10-22 ENCOUNTER — OFFICE VISIT (OUTPATIENT)
Dept: FAMILY MEDICINE | Facility: CLINIC | Age: 53
End: 2018-10-22
Payer: MEDICARE

## 2018-10-22 ENCOUNTER — HOSPITAL ENCOUNTER (OUTPATIENT)
Dept: RADIOLOGY | Facility: HOSPITAL | Age: 53
Discharge: HOME | End: 2018-10-22
Attending: FAMILY MEDICINE
Payer: MEDICARE

## 2018-10-22 VITALS
TEMPERATURE: 98 F | DIASTOLIC BLOOD PRESSURE: 84 MMHG | BODY MASS INDEX: 29.27 KG/M2 | OXYGEN SATURATION: 97 % | SYSTOLIC BLOOD PRESSURE: 124 MMHG | HEART RATE: 78 BPM | WEIGHT: 209.88 LBS

## 2018-10-22 DIAGNOSIS — M79.652 PAIN OF LEFT THIGH: ICD-10-CM

## 2018-10-22 DIAGNOSIS — R23.3 SPONTANEOUS BRUISING: ICD-10-CM

## 2018-10-22 DIAGNOSIS — R23.3 SPONTANEOUS BRUISING: Primary | ICD-10-CM

## 2018-10-22 PROCEDURE — 99999 PR PBB SHADOW E&M-EST. PATIENT-LVL IV: CPT | Mod: PBBFAC,,, | Performed by: FAMILY MEDICINE

## 2018-10-22 PROCEDURE — 93971 EXTREMITY STUDY: CPT | Mod: 26,,, | Performed by: RADIOLOGY

## 2018-10-22 PROCEDURE — 93971 EXTREMITY STUDY: CPT | Mod: TC

## 2018-10-22 PROCEDURE — 99214 OFFICE O/P EST MOD 30 MIN: CPT | Mod: PBBFAC,25,PO | Performed by: FAMILY MEDICINE

## 2018-10-22 PROCEDURE — 99214 OFFICE O/P EST MOD 30 MIN: CPT | Mod: S$PBB,,, | Performed by: FAMILY MEDICINE
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
## 2018-10-24 ENCOUNTER — HOSPITAL ENCOUNTER (EMERGENCY)
Facility: HOSPITAL | Age: 53
Discharge: HOME | End: 2018-10-24
Attending: EMERGENCY MEDICINE
Payer: MEDICARE

## 2018-10-24 VITALS
RESPIRATION RATE: 18 BRPM | TEMPERATURE: 98 F | HEART RATE: 89 BPM | DIASTOLIC BLOOD PRESSURE: 77 MMHG | SYSTOLIC BLOOD PRESSURE: 129 MMHG | WEIGHT: 209 LBS | OXYGEN SATURATION: 99 % | BODY MASS INDEX: 29.26 KG/M2 | HEIGHT: 71 IN
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
## 2018-10-24 DIAGNOSIS — S46.911A STRAIN OF RIGHT SHOULDER, INITIAL ENCOUNTER: ICD-10-CM

## 2018-10-24 PROCEDURE — 99283 EMERGENCY DEPT VISIT LOW MDM: CPT

## 2018-10-24 PROCEDURE — 25000003 PHARM REV CODE 250: Performed by: EMERGENCY MEDICINE

## 2018-10-24 RX ORDER — KETOROLAC TROMETHAMINE 10 MG/1
10 TABLET, FILM COATED ORAL
Status: COMPLETED | OUTPATIENT
Start: 2018-10-24 | End: 2018-10-24

## 2018-10-24 RX ORDER — KETOROLAC TROMETHAMINE 10 MG/1
10 TABLET, FILM COATED ORAL EVERY 6 HOURS
Qty: 10 TABLET | Refills: 0 | Status: SHIPPED | OUTPATIENT
Start: 2018-10-24 | End: 2019-03-11 | Stop reason: ALTCHOICE

## 2018-10-24 RX ADMIN — KETOROLAC TROMETHAMINE 10 MG: 10 TABLET, FILM COATED ORAL at 03:10
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
## 2018-10-24 NOTE — DISCHARGE INSTRUCTIONS
Use ice off and on for 2 days then change to heat if pain is still present.  Call your doctor for close follow-up

## 2019-01-23 ENCOUNTER — OFFICE VISIT (OUTPATIENT)
Dept: FAMILY MEDICINE | Facility: CLINIC | Age: 54
End: 2019-01-23
Payer: MEDICARE

## 2019-01-23 VITALS
TEMPERATURE: 98 F | BODY MASS INDEX: 28.96 KG/M2 | HEART RATE: 84 BPM | WEIGHT: 207.69 LBS | DIASTOLIC BLOOD PRESSURE: 84 MMHG | OXYGEN SATURATION: 96 % | SYSTOLIC BLOOD PRESSURE: 130 MMHG

## 2019-01-23 DIAGNOSIS — J30.2 ACUTE SEASONAL ALLERGIC RHINITIS: ICD-10-CM

## 2019-01-23 DIAGNOSIS — E78.5 HYPERLIPIDEMIA, UNSPECIFIED HYPERLIPIDEMIA TYPE: ICD-10-CM

## 2019-01-23 DIAGNOSIS — M67.432 GANGLION CYST OF DORSUM OF LEFT WRIST: Primary | ICD-10-CM

## 2019-01-23 DIAGNOSIS — G89.29 CHRONIC LOW BACK PAIN, UNSPECIFIED BACK PAIN LATERALITY, WITH SCIATICA PRESENCE UNSPECIFIED: ICD-10-CM

## 2019-01-23 DIAGNOSIS — L91.8 SKIN TAG: ICD-10-CM

## 2019-01-23 DIAGNOSIS — M54.5 CHRONIC LOW BACK PAIN, UNSPECIFIED BACK PAIN LATERALITY, WITH SCIATICA PRESENCE UNSPECIFIED: ICD-10-CM

## 2019-01-23 PROCEDURE — 99999 PR PBB SHADOW E&M-EST. PATIENT-LVL IV: ICD-10-PCS | Mod: PBBFAC,,, | Performed by: FAMILY MEDICINE

## 2019-01-23 PROCEDURE — 88305 TISSUE EXAM BY PATHOLOGIST: CPT | Performed by: PATHOLOGY

## 2019-01-23 PROCEDURE — 99999 PR PBB SHADOW E&M-EST. PATIENT-LVL IV: CPT | Mod: PBBFAC,,, | Performed by: FAMILY MEDICINE

## 2019-01-23 PROCEDURE — 99214 OFFICE O/P EST MOD 30 MIN: CPT | Mod: PBBFAC,25,PO | Performed by: FAMILY MEDICINE

## 2019-01-23 PROCEDURE — 88305 TISSUE EXAM BY PATHOLOGIST: CPT | Mod: 26,,, | Performed by: PATHOLOGY

## 2019-01-23 PROCEDURE — 99214 OFFICE O/P EST MOD 30 MIN: CPT | Mod: S$PBB,,, | Performed by: FAMILY MEDICINE

## 2019-01-23 PROCEDURE — 88305 TISSUE SPECIMEN TO PATHOLOGY, INTERNAL MEDICINE: ICD-10-PCS | Mod: 26,,, | Performed by: PATHOLOGY

## 2019-01-23 PROCEDURE — 99214 PR OFFICE/OUTPT VISIT, EST, LEVL IV, 30-39 MIN: ICD-10-PCS | Mod: S$PBB,,, | Performed by: FAMILY MEDICINE

## 2019-01-23 RX ORDER — ATORVASTATIN CALCIUM 40 MG
40 TABLET ORAL DAILY
Qty: 30 TABLET | Refills: 5 | Status: SHIPPED | OUTPATIENT
Start: 2019-01-23 | End: 2019-09-12 | Stop reason: SDUPTHER

## 2019-01-23 RX ORDER — OXYCODONE AND ACETAMINOPHEN 5; 325 MG/1; MG/1
1 TABLET ORAL EVERY 4 HOURS PRN
Qty: 31 TABLET | Refills: 0 | Status: SHIPPED | OUTPATIENT
Start: 2019-02-23 | End: 2019-04-29 | Stop reason: SDUPTHER

## 2019-01-23 RX ORDER — OXYCODONE AND ACETAMINOPHEN 5; 325 MG/1; MG/1
1 TABLET ORAL EVERY 4 HOURS PRN
Qty: 31 TABLET | Refills: 0 | Status: SHIPPED | OUTPATIENT
Start: 2019-03-23 | End: 2019-04-29 | Stop reason: SDUPTHER

## 2019-01-23 RX ORDER — OXYCODONE AND ACETAMINOPHEN 5; 325 MG/1; MG/1
1 TABLET ORAL EVERY 4 HOURS PRN
Qty: 31 TABLET | Refills: 0 | Status: SHIPPED | OUTPATIENT
Start: 2019-01-23 | End: 2019-04-29 | Stop reason: SDUPTHER

## 2019-01-23 RX ORDER — LEVOCETIRIZINE DIHYDROCHLORIDE 5 MG/1
5 TABLET, FILM COATED ORAL NIGHTLY
Qty: 30 TABLET | Refills: 3 | Status: SHIPPED | OUTPATIENT
Start: 2019-01-23 | End: 2019-07-30 | Stop reason: SDUPTHER

## 2019-01-27 NOTE — PROGRESS NOTES
Subjective:       Patient ID: Rito Conley is a 53 y.o. male.    Chief Complaint: Check Knot on left wrist (increasing in size) and Skin Tags    53 year old male with   presents for concerns aobut a kin tag on the left side of his neck. He states it is irritating as it rubs against his chain. He would like this removed.    He also has a cyst along the dorsum aspect of his wrist. He state it is getting larger and would like to have this removed.     He is also due for refills of his chronic pain medications for his chronic lower back pain. His back pain is stable and controlled with the chronic pain medication. He denies leg weakness or leg numbness.        Review of Systems    Objective:       Vitals:    01/23/19 0948   BP: 130/84   Pulse: 84   Temp: 98.4 °F (36.9 °C)   TempSrc: Oral   SpO2: 96%   Weight: 94.2 kg (207 lb 10.8 oz)       Physical Exam   Constitutional: He is oriented to person, place, and time. He appears well-developed and well-nourished. No distress.   HENT:   Head: Normocephalic and atraumatic.   Eyes: Conjunctivae are normal.   Neck: Normal range of motion. Neck supple.   Cardiovascular: Normal rate, regular rhythm and normal heart sounds. Exam reveals no gallop and no friction rub.   No murmur heard.  Pulmonary/Chest: Effort normal and breath sounds normal. No respiratory distress. He has no wheezes. He has no rales.   Musculoskeletal: He exhibits no edema.        Lumbar back: He exhibits no tenderness, no bony tenderness, no swelling, no edema and no deformity.   Neurological: He is alert and oriented to person, place, and time.   Skin: He is not diaphoretic.           1.    Date:1/23/19  Time:  Name of the procedure being done: skin tag removal  Indications: skin tag causing irritation  Patient consent:  ü The risks and alternatives to the procedure were explained to the patient.The patient was given the opportunity to ask questions and that the patient consented to the procedure in  writing  Pertinent Lab Values:  i.e. coags, CBC  Type of Anesthesia used: i.e. 1% lidocaine with epinephrine  Description of the procedure: Using sterile prep, local anesthesia was given to the re. 1cc of liocaine was injected and a wheel was made. A #11 blade was used to cut the skin tag off and it was placed in formalin and sent for pathology. There was minimal bleeding. Silver nitrate stick was used for hemostasis. Area was cleaned with hibiclenz and a bandaid was applied.   Complications:none  Estimated blood loss ( if indicated)  Disposition: Pt tolerated the procedure well    Assessment:       1. Ganglion cyst of dorsum of left wrist    2. Acute seasonal allergic rhinitis    3. Chronic low back pain, unspecified back pain laterality, with sciatica presence unspecified    4. Hyperlipidemia, unspecified hyperlipidemia type    5. Skin tag        Plan:       Rito was seen today for check knot on left wrist (increasing in size) and skin tags.    Diagnoses and all orders for this visit:    Ganglion cyst of dorsum of left wrist  -     Ambulatory consult to Orthopedics  Referral to ortho for removal  Acute seasonal allergic rhinitis  -     levocetirizine (XYZAL) 5 MG tablet; Take 1 tablet (5 mg total) by mouth every evening.    Chronic low back pain, unspecified back pain laterality, with sciatica presence unspecified  -     oxyCODONE-acetaminophen (PERCOCET) 5-325 mg per tablet; Take 1 tablet by mouth every 4 (four) hours as needed.  -     oxyCODONE-acetaminophen (PERCOCET) 5-325 mg per tablet; Take 1 tablet by mouth every 4 (four) hours as needed.  -     oxyCODONE-acetaminophen (PERCOCET) 5-325 mg per tablet; Take 1 tablet by mouth every 4 (four) hours as needed.  Stable. Refilled meds.   Follow-up in about 3 months (around 4/23/2019).    Hyperlipidemia, unspecified hyperlipidemia type  -     LIPITOR 40 mg tablet; Take 1 tablet (40 mg total) by mouth once daily.  Stable. Refilled meds.     Skin tag  -     Tissue  Specimen To Pathology, Internal Medicine

## 2019-01-29 ENCOUNTER — OFFICE VISIT (OUTPATIENT)
Dept: FAMILY MEDICINE | Facility: CLINIC | Age: 54
End: 2019-01-29
Payer: MEDICARE

## 2019-01-29 VITALS
HEART RATE: 93 BPM | BODY MASS INDEX: 29.02 KG/M2 | OXYGEN SATURATION: 97 % | DIASTOLIC BLOOD PRESSURE: 80 MMHG | SYSTOLIC BLOOD PRESSURE: 120 MMHG | WEIGHT: 207.25 LBS | HEIGHT: 71 IN

## 2019-01-29 DIAGNOSIS — R51.9 SINUS HEADACHE: Primary | ICD-10-CM

## 2019-01-29 PROCEDURE — 99213 PR OFFICE/OUTPT VISIT, EST, LEVL III, 20-29 MIN: ICD-10-PCS | Mod: S$PBB,,, | Performed by: FAMILY MEDICINE

## 2019-01-29 PROCEDURE — 99213 OFFICE O/P EST LOW 20 MIN: CPT | Mod: PBBFAC,PO,25 | Performed by: FAMILY MEDICINE

## 2019-01-29 PROCEDURE — 99999 PR PBB SHADOW E&M-EST. PATIENT-LVL III: CPT | Mod: PBBFAC,,, | Performed by: FAMILY MEDICINE

## 2019-01-29 PROCEDURE — 99999 PR PBB SHADOW E&M-EST. PATIENT-LVL III: ICD-10-PCS | Mod: PBBFAC,,, | Performed by: FAMILY MEDICINE

## 2019-01-29 PROCEDURE — 96372 THER/PROPH/DIAG INJ SC/IM: CPT | Mod: PBBFAC,PO

## 2019-01-29 PROCEDURE — 99213 OFFICE O/P EST LOW 20 MIN: CPT | Mod: S$PBB,,, | Performed by: FAMILY MEDICINE

## 2019-01-29 RX ORDER — METHYLPREDNISOLONE ACETATE 40 MG/ML
40 INJECTION, SUSPENSION INTRA-ARTICULAR; INTRALESIONAL; INTRAMUSCULAR; SOFT TISSUE
Status: COMPLETED | OUTPATIENT
Start: 2019-01-29 | End: 2019-01-29

## 2019-01-29 RX ADMIN — METHYLPREDNISOLONE ACETATE 40 MG: 40 INJECTION, SUSPENSION INTRA-ARTICULAR; INTRALESIONAL; INTRAMUSCULAR; SOFT TISSUE at 10:01

## 2019-01-29 NOTE — PROGRESS NOTES
Subjective:       Patient ID: Rito Conley is a 53 y.o. male.    Chief Complaint: Sinus Problem    Sinusitis   This is a new problem. The current episode started in the past 7 days. The problem is unchanged. There has been no fever. He is experiencing no pain. Associated symptoms include coughing, headaches and sinus pressure. Pertinent negatives include no ear pain, sneezing or sore throat. Treatments tried: VICKS, LEVOCETIRIZINE, AND FLONASE. The treatment provided mild relief.       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    Hypertension     Lower back pain     PTSD (post-traumatic stress disorder)     Sleep apnea     uses CPAP 1-2x/week      Past Surgical History:   Procedure Laterality Date    COLONOSCOPY N/A 6/17/2015    Performed by [facility]rand [facility] at [facility]O    HERNIA REPAIR      REPAIR-HERNIA-UMBILICAL (5 YRS +) Open without Mesh N/A 2/19/2018    Performed by Gael Benson MD at [facility]    REPAIR-HERNIA-VENTRAL-INITIAL with MESH  5/2/2018    Performed by Gael Benson MD at [facility] OR 2ND FLR     Family History   Problem Relation Age of Onset    Hypertension Mother     Cancer Maternal Grandmother         Breast    Cancer Maternal Aunt         breast    Amblyopia Neg Hx     Blindness Neg Hx     Cataracts Neg Hx     Diabetes Neg Hx     Glaucoma Neg Hx     Macular degeneration Neg Hx     Retinal detachment Neg Hx     Strabismus Neg Hx     Stroke Neg Hx     Thyroid disease Neg Hx      Social History     Socioeconomic History    Marital status:      Spouse name: Not on file    Number of children: Not on file    Years of education: Not on file    Highest education level: Not on file   Social Needs    Financial resource strain: Not on file    Food insecurity - worry: Not on file    Food insecurity - inability: Not on file    Transportation needs - medical: Not on file    Transportation needs -  "non-medical: Not on file   Occupational History    Not on file   Tobacco Use    Smoking status: Never Smoker    Smokeless tobacco: Never Used   Substance and Sexual Activity    Alcohol use: Yes     Alcohol/week: 2.4 - 3.6 oz     Types: 4 - 6 Glasses of wine per week     Comment: a week.     Drug use: No    Sexual activity: Yes   Other Topics Concern    Not on file   Social History Narrative    Not on file       Review of Systems   HENT: Positive for postnasal drip, rhinorrhea, sinus pressure and sinus pain. Negative for ear discharge, ear pain, sneezing and sore throat.    Respiratory: Positive for cough.    Neurological: Positive for headaches.       Objective:       Vitals:    01/29/19 0953   BP: 120/80   Pulse: 93   SpO2: 97%   Weight: 94 kg (207 lb 3.7 oz)   Height: 5' 11" (1.803 m)     Physical Exam   Constitutional: He is oriented to person, place, and time. He appears well-developed and well-nourished. No distress.   HENT:   Head: Normocephalic and atraumatic.   Right Ear: External ear normal.   Left Ear: External ear normal.   Mouth/Throat: Oropharynx is clear and moist. No oropharyngeal exudate.   Neck: Normal range of motion. Neck supple.   Cardiovascular: Normal rate, regular rhythm and normal heart sounds. Exam reveals no gallop and no friction rub.   No murmur heard.  Pulmonary/Chest: Effort normal and breath sounds normal. No stridor. No respiratory distress. He has no wheezes. He has no rales. He exhibits no tenderness.   Lymphadenopathy:     He has cervical adenopathy.   Neurological: He is alert and oriented to person, place, and time.   Skin: He is not diaphoretic.       Assessment:       1. Sinus headache        Plan:       Rito was seen today for sinus problem.    Diagnoses and all orders for this visit:    Sinus headache  -     methylPREDNISolone acetate injection 40 mg      - continue symptomatic treatment with rest, increase fluid intake, tylenol or ibuprofen PRN fever or body " aches.

## 2019-01-30 ENCOUNTER — PES CALL (OUTPATIENT)
Dept: ADMINISTRATIVE | Facility: CLINIC | Age: 54
End: 2019-01-30

## 2019-02-06 ENCOUNTER — TELEPHONE (OUTPATIENT)
Dept: FAMILY MEDICINE | Facility: CLINIC | Age: 54
End: 2019-02-06

## 2019-02-06 NOTE — TELEPHONE ENCOUNTER
----- Message from Poly Agrawal sent at 2/6/2019  3:14 PM CST -----  Contact: self [phone]  Pt returned a missed call to staff

## 2019-02-06 NOTE — TELEPHONE ENCOUNTER
----- Message from Denise Conley sent at 2/6/2019  2:45 PM CST -----  Contact: Self   Patient called to check the status of his wrist surgery procedure. Please call at [phone].

## 2019-02-06 NOTE — TELEPHONE ENCOUNTER
Advised patient that he can call [facility] Referral Department or Orthopedic Surgery Department to schedule procedure.  Patient verbalized understanding.

## 2019-02-07 NOTE — TELEPHONE ENCOUNTER
----- Message from Major Warner sent at 2/6/2019  4:17 PM CST -----  Contact: Self/[phone]  The patient returned the staff call.            Thank you

## 2019-02-25 ENCOUNTER — TELEPHONE (OUTPATIENT)
Dept: FAMILY MEDICINE | Facility: CLINIC | Age: 54
End: 2019-02-25

## 2019-02-25 DIAGNOSIS — Z12.5 SCREENING FOR PROSTATE CANCER: Primary | ICD-10-CM

## 2019-02-25 NOTE — TELEPHONE ENCOUNTER
----- Message from Raza Manning sent at 2/25/2019  9:35 AM CST -----  Contact: Self/-[phone]  Type:  Patient Requesting Referral    Who Called: Patient    Referral to What Specialty: Urology    Reason for Referral: Check Prostate     Does the patient want the referral with a specific physician?: No    Patient Requesting a Call Back?: Yes     Best Call Back Number:  [phone]    Thank you.

## 2019-03-04 ENCOUNTER — OFFICE VISIT (OUTPATIENT)
Dept: UROLOGY | Facility: CLINIC | Age: 54
End: 2019-03-04
Payer: MEDICARE

## 2019-03-04 VITALS
HEART RATE: 68 BPM | RESPIRATION RATE: 14 BRPM | SYSTOLIC BLOOD PRESSURE: 122 MMHG | DIASTOLIC BLOOD PRESSURE: 84 MMHG | HEIGHT: 71 IN | WEIGHT: 207.25 LBS | BODY MASS INDEX: 29.02 KG/M2

## 2019-03-04 DIAGNOSIS — N13.8 BPH WITH OBSTRUCTION/LOWER URINARY TRACT SYMPTOMS: ICD-10-CM

## 2019-03-04 DIAGNOSIS — R35.0 FREQUENCY OF URINATION: Primary | ICD-10-CM

## 2019-03-04 DIAGNOSIS — R35.1 NOCTURIA: ICD-10-CM

## 2019-03-04 DIAGNOSIS — N40.1 BPH WITH OBSTRUCTION/LOWER URINARY TRACT SYMPTOMS: ICD-10-CM

## 2019-03-04 PROCEDURE — 99999 PR PBB SHADOW E&M-EST. PATIENT-LVL III: CPT | Mod: PBBFAC,,, | Performed by: UROLOGY

## 2019-03-04 PROCEDURE — 99214 OFFICE O/P EST MOD 30 MIN: CPT | Mod: S$PBB,,, | Performed by: UROLOGY

## 2019-03-04 PROCEDURE — 99214 PR OFFICE/OUTPT VISIT, EST, LEVL IV, 30-39 MIN: ICD-10-PCS | Mod: S$PBB,,, | Performed by: UROLOGY

## 2019-03-04 PROCEDURE — 99999 PR PBB SHADOW E&M-EST. PATIENT-LVL III: ICD-10-PCS | Mod: PBBFAC,,, | Performed by: UROLOGY

## 2019-03-04 PROCEDURE — 99213 OFFICE O/P EST LOW 20 MIN: CPT | Mod: PBBFAC | Performed by: UROLOGY

## 2019-03-04 RX ORDER — OXYBUTYNIN CHLORIDE 5 MG/1
5 TABLET, EXTENDED RELEASE ORAL DAILY
Qty: 30 TABLET | Refills: 11 | Status: SHIPPED | OUTPATIENT
Start: 2019-03-04 | End: 2019-07-22 | Stop reason: SDUPTHER

## 2019-03-04 NOTE — PROGRESS NOTES
"Subjective:       Rito Conley is a 53 y.o. male who is an established patient with Dr Colindres, though is new to me was seen for evaluation of urinary issues.      Last saw Bernadine 4/18. Reported frequency, VON, nocturia x 1. Denies straining, urgency, weak stream. Was on Flomax. Also saw  for ED.    He is referred back now with c/o urinary frequency and worsening nocturia. Nocturia x 2-3. Strong flow. Mild urgency. Denies UI. Out of Flomax x 1 week though symptoms pre-date that. Has not noted change with Flomax. Denies hematuria, dysuria.     PSA 4/18 - 0.43    PVR (bladder scan) today - 0cc      The following portions of the patient's history were reviewed and updated as appropriate: allergies, current medications, past family history, past medical history, past social history, past surgical history and problem list.    Review of Systems  Constitutional: no fever or chills  ENT: no nasal congestion or sore throat  Respiratory: no cough or shortness of breath  Cardiovascular: no chest pain or palpitations  Gastrointestinal: no nausea or vomiting, tolerating diet  Genitourinary: as per HPI  Hematologic/Lymphatic: no easy bruising or lymphadenopathy  Musculoskeletal: no arthralgias or myalgias  Skin: no rashes or lesions  Neurological: no seizures or tremors  Behavioral/Psych: no auditory or visual hallucinations       Objective:    Vitals: /84   Pulse 68   Resp 14   Ht 5' 11" (1.803 m)   Wt 94 kg (207 lb 3.7 oz)   BMI 28.90 kg/m²     Physical Exam   General: well developed, well nourished in no acute distress  Head: normocephalic, atraumatic  Neck: supple, trachea midline, no obvious enlargement of thyroid  HEENT: EOMI, mucus membranes moist, sclera anicteric, no hearing impairment  Lungs: symmetric expansion, non-labored breathing  Cardiovascular: regular rate and rhythm, normal pulses  Abdomen: soft, non tender, non distended, no palpable masses, no hepatosplenomegaly, no hernias, bladder " nonpalpable. No CVA tenderness.  Musculoskeletal: no peripheral edema, normal ROM in bilateral upper and lower extremities  Lymphatics: no cervical or inguinal lymphadenopathy  Skin: no rashes or lesions  Neuro: alert and oriented x 3, no gross deficits  Psych: normal judgment and insight, normal mood/affect and non-anxious  Genitourinary:   patient declined exam   YOSHI: patient declined exam      Lab Review   Urine analysis today in clinic shows Negative     Lab Results   Component Value Date    WBC 6.51 10/22/2018    HGB 14.3 10/22/2018    HCT 43.0 10/22/2018    MCV 92 10/22/2018     10/22/2018     Lab Results   Component Value Date    CREATININE 1.1 10/22/2018    BUN 14 10/22/2018     Lab Results   Component Value Date    PSA 0.76 01/30/2017     Lab Results   Component Value Date    PSADIAG 0.43 04/02/2018       Imaging  NA       Assessment/Plan:      1. Frequency of urination    - Seems like OAB component   - Limited response to Flomax   - Trial Ditropan 5mg. Discussed possible SE.      2. Nocturia    - Reduce PM fluids   - Ditropan 5mg      3. BPH with obstruction/lower urinary tract symptoms    - Limited response with Flomax   - Okay to stop       Follow up in 6 week with PVR, PSA/YOSHI

## 2019-03-04 NOTE — LETTER
March 4, 2019      Jennifer Huertas MD  [address]  [place] [address]           [facility]  [address] [facility] [address]  [place] [address]  Phone: [phone]  Fax: [phone]          Patient: Rito Conley   MR Number: [id number]   YOB: 1965   Date of Visit: 3/4/2019       Dear Dr. Jennifer Huertas:    Thank you for referring Rito Conley to me for evaluation. Attached you will find relevant portions of my assessment and plan of care.    If you have questions, please do not hesitate to call me. I look forward to following Rito Conley along with you.    Sincerely,    Jory Mauricio MD    Enclosure  CC:  No Recipients    If you would like to receive this communication electronically, please contact [email or web address] or [phone] to request more information on [employer] access.    For providers and/or their staff who would like to refer a patient to [facility], please contact us through our one-stop-shop provider referral line, [facility], at [phone].    If you feel you have received this communication in error or would no longer like to receive these types of communications, please e-mail [email or web address]

## 2019-03-11 ENCOUNTER — OFFICE VISIT (OUTPATIENT)
Dept: FAMILY MEDICINE | Facility: CLINIC | Age: 54
End: 2019-03-11
Payer: MEDICARE

## 2019-03-11 VITALS
RESPIRATION RATE: 16 BRPM | BODY MASS INDEX: 28.86 KG/M2 | WEIGHT: 206.13 LBS | SYSTOLIC BLOOD PRESSURE: 130 MMHG | TEMPERATURE: 98 F | OXYGEN SATURATION: 98 % | DIASTOLIC BLOOD PRESSURE: 80 MMHG | HEART RATE: 70 BPM | HEIGHT: 71 IN

## 2019-03-11 DIAGNOSIS — J30.9 ALLERGIC RHINITIS, UNSPECIFIED SEASONALITY, UNSPECIFIED TRIGGER: ICD-10-CM

## 2019-03-11 DIAGNOSIS — K21.9 GASTROESOPHAGEAL REFLUX DISEASE, ESOPHAGITIS PRESENCE NOT SPECIFIED: ICD-10-CM

## 2019-03-11 DIAGNOSIS — M19.012 PRIMARY OSTEOARTHRITIS OF LEFT SHOULDER: ICD-10-CM

## 2019-03-11 DIAGNOSIS — M41.9 SCOLIOSIS OF LUMBAR SPINE, UNSPECIFIED SCOLIOSIS TYPE: ICD-10-CM

## 2019-03-11 DIAGNOSIS — M47.816 OSTEOARTHRITIS OF LUMBAR SPINE, UNSPECIFIED SPINAL OSTEOARTHRITIS COMPLICATION STATUS: ICD-10-CM

## 2019-03-11 DIAGNOSIS — N13.8 BPH WITH OBSTRUCTION/LOWER URINARY TRACT SYMPTOMS: ICD-10-CM

## 2019-03-11 DIAGNOSIS — F43.10 PTSD (POST-TRAUMATIC STRESS DISORDER): ICD-10-CM

## 2019-03-11 DIAGNOSIS — Z00.00 ENCOUNTER FOR PREVENTIVE HEALTH EXAMINATION: Primary | ICD-10-CM

## 2019-03-11 DIAGNOSIS — N40.1 BPH WITH OBSTRUCTION/LOWER URINARY TRACT SYMPTOMS: ICD-10-CM

## 2019-03-11 DIAGNOSIS — I11.9 BENIGN HYPERTENSIVE HEART DISEASE WITHOUT HEART FAILURE: ICD-10-CM

## 2019-03-11 PROBLEM — K43.9 VENTRAL HERNIA WITHOUT OBSTRUCTION OR GANGRENE: Status: RESOLVED | Noted: 2018-05-01 | Resolved: 2019-03-11

## 2019-03-11 PROBLEM — M25.69 DECREASED ROM OF TRUNK AND BACK: Status: RESOLVED | Noted: 2017-04-19 | Resolved: 2019-03-11

## 2019-03-11 PROBLEM — K42.9 UMBILICAL HERNIA: Status: RESOLVED | Noted: 2018-02-19 | Resolved: 2019-03-11

## 2019-03-11 PROBLEM — K46.0 ABDOMINAL HERNIA WITH OBSTRUCTION AND WITHOUT GANGRENE: Status: RESOLVED | Noted: 2018-05-01 | Resolved: 2019-03-11

## 2019-03-11 PROBLEM — R35.1 NOCTURIA: Status: RESOLVED | Noted: 2019-03-04 | Resolved: 2019-03-11

## 2019-03-11 PROBLEM — R35.0 FREQUENCY OF URINATION: Status: RESOLVED | Noted: 2019-03-04 | Resolved: 2019-03-11

## 2019-03-11 PROCEDURE — 99999 PR PBB SHADOW E&M-EST. PATIENT-LVL V: CPT | Mod: PBBFAC,,, | Performed by: PHYSICIAN ASSISTANT

## 2019-03-11 PROCEDURE — 99215 OFFICE O/P EST HI 40 MIN: CPT | Mod: PBBFAC,PO | Performed by: PHYSICIAN ASSISTANT

## 2019-03-11 PROCEDURE — 99999 PR PBB SHADOW E&M-EST. PATIENT-LVL V: ICD-10-PCS | Mod: PBBFAC,,, | Performed by: PHYSICIAN ASSISTANT

## 2019-03-11 PROCEDURE — G0439 PPPS, SUBSEQ VISIT: HCPCS | Mod: S$GLB,,, | Performed by: PHYSICIAN ASSISTANT

## 2019-03-11 PROCEDURE — G0439 PR MEDICARE ANNUAL WELLNESS SUBSEQUENT VISIT: ICD-10-PCS | Mod: S$GLB,,, | Performed by: PHYSICIAN ASSISTANT

## 2019-03-11 RX ORDER — TAMSULOSIN HYDROCHLORIDE 0.4 MG/1
CAPSULE ORAL
COMMUNITY
End: 2019-04-29

## 2019-03-11 RX ORDER — PAROXETINE HYDROCHLORIDE 20 MG/1
TABLET, FILM COATED ORAL
COMMUNITY

## 2019-03-11 RX ORDER — AMLODIPINE BESYLATE 10 MG/1
10 TABLET ORAL DAILY
Qty: 90 TABLET | Refills: 1 | Status: SHIPPED | OUTPATIENT
Start: 2019-03-11 | End: 2019-10-30 | Stop reason: SDUPTHER

## 2019-03-11 NOTE — PATIENT INSTRUCTIONS
Counseling and Referral of Other Preventative  (Italic type indicates deductible and co-insurance are waived)    Patient Name: Rito Conley  Today's Date: 3/11/2019    Health Maintenance       Date Due Completion Date    Lipid Panel 10/22/2023 10/22/2018    Colonoscopy 07/03/2025 7/3/2015        No orders of the defined types were placed in this encounter.    The following information is provided to all patients.  This information is to help you find resources for any of the problems found today that may be affecting your health:                Living healthy guide: www.[facility].[place].[facility]      Understanding Diabetes: www.diabetes.org      Eating healthy: www.cdc.gov/healthyweight      CDC home safety checklist: www.cdc.gov/steadi/patient.html      Agency on Aging: www.goea.[place].[facility]      Alcoholics anonymous (AA): www.aa.org      Physical Activity: www.corby.nih.gov/xu4jqvk      Tobacco use: www.quitwithusla.org

## 2019-03-11 NOTE — PROGRESS NOTES
"Rito Conley presented for a  Medicare AWV and comprehensive Health Risk Assessment today. The following components were reviewed and updated:    · Medical history  · Family History  · Social history  · Allergies and Current Medications  · Health Risk Assessment  · Health Maintenance  · Care Team     ** See Completed Assessments for Annual Wellness Visit within the encounter summary.**       The following assessments were completed:  · Living Situation  · CAGE  · Depression Screening  · Timed Get Up and Go  · Whisper Test  · Cognitive Function Screening  · Nutrition Screening  · ADL Screening  · PAQ Screening      Vitals:    03/11/19 1001   BP: 130/80   Pulse: 70   Resp: 16   Temp: 98 °F (36.7 °C)   TempSrc: Oral   SpO2: 98%   Weight: 93.5 kg (206 lb 2.1 oz)   Height: 5' 11" (1.803 m)     Body mass index is 28.75 kg/m².  Physical Exam   Constitutional: He is oriented to person, place, and time. No distress.   HENT:   Head: Normocephalic and atraumatic.   Eyes: Conjunctivae and EOM are normal.   Cardiovascular: Normal rate and regular rhythm.   Pulmonary/Chest: Effort normal and breath sounds normal.   Neurological: He is alert and oriented to person, place, and time.   Psychiatric: He has a normal mood and affect.         Diagnoses and health risks identified today and associated recommendations/orders:    1. Benign hypertensive heart disease without heart failure  The current medical regimen is effective;  continue present plan and medications.  - amLODIPine (NORVASC) 10 MG tablet; Take 1 tablet (10 mg total) by mouth once daily.  Dispense: 90 tablet; Refill: 1    2. Gastroesophageal reflux disease, esophagitis presence not specified  The current medical regimen is effective;  continue present plan and medications.  - ranitidine (ZANTAC) 150 MG tablet; Take 1 tablet (150 mg total) by mouth 2 (two) times daily.  Dispense: 180 tablet; Refill: 1    3. Encounter for preventive health examination  Doing well " overall    4. BPH with obstruction/lower urinary tract symptoms  The current medical regimen is effective;  continue present plan and medications.  Continue to follow up with urology    5. Allergic rhinitis, unspecified seasonality, unspecified trigger  The current medical regimen is effective;  continue present plan and medications.    6. Osteoarthritis of lumbar spine, unspecified spinal osteoarthritis complication status  The current medical regimen is effective;  continue present plan and medications.'    7. Primary osteoarthritis of left shoulder  The current medical regimen is effective;  continue present plan and medications.    8. Scoliosis of lumbar spine, unspecified scoliosis type  The current medical regimen is effective;  continue present plan and medications.    9. PTSD (post-traumatic stress disorder)  The current medical regimen is effective;  continue present plan and medications.  [facility]     POA form filled and signed. Sent for scanning. He wants to leave his living will up to his wife.       Provided Rito with a 5-10 year written screening schedule and personal prevention plan. Recommendations were developed using the USPSTF age appropriate recommendations. Education, counseling, and referrals were provided as needed. After Visit Summary printed and given to patient which includes a list of additional screenings\tests needed.    No Follow-up on file.    MARCELA Warner

## 2019-04-08 ENCOUNTER — LAB VISIT (OUTPATIENT)
Dept: LAB | Facility: HOSPITAL | Age: 54
End: 2019-04-08
Attending: UROLOGY
Payer: MEDICARE

## 2019-04-08 DIAGNOSIS — R35.0 FREQUENCY OF URINATION: ICD-10-CM

## 2019-04-08 DIAGNOSIS — R35.1 NOCTURIA: ICD-10-CM

## 2019-04-08 DIAGNOSIS — N13.8 BPH WITH OBSTRUCTION/LOWER URINARY TRACT SYMPTOMS: ICD-10-CM

## 2019-04-08 DIAGNOSIS — N40.1 BPH WITH OBSTRUCTION/LOWER URINARY TRACT SYMPTOMS: ICD-10-CM

## 2019-04-08 LAB — COMPLEXED PSA SERPL-MCNC: 0.48 NG/ML (ref 0–4)

## 2019-04-08 PROCEDURE — 36415 COLL VENOUS BLD VENIPUNCTURE: CPT

## 2019-04-08 PROCEDURE — 84153 ASSAY OF PSA TOTAL: CPT

## 2019-04-15 ENCOUNTER — OFFICE VISIT (OUTPATIENT)
Dept: UROLOGY | Facility: CLINIC | Age: 54
End: 2019-04-15
Payer: MEDICARE

## 2019-04-15 VITALS — HEIGHT: 71 IN | RESPIRATION RATE: 16 BRPM | BODY MASS INDEX: 28.98 KG/M2 | WEIGHT: 207 LBS

## 2019-04-15 DIAGNOSIS — R35.1 NOCTURIA: ICD-10-CM

## 2019-04-15 DIAGNOSIS — N40.1 BPH WITH OBSTRUCTION/LOWER URINARY TRACT SYMPTOMS: ICD-10-CM

## 2019-04-15 DIAGNOSIS — N13.8 BPH WITH OBSTRUCTION/LOWER URINARY TRACT SYMPTOMS: ICD-10-CM

## 2019-04-15 DIAGNOSIS — R35.0 FREQUENCY OF URINATION: Primary | ICD-10-CM

## 2019-04-15 PROCEDURE — 99213 OFFICE O/P EST LOW 20 MIN: CPT | Mod: PBBFAC | Performed by: UROLOGY

## 2019-04-15 PROCEDURE — 99214 PR OFFICE/OUTPT VISIT, EST, LEVL IV, 30-39 MIN: ICD-10-PCS | Mod: S$PBB,,, | Performed by: UROLOGY

## 2019-04-15 PROCEDURE — 99999 PR PBB SHADOW E&M-EST. PATIENT-LVL III: ICD-10-PCS | Mod: PBBFAC,,, | Performed by: UROLOGY

## 2019-04-15 PROCEDURE — 99999 PR PBB SHADOW E&M-EST. PATIENT-LVL III: CPT | Mod: PBBFAC,,, | Performed by: UROLOGY

## 2019-04-15 PROCEDURE — 99214 OFFICE O/P EST MOD 30 MIN: CPT | Mod: S$PBB,,, | Performed by: UROLOGY

## 2019-04-15 NOTE — PROGRESS NOTES
"Subjective:       Rito Conley is a 53 y.o. male who is an established patient with Dr Colindres, though is new to me was seen for evaluation of urinary issues.      Last saw Bernadine 4/18. Reported frequency, VON, nocturia x 1. Denies straining, urgency, weak stream. Was on Flomax. Also saw  for ED.    He is referred back now with c/o urinary frequency and worsening nocturia. Nocturia x 2-3. Strong flow. Mild urgency. Denies UI. Out of Flomax x 1 week though symptoms pre-date that. Has not noted change with Flomax. Denies hematuria, dysuria.     PSA 4/18 - 0.43  PSA 4/19 - 0.48    PVR (bladder scan) initial visit - 0cc     04/15/2019  PSA normal. Given trial Ditropan last visit - some improvement. Strong flow. Nocturia x 1.       The following portions of the patient's history were reviewed and updated as appropriate: allergies, current medications, past family history, past medical history, past social history, past surgical history and problem list.    Review of Systems  Constitutional: no fever or chills  ENT: no nasal congestion or sore throat  Respiratory: no cough or shortness of breath  Cardiovascular: no chest pain or palpitations  Gastrointestinal: no nausea or vomiting, tolerating diet  Genitourinary: as per HPI  Hematologic/Lymphatic: no easy bruising or lymphadenopathy  Musculoskeletal: no arthralgias or myalgias  Skin: no rashes or lesions  Neurological: no seizures or tremors  Behavioral/Psych: no auditory or visual hallucinations       Objective:    Vitals: Resp 16   Ht 5' 11" (1.803 m)   Wt 93.9 kg (207 lb)   BMI 28.87 kg/m²     Physical Exam   General: well developed, well nourished in no acute distress  Head: normocephalic, atraumatic  Neck: supple, trachea midline, no obvious enlargement of thyroid  HEENT: EOMI, mucus membranes moist, sclera anicteric, no hearing impairment  Lungs: symmetric expansion, non-labored breathing  Cardiovascular: regular rate and rhythm, normal " pulses  Abdomen: soft, non tender, non distended, no palpable masses, no hepatosplenomegaly, no hernias, bladder nonpalpable. No CVA tenderness.  Musculoskeletal: no peripheral edema, normal ROM in bilateral upper and lower extremities  Lymphatics: no cervical or inguinal lymphadenopathy  Skin: no rashes or lesions  Neuro: alert and oriented x 3, no gross deficits  Psych: normal judgment and insight, normal mood/affect and non-anxious  Genitourinary:   patient declined exam   YOSHI: Symmetric 40g prostate without nodularity or tenderness. Normal landmarks. seminal vesicles non palpable, normal sphincter tone without hemorrhoids or rectal masses. Normal appearance of anus and perineum. Difficult to palpate.       Lab Review   Urine analysis today in clinic shows Negative     Lab Results   Component Value Date    WBC 6.51 10/22/2018    HGB 14.3 10/22/2018    HCT 43.0 10/22/2018    MCV 92 10/22/2018     10/22/2018     Lab Results   Component Value Date    CREATININE 1.1 10/22/2018    BUN 14 10/22/2018     Lab Results   Component Value Date    PSA 0.76 01/30/2017     Lab Results   Component Value Date    PSADIAG 0.48 04/08/2019       Imaging  NA       Assessment/Plan:      1. Frequency of urination    - Seems like OAB component   - Limited response to Flomax   - Ditropan 5mg. Discussed possible SE.      2. Nocturia    - Reduce PM fluids   - Ditropan 5mg      3. BPH with obstruction/lower urinary tract symptoms    - Limited response with Flomax   - Okay to stop   - PSA/YOSHI normal       Follow up in 6 months with PVR

## 2019-04-29 ENCOUNTER — OFFICE VISIT (OUTPATIENT)
Dept: FAMILY MEDICINE | Facility: CLINIC | Age: 54
End: 2019-04-29
Payer: MEDICARE

## 2019-04-29 VITALS
OXYGEN SATURATION: 98 % | TEMPERATURE: 98 F | WEIGHT: 207.25 LBS | HEART RATE: 84 BPM | HEIGHT: 71 IN | DIASTOLIC BLOOD PRESSURE: 80 MMHG | BODY MASS INDEX: 29.02 KG/M2 | RESPIRATION RATE: 16 BRPM | SYSTOLIC BLOOD PRESSURE: 130 MMHG

## 2019-04-29 DIAGNOSIS — G89.29 CHRONIC LOW BACK PAIN, UNSPECIFIED BACK PAIN LATERALITY, WITH SCIATICA PRESENCE UNSPECIFIED: ICD-10-CM

## 2019-04-29 DIAGNOSIS — I10 ESSENTIAL HYPERTENSION: Primary | ICD-10-CM

## 2019-04-29 DIAGNOSIS — M54.5 CHRONIC LOW BACK PAIN, UNSPECIFIED BACK PAIN LATERALITY, WITH SCIATICA PRESENCE UNSPECIFIED: ICD-10-CM

## 2019-04-29 DIAGNOSIS — E78.5 HYPERLIPIDEMIA, UNSPECIFIED HYPERLIPIDEMIA TYPE: ICD-10-CM

## 2019-04-29 PROCEDURE — 99999 PR PBB SHADOW E&M-EST. PATIENT-LVL III: CPT | Mod: PBBFAC,,, | Performed by: FAMILY MEDICINE

## 2019-04-29 PROCEDURE — 99214 OFFICE O/P EST MOD 30 MIN: CPT | Mod: S$PBB,,, | Performed by: FAMILY MEDICINE

## 2019-04-29 PROCEDURE — 99213 OFFICE O/P EST LOW 20 MIN: CPT | Mod: PBBFAC,PO | Performed by: FAMILY MEDICINE

## 2019-04-29 PROCEDURE — 99214 PR OFFICE/OUTPT VISIT, EST, LEVL IV, 30-39 MIN: ICD-10-PCS | Mod: S$PBB,,, | Performed by: FAMILY MEDICINE

## 2019-04-29 PROCEDURE — 99999 PR PBB SHADOW E&M-EST. PATIENT-LVL III: ICD-10-PCS | Mod: PBBFAC,,, | Performed by: FAMILY MEDICINE

## 2019-04-29 RX ORDER — OXYCODONE AND ACETAMINOPHEN 5; 325 MG/1; MG/1
1 TABLET ORAL EVERY 4 HOURS PRN
Qty: 31 TABLET | Refills: 0 | Status: SHIPPED | OUTPATIENT
Start: 2019-04-29 | End: 2019-07-22 | Stop reason: SDUPTHER

## 2019-04-29 RX ORDER — OXYCODONE AND ACETAMINOPHEN 5; 325 MG/1; MG/1
1 TABLET ORAL EVERY 4 HOURS PRN
Qty: 31 TABLET | Refills: 0 | Status: SHIPPED | OUTPATIENT
Start: 2019-06-29 | End: 2019-07-22 | Stop reason: SDUPTHER

## 2019-04-29 RX ORDER — OXYCODONE AND ACETAMINOPHEN 5; 325 MG/1; MG/1
1 TABLET ORAL EVERY 4 HOURS PRN
Qty: 31 TABLET | Refills: 0 | Status: SHIPPED | OUTPATIENT
Start: 2019-05-29 | End: 2019-07-22 | Stop reason: SDUPTHER

## 2019-04-29 NOTE — PROGRESS NOTES
Subjective:       Patient ID: Rito Conley is a 53 y.o. male.    Chief Complaint: knot on wrist (left improved) and Medication Refill    53 year old male is her for refills of his medication for his lower back pain. He is doing well on his pain medication without side effects. His back pain is better with good weather. He is staying active. He was camping this weekend with 26 people.       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  No date: Hyperlipidemia  No date: Hypertension  No date: Lower back pain  No date: PTSD (post-traumatic stress disorder)  No date: Sleep apnea      Comment:  uses CPAP 1-2x/week   Past Surgical History:  6/17/2015: COLONOSCOPY; N/A      Comment:  Performed by West Bertrand MD at [facility] ENDO  No date: HERNIA REPAIR  2/19/2018: REPAIR-HERNIA-UMBILICAL (5 YRS +) Open without Mesh; N/A      Comment:  Performed by Gael Benson MD at [facility] OR [address] [place] [address]  5/2/2018: REPAIR-HERNIA-VENTRAL-INITIAL with MESH      Comment:  Performed by Gael Benson MD at [facility] OR [address] [place] [address]  Review of patient's family history indicates:  Problem: Hypertension      Relation: Mother          Age of Onset: (Not Specified)  Problem: Cancer      Relation: Maternal Grandmother          Age of Onset: (Not Specified)          Comment: something in her arm   Problem: Cancer      Relation: Maternal Aunt          Age of Onset: (Not Specified)          Comment: breast  Problem: Other      Relation: Father          Age of Onset: (Not Specified)          Comment: agent orange exposure   Problem: No Known Problems      Relation: Sister          Age of Onset: (Not Specified)  Problem: No Known Problems      Relation: Brother          Age of Onset: (Not Specified)  Problem: Amblyopia      Relation: Neg Hx          Age of Onset: (Not Specified)  Problem: Blindness      Relation: Neg Hx          Age of Onset: (Not Specified)  Problem: Cataracts      Relation: Neg Hx          Age of Onset:  (Not Specified)  Problem: Diabetes      Relation: Neg Hx          Age of Onset: (Not Specified)  Problem: Glaucoma      Relation: Neg Hx          Age of Onset: (Not Specified)  Problem: Macular degeneration      Relation: Neg Hx          Age of Onset: (Not Specified)  Problem: Retinal detachment      Relation: Neg Hx          Age of Onset: (Not Specified)  Problem: Strabismus      Relation: Neg Hx          Age of Onset: (Not Specified)  Problem: Stroke      Relation: Neg Hx          Age of Onset: (Not Specified)  Problem: Thyroid disease      Relation: Neg Hx          Age of Onset: (Not Specified)    Social History    Socioeconomic History      Marital status:       Spouse name: Not on file      Number of children: Not on file      Years of education: Not on file      Highest education level: Not on file    Occupational History      Not on file    Social Needs      Financial resource strain: Not on file      Food insecurity:        Worry: Not on file        Inability: Not on file      Transportation needs:        Medical: Not on file        Non-medical: Not on file    Tobacco Use      Smoking status: Never Smoker      Smokeless tobacco: Never Used    Substance and Sexual Activity      Alcohol use: Yes        Alcohol/week: 2.4 - 3.6 oz        Types: 4 - 6 Glasses of wine per week        Comment: a week.       Drug use: No      Sexual activity: Yes    Lifestyle      Physical activity:        Days per week: Not on file        Minutes per session: Not on file      Stress: Not on file    Relationships      Social connections:        Talks on phone: Not on file        Gets together: Not on file        Attends Bahai service: Not on file        Active member of club or organization: Not on file        Attends meetings of clubs or organizations: Not on file        Relationship status: Not on file    Other Topics      Concerns:        Not on file    Social History Narrative      Not on file        Review of Systems  "   Objective:       Vitals:    04/29/19 1507   BP: 130/80   Pulse: 84   Resp: 16   Temp: 98.4 °F (36.9 °C)   TempSrc: Oral   SpO2: 98%   Weight: 94 kg (207 lb 3.7 oz)   Height: 5' 11" (1.803 m)       Physical Exam   Constitutional: He is oriented to person, place, and time. He appears well-developed and well-nourished. No distress.   HENT:   Head: Normocephalic and atraumatic.   Cardiovascular: Normal rate, regular rhythm and normal heart sounds. Exam reveals no gallop and no friction rub.   No murmur heard.  Musculoskeletal: He exhibits no edema.        Lumbar back: He exhibits decreased range of motion, tenderness and pain. He exhibits no bony tenderness, no edema, no deformity, no laceration, no spasm and normal pulse.   Neurological: He is alert and oriented to person, place, and time.   Skin: He is not diaphoretic.       Assessment:       1. Essential hypertension    2. Hyperlipidemia, unspecified hyperlipidemia type    3. Chronic low back pain, unspecified back pain laterality, with sciatica presence unspecified        Plan:       Rito was seen today for knot on wrist and medication refill.    Diagnoses and all orders for this visit:    Essential hypertension  -     Comprehensive metabolic panel; Future  -     TSH; Future  -     CBC auto differential; Future  -     Lipid panel; Future    Hyperlipidemia, unspecified hyperlipidemia type  -     Comprehensive metabolic panel; Future  -     TSH; Future  -     CBC auto differential; Future  -     Lipid panel; Future  Due for labs    Bpixpr4m low back pain, unspecified back pain laterality, with sciatica presence unspecified  -     oxyCODONE-acetaminophen (PERCOCET) 5-325 mg per tablet; Take 1 tablet by mouth every 4 (four) hours as needed.  -     oxyCODONE-acetaminophen (PERCOCET) 5-325 mg per tablet; Take 1 tablet by mouth every 4 (four) hours as needed.  -     oxyCODONE-acetaminophen (PERCOCET) 5-325 mg per tablet; Take 1 tablet by mouth every 4 (four) hours as " needed.  Stable. Refilled meds.   Follow up in about 3 months (around 7/29/2019).

## 2019-05-14 ENCOUNTER — LAB VISIT (OUTPATIENT)
Dept: LAB | Facility: HOSPITAL | Age: 54
End: 2019-05-14
Attending: FAMILY MEDICINE
Payer: MEDICARE

## 2019-05-14 DIAGNOSIS — E78.5 HYPERLIPIDEMIA, UNSPECIFIED HYPERLIPIDEMIA TYPE: ICD-10-CM

## 2019-05-14 DIAGNOSIS — I10 ESSENTIAL HYPERTENSION: ICD-10-CM

## 2019-05-14 LAB
ALBUMIN SERPL BCP-MCNC: 3.9 G/DL (ref 3.5–5.2)
ALP SERPL-CCNC: 101 U/L (ref 55–135)
ALT SERPL W/O P-5'-P-CCNC: 29 U/L (ref 10–44)
ANION GAP SERPL CALC-SCNC: 9 MMOL/L (ref 8–16)
AST SERPL-CCNC: 28 U/L (ref 10–40)
BASOPHILS # BLD AUTO: 0.04 K/UL (ref 0–0.2)
BASOPHILS NFR BLD: 0.8 % (ref 0–1.9)
BILIRUB SERPL-MCNC: 0.6 MG/DL (ref 0.1–1)
BUN SERPL-MCNC: 14 MG/DL (ref 6–20)
CALCIUM SERPL-MCNC: 9.3 MG/DL (ref 8.7–10.5)
CHLORIDE SERPL-SCNC: 105 MMOL/L (ref 95–110)
CHOLEST SERPL-MCNC: 161 MG/DL (ref 120–199)
CHOLEST/HDLC SERPL: 3.2 {RATIO} (ref 2–5)
CO2 SERPL-SCNC: 23 MMOL/L (ref 23–29)
CREAT SERPL-MCNC: 1 MG/DL (ref 0.5–1.4)
DIFFERENTIAL METHOD: ABNORMAL
EOSINOPHIL # BLD AUTO: 0.2 K/UL (ref 0–0.5)
EOSINOPHIL NFR BLD: 4.3 % (ref 0–8)
ERYTHROCYTE [DISTWIDTH] IN BLOOD BY AUTOMATED COUNT: 13.7 % (ref 11.5–14.5)
EST. GFR  (AFRICAN AMERICAN): >60 ML/MIN/1.73 M^2
EST. GFR  (NON AFRICAN AMERICAN): >60 ML/MIN/1.73 M^2
GLUCOSE SERPL-MCNC: 95 MG/DL (ref 70–110)
HCT VFR BLD AUTO: 43.6 % (ref 40–54)
HDLC SERPL-MCNC: 50 MG/DL (ref 40–75)
HDLC SERPL: 31.1 % (ref 20–50)
HGB BLD-MCNC: 14 G/DL (ref 14–18)
IMM GRANULOCYTES # BLD AUTO: 0.01 K/UL (ref 0–0.04)
IMM GRANULOCYTES NFR BLD AUTO: 0.2 % (ref 0–0.5)
LDLC SERPL CALC-MCNC: 97.6 MG/DL (ref 63–159)
LYMPHOCYTES # BLD AUTO: 2.4 K/UL (ref 1–4.8)
LYMPHOCYTES NFR BLD: 46.6 % (ref 18–48)
MCH RBC QN AUTO: 30.2 PG (ref 27–31)
MCHC RBC AUTO-ENTMCNC: 32.1 G/DL (ref 32–36)
MCV RBC AUTO: 94 FL (ref 82–98)
MONOCYTES # BLD AUTO: 0.6 K/UL (ref 0.3–1)
MONOCYTES NFR BLD: 11.8 % (ref 4–15)
NEUTROPHILS # BLD AUTO: 1.9 K/UL (ref 1.8–7.7)
NEUTROPHILS NFR BLD: 36.3 % (ref 38–73)
NONHDLC SERPL-MCNC: 111 MG/DL
NRBC BLD-RTO: 0 /100 WBC
PLATELET # BLD AUTO: 308 K/UL (ref 150–350)
PMV BLD AUTO: 9.6 FL (ref 9.2–12.9)
POTASSIUM SERPL-SCNC: 3.8 MMOL/L (ref 3.5–5.1)
PROT SERPL-MCNC: 7.9 G/DL (ref 6–8.4)
RBC # BLD AUTO: 4.63 M/UL (ref 4.6–6.2)
SODIUM SERPL-SCNC: 137 MMOL/L (ref 136–145)
TRIGL SERPL-MCNC: 67 MG/DL (ref 30–150)
TSH SERPL DL<=0.005 MIU/L-ACNC: 1.27 UIU/ML (ref 0.4–4)
WBC # BLD AUTO: 5.09 K/UL (ref 3.9–12.7)

## 2019-05-14 PROCEDURE — 80053 COMPREHEN METABOLIC PANEL: CPT

## 2019-05-14 PROCEDURE — 84443 ASSAY THYROID STIM HORMONE: CPT

## 2019-05-14 PROCEDURE — 80061 LIPID PANEL: CPT

## 2019-05-14 PROCEDURE — 85025 COMPLETE CBC W/AUTO DIFF WBC: CPT

## 2019-05-14 PROCEDURE — 36415 COLL VENOUS BLD VENIPUNCTURE: CPT | Mod: PO

## 2019-05-15 ENCOUNTER — OFFICE VISIT (OUTPATIENT)
Dept: FAMILY MEDICINE | Facility: CLINIC | Age: 54
End: 2019-05-15
Payer: MEDICARE

## 2019-05-15 VITALS
HEART RATE: 71 BPM | SYSTOLIC BLOOD PRESSURE: 126 MMHG | DIASTOLIC BLOOD PRESSURE: 80 MMHG | OXYGEN SATURATION: 99 % | HEIGHT: 71 IN | WEIGHT: 204.13 LBS | BODY MASS INDEX: 28.58 KG/M2

## 2019-05-15 DIAGNOSIS — K21.9 GASTROESOPHAGEAL REFLUX DISEASE, ESOPHAGITIS PRESENCE NOT SPECIFIED: Primary | ICD-10-CM

## 2019-05-15 PROCEDURE — 99999 PR PBB SHADOW E&M-EST. PATIENT-LVL IV: CPT | Mod: PBBFAC,,, | Performed by: FAMILY MEDICINE

## 2019-05-15 PROCEDURE — 99214 OFFICE O/P EST MOD 30 MIN: CPT | Mod: PBBFAC,PO | Performed by: FAMILY MEDICINE

## 2019-05-15 PROCEDURE — 99214 OFFICE O/P EST MOD 30 MIN: CPT | Mod: S$PBB,,, | Performed by: FAMILY MEDICINE

## 2019-05-15 PROCEDURE — 99999 PR PBB SHADOW E&M-EST. PATIENT-LVL IV: ICD-10-PCS | Mod: PBBFAC,,, | Performed by: FAMILY MEDICINE

## 2019-05-15 PROCEDURE — 99214 PR OFFICE/OUTPT VISIT, EST, LEVL IV, 30-39 MIN: ICD-10-PCS | Mod: S$PBB,,, | Performed by: FAMILY MEDICINE

## 2019-05-15 RX ORDER — OMEPRAZOLE 40 MG/1
40 CAPSULE, DELAYED RELEASE ORAL DAILY
Qty: 30 CAPSULE | Refills: 2 | Status: SHIPPED | OUTPATIENT
Start: 2019-05-15 | End: 2019-09-09 | Stop reason: SDUPTHER

## 2019-05-15 NOTE — PROGRESS NOTES
Subjective:       Patient ID: Rito Conley is a 54 y.o. male.    Chief Complaint: GI Problem and Gastroesophageal Reflux    Abdominal Pain   This is a new problem. The current episode started in the past 7 days. The onset quality is gradual. The problem occurs 2 to 4 times per day. The most recent episode lasted 7 days. The problem has been resolved. The pain is at a severity of 6/10. The pain is mild. The quality of the pain is burning. The abdominal pain radiates to the epigastric region. Associated symptoms include belching and flatus. Pertinent negatives include no anorexia, arthralgias, constipation, diarrhea, dysuria, fever, frequency, headaches, hematochezia, hematuria, melena, myalgias, nausea, vomiting or weight loss. The pain is aggravated by belching. The pain is relieved by belching. He has tried nothing for the symptoms. The treatment provided moderate relief. Prior diagnostic workup includes surgery. His past medical history is significant for abdominal surgery and GERD. There is no history of colon cancer, Crohn's disease, gallstones, irritable bowel syndrome, pancreatitis, PUD or ulcerative colitis. Patient's medical history does not include kidney stones and UTI.     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    Hyperlipidemia     Hypertension     Lower back pain     PTSD (post-traumatic stress disorder)     Sleep apnea     uses CPAP 1-2x/week      Past Surgical History:   Procedure Laterality Date    COLONOSCOPY N/A 6/17/2015    [facility] West [facility]rand [facility]    HERNIA REPAIR      REPAIR-HERNIA-UMBILICAL (5 YRS +) Open without Mesh N/A 2/19/2018    Performed by Gael Benson MD at [facility] FLR    REPAIR-HERNIA-VENTRAL-INITIAL with MESH  5/2/2018    Performed by Gael Benson MD at [facility] OR 2ND FLR     Family History   Problem Relation Age of Onset    Hypertension Mother     Cancer Maternal Grandmother          something in her arm     Cancer Maternal Aunt         breast    Other Father         agent orange exposure     No Known Problems Sister     No Known Problems Brother     Amblyopia Neg Hx     Blindness Neg Hx     Cataracts Neg Hx     Diabetes Neg Hx     Glaucoma Neg Hx     Macular degeneration Neg Hx     Retinal detachment Neg Hx     Strabismus Neg Hx     Stroke Neg Hx     Thyroid disease Neg Hx      Social History     Socioeconomic History    Marital status:      Spouse name: Not on file    Number of children: Not on file    Years of education: Not on file    Highest education level: Not on file   Occupational History    Not on file   Social Needs    Financial resource strain: Not hard at all    Food insecurity:     Worry: Never true     Inability: Never true    Transportation needs:     Medical: No     Non-medical: No   Tobacco Use    Smoking status: Never Smoker    Smokeless tobacco: Never Used   Substance and Sexual Activity    Alcohol use: Yes     Alcohol/week: 2.4 - 3.6 oz     Types: 4 - 6 Glasses of wine per week     Frequency: 2-3 times a week     Drinks per session: 1 or 2     Binge frequency: Less than monthly     Comment: a week.     Drug use: No    Sexual activity: Yes   Lifestyle    Physical activity:     Days per week: 4 days     Minutes per session: 30 min    Stress: Only a little   Relationships    Social connections:     Talks on phone: Three times a week     Gets together: Once a week     Attends Judaism service: Not on file     Active member of club or organization: Yes     Attends meetings of clubs or organizations: More than 4 times per year     Relationship status:    Other Topics Concern    Not on file   Social History Narrative    Not on file       Review of Systems   Constitutional: Negative for fever and weight loss.   Respiratory: Negative for chest tightness and shortness of breath.    Cardiovascular: Negative for chest pain.  "  Gastrointestinal: Positive for abdominal pain and flatus. Negative for anorexia, constipation, diarrhea, hematochezia, melena, nausea and vomiting.   Genitourinary: Negative for dysuria, frequency and hematuria.   Musculoskeletal: Negative for arthralgias and myalgias.   Neurological: Negative for headaches.       Objective:       Vitals:    05/15/19 1350   BP: 126/80   Pulse: 71   SpO2: 99%   Weight: 92.6 kg (204 lb 2.3 oz)   Height: 5' 11" (1.803 m)       Physical Exam   Constitutional: He is oriented to person, place, and time. He appears well-developed and well-nourished. No distress.   HENT:   Head: Normocephalic and atraumatic.   Eyes: Conjunctivae are normal.   Neck: Normal range of motion. Neck supple.   Cardiovascular: Normal rate, regular rhythm and normal heart sounds. Exam reveals no gallop and no friction rub.   No murmur heard.  Pulmonary/Chest: Effort normal and breath sounds normal. No stridor. No respiratory distress. He has no wheezes. He has no rales.   Abdominal: Soft. Bowel sounds are normal. He exhibits no distension and no mass. There is no tenderness. There is no guarding.   Neurological: He is alert and oriented to person, place, and time.   Skin: He is not diaphoretic.       Assessment:       1. Gastroesophageal reflux disease, esophagitis presence not specified        Plan:       Rito was seen today for gi problem and gastroesophageal reflux.    Diagnoses and all orders for this visit:    Gastroesophageal reflux disease, esophagitis presence not specified  -     omeprazole (PRILOSEC) 40 MG capsule; Take 1 capsule (40 mg total) by mouth once daily.           "

## 2019-07-22 ENCOUNTER — OFFICE VISIT (OUTPATIENT)
Dept: FAMILY MEDICINE | Facility: CLINIC | Age: 54
End: 2019-07-22
Payer: MEDICARE

## 2019-07-22 VITALS
TEMPERATURE: 98 F | HEIGHT: 71 IN | WEIGHT: 207.88 LBS | SYSTOLIC BLOOD PRESSURE: 128 MMHG | BODY MASS INDEX: 29.1 KG/M2 | OXYGEN SATURATION: 98 % | HEART RATE: 71 BPM | DIASTOLIC BLOOD PRESSURE: 88 MMHG

## 2019-07-22 DIAGNOSIS — G89.29 CHRONIC LOW BACK PAIN, UNSPECIFIED BACK PAIN LATERALITY, WITH SCIATICA PRESENCE UNSPECIFIED: ICD-10-CM

## 2019-07-22 DIAGNOSIS — M54.5 CHRONIC LOW BACK PAIN, UNSPECIFIED BACK PAIN LATERALITY, WITH SCIATICA PRESENCE UNSPECIFIED: ICD-10-CM

## 2019-07-22 PROCEDURE — 99213 OFFICE O/P EST LOW 20 MIN: CPT | Mod: PBBFAC,PO | Performed by: FAMILY MEDICINE

## 2019-07-22 PROCEDURE — 99214 PR OFFICE/OUTPT VISIT, EST, LEVL IV, 30-39 MIN: ICD-10-PCS | Mod: S$PBB,,, | Performed by: FAMILY MEDICINE

## 2019-07-22 PROCEDURE — 99999 PR PBB SHADOW E&M-EST. PATIENT-LVL III: CPT | Mod: PBBFAC,,, | Performed by: FAMILY MEDICINE

## 2019-07-22 PROCEDURE — 99999 PR PBB SHADOW E&M-EST. PATIENT-LVL III: ICD-10-PCS | Mod: PBBFAC,,, | Performed by: FAMILY MEDICINE

## 2019-07-22 PROCEDURE — 99214 OFFICE O/P EST MOD 30 MIN: CPT | Mod: S$PBB,,, | Performed by: FAMILY MEDICINE

## 2019-07-22 RX ORDER — OXYCODONE AND ACETAMINOPHEN 5; 325 MG/1; MG/1
1 TABLET ORAL EVERY 4 HOURS PRN
Qty: 31 TABLET | Refills: 0 | Status: SHIPPED | OUTPATIENT
Start: 2019-08-29 | End: 2020-03-04

## 2019-07-22 RX ORDER — OXYCODONE AND ACETAMINOPHEN 5; 325 MG/1; MG/1
1 TABLET ORAL EVERY 4 HOURS PRN
Qty: 31 TABLET | Refills: 0 | Status: SHIPPED | OUTPATIENT
Start: 2019-09-29 | End: 2019-10-30 | Stop reason: SDUPTHER

## 2019-07-22 RX ORDER — OXYBUTYNIN CHLORIDE 5 MG/1
5 TABLET, EXTENDED RELEASE ORAL DAILY
Qty: 30 TABLET | Refills: 11 | Status: SHIPPED | OUTPATIENT
Start: 2019-07-22 | End: 2019-10-14 | Stop reason: SDUPTHER

## 2019-07-22 RX ORDER — METHOCARBAMOL 500 MG/1
500 TABLET, FILM COATED ORAL 4 TIMES DAILY
Qty: 30 TABLET | Refills: 0 | Status: SHIPPED | OUTPATIENT
Start: 2019-07-22 | End: 2019-08-01

## 2019-07-22 RX ORDER — OXYCODONE AND ACETAMINOPHEN 5; 325 MG/1; MG/1
1 TABLET ORAL EVERY 4 HOURS PRN
Qty: 31 TABLET | Refills: 0 | Status: SHIPPED | OUTPATIENT
Start: 2019-07-29 | End: 2020-03-04

## 2019-07-22 NOTE — PROGRESS NOTES
Subjective:       Patient ID: Rito Conley is a 54 y.o. male.    Chief Complaint: Discuss Back Pain    54 year-old male with chronic lower back pain presents for worsening pain that radiates from his lower back to his left buttock. He states he feels like it is tight.     Back Pain   This is a recurrent problem. The current episode started in the past 7 days. The problem occurs 2 to 4 times per day. The problem has been gradually worsening since onset. The pain is present in the lumbar spine. The quality of the pain is described as aching. The pain does not radiate. The pain is at a severity of 6/10. The pain is moderate. The pain is worse during the night. Stiffness is present all day. Pertinent negatives include no bladder incontinence, bowel incontinence, dysuria, leg pain, tingling or weakness. Risk factors include lack of exercise and sedentary lifestyle. The treatment provided no relief.     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    Hyperlipidemia     Hypertension     Lower back pain     PTSD (post-traumatic stress disorder)     Sleep apnea     uses CPAP 1-2x/week      Past Surgical History:   Procedure Laterality Date    COLONOSCOPY N/A 6/17/2015    [facility]rand [facility] ENDO    HERNIA REPAIR      REPAIR-HERNIA-UMBILICAL (5 YRS +) Open without Mesh N/A 2/19/2018    Performed [facility]    REPAIR-HERNIA-VENTRAL-INITIAL with MESH  5/2/2018    Performed by Gael Benson MD at [facility] OR 2ND FLR     Family History   Problem Relation Age of Onset    Hypertension Mother     Cancer Maternal Grandmother         something in her arm     Cancer Maternal Aunt         breast    Other Father         agent orange exposure     No Known Problems Sister     No Known Problems Brother     Amblyopia Neg Hx     Blindness Neg Hx     Cataracts Neg Hx     Diabetes Neg Hx     Glaucoma Neg Hx     Macular  "degeneration Neg Hx     Retinal detachment Neg Hx     Strabismus Neg Hx     Stroke Neg Hx     Thyroid disease Neg Hx      Social History     Socioeconomic History    Marital status:      Spouse name: Not on file    Number of children: Not on file    Years of education: Not on file    Highest education level: Not on file   Occupational History    Not on file   Social Needs    Financial resource strain: Not hard at all    Food insecurity:     Worry: Never true     Inability: Never true    Transportation needs:     Medical: No     Non-medical: No   Tobacco Use    Smoking status: Never Smoker    Smokeless tobacco: Never Used   Substance and Sexual Activity    Alcohol use: Yes     Alcohol/week: 2.4 - 3.6 oz     Types: 4 - 6 Glasses of wine per week     Frequency: 2-3 times a week     Drinks per session: 1 or 2     Binge frequency: Less than monthly     Comment: a week.     Drug use: No    Sexual activity: Yes   Lifestyle    Physical activity:     Days per week: 4 days     Minutes per session: 30 min    Stress: Only a little   Relationships    Social connections:     Talks on phone: Three times a week     Gets together: Once a week     Attends Oriental orthodox service: Not on file     Active member of club or organization: Yes     Attends meetings of clubs or organizations: More than 4 times per year     Relationship status:    Other Topics Concern    Not on file   Social History Narrative    Not on file       Review of Systems   Gastrointestinal: Negative for bowel incontinence.   Genitourinary: Negative for bladder incontinence and dysuria.   Musculoskeletal: Positive for back pain.   Neurological: Negative for tingling and weakness.       Objective:       Vitals:    07/22/19 1113   BP: 128/88   Pulse: 71   Temp: 98.1 °F (36.7 °C)   TempSrc: Oral   SpO2: 98%   Weight: 94.3 kg (207 lb 14.3 oz)   Height: 5' 11" (1.803 m)       Physical Exam   Constitutional: He appears well-developed and " well-nourished. No distress.   HENT:   Head: Normocephalic and atraumatic.   Musculoskeletal:        Lumbar back: He exhibits tenderness, bony tenderness, pain and spasm. He exhibits normal range of motion, no swelling, no edema, no deformity and no laceration.        Back:    Skin: He is not diaphoretic.       Assessment:       1. Chronic low back pain, unspecified back pain laterality, with sciatica presence unspecified        Plan:       Rito was seen today for discuss back pain.    Diagnoses and all orders for this visit:    Chronic low back pain, unspecified back pain laterality, with sciatica presence unspecified  -     oxyCODONE-acetaminophen (PERCOCET) 5-325 mg per tablet; Take 1 tablet by mouth every 4 (four) hours as needed.  -     oxyCODONE-acetaminophen (PERCOCET) 5-325 mg per tablet; Take 1 tablet by mouth every 4 (four) hours as needed.  -     oxyCODONE-acetaminophen (PERCOCET) 5-325 mg per tablet; Take 1 tablet by mouth every 4 (four) hours as needed.  -     methocarbamol (ROBAXIN) 500 MG Tab; Take 1 tablet (500 mg total) by mouth 4 (four) times daily. for 10 days  Stable. Refilled meds.  And added robaxin due to spasm    Other orders  -     oxybutynin (DITROPAN-XL) 5 MG TR24; Take 1 tablet (5 mg total) by mouth once daily.

## 2019-07-30 ENCOUNTER — OFFICE VISIT (OUTPATIENT)
Dept: FAMILY MEDICINE | Facility: CLINIC | Age: 54
End: 2019-07-30
Payer: MEDICARE

## 2019-07-30 VITALS
BODY MASS INDEX: 29.44 KG/M2 | HEIGHT: 71 IN | DIASTOLIC BLOOD PRESSURE: 88 MMHG | OXYGEN SATURATION: 95 % | SYSTOLIC BLOOD PRESSURE: 122 MMHG | WEIGHT: 210.31 LBS | TEMPERATURE: 98 F | HEART RATE: 70 BPM

## 2019-07-30 DIAGNOSIS — J01.10 ACUTE FRONTAL SINUSITIS, RECURRENCE NOT SPECIFIED: Primary | ICD-10-CM

## 2019-07-30 PROCEDURE — 99214 OFFICE O/P EST MOD 30 MIN: CPT | Mod: S$PBB,,, | Performed by: INTERNAL MEDICINE

## 2019-07-30 PROCEDURE — 99213 OFFICE O/P EST LOW 20 MIN: CPT | Mod: PBBFAC,PO | Performed by: INTERNAL MEDICINE

## 2019-07-30 PROCEDURE — 99999 PR PBB SHADOW E&M-EST. PATIENT-LVL III: CPT | Mod: PBBFAC,,, | Performed by: INTERNAL MEDICINE

## 2019-07-30 PROCEDURE — 99999 PR PBB SHADOW E&M-EST. PATIENT-LVL III: ICD-10-PCS | Mod: PBBFAC,,, | Performed by: INTERNAL MEDICINE

## 2019-07-30 PROCEDURE — 96372 THER/PROPH/DIAG INJ SC/IM: CPT | Mod: PBBFAC,PO

## 2019-07-30 PROCEDURE — 99214 PR OFFICE/OUTPT VISIT, EST, LEVL IV, 30-39 MIN: ICD-10-PCS | Mod: S$PBB,,, | Performed by: INTERNAL MEDICINE

## 2019-07-30 RX ORDER — LEVOCETIRIZINE DIHYDROCHLORIDE 5 MG/1
5 TABLET, FILM COATED ORAL NIGHTLY
Qty: 90 TABLET | Refills: 1 | Status: SHIPPED | OUTPATIENT
Start: 2019-07-30 | End: 2019-10-31 | Stop reason: SDUPTHER

## 2019-07-30 RX ORDER — PROMETHAZINE HYDROCHLORIDE AND DEXTROMETHORPHAN HYDROBROMIDE 6.25; 15 MG/5ML; MG/5ML
5 SYRUP ORAL EVERY 12 HOURS PRN
Qty: 180 ML | Refills: 0 | Status: SHIPPED | OUTPATIENT
Start: 2019-07-30 | End: 2019-08-09

## 2019-07-30 RX ORDER — METHYLPREDNISOLONE ACETATE 40 MG/ML
40 INJECTION, SUSPENSION INTRA-ARTICULAR; INTRALESIONAL; INTRAMUSCULAR; SOFT TISSUE
Status: COMPLETED | OUTPATIENT
Start: 2019-07-30 | End: 2019-07-30

## 2019-07-30 RX ADMIN — METHYLPREDNISOLONE ACETATE 40 MG: 40 INJECTION, SUSPENSION INTRA-ARTICULAR; INTRALESIONAL; INTRAMUSCULAR; SOFT TISSUE at 08:07

## 2019-07-30 NOTE — PROGRESS NOTES
SUBJECTIVE     Chief Complaint   Patient presents with    Sinusitis     pressure in head; PND; sore throat started 2 days ago. also needs refill on xyzal       HPI  Rito Conley is a 54 y.o. male with multiple medical diagnoses as listed in the medical history and problem list that presents for evaluation of URI x 2 days. Pt reports a dry cough, post-nasal drip, a B/L frontal headache at an 8/10 with radiation to the temporal lobes. +chills, Denies any fever or night sweats. Pt has been taking Flonase and Xyzal without improvement. Denies any sick contacts. +recent travel to [place] via car.    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    Hyperlipidemia     Hypertension     Lower back pain     PTSD (post-traumatic stress disorder)     Sleep apnea     uses CPAP 1-2x/week       PAST SURGICAL HISTORY:  Past Surgical History:   Procedure Laterality Date    COLONOSCOPY N/A 6/17/2015    [facility]formed [facility] ENDO    HERNIA REPAIR      REPAIR-HERNIA-UMBILICAL (5 YRS +) Open without Mesh N/A 2/19/2018    Performed by [facility] Benson [facility] at [facility] OR [address] [place] [address]    REPAIR-HERNIA-VENTRAL-INITIAL with MESH  5/2/2018    Performed by Gael Benson MD at [facility] OR [address] [place] [address]       SOCIAL HISTORY:  Social History     Socioeconomic History    Marital status:      Spouse name: Not on file    Number of children: Not on file    Years of education: Not on file    Highest education level: Not on file   Occupational History    Not on file   Social Needs    Financial resource strain: Not hard at all    Food insecurity:     Worry: Never true     Inability: Never true    Transportation needs:     Medical: No     Non-medical: No   Tobacco Use    Smoking status: Never Smoker    Smokeless tobacco: Never Used   Substance and Sexual Activity    Alcohol use: Yes     Alcohol/week: 2.4 - 3.6 oz     Types: 4 - 6 Glasses of wine per week      Frequency: 2-3 times a week     Drinks per session: 1 or 2     Binge frequency: Less than monthly     Comment: a week.     Drug use: No    Sexual activity: Yes   Lifestyle    Physical activity:     Days per week: 4 days     Minutes per session: 30 min    Stress: Only a little   Relationships    Social connections:     Talks on phone: Three times a week     Gets together: Once a week     Attends Episcopalian service: Not on file     Active member of club or organization: Yes     Attends meetings of clubs or organizations: More than 4 times per year     Relationship status:    Other Topics Concern    Not on file   Social History Narrative    Not on file       FAMILY HISTORY:  Family History   Problem Relation Age of Onset    Hypertension Mother     Cancer Maternal Grandmother         something in her arm     Cancer Maternal Aunt         breast    Other Father         agent orange exposure     No Known Problems Sister     No Known Problems Brother     Amblyopia Neg Hx     Blindness Neg Hx     Cataracts Neg Hx     Diabetes Neg Hx     Glaucoma Neg Hx     Macular degeneration Neg Hx     Retinal detachment Neg Hx     Strabismus Neg Hx     Stroke Neg Hx     Thyroid disease Neg Hx        ALLERGIES AND MEDICATIONS: updated and reviewed.  Review of patient's allergies indicates:  No Known Allergies  Current Outpatient Medications   Medication Sig Dispense Refill    amLODIPine (NORVASC) 10 MG tablet Take 1 tablet (10 mg total) by mouth once daily. 90 tablet 1    buPROPion (WELLBUTRIN XL) 150 MG TB24 tablet Take 150 mg by mouth once daily.       fluticasone (FLONASE) 50 mcg/actuation nasal spray 1 spray by Each Nare route 2 (two) times daily. (Patient taking differently: 1 spray by Each Nare route 2 (two) times daily as needed. ) 3 Bottle 3    hydrochlorothiazide (HYDRODIURIL) 50 MG tablet Take 1 tablet (50 mg total) by mouth once daily. 90 tablet 1    levocetirizine (XYZAL) 5 MG tablet Take 1  tablet (5 mg total) by mouth every evening. 90 tablet 1    LIPITOR 40 mg tablet Take 1 tablet (40 mg total) by mouth once daily. 30 tablet 5    methocarbamol (ROBAXIN) 500 MG Tab Take 1 tablet (500 mg total) by mouth 4 (four) times daily. for 10 days 30 tablet 0    omeprazole (PRILOSEC) 40 MG capsule Take 1 capsule (40 mg total) by mouth once daily. 30 capsule 2    oxybutynin (DITROPAN-XL) 5 MG TR24 Take 1 tablet (5 mg total) by mouth once daily. 30 tablet 11    oxyCODONE-acetaminophen (PERCOCET) 5-325 mg per tablet Take 1 tablet by mouth every 4 (four) hours as needed. 31 tablet 0    [START ON 8/29/2019] oxyCODONE-acetaminophen (PERCOCET) 5-325 mg per tablet Take 1 tablet by mouth every 4 (four) hours as needed. 31 tablet 0    [START ON 9/29/2019] oxyCODONE-acetaminophen (PERCOCET) 5-325 mg per tablet Take 1 tablet by mouth every 4 (four) hours as needed. 31 tablet 0    paroxetine (PAXIL) 20 MG tablet TAKE ONE-HALF TABLET BY MOUTH EVERY DAY FOR MENTAL HEALTH      zolpidem (AMBIEN) 10 mg Tab Take 1 tablet (10 mg total) by mouth nightly as needed. 30 tablet 2    promethazine-dextromethorphan (PROMETHAZINE-DM) 6.25-15 mg/5 mL Syrp Take 5 mLs by mouth every 12 (twelve) hours as needed. 180 mL 0    ranitidine (ZANTAC) 150 MG tablet Take 1 tablet (150 mg total) by mouth 2 (two) times daily. 180 tablet 1     Current Facility-Administered Medications   Medication Dose Route Frequency Provider Last Rate Last Dose    methylPREDNISolone acetate injection 40 mg  40 mg Intramuscular 1 time in Clinic/HOD Tuyet Puente MD           ROS  Review of Systems   Constitutional: Negative for chills and fever.   HENT: Positive for postnasal drip. Negative for hearing loss and sore throat.    Eyes: Negative for visual disturbance.   Respiratory: Positive for cough. Negative for shortness of breath.    Cardiovascular: Negative for chest pain, palpitations and leg swelling.   Gastrointestinal: Negative for abdominal pain,  "constipation, diarrhea, nausea and vomiting.   Genitourinary: Negative for dysuria, frequency and urgency.   Musculoskeletal: Negative for arthralgias, joint swelling and myalgias.   Skin: Negative for rash and wound.   Neurological: Positive for headaches.   Psychiatric/Behavioral: Negative for agitation and confusion. The patient is not nervous/anxious.          OBJECTIVE     Physical Exam  Vitals:    07/30/19 0808   BP: 122/88   Pulse: 70   Temp: 97.9 °F (36.6 °C)    Body mass index is 29.33 kg/m².  Weight: 95.4 kg (210 lb 5.1 oz)   Height: 5' 11" (180.3 cm)     Physical Exam   Constitutional: He is oriented to person, place, and time. He appears well-developed and well-nourished. No distress.   HENT:   Head: Normocephalic and atraumatic.   Right Ear: Hearing, tympanic membrane and external ear normal.   Left Ear: Hearing, tympanic membrane and external ear normal.   Nose: Nose normal. No rhinorrhea. Right sinus exhibits no maxillary sinus tenderness and no frontal sinus tenderness. Left sinus exhibits no maxillary sinus tenderness and no frontal sinus tenderness.   Mouth/Throat: No uvula swelling. Posterior oropharyngeal erythema present. No posterior oropharyngeal edema.   Eyes: Conjunctivae and EOM are normal. Right eye exhibits no discharge. Left eye exhibits no discharge. No scleral icterus.   Neck: Normal range of motion. Neck supple. No JVD present. No tracheal deviation present.   Cardiovascular: Normal rate, regular rhythm, normal heart sounds and intact distal pulses. Exam reveals no gallop and no friction rub.   No murmur heard.  Pulmonary/Chest: Effort normal and breath sounds normal. No respiratory distress. He has no wheezes.   Abdominal: Soft. Bowel sounds are normal. He exhibits no distension and no mass. There is no tenderness. There is no rebound and no guarding.   Musculoskeletal: Normal range of motion. He exhibits no edema, tenderness or deformity.   Neurological: He is alert and oriented to " person, place, and time. He exhibits normal muscle tone. Coordination normal.   Skin: Skin is warm and dry. No rash noted. No erythema.   Psychiatric: He has a normal mood and affect. His behavior is normal. Judgment and thought content normal.         Health Maintenance       Date Due Completion Date    Sign Pain Contract 05/03/1983 ---    Urine Drug Screen 05/03/1983 ---    Naloxone Prescription 05/03/1983 ---    Shingles Vaccine (1 of 2) 05/03/2015 ---    Lipid Panel 05/14/2024 5/14/2019    Colonoscopy 07/03/2025 7/3/2015            ASSESSMENT     54 y.o. male with     1. Acute frontal sinusitis, recurrence not specified        PLAN:     1. Acute frontal sinusitis, recurrence not specified  - Continue symptomatic treatment with rest, increase fluid intake, tylenol or ibuprofen PRN fever(temp >/= 100.4) or body aches. Okay to take OTC antihistamines, i.e. Bendaryl, Claritin, Allegra, etc. as needed.  - Okay to gargle with warm, salt water or use throat lozenges as needed  - methylPREDNISolone acetate injection 40 mg  - levocetirizine (XYZAL) 5 MG tablet; Take 1 tablet (5 mg total) by mouth every evening.  Dispense: 90 tablet; Refill: 1  - promethazine-dextromethorphan (PROMETHAZINE-DM) 6.25-15 mg/5 mL Syrp; Take 5 mLs by mouth every 12 (twelve) hours as needed.  Dispense: 180 mL; Refill: 0        RTC in 1-2 weeks as needed for any acute worsening of current condition or failure to improve       Tuyet Puente MD  07/30/2019 8:34 AM        No follow-ups on file.

## 2019-07-30 NOTE — PROGRESS NOTES
Administered Depo-Medrol 40 mg/mL IM to left upper outer glut. No s/s of any adverse reaction noted.

## 2019-09-09 DIAGNOSIS — K21.9 GASTROESOPHAGEAL REFLUX DISEASE, ESOPHAGITIS PRESENCE NOT SPECIFIED: ICD-10-CM

## 2019-09-09 RX ORDER — OMEPRAZOLE 40 MG/1
40 CAPSULE, DELAYED RELEASE ORAL DAILY
Qty: 30 CAPSULE | Refills: 2 | Status: SHIPPED | OUTPATIENT
Start: 2019-09-09 | End: 2019-09-23 | Stop reason: SDUPTHER

## 2019-09-12 DIAGNOSIS — E78.5 HYPERLIPIDEMIA, UNSPECIFIED HYPERLIPIDEMIA TYPE: ICD-10-CM

## 2019-09-12 RX ORDER — ATORVASTATIN CALCIUM 40 MG
40 TABLET ORAL DAILY
Qty: 30 TABLET | Refills: 5 | Status: SHIPPED | OUTPATIENT
Start: 2019-09-12 | End: 2020-03-20 | Stop reason: SDUPTHER

## 2019-09-23 ENCOUNTER — OFFICE VISIT (OUTPATIENT)
Dept: FAMILY MEDICINE | Facility: CLINIC | Age: 54
End: 2019-09-23
Payer: MEDICARE

## 2019-09-23 ENCOUNTER — LAB VISIT (OUTPATIENT)
Dept: LAB | Facility: HOSPITAL | Age: 54
End: 2019-09-23
Attending: FAMILY MEDICINE
Payer: MEDICARE

## 2019-09-23 VITALS
HEIGHT: 71 IN | WEIGHT: 206.81 LBS | TEMPERATURE: 98 F | HEART RATE: 83 BPM | BODY MASS INDEX: 28.95 KG/M2 | OXYGEN SATURATION: 98 %

## 2019-09-23 DIAGNOSIS — Z01.84 IMMUNITY TO HEPATITIS B VIRUS DEMONSTRATED BY SEROLOGIC TEST: ICD-10-CM

## 2019-09-23 DIAGNOSIS — Z11.1 TUBERCULOSIS SCREENING: Primary | ICD-10-CM

## 2019-09-23 DIAGNOSIS — K21.9 GASTROESOPHAGEAL REFLUX DISEASE, ESOPHAGITIS PRESENCE NOT SPECIFIED: ICD-10-CM

## 2019-09-23 PROCEDURE — 86706 HEP B SURFACE ANTIBODY: CPT

## 2019-09-23 PROCEDURE — 86580 TB INTRADERMAL TEST: CPT | Mod: PBBFAC,PO

## 2019-09-23 PROCEDURE — 90686 IIV4 VACC NO PRSV 0.5 ML IM: CPT | Mod: PBBFAC,PO

## 2019-09-23 PROCEDURE — 99999 PR PBB SHADOW E&M-EST. PATIENT-LVL III: ICD-10-PCS | Mod: PBBFAC,,, | Performed by: FAMILY MEDICINE

## 2019-09-23 PROCEDURE — 99213 OFFICE O/P EST LOW 20 MIN: CPT | Mod: PBBFAC,PO,25 | Performed by: FAMILY MEDICINE

## 2019-09-23 PROCEDURE — 99024 POSTOP FOLLOW-UP VISIT: CPT | Mod: POP,,, | Performed by: FAMILY MEDICINE

## 2019-09-23 PROCEDURE — 99024 PR POST-OP FOLLOW-UP VISIT: ICD-10-PCS | Mod: POP,,, | Performed by: FAMILY MEDICINE

## 2019-09-23 PROCEDURE — 99999 PR PBB SHADOW E&M-EST. PATIENT-LVL III: CPT | Mod: PBBFAC,,, | Performed by: FAMILY MEDICINE

## 2019-09-23 RX ORDER — OMEPRAZOLE 40 MG/1
40 CAPSULE, DELAYED RELEASE ORAL DAILY
Qty: 30 CAPSULE | Refills: 2 | Status: SHIPPED | OUTPATIENT
Start: 2019-09-23 | End: 2020-03-20 | Stop reason: SDUPTHER

## 2019-09-23 RX ORDER — OMEPRAZOLE 40 MG/1
40 CAPSULE, DELAYED RELEASE ORAL DAILY
Qty: 30 CAPSULE | Refills: 2 | Status: SHIPPED | OUTPATIENT
Start: 2019-09-23 | End: 2019-09-23 | Stop reason: SDUPTHER

## 2019-09-23 NOTE — PROGRESS NOTES
Tuberculin skin test applied to right ventral forearm. Instructed pt to return Wednesday at 9:05am or later or before Thursday at 9:05    After obtaining consent, and per orders of Dr. Huertas, injection of flu shot given into the left deltoid by Edelmira Banerjee. Patient instructed to remain in clinic for 20 minutes afterwards, and to report any adverse reaction to me immediately.

## 2019-09-23 NOTE — PROGRESS NOTES
"Subjective:       Patient ID: Rito Conley is a 54 y.o. male.    Chief Complaint: Follow Up/ Renew Medications and Immunizations    HPI  Review of Systems   Constitutional: Negative for activity change and unexpected weight change.   HENT: Negative for hearing loss, rhinorrhea and trouble swallowing.    Eyes: Negative for discharge and visual disturbance.   Respiratory: Negative for chest tightness and wheezing.    Cardiovascular: Negative for chest pain and palpitations.   Gastrointestinal: Negative for blood in stool, constipation, diarrhea and vomiting.   Endocrine: Negative for polydipsia and polyuria.   Genitourinary: Negative for difficulty urinating, hematuria and urgency.   Musculoskeletal: Negative for arthralgias, joint swelling and neck pain.   Neurological: Negative for weakness and headaches.   Psychiatric/Behavioral: Negative for confusion and dysphoric mood.       Objective:       Vitals:    09/23/19 0840   Pulse: 83   Temp: 98.2 °F (36.8 °C)   TempSrc: Oral   SpO2: 98%   Weight: 93.8 kg (206 lb 12.7 oz)   Height: 5' 11" (1.803 m)       Physical Exam    Assessment:       1. Tuberculosis screening    2. Gastroesophageal reflux disease, esophagitis presence not specified        Plan:       Rito was seen today for follow up/ renew medications and immunizations.    Diagnoses and all orders for this visit:    Tuberculosis screening  -     POCT TB Skin Test Read    Gastroesophageal reflux disease, esophagitis presence not specified  -     omeprazole (PRILOSEC) 40 MG capsule; Take 1 capsule (40 mg total) by mouth once daily.    Other orders  -     Influenza - Quadrivalent (3 years & older)           "

## 2019-09-24 LAB — HBV SURFACE AB SER-ACNC: POSITIVE M[IU]/ML

## 2019-09-25 LAB
TB INDURATION - 48 HR READ: 0 MM
TB INDURATION - 72 HR READ: NORMAL
TB SKIN TEST - 48 HR READ: NEGATIVE
TB SKIN TEST - 72 HR READ: NORMAL

## 2019-10-11 NOTE — ANESTHESIA POSTPROCEDURE EVALUATION
"Anesthesia Post Evaluation    Patient: Rito Conley    Procedure(s) Performed: Procedure(s):  REPAIR-HERNIA-VENTRAL-INITIAL with MESH    Final Anesthesia Type: general  Patient location during evaluation: PACU  Patient participation: Yes- Able to Participate  Level of consciousness: awake and alert  Post-procedure vital signs: reviewed and stable  Pain management: adequate  Airway patency: patent  PONV status at discharge: No PONV  Anesthetic complications: no      Cardiovascular status: hemodynamically stable  Respiratory status: unassisted  Hydration status: euvolemic  Follow-up not needed.        Visit Vitals  BP (!) 149/94 (BP Location: Right arm, Patient Position: Lying)   Pulse 69   Temp 36.9 °C (98.5 °F) (Axillary)   Resp 13   Ht 5' 11" (1.803 m)   Wt 93 kg (205 lb)   SpO2 (!) 93%   BMI 28.59 kg/m²       Pain/Rodo Score: Pain Assessment Performed: Yes (5/2/2018  8:50 AM)  Presence of Pain: denies (5/2/2018  8:50 AM)  Pain Rating Prior to Med Admin: 4 (5/2/2018  9:03 AM)  Pain Rating Post Med Admin: 4 (5/2/2018  4:18 AM)  Rodo Score: 8 (5/2/2018  8:50 AM)      "
3

## 2019-10-14 ENCOUNTER — OFFICE VISIT (OUTPATIENT)
Dept: UROLOGY | Facility: CLINIC | Age: 54
End: 2019-10-14
Payer: MEDICARE

## 2019-10-14 VITALS
WEIGHT: 188.63 LBS | HEART RATE: 68 BPM | BODY MASS INDEX: 26.41 KG/M2 | DIASTOLIC BLOOD PRESSURE: 90 MMHG | RESPIRATION RATE: 16 BRPM | HEIGHT: 71 IN | SYSTOLIC BLOOD PRESSURE: 138 MMHG

## 2019-10-14 DIAGNOSIS — N40.1 BPH WITH OBSTRUCTION/LOWER URINARY TRACT SYMPTOMS: ICD-10-CM

## 2019-10-14 DIAGNOSIS — R35.1 NOCTURIA: ICD-10-CM

## 2019-10-14 DIAGNOSIS — N13.8 BPH WITH OBSTRUCTION/LOWER URINARY TRACT SYMPTOMS: ICD-10-CM

## 2019-10-14 DIAGNOSIS — R35.0 FREQUENCY OF URINATION: Primary | ICD-10-CM

## 2019-10-14 PROCEDURE — 99213 OFFICE O/P EST LOW 20 MIN: CPT | Mod: S$PBB,,, | Performed by: UROLOGY

## 2019-10-14 PROCEDURE — 99213 OFFICE O/P EST LOW 20 MIN: CPT | Mod: PBBFAC | Performed by: UROLOGY

## 2019-10-14 PROCEDURE — 99999 PR PBB SHADOW E&M-EST. PATIENT-LVL III: ICD-10-PCS | Mod: PBBFAC,,, | Performed by: UROLOGY

## 2019-10-14 PROCEDURE — 99999 PR PBB SHADOW E&M-EST. PATIENT-LVL III: CPT | Mod: PBBFAC,,, | Performed by: UROLOGY

## 2019-10-14 PROCEDURE — 99213 PR OFFICE/OUTPT VISIT, EST, LEVL III, 20-29 MIN: ICD-10-PCS | Mod: S$PBB,,, | Performed by: UROLOGY

## 2019-10-14 RX ORDER — OXYBUTYNIN CHLORIDE 5 MG/1
5 TABLET, EXTENDED RELEASE ORAL DAILY
Qty: 90 TABLET | Refills: 3 | Status: SHIPPED | OUTPATIENT
Start: 2019-10-14 | End: 2020-03-04 | Stop reason: SDUPTHER

## 2019-10-14 NOTE — PROGRESS NOTES
"Subjective:       Rito Conley is a 54 y.o. male who is an established patient with Dr Colindres, though is new to me was seen for evaluation of urinary issues.      Last saw Bernadine 4/18. Reported frequency, VON, nocturia x 1. Denies straining, urgency, weak stream. Was on Flomax. Also saw  for ED.    He is referred back now with c/o urinary frequency and worsening nocturia. Nocturia x 2-3. Strong flow. Mild urgency. Denies UI. Out of Flomax x 1 week though symptoms pre-date that. Has not noted change with Flomax. Denies hematuria, dysuria.     PSA 4/18 - 0.43  PSA 4/19 - 0.48    PVR (bladder scan) initial visit - 0cc     4/15/2019  PSA normal. Given trial Ditropan last visit - some improvement. Strong flow. Nocturia x 1.    10/14/2019  Frequency improved with Ditropan. No further issues.   PVR (bladder scan) today - 0cc      The following portions of the patient's history were reviewed and updated as appropriate: allergies, current medications, past family history, past medical history, past social history, past surgical history and problem list.    Review of Systems  Constitutional: no fever or chills  ENT: no nasal congestion or sore throat  Respiratory: no cough or shortness of breath  Cardiovascular: no chest pain or palpitations  Gastrointestinal: no nausea or vomiting, tolerating diet  Genitourinary: as per HPI  Hematologic/Lymphatic: no easy bruising or lymphadenopathy  Musculoskeletal: no arthralgias or myalgias  Skin: no rashes or lesions  Neurological: no seizures or tremors  Behavioral/Psych: no auditory or visual hallucinations       Objective:    Vitals: BP (!) 138/90   Pulse 68   Resp 16   Ht 5' 11" (1.803 m)   Wt 85.6 kg (188 lb 9.7 oz)   BMI 26.30 kg/m²     Physical Exam   General: well developed, well nourished in no acute distress  Head: normocephalic, atraumatic  Neck: supple, trachea midline, no obvious enlargement of thyroid  HEENT: EOMI, mucus membranes moist, sclera " anicteric, no hearing impairment  Lungs: symmetric expansion, non-labored breathing  Cardiovascular: regular rate and rhythm, normal pulses  Abdomen: soft, non tender, non distended, no palpable masses, no hepatosplenomegaly, no hernias, bladder nonpalpable. No CVA tenderness.  Musculoskeletal: no peripheral edema, normal ROM in bilateral upper and lower extremities  Lymphatics: no cervical or inguinal lymphadenopathy  Skin: no rashes or lesions  Neuro: alert and oriented x 3, no gross deficits  Psych: normal judgment and insight, normal mood/affect and non-anxious  Genitourinary: (last visit 4/19)  patient declined exam   YOSHI: Symmetric 40g prostate without nodularity or tenderness. Normal landmarks. seminal vesicles non palpable, normal sphincter tone without hemorrhoids or rectal masses. Normal appearance of anus and perineum. Difficult to palpate.       Lab Review   Urine analysis today in clinic shows Negative     Lab Results   Component Value Date    WBC 5.09 05/14/2019    HGB 14.0 05/14/2019    HCT 43.6 05/14/2019    MCV 94 05/14/2019     05/14/2019     Lab Results   Component Value Date    CREATININE 1.0 05/14/2019    BUN 14 05/14/2019     Lab Results   Component Value Date    PSA 0.76 01/30/2017     Lab Results   Component Value Date    PSADIAG 0.48 04/08/2019       Imaging  NA       Assessment/Plan:      1. Frequency of urination    - Seems like OAB component   - Limited response to Flomax   - Ditropan 5mg. Discussed possible SE. Doing much better. Refilled today.      2. Nocturia    - Reduce PM fluids   - Ditropan 5mg      3. BPH with obstruction/lower urinary tract symptoms    - Limited response with Flomax   - Okay to stop   - PSA/YOSHI normal       Follow up in 6 months with PSA/YOSHI

## 2019-10-30 DIAGNOSIS — J01.10 ACUTE FRONTAL SINUSITIS, RECURRENCE NOT SPECIFIED: ICD-10-CM

## 2019-10-30 DIAGNOSIS — G89.29 CHRONIC LOW BACK PAIN: ICD-10-CM

## 2019-10-30 DIAGNOSIS — G47.00 INSOMNIA, UNSPECIFIED TYPE: ICD-10-CM

## 2019-10-30 DIAGNOSIS — I11.9 BENIGN HYPERTENSIVE HEART DISEASE WITHOUT HEART FAILURE: ICD-10-CM

## 2019-10-30 DIAGNOSIS — M54.50 CHRONIC LOW BACK PAIN: ICD-10-CM

## 2019-10-30 NOTE — TELEPHONE ENCOUNTER
Pt requesting medication for his sinus. Please advise.     Last Office Visit Info:   The patient's last visit with Jennifer Huertas MD was on 9/23/2019.    The patient's last visit in current department was on 9/23/2019.        Last CBC Results:   Lab Results   Component Value Date    WBC 5.09 05/14/2019    HGB 14.0 05/14/2019    HCT 43.6 05/14/2019     05/14/2019       Last CMP Results  Lab Results   Component Value Date     05/14/2019    K 3.8 05/14/2019     05/14/2019    CO2 23 05/14/2019    BUN 14 05/14/2019    CREATININE 1.0 05/14/2019    CALCIUM 9.3 05/14/2019    ALBUMIN 3.9 05/14/2019    AST 28 05/14/2019    ALT 29 05/14/2019       Last Lipids  Lab Results   Component Value Date    CHOL 161 05/14/2019    TRIG 67 05/14/2019    HDL 50 05/14/2019    LDLCALC 97.6 05/14/2019       Last A1C  Lab Results   Component Value Date    HGBA1C 5.9 04/26/2013       Last TSH  Lab Results   Component Value Date    TSH 1.270 05/14/2019         Current Med Refills  Medication List with Changes/Refills   Current Medications    AMLODIPINE (NORVASC) 10 MG TABLET    Take 1 tablet (10 mg total) by mouth once daily.       Start Date: 3/11/2019 End Date: --    BUPROPION (WELLBUTRIN XL) 150 MG TB24 TABLET    Take 150 mg by mouth once daily.        Start Date: 8/23/2016 End Date: --    FLUTICASONE (FLONASE) 50 MCG/ACTUATION NASAL SPRAY    1 spray by Each Nare route 2 (two) times daily.       Start Date: 12/16/2016End Date: --    HYDROCHLOROTHIAZIDE (HYDRODIURIL) 50 MG TABLET    Take 1 tablet (50 mg total) by mouth once daily.       Start Date: 12/16/2016End Date: --    LEVOCETIRIZINE (XYZAL) 5 MG TABLET    Take 1 tablet (5 mg total) by mouth every evening.       Start Date: 7/30/2019 End Date: --    LIPITOR 40 MG TABLET    Take 1 tablet (40 mg total) by mouth once daily.       Start Date: 9/12/2019 End Date: --    OMEPRAZOLE (PRILOSEC) 40 MG CAPSULE    Take 1 capsule (40 mg total) by mouth once daily.       Start  Date: 9/23/2019 End Date: 9/22/2020    OXYBUTYNIN (DITROPAN-XL) 5 MG TR24    Take 1 tablet (5 mg total) by mouth once daily.       Start Date: 10/14/2019End Date: 10/13/2020    OXYCODONE-ACETAMINOPHEN (PERCOCET) 5-325 MG PER TABLET    Take 1 tablet by mouth every 4 (four) hours as needed.       Start Date: 7/29/2019 End Date: --    OXYCODONE-ACETAMINOPHEN (PERCOCET) 5-325 MG PER TABLET    Take 1 tablet by mouth every 4 (four) hours as needed.       Start Date: 8/29/2019 End Date: --    OXYCODONE-ACETAMINOPHEN (PERCOCET) 5-325 MG PER TABLET    Take 1 tablet by mouth every 4 (four) hours as needed.       Start Date: 9/29/2019 End Date: --    PAROXETINE (PAXIL) 20 MG TABLET    TAKE ONE-HALF TABLET BY MOUTH EVERY DAY FOR MENTAL HEALTH       Start Date: --        End Date: --    ZOLPIDEM (AMBIEN) 10 MG TAB    Take 1 tablet (10 mg total) by mouth nightly as needed.       Start Date: 12/16/2016End Date: --       Order(s) placed per written order guidelines: none    Please advise.

## 2019-10-30 NOTE — TELEPHONE ENCOUNTER
----- Message from Ellie Dunlap sent at 10/30/2019  1:45 PM CDT -----  Contact: Self/  [phone]  Type: RX Refill Request    Who Called:   Patient    Have you contacted your pharmacy:  No    Refill or New Rx:  Refill    RX Name and Strength:  oxyCODONE-acetaminophen (PERCOCET) 5-325 mg per tablet                                           amLODIPine (NORVASC) 10 MG tablet                                             Preferred Pharmacy with phone number: [facility] [place] [place] - [facility] [address] [facility]    Local or Mail Order:  Local    Ordering Provider:  DESTIN Huertas    Would the patient rather a call back or a response via [facility]?   Call back    Best Call Back Number:  [phone]     Additional Information:   Patient needs a prescription for his nasal drip, but he stated it isn't fluticasone.  Patient is leaving to go out of town on Friday and need these prescriptions for he leave. Thank you

## 2019-10-31 RX ORDER — OXYCODONE AND ACETAMINOPHEN 5; 325 MG/1; MG/1
1 TABLET ORAL EVERY 4 HOURS PRN
Qty: 31 TABLET | Refills: 0 | Status: SHIPPED | OUTPATIENT
Start: 2019-10-31 | End: 2019-12-16 | Stop reason: SDUPTHER

## 2019-10-31 RX ORDER — ZOLPIDEM TARTRATE 10 MG/1
10 TABLET ORAL NIGHTLY PRN
Qty: 30 TABLET | Refills: 2 | Status: SHIPPED | OUTPATIENT
Start: 2019-10-31 | End: 2021-09-28 | Stop reason: SDUPTHER

## 2019-10-31 RX ORDER — LEVOCETIRIZINE DIHYDROCHLORIDE 5 MG/1
5 TABLET, FILM COATED ORAL NIGHTLY
Qty: 90 TABLET | Refills: 1 | Status: SHIPPED | OUTPATIENT
Start: 2019-10-31 | End: 2020-01-08

## 2019-10-31 RX ORDER — AMLODIPINE BESYLATE 10 MG/1
10 TABLET ORAL DAILY
Qty: 90 TABLET | Refills: 1 | Status: SHIPPED | OUTPATIENT
Start: 2019-10-31 | End: 2020-10-12 | Stop reason: SDUPTHER

## 2019-11-04 ENCOUNTER — TELEPHONE (OUTPATIENT)
Dept: FAMILY MEDICINE | Facility: CLINIC | Age: 54
End: 2019-11-04

## 2019-11-04 NOTE — TELEPHONE ENCOUNTER
Return call to Pt, and he was asking about his Rx and if they was ready for . Informed the Pt that his Rx was on the Provider desk and that as soon as she signed it. That someone from the office would give him a call to come and  his Rx. He acknowledged understanding.

## 2019-12-16 ENCOUNTER — OFFICE VISIT (OUTPATIENT)
Dept: FAMILY MEDICINE | Facility: CLINIC | Age: 54
End: 2019-12-16
Payer: MEDICARE

## 2019-12-16 VITALS
OXYGEN SATURATION: 98 % | HEIGHT: 71 IN | TEMPERATURE: 98 F | HEART RATE: 80 BPM | WEIGHT: 204.38 LBS | DIASTOLIC BLOOD PRESSURE: 80 MMHG | SYSTOLIC BLOOD PRESSURE: 132 MMHG | RESPIRATION RATE: 16 BRPM | BODY MASS INDEX: 28.61 KG/M2

## 2019-12-16 DIAGNOSIS — M54.50 CHRONIC LOW BACK PAIN: ICD-10-CM

## 2019-12-16 DIAGNOSIS — J30.9 ALLERGIC SINUSITIS: Primary | ICD-10-CM

## 2019-12-16 DIAGNOSIS — G89.29 CHRONIC LOW BACK PAIN: ICD-10-CM

## 2019-12-16 PROCEDURE — 99214 PR OFFICE/OUTPT VISIT, EST, LEVL IV, 30-39 MIN: ICD-10-PCS | Mod: S$PBB,,, | Performed by: FAMILY MEDICINE

## 2019-12-16 PROCEDURE — 99214 OFFICE O/P EST MOD 30 MIN: CPT | Mod: S$PBB,,, | Performed by: FAMILY MEDICINE

## 2019-12-16 PROCEDURE — 99999 PR PBB SHADOW E&M-EST. PATIENT-LVL IV: ICD-10-PCS | Mod: PBBFAC,,, | Performed by: FAMILY MEDICINE

## 2019-12-16 PROCEDURE — 99214 OFFICE O/P EST MOD 30 MIN: CPT | Mod: PBBFAC,PO | Performed by: FAMILY MEDICINE

## 2019-12-16 PROCEDURE — 99999 PR PBB SHADOW E&M-EST. PATIENT-LVL IV: CPT | Mod: PBBFAC,,, | Performed by: FAMILY MEDICINE

## 2019-12-16 RX ORDER — DESLORATADINE 5 MG/1
5 TABLET ORAL DAILY
Qty: 30 TABLET | Refills: 11 | Status: SHIPPED | OUTPATIENT
Start: 2019-12-16 | End: 2020-01-08

## 2019-12-16 RX ORDER — OXYCODONE AND ACETAMINOPHEN 5; 325 MG/1; MG/1
1 TABLET ORAL EVERY 4 HOURS PRN
Qty: 31 TABLET | Refills: 0 | Status: SHIPPED | OUTPATIENT
Start: 2020-02-16 | End: 2020-03-04

## 2019-12-16 RX ORDER — OXYCODONE AND ACETAMINOPHEN 5; 325 MG/1; MG/1
1 TABLET ORAL EVERY 4 HOURS PRN
Qty: 31 TABLET | Refills: 0 | Status: SHIPPED | OUTPATIENT
Start: 2020-01-16 | End: 2020-03-04

## 2019-12-16 RX ORDER — DESLORATADINE 5 MG/1
5 TABLET ORAL DAILY
Qty: 30 TABLET | Refills: 11 | Status: SHIPPED | OUTPATIENT
Start: 2019-12-16 | End: 2019-12-16 | Stop reason: SDUPTHER

## 2019-12-16 RX ORDER — OXYCODONE AND ACETAMINOPHEN 5; 325 MG/1; MG/1
1 TABLET ORAL EVERY 4 HOURS PRN
Qty: 31 TABLET | Refills: 0 | Status: SHIPPED | OUTPATIENT
Start: 2019-12-16 | End: 2020-03-04

## 2019-12-16 NOTE — PROGRESS NOTES
Subjective:       Patient ID: Rito Conley is a 54 y.o. male.    Chief Complaint: Back Pain and Medication Refill    54 year old male presents for refills of his percocet. He is doing better with his back pain as he is exercising. He only uses [place] as needed[place]       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  No date: Hyperlipidemia  No date: Hypertension  No date: Lower back pain  No date: PTSD (post-traumatic stress disorder)  No date: Sleep apnea      Comment:  uses CPAP 1-2x/week   Past Surgical History:  No date: HERNIA REPAIR  Review of patient's family history indicates:  Problem: Hypertension      Relation: Mother          Age of Onset: (Not Specified)  Problem: Cancer      Relation: Maternal Grandmother          Age of Onset: (Not Specified)          Comment: something in her arm   Problem: Cancer      Relation: Maternal Aunt          Age of Onset: (Not Specified)          Comment: breast  Problem: Other      Relation: Father          Age of Onset: (Not Specified)          Comment: agent orange exposure   Problem: No Known Problems      Relation: Sister          Age of Onset: (Not Specified)  Problem: No Known Problems      Relation: Brother          Age of Onset: (Not Specified)  Problem: Amblyopia      Relation: Neg Hx          Age of Onset: (Not Specified)  Problem: Blindness      Relation: Neg Hx          Age of Onset: (Not Specified)  Problem: Cataracts      Relation: Neg Hx          Age of Onset: (Not Specified)  Problem: Diabetes      Relation: Neg Hx          Age of Onset: (Not Specified)  Problem: Glaucoma      Relation: Neg Hx          Age of Onset: (Not Specified)  Problem: Macular degeneration      Relation: Neg Hx          Age of Onset: (Not Specified)  Problem: Retinal detachment      Relation: Neg Hx          Age of Onset: (Not Specified)  Problem: Strabismus      Relation: Neg Hx          Age of Onset: (Not Specified)  Problem:  "Stroke      Relation: Neg Hx          Age of Onset: (Not Specified)  Problem: Thyroid disease      Relation: Neg Hx          Age of Onset: (Not Specified)    Social History    Socioeconomic History      Marital status:       Spouse name: Not on file      Number of children: Not on file      Years of education: Not on file      Highest education level: Not on file    Occupational History      Not on file    Social Needs      Financial resource strain: Not hard at all      Food insecurity:        Worry: Never true        Inability: Never true      Transportation needs:        Medical: No        Non-medical: No    Tobacco Use      Smoking status: Never Smoker      Smokeless tobacco: Never Used    Substance and Sexual Activity      Alcohol use: Yes        Alcohol/week: 4.0 - 6.0 standard drinks        Types: 4 - 6 Glasses of wine per week        Frequency: 2-3 times a week        Drinks per session: 1 or 2        Binge frequency: Less than monthly        Comment: a week.       Drug use: No      Sexual activity: Yes    Lifestyle      Physical activity:        Days per week: 4 days        Minutes per session: 30 min      Stress: Only a little    Relationships      Social connections:        Talks on phone: Three times a week        Gets together: Once a week        Attends Yarsani service: Not on file        Active member of club or organization: Yes        Attends meetings of clubs or organizations: More than 4 times per year        Relationship status:     Other Topics      Concerns:        Not on file    Social History Narrative      Not on file        Review of Systems    Objective:       Vitals:    12/16/19 1347   BP: 132/80   Pulse: 80   Resp: 16   Temp: 97.7 °F (36.5 °C)   TempSrc: Oral   SpO2: 98%   Weight: 92.7 kg (204 lb 5.9 oz)   Height: 5' 11" (1.803 m)       Physical Exam   Constitutional: He is oriented to person, place, and time. He appears well-developed and well-nourished. No distress. "   HENT:   Head: Normocephalic and atraumatic.   Neck: Normal range of motion. Neck supple.   Cardiovascular: Normal rate, regular rhythm and normal heart sounds. Exam reveals no gallop and no friction rub.   No murmur heard.  Pulmonary/Chest: Effort normal and breath sounds normal. No stridor. No respiratory distress. He has no wheezes. He has no rales.   Neurological: He is alert and oriented to person, place, and time.   Skin: He is not diaphoretic.       Assessment:       1. Allergic sinusitis    2. Chronic low back pain        Plan:       Rito was seen today for back pain and medication refill.    Diagnoses and all orders for this visit:    Allergic sinusitis  -     desloratadine (CLARINEX) 5 mg tablet; Take 1 tablet (5 mg total) by mouth once daily.  Stable. Refilled meds.     Chronic low back pain  -     oxyCODONE-acetaminophen (PERCOCET) 5-325 mg per tablet; Take 1 tablet by mouth every 4 (four) hours as needed.  -     oxyCODONE-acetaminophen (PERCOCET) 5-325 mg per tablet; Take 1 tablet by mouth every 4 (four) hours as needed.  -     oxyCODONE-acetaminophen (PERCOCET) 5-325 mg per tablet; Take 1 tablet by mouth every 4 (four) hours as needed.  Stable. Refilled meds. Follow up in about 3 months (around 3/16/2020).       Other orders  -     (In Office Administered) Zoster Vaccine - Live

## 2019-12-23 ENCOUNTER — CLINICAL SUPPORT (OUTPATIENT)
Dept: FAMILY MEDICINE | Facility: CLINIC | Age: 54
End: 2019-12-23
Payer: MEDICARE

## 2019-12-23 DIAGNOSIS — Z23 NEED FOR SHINGLES VACCINE: Primary | ICD-10-CM

## 2019-12-23 PROCEDURE — 90750 HZV VACC RECOMBINANT IM: CPT | Mod: PBBFAC,PO

## 2019-12-23 PROCEDURE — 99499 UNLISTED E&M SERVICE: CPT | Mod: S$PBB,,, | Performed by: FAMILY MEDICINE

## 2019-12-23 PROCEDURE — 99499 NO LOS: ICD-10-PCS | Mod: S$PBB,,, | Performed by: FAMILY MEDICINE

## 2020-01-08 ENCOUNTER — OFFICE VISIT (OUTPATIENT)
Dept: FAMILY MEDICINE | Facility: CLINIC | Age: 55
End: 2020-01-08
Payer: MEDICARE

## 2020-01-08 VITALS
SYSTOLIC BLOOD PRESSURE: 110 MMHG | RESPIRATION RATE: 16 BRPM | BODY MASS INDEX: 28.61 KG/M2 | TEMPERATURE: 98 F | DIASTOLIC BLOOD PRESSURE: 80 MMHG | WEIGHT: 204.38 LBS | HEART RATE: 81 BPM | HEIGHT: 71 IN | OXYGEN SATURATION: 98 %

## 2020-01-08 DIAGNOSIS — J30.9 CHRONIC ALLERGIC RHINITIS: Primary | ICD-10-CM

## 2020-01-08 PROCEDURE — 99999 PR PBB SHADOW E&M-EST. PATIENT-LVL IV: CPT | Mod: PBBFAC,,, | Performed by: PHYSICIAN ASSISTANT

## 2020-01-08 PROCEDURE — 99999 PR PBB SHADOW E&M-EST. PATIENT-LVL IV: ICD-10-PCS | Mod: PBBFAC,,, | Performed by: PHYSICIAN ASSISTANT

## 2020-01-08 PROCEDURE — 99213 OFFICE O/P EST LOW 20 MIN: CPT | Mod: S$PBB,,, | Performed by: PHYSICIAN ASSISTANT

## 2020-01-08 PROCEDURE — 99213 PR OFFICE/OUTPT VISIT, EST, LEVL III, 20-29 MIN: ICD-10-PCS | Mod: S$PBB,,, | Performed by: PHYSICIAN ASSISTANT

## 2020-01-08 PROCEDURE — 99214 OFFICE O/P EST MOD 30 MIN: CPT | Mod: PBBFAC,PO | Performed by: PHYSICIAN ASSISTANT

## 2020-01-08 RX ORDER — LORATADINE 10 MG/1
10 TABLET ORAL DAILY
Qty: 90 TABLET | Refills: 3 | Status: SHIPPED | OUTPATIENT
Start: 2020-01-08 | End: 2020-08-25 | Stop reason: ALTCHOICE

## 2020-01-08 RX ORDER — AZELASTINE 1 MG/ML
1 SPRAY, METERED NASAL 2 TIMES DAILY
Qty: 30 ML | Refills: 11 | Status: SHIPPED | OUTPATIENT
Start: 2020-01-08 | End: 2020-03-20 | Stop reason: SDUPTHER

## 2020-01-08 RX ORDER — CETIRIZINE HYDROCHLORIDE 10 MG/1
10 TABLET ORAL DAILY
Qty: 90 TABLET | Refills: 3 | Status: SHIPPED | OUTPATIENT
Start: 2020-01-08 | End: 2020-08-25 | Stop reason: ALTCHOICE

## 2020-01-08 NOTE — PROGRESS NOTES
Subjective:       Patient ID: Rito Conley is a 54 y.o. male with multiple medical diagnoses as listed in the medical history and problem list that presents for URI (since yesterday)  .    Chief Complaint: URI (since yesterday)      Sinus Problem   This is a new problem. The current episode started yesterday. There has been no fever. Associated symptoms include ear pain, sinus pressure and sneezing. Pertinent negatives include no chills, congestion, headaches or sore throat. Treatments tried: alki seltzer and slept propped up    out of anti-histamine due to coverage  Has Flonase but hasn't been using it    Review of Systems   Constitutional: Negative for chills, fatigue and fever.   HENT: Positive for ear pain, postnasal drip, sinus pressure and sneezing. Negative for congestion, rhinorrhea, sinus pain and sore throat.    Eyes: Negative for pain, discharge (watery), redness and itching.   Gastrointestinal: Positive for nausea (worse with laying down ). Negative for abdominal pain, diarrhea and vomiting.   Neurological: Negative for dizziness, numbness and headaches.         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    Hyperlipidemia     Hypertension     Lower back pain     PTSD (post-traumatic stress disorder)     Sleep apnea     uses CPAP 1-2x/week       SOCIAL HISTORY:  Social History     Socioeconomic History    Marital status:      Spouse name: Not on file    Number of children: Not on file    Years of education: Not on file    Highest education level: Not on file   Occupational History    Not on file   Social Needs    Financial resource strain: Not hard at all    Food insecurity:     Worry: Never true     Inability: Never true    Transportation needs:     Medical: No     Non-medical: No   Tobacco Use    Smoking status: Never Smoker    Smokeless tobacco: Never Used   Substance and Sexual Activity    Alcohol use: Yes      Alcohol/week: 4.0 - 6.0 standard drinks     Types: 4 - 6 Glasses of wine per week     Frequency: 2-3 times a week     Drinks per session: 1 or 2     Binge frequency: Less than monthly     Comment: a week.     Drug use: No    Sexual activity: Yes   Lifestyle    Physical activity:     Days per week: 4 days     Minutes per session: 30 min    Stress: Only a little   Relationships    Social connections:     Talks on phone: Three times a week     Gets together: Once a week     Attends Voodoo service: Not on file     Active member of club or organization: Yes     Attends meetings of clubs or organizations: More than 4 times per year     Relationship status:    Other Topics Concern    Not on file   Social History Narrative    Not on file       ALLERGIES AND MEDICATIONS: updated and reviewed.  Review of patient's allergies indicates:  No Known Allergies  Current Outpatient Medications   Medication Sig Dispense Refill    amLODIPine (NORVASC) 10 MG tablet Take 1 tablet (10 mg total) by mouth once daily. 90 tablet 1    buPROPion (WELLBUTRIN XL) 150 MG TB24 tablet Take 150 mg by mouth once daily.       fluticasone (FLONASE) 50 mcg/actuation nasal spray 1 spray by Each Nare route 2 (two) times daily. (Patient taking differently: 1 spray by Each Nare route 2 (two) times daily as needed. ) 3 Bottle 3    hydrochlorothiazide (HYDRODIURIL) 50 MG tablet Take 1 tablet (50 mg total) by mouth once daily. 90 tablet 1    LIPITOR 40 mg tablet Take 1 tablet (40 mg total) by mouth once daily. 30 tablet 5    omeprazole (PRILOSEC) 40 MG capsule Take 1 capsule (40 mg total) by mouth once daily. 30 capsule 2    oxybutynin (DITROPAN-XL) 5 MG TR24 Take 1 tablet (5 mg total) by mouth once daily. 90 tablet 3    oxyCODONE-acetaminophen (PERCOCET) 5-325 mg per tablet Take 1 tablet by mouth every 4 (four) hours as needed. 31 tablet 0    [START ON 1/16/2020] oxyCODONE-acetaminophen (PERCOCET) 5-325 mg per tablet Take 1 tablet  "by mouth every 4 (four) hours as needed. 31 tablet 0    [START ON 2/16/2020] oxyCODONE-acetaminophen (PERCOCET) 5-325 mg per tablet Take 1 tablet by mouth every 4 (four) hours as needed. 31 tablet 0    paroxetine (PAXIL) 20 MG tablet TAKE ONE-HALF TABLET BY MOUTH EVERY DAY FOR MENTAL HEALTH      zolpidem (AMBIEN) 10 mg Tab Take 1 tablet (10 mg total) by mouth nightly as needed. 30 tablet 2    azelastine (ASTELIN) 137 mcg (0.1 %) nasal spray 1 spray (137 mcg total) by Nasal route 2 (two) times daily. 30 mL 11    cetirizine (ZYRTEC) 10 MG tablet Take 1 tablet (10 mg total) by mouth once daily. 90 tablet 3    loratadine (CLARITIN) 10 mg tablet Take 1 tablet (10 mg total) by mouth once daily. 90 tablet 3    oxyCODONE-acetaminophen (PERCOCET) 5-325 mg per tablet Take 1 tablet by mouth every 4 (four) hours as needed. 31 tablet 0    oxyCODONE-acetaminophen (PERCOCET) 5-325 mg per tablet Take 1 tablet by mouth every 4 (four) hours as needed. 31 tablet 0     No current facility-administered medications for this visit.          Objective:   /80   Pulse 81   Temp 98.3 °F (36.8 °C) (Oral)   Resp 16   Ht 5' 11" (1.803 m)   Wt 92.7 kg (204 lb 5.9 oz)   SpO2 98%   BMI 28.50 kg/m²      Physical Exam   Constitutional: He is oriented to person, place, and time. No distress.   HENT:   Head: Normocephalic and atraumatic.   Right Ear: External ear and ear canal normal. No middle ear effusion.   Left Ear: External ear and ear canal normal.  No middle ear effusion.   Nose: No mucosal edema or rhinorrhea. Right sinus exhibits no maxillary sinus tenderness and no frontal sinus tenderness. Left sinus exhibits no maxillary sinus tenderness and no frontal sinus tenderness.   Mouth/Throat: Uvula is midline and mucous membranes are normal. Posterior oropharyngeal erythema (PND) present. No oropharyngeal exudate.   Lots of air fluid levels   Pale turbinates     Eyes: Conjunctivae and EOM are normal.   Cardiovascular: Normal " rate and regular rhythm.   Pulmonary/Chest: Effort normal and breath sounds normal. He has no wheezes.   Lymphadenopathy:     He has no cervical adenopathy.   Neurological: He is alert and oriented to person, place, and time.           Assessment:       1. Chronic allergic rhinitis        Plan:       Chronic allergic rhinitis  -     azelastine (ASTELIN) 137 mcg (0.1 %) nasal spray; 1 spray (137 mcg total) by Nasal route 2 (two) times daily.  Dispense: 30 mL; Refill: 11  -     cetirizine (ZYRTEC) 10 MG tablet; Take 1 tablet (10 mg total) by mouth once daily.  Dispense: 90 tablet; Refill: 3  -     loratadine (CLARITIN) 10 mg tablet; Take 1 tablet (10 mg total) by mouth once daily.  Dispense: 90 tablet; Refill: 3    Call for fever 100.4 or higher, change in discharge color, no improvement in 7-10 days.           No follow-ups on file.

## 2020-02-05 ENCOUNTER — OFFICE VISIT (OUTPATIENT)
Dept: FAMILY MEDICINE | Facility: CLINIC | Age: 55
End: 2020-02-05
Payer: MEDICARE

## 2020-02-05 VITALS
SYSTOLIC BLOOD PRESSURE: 136 MMHG | DIASTOLIC BLOOD PRESSURE: 90 MMHG | BODY MASS INDEX: 29.23 KG/M2 | WEIGHT: 208.75 LBS | HEART RATE: 95 BPM | TEMPERATURE: 98 F | HEIGHT: 71 IN | OXYGEN SATURATION: 97 %

## 2020-02-05 DIAGNOSIS — Z74.09 OTHER REDUCED MOBILITY: ICD-10-CM

## 2020-02-05 DIAGNOSIS — Z11.4 SCREENING FOR HIV (HUMAN IMMUNODEFICIENCY VIRUS): ICD-10-CM

## 2020-02-05 DIAGNOSIS — I11.9 BENIGN HYPERTENSIVE HEART DISEASE WITHOUT HEART FAILURE: ICD-10-CM

## 2020-02-05 DIAGNOSIS — J30.9 ALLERGIC RHINITIS, UNSPECIFIED SEASONALITY, UNSPECIFIED TRIGGER: ICD-10-CM

## 2020-02-05 DIAGNOSIS — F43.10 PTSD (POST-TRAUMATIC STRESS DISORDER): ICD-10-CM

## 2020-02-05 DIAGNOSIS — N40.1 BPH WITH OBSTRUCTION/LOWER URINARY TRACT SYMPTOMS: ICD-10-CM

## 2020-02-05 DIAGNOSIS — N13.8 BPH WITH OBSTRUCTION/LOWER URINARY TRACT SYMPTOMS: ICD-10-CM

## 2020-02-05 DIAGNOSIS — Z00.00 ENCOUNTER FOR PREVENTIVE HEALTH EXAMINATION: Primary | ICD-10-CM

## 2020-02-05 PROCEDURE — 99999 PR PBB SHADOW E&M-EST. PATIENT-LVL III: CPT | Mod: PBBFAC,,, | Performed by: NURSE PRACTITIONER

## 2020-02-05 PROCEDURE — G0439 PPPS, SUBSEQ VISIT: HCPCS | Mod: S$GLB,,, | Performed by: NURSE PRACTITIONER

## 2020-02-05 PROCEDURE — 99213 OFFICE O/P EST LOW 20 MIN: CPT | Mod: PBBFAC,PO | Performed by: NURSE PRACTITIONER

## 2020-02-05 PROCEDURE — G0439 PR MEDICARE ANNUAL WELLNESS SUBSEQUENT VISIT: ICD-10-PCS | Mod: S$GLB,,, | Performed by: NURSE PRACTITIONER

## 2020-02-05 PROCEDURE — 99999 PR PBB SHADOW E&M-EST. PATIENT-LVL III: ICD-10-PCS | Mod: PBBFAC,,, | Performed by: NURSE PRACTITIONER

## 2020-02-05 NOTE — PROGRESS NOTES
"Rito Conley presented for a  Medicare AWV and comprehensive Health Risk Assessment today. The following components were reviewed and updated:    · Medical history  · Family History  · Social history  · Allergies and Current Medications  · Health Risk Assessment  · Health Maintenance  · Care Team     ** See Completed Assessments for Annual Wellness Visit within the encounter summary.**       The following assessments were completed:  · Living Situation  · CAGE  · Depression Screening  · Timed Get Up and Go  · Whisper Test  · Cognitive Function Screening  ·   ·   · Nutrition Screening  · ADL Screening  · PAQ Screening    Vitals:    02/05/20 1057   BP: (!) 136/90   BP Location: Left arm   Patient Position: Sitting   BP Method: Large (Manual)   Pulse: 95   Temp: 98.3 °F (36.8 °C)   TempSrc: Oral   SpO2: 97%   Weight: 94.7 kg (208 lb 12.4 oz)   Height: 5' 11" (1.803 m)     Body mass index is 29.12 kg/m².  Physical Exam   Constitutional: He is oriented to person, place, and time.   Cardiovascular: Normal rate, regular rhythm and normal heart sounds.   Pulmonary/Chest: Effort normal and breath sounds normal.   Abdominal: Soft. Bowel sounds are normal.   Musculoskeletal: Normal range of motion. He exhibits no edema.   Neurological: He is alert and oriented to person, place, and time.   Skin: Skin is warm and dry. Capillary refill takes less than 2 seconds.   Psychiatric: He has a normal mood and affect. His behavior is normal. Thought content normal.   Vitals reviewed.        Diagnoses and health risks identified today and associated recommendations/orders:    1. Encounter for preventive health examination  Education provided about preventive health examinations and procedures; addressed and discussed patient's health concerns. Additionally, reviewed medical record for risk factors and documented the results during this encounter.    2. PTSD (post-traumatic stress disorder)   Denies homicidal or suicidal ideation, we " discussed family and social dynamics, stress relieving activities. Continue as advised.     3. Benign hypertensive heart disease without heart failure  Presently not at goal. We discussed health risks associated with this issue.  Patient aware of dietary and lifestyle modifications and medicinal interventions.     4. BPH with obstruction/lower urinary tract symptoms  Stable, patient evaluated/monitored by [facility] urology dept; continue as advised.     5. Allergic rhinitis, unspecified seasonality, unspecified trigger  Stable and monitored. Continue current treatment plan as previously prescribed.     6. Other reduced mobility  Stable and monitored. Continue current treatment plan as previously prescribed.     7. Screening for HIV (human immunodeficiency virus)  - HIV 1/2 Ag/Ab (4th Gen); Future    Reviewed health maintenance, educated about recommended examinations, procedures (labs & images), and immunizations. Patient reports his labs will be obtained at his annual with his PCP.       Provided Rito with a 5-10 year written screening schedule and personal prevention plan. Recommendations were developed using the USPSTF age appropriate recommendations. Education, counseling, and referrals were provided as needed. After Visit Summary printed and given to patient which includes a list of additional screenings\tests needed.    Follow up in about 1 year (around 2/5/2021) for assessment .    Miguel Willson Jr, NP  I offered to discuss end of life issues, including information on how to make advance directives that the patient could use to name someone who would make medical decisions on their behalf if they became too ill to make themselves.    ___Patient declined  _X_Patient is interested, I provided paper work and offered to discuss.

## 2020-02-05 NOTE — PATIENT INSTRUCTIONS
Counseling and Referral of Other Preventative  (Italic type indicates deductible and co-insurance are waived)    Patient Name: Rito Conley  Today's Date: 2/5/2020    Health Maintenance       Date Due Completion Date    HIV Screening 05/03/1980 ---    Shingles Vaccine (2 of 2) 02/17/2020 12/23/2019    TETANUS VACCINE 10/02/2023 10/2/2013    Lipid Panel 05/14/2024 5/14/2019    Colonoscopy 07/03/2025 7/3/2015        No orders of the defined types were placed in this encounter.    The following information is provided to all patients.  This information is to help you find resources for any of the problems found today that may be affecting your health:                Living healthy guide: www.Count includes the [facility].[place].gov      Understanding Diabetes: www.diabetes.org      Eating healthy: www.cdc.gov/healthyweight      [facility] home safety checklist: www.cdc.gov/steadi/patient.html      Agency on Aging: www.goea.[place].[facility]      Alcoholics anonymous (AA): www.aa.org      Physical Activity: www.corby.nih.gov/ln5rydt      Tobacco use: www.quitwithusla.org

## 2020-02-18 ENCOUNTER — OFFICE VISIT (OUTPATIENT)
Dept: FAMILY MEDICINE | Facility: CLINIC | Age: 55
End: 2020-02-18
Payer: MEDICARE

## 2020-02-18 VITALS
BODY MASS INDEX: 29.6 KG/M2 | HEIGHT: 71 IN | OXYGEN SATURATION: 97 % | TEMPERATURE: 98 F | HEART RATE: 71 BPM | SYSTOLIC BLOOD PRESSURE: 122 MMHG | DIASTOLIC BLOOD PRESSURE: 86 MMHG | WEIGHT: 211.44 LBS

## 2020-02-18 DIAGNOSIS — S39.012A STRAIN OF LUMBAR REGION, INITIAL ENCOUNTER: Primary | ICD-10-CM

## 2020-02-18 PROCEDURE — 99214 PR OFFICE/OUTPT VISIT, EST, LEVL IV, 30-39 MIN: ICD-10-PCS | Mod: S$PBB,,, | Performed by: INTERNAL MEDICINE

## 2020-02-18 PROCEDURE — 99999 PR PBB SHADOW E&M-EST. PATIENT-LVL III: ICD-10-PCS | Mod: PBBFAC,,, | Performed by: INTERNAL MEDICINE

## 2020-02-18 PROCEDURE — 99214 OFFICE O/P EST MOD 30 MIN: CPT | Mod: S$PBB,,, | Performed by: INTERNAL MEDICINE

## 2020-02-18 PROCEDURE — 99213 OFFICE O/P EST LOW 20 MIN: CPT | Mod: PBBFAC,PO | Performed by: INTERNAL MEDICINE

## 2020-02-18 PROCEDURE — 99999 PR PBB SHADOW E&M-EST. PATIENT-LVL III: CPT | Mod: PBBFAC,,, | Performed by: INTERNAL MEDICINE

## 2020-02-18 RX ORDER — TIZANIDINE 4 MG/1
4 TABLET ORAL EVERY 8 HOURS PRN
Qty: 30 TABLET | Refills: 0 | Status: SHIPPED | OUTPATIENT
Start: 2020-02-18 | End: 2020-02-28

## 2020-02-18 RX ORDER — IBUPROFEN 800 MG/1
800 TABLET ORAL 3 TIMES DAILY PRN
Qty: 30 TABLET | Refills: 0 | Status: SHIPPED | OUTPATIENT
Start: 2020-02-18 | End: 2021-03-28 | Stop reason: ALTCHOICE

## 2020-02-18 NOTE — PROGRESS NOTES
SUBJECTIVE     Chief Complaint   Patient presents with    lower side back pain       HPI  Rito Conley is a 54 y.o. male with multiple medical diagnoses as listed in the medical history and problem list that presents for evaluation of R lower back pain since Sunday. Denies any preceding trauma, falls, or heavy lifting. His pain is an intermittent ache at a 5-6 without radiation. Pain improves with stretching and worsens with laying still on the right side. He has not taken/applied any meds for the pain. Denies any urinary/fecal incontinence.     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    Hyperlipidemia     Hypertension     Lower back pain     PTSD (post-traumatic stress disorder)     Sleep apnea     uses CPAP 1-2x/week       PAST SURGICAL HISTORY:  Past Surgical History:   Procedure Laterality Date    HERNIA REPAIR         SOCIAL HISTORY:  Social History     Socioeconomic History    Marital status:      Spouse name: Not on file    Number of children: Not on file    Years of education: Not on file    Highest education level: Not on file   Occupational History    Not on file   Social Needs    Financial resource strain: Not hard at all    Food insecurity:     Worry: Never true     Inability: Never true    Transportation needs:     Medical: No     Non-medical: No   Tobacco Use    Smoking status: Never Smoker    Smokeless tobacco: Never Used   Substance and Sexual Activity    Alcohol use: Yes     Alcohol/week: 4.0 - 6.0 standard drinks     Types: 4 - 6 Glasses of wine per week     Frequency: 2-3 times a week     Drinks per session: 1 or 2     Binge frequency: Less than monthly     Comment: a week.     Drug use: No    Sexual activity: Yes   Lifestyle    Physical activity:     Days per week: 4 days     Minutes per session: 30 min    Stress: Only a little   Relationships    Social connections:     Talks on phone: Three  times a week     Gets together: Once a week     Attends Gnosticist service: Not on file     Active member of club or organization: Yes     Attends meetings of clubs or organizations: More than 4 times per year     Relationship status:    Other Topics Concern    Not on file   Social History Narrative    Not on file       FAMILY HISTORY:  Family History   Problem Relation Age of Onset    Hypertension Mother     Cancer Maternal Grandmother         something in her arm     Cancer Maternal Aunt         breast    Other Father         agent orange exposure     No Known Problems Sister     No Known Problems Brother     Amblyopia Neg Hx     Blindness Neg Hx     Cataracts Neg Hx     Diabetes Neg Hx     Glaucoma Neg Hx     Macular degeneration Neg Hx     Retinal detachment Neg Hx     Strabismus Neg Hx     Stroke Neg Hx     Thyroid disease Neg Hx        ALLERGIES AND MEDICATIONS: updated and reviewed.  Review of patient's allergies indicates:  No Known Allergies  Current Outpatient Medications   Medication Sig Dispense Refill    amLODIPine (NORVASC) 10 MG tablet Take 1 tablet (10 mg total) by mouth once daily. 90 tablet 1    azelastine (ASTELIN) 137 mcg (0.1 %) nasal spray 1 spray (137 mcg total) by Nasal route 2 (two) times daily. 30 mL 11    buPROPion (WELLBUTRIN XL) 150 MG TB24 tablet Take 150 mg by mouth once daily.       cetirizine (ZYRTEC) 10 MG tablet Take 1 tablet (10 mg total) by mouth once daily. 90 tablet 3    fluticasone (FLONASE) 50 mcg/actuation nasal spray 1 spray by Each Nare route 2 (two) times daily. (Patient taking differently: 1 spray by Each Nare route 2 (two) times daily as needed. ) 3 Bottle 3    hydrochlorothiazide (HYDRODIURIL) 50 MG tablet Take 1 tablet (50 mg total) by mouth once daily. 90 tablet 1    LIPITOR 40 mg tablet Take 1 tablet (40 mg total) by mouth once daily. 30 tablet 5    loratadine (CLARITIN) 10 mg tablet Take 1 tablet (10 mg total) by mouth once daily. 90  tablet 3    omeprazole (PRILOSEC) 40 MG capsule Take 1 capsule (40 mg total) by mouth once daily. 30 capsule 2    oxybutynin (DITROPAN-XL) 5 MG TR24 Take 1 tablet (5 mg total) by mouth once daily. 90 tablet 3    oxyCODONE-acetaminophen (PERCOCET) 5-325 mg per tablet Take 1 tablet by mouth every 4 (four) hours as needed. 31 tablet 0    oxyCODONE-acetaminophen (PERCOCET) 5-325 mg per tablet Take 1 tablet by mouth every 4 (four) hours as needed. 31 tablet 0    oxyCODONE-acetaminophen (PERCOCET) 5-325 mg per tablet Take 1 tablet by mouth every 4 (four) hours as needed. 31 tablet 0    oxyCODONE-acetaminophen (PERCOCET) 5-325 mg per tablet Take 1 tablet by mouth every 4 (four) hours as needed. 31 tablet 0    oxyCODONE-acetaminophen (PERCOCET) 5-325 mg per tablet Take 1 tablet by mouth every 4 (four) hours as needed. 31 tablet 0    paroxetine (PAXIL) 20 MG tablet TAKE ONE-HALF TABLET BY MOUTH EVERY DAY FOR MENTAL HEALTH      zolpidem (AMBIEN) 10 mg Tab Take 1 tablet (10 mg total) by mouth nightly as needed. 30 tablet 2    ibuprofen (ADVIL,MOTRIN) 800 MG tablet Take 1 tablet (800 mg total) by mouth 3 (three) times daily as needed for Pain (TAKE WITH MEALS). 30 tablet 0    tiZANidine (ZANAFLEX) 4 MG tablet Take 1 tablet (4 mg total) by mouth every 8 (eight) hours as needed (MAY CAUSE DROWSINESS; IF SO, ONLY TAKE NIGHTLY AS NEEDED). 30 tablet 0     No current facility-administered medications for this visit.        ROS  Review of Systems   Constitutional: Negative for chills and fever.   HENT: Negative for hearing loss and sore throat.    Eyes: Negative for visual disturbance.   Respiratory: Negative for cough and shortness of breath.    Cardiovascular: Negative for chest pain, palpitations and leg swelling.   Gastrointestinal: Negative for abdominal pain, constipation, diarrhea, nausea and vomiting.   Genitourinary: Positive for frequency. Negative for dysuria and urgency.   Musculoskeletal: Positive for back pain  "(R lower). Negative for arthralgias, joint swelling and myalgias.   Skin: Negative for rash and wound.   Neurological: Negative for headaches.   Psychiatric/Behavioral: Negative for agitation and confusion. The patient is not nervous/anxious.          OBJECTIVE     Physical Exam  Vitals:    02/18/20 1515   BP: 122/86   Pulse: 71   Temp: 97.9 °F (36.6 °C)    Body mass index is 29.49 kg/m².  Weight: 95.9 kg (211 lb 6.7 oz)   Height: 5' 11" (180.3 cm)     Physical Exam   Constitutional: He is oriented to person, place, and time. He appears well-developed and well-nourished. No distress.   HENT:   Head: Normocephalic and atraumatic.   Right Ear: External ear normal.   Left Ear: External ear normal.   Nose: Nose normal.   Mouth/Throat: Oropharynx is clear and moist.   Eyes: Conjunctivae and EOM are normal. Right eye exhibits no discharge. Left eye exhibits no discharge. No scleral icterus.   Neck: Normal range of motion. Neck supple. No JVD present. No tracheal deviation present.   Cardiovascular: Normal rate, regular rhythm, normal heart sounds and intact distal pulses. Exam reveals no gallop and no friction rub.   No murmur heard.  Pulmonary/Chest: Effort normal and breath sounds normal. No respiratory distress. He has no wheezes.   Abdominal: Soft. Bowel sounds are normal. He exhibits no distension and no mass. There is no tenderness. There is no rebound and no guarding.   Musculoskeletal: Normal range of motion. He exhibits no edema or deformity.        Cervical back: He exhibits no tenderness and no bony tenderness.        Thoracic back: He exhibits no tenderness and no bony tenderness.        Lumbar back: He exhibits tenderness (R paraspinal muscles). He exhibits no bony tenderness.   Negative BLE straight leg raise   Neurological: He is alert and oriented to person, place, and time. He exhibits normal muscle tone. Coordination normal.   Skin: Skin is warm and dry. No rash noted. No erythema.   Psychiatric: He has " a normal mood and affect. His behavior is normal. Judgment and thought content normal.         Health Maintenance       Date Due Completion Date    HIV Screening 05/03/1980 ---    Sign Pain Contract 05/03/1983 ---    Urine Drug Screen 05/03/1983 ---    Naloxone Prescription 05/03/1983 ---    Shingles Vaccine (2 of 2) 02/17/2020 12/23/2019    TETANUS VACCINE 10/02/2023 10/2/2013    Lipid Panel 05/14/2024 5/14/2019    Colonoscopy 07/03/2025 7/3/2015            ASSESSMENT     54 y.o. male with     1. Strain of lumbar region, initial encounter        PLAN:     1. Strain of lumbar region, initial encounter  - Pt advised to rest and avoid heavy lifting of >5-10lbs  - Pt encouraged to apply ice packs 2-3 times daily at 10 minute intervals x 72 hours, then okay to change to heating compress with care not to burn his self; he  voiced understanding   - ibuprofen (ADVIL,MOTRIN) 800 MG tablet; Take 1 tablet (800 mg total) by mouth 3 (three) times daily as needed for Pain (TAKE WITH MEALS).  Dispense: 30 tablet; Refill: 0  - tiZANidine (ZANAFLEX) 4 MG tablet; Take 1 tablet (4 mg total) by mouth every 8 (eight) hours as needed (MAY CAUSE DROWSINESS; IF SO, ONLY TAKE NIGHTLY AS NEEDED).  Dispense: 30 tablet; Refill: 0        RTC in 1-2 weeks as needed for any acute worsening of current condition or failure to improve       Tuyet Puente MD  02/18/2020 3:32 PM        No follow-ups on file.

## 2020-03-04 ENCOUNTER — OFFICE VISIT (OUTPATIENT)
Dept: FAMILY MEDICINE | Facility: CLINIC | Age: 55
End: 2020-03-04
Payer: MEDICARE

## 2020-03-04 ENCOUNTER — CLINICAL SUPPORT (OUTPATIENT)
Dept: FAMILY MEDICINE | Facility: CLINIC | Age: 55
End: 2020-03-04
Payer: MEDICARE

## 2020-03-04 VITALS
HEIGHT: 71 IN | SYSTOLIC BLOOD PRESSURE: 130 MMHG | HEART RATE: 77 BPM | WEIGHT: 211.63 LBS | OXYGEN SATURATION: 97 % | TEMPERATURE: 98 F | DIASTOLIC BLOOD PRESSURE: 80 MMHG | BODY MASS INDEX: 29.63 KG/M2

## 2020-03-04 DIAGNOSIS — Z23 NEED FOR ZOSTER VACCINATION: Primary | ICD-10-CM

## 2020-03-04 DIAGNOSIS — G89.29 CHRONIC LOW BACK PAIN WITHOUT SCIATICA, UNSPECIFIED BACK PAIN LATERALITY: ICD-10-CM

## 2020-03-04 DIAGNOSIS — M54.50 CHRONIC LOW BACK PAIN WITHOUT SCIATICA, UNSPECIFIED BACK PAIN LATERALITY: ICD-10-CM

## 2020-03-04 DIAGNOSIS — R09.81 SINUS CONGESTION: Primary | ICD-10-CM

## 2020-03-04 PROCEDURE — 99211 OFF/OP EST MAY X REQ PHY/QHP: CPT | Mod: PBBFAC,PO

## 2020-03-04 PROCEDURE — 99999 PR PBB SHADOW E&M-EST. PATIENT-LVL III: CPT | Mod: PBBFAC,,, | Performed by: FAMILY MEDICINE

## 2020-03-04 PROCEDURE — 99214 PR OFFICE/OUTPT VISIT, EST, LEVL IV, 30-39 MIN: ICD-10-PCS | Mod: S$PBB,,, | Performed by: FAMILY MEDICINE

## 2020-03-04 PROCEDURE — 90750 HZV VACC RECOMBINANT IM: CPT | Mod: PBBFAC,PO

## 2020-03-04 PROCEDURE — 99999 PR PBB SHADOW E&M-EST. PATIENT-LVL III: ICD-10-PCS | Mod: PBBFAC,,, | Performed by: FAMILY MEDICINE

## 2020-03-04 PROCEDURE — 99214 OFFICE O/P EST MOD 30 MIN: CPT | Mod: S$PBB,,, | Performed by: FAMILY MEDICINE

## 2020-03-04 PROCEDURE — 96372 THER/PROPH/DIAG INJ SC/IM: CPT | Mod: PBBFAC,PO

## 2020-03-04 PROCEDURE — 99999 PR PBB SHADOW E&M-EST. PATIENT-LVL I: CPT | Mod: PBBFAC,,,

## 2020-03-04 PROCEDURE — 99213 OFFICE O/P EST LOW 20 MIN: CPT | Mod: PBBFAC,27,PO,25 | Performed by: FAMILY MEDICINE

## 2020-03-04 PROCEDURE — 99999 PR PBB SHADOW E&M-EST. PATIENT-LVL I: ICD-10-PCS | Mod: PBBFAC,,,

## 2020-03-04 PROCEDURE — 99499 NO LOS: ICD-10-PCS | Mod: S$PBB,,, | Performed by: FAMILY MEDICINE

## 2020-03-04 PROCEDURE — 99499 UNLISTED E&M SERVICE: CPT | Mod: S$PBB,,, | Performed by: FAMILY MEDICINE

## 2020-03-04 RX ORDER — OXYCODONE AND ACETAMINOPHEN 5; 325 MG/1; MG/1
1 TABLET ORAL EVERY 4 HOURS PRN
Qty: 31 TABLET | Refills: 0 | Status: SHIPPED | OUTPATIENT
Start: 2020-03-04 | End: 2020-09-28 | Stop reason: CLARIF

## 2020-03-04 RX ORDER — OXYCODONE AND ACETAMINOPHEN 5; 325 MG/1; MG/1
1 TABLET ORAL EVERY 4 HOURS PRN
Qty: 31 TABLET | Refills: 0 | Status: SHIPPED | OUTPATIENT
Start: 2020-05-04 | End: 2020-10-12 | Stop reason: SDUPTHER

## 2020-03-04 RX ORDER — OXYCODONE AND ACETAMINOPHEN 5; 325 MG/1; MG/1
1 TABLET ORAL EVERY 4 HOURS PRN
Qty: 31 TABLET | Refills: 0 | Status: SHIPPED | OUTPATIENT
Start: 2020-04-04 | End: 2020-10-12

## 2020-03-04 RX ORDER — METHYLPREDNISOLONE ACETATE 40 MG/ML
40 INJECTION, SUSPENSION INTRA-ARTICULAR; INTRALESIONAL; INTRAMUSCULAR; SOFT TISSUE
Status: COMPLETED | OUTPATIENT
Start: 2020-03-04 | End: 2020-03-04

## 2020-03-04 RX ORDER — OXYBUTYNIN CHLORIDE 5 MG/1
5 TABLET, EXTENDED RELEASE ORAL DAILY
Qty: 90 TABLET | Refills: 3 | Status: SHIPPED | OUTPATIENT
Start: 2020-03-04 | End: 2020-12-28

## 2020-03-04 RX ADMIN — METHYLPREDNISOLONE ACETATE 40 MG: 40 INJECTION, SUSPENSION INTRA-ARTICULAR; INTRALESIONAL; INTRAMUSCULAR; SOFT TISSUE at 02:03
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
## 2020-03-04 NOTE — PROGRESS NOTES
Administered Depo- Medrol 40mg/mL IM to left upper outer glut. No s/s of any adverse reaction noted.

## 2020-03-20 DIAGNOSIS — K21.9 GASTROESOPHAGEAL REFLUX DISEASE, ESOPHAGITIS PRESENCE NOT SPECIFIED: ICD-10-CM

## 2020-03-20 DIAGNOSIS — J30.9 CHRONIC ALLERGIC RHINITIS: ICD-10-CM

## 2020-03-20 DIAGNOSIS — E78.5 HYPERLIPIDEMIA, UNSPECIFIED HYPERLIPIDEMIA TYPE: ICD-10-CM

## 2020-03-20 RX ORDER — AZELASTINE 1 MG/ML
1 SPRAY, METERED NASAL 2 TIMES DAILY
Qty: 30 ML | Refills: 11 | Status: SHIPPED | OUTPATIENT
Start: 2020-03-20 | End: 2020-11-17 | Stop reason: SDUPTHER

## 2020-03-20 RX ORDER — OMEPRAZOLE 40 MG/1
40 CAPSULE, DELAYED RELEASE ORAL DAILY
Qty: 90 CAPSULE | Refills: 1 | Status: SHIPPED | OUTPATIENT
Start: 2020-03-20 | End: 2020-03-20 | Stop reason: SDUPTHER

## 2020-03-20 RX ORDER — ATORVASTATIN CALCIUM 40 MG
40 TABLET ORAL DAILY
Qty: 30 TABLET | Refills: 5 | Status: SHIPPED | OUTPATIENT
Start: 2020-03-20 | End: 2020-10-12 | Stop reason: SDUPTHER

## 2020-03-20 RX ORDER — OMEPRAZOLE 40 MG/1
40 CAPSULE, DELAYED RELEASE ORAL DAILY
Qty: 90 CAPSULE | Refills: 1 | Status: CANCELLED | OUTPATIENT
Start: 2020-03-20 | End: 2020-06-18

## 2020-03-20 RX ORDER — OMEPRAZOLE 40 MG/1
40 CAPSULE, DELAYED RELEASE ORAL DAILY
Qty: 90 CAPSULE | Refills: 1 | Status: SHIPPED | OUTPATIENT
Start: 2020-03-20 | End: 2020-06-18

## 2020-03-20 NOTE — TELEPHONE ENCOUNTER
----- Message from Siri Peck sent at 3/20/2020 11:00 AM CDT -----  Contact: SYLVIA HICKEY [[id number]]  Name of Who is Calling: SYLVIA HICKEY [[id number]]    What is the request in detail:   Patient called requesting written Rx for the following:  omeprazole (PRILOSEC) 40 MG capsule,  azelastine (ASTELIN) 137 mcg (0.1 %) nasal spray,  LIPITOR 40 mg tablet.    Please do so at your earliest convenience and further advise.       THANKS!      Reply by [facility]: NO      What Number to Call Back: [phone]

## 2020-04-24 ENCOUNTER — TELEPHONE (OUTPATIENT)
Dept: FAMILY MEDICINE | Facility: CLINIC | Age: 55
End: 2020-04-24

## 2020-04-24 NOTE — TELEPHONE ENCOUNTER
----- Message from Cece Conley sent at 4/24/2020  9:34 AM CDT -----  Contact: SYLVIA CONLEY [[id number]]  Name of Who is Calling: SYLVIA CONLEY [[id number]]    What is the request in detail:SYLVIA CONLEY [[id number]] is requesting a call back in regards to sinus infection   Please contact to further discuss and advise      Can the clinic reply by [facility]: yes     [facility]  [phone] (home)

## 2020-04-24 NOTE — TELEPHONE ENCOUNTER
"Patient requesting medication for symptoms of a sinus infection.  Stated his lymph nodes under his neck are swollen and his throat is getting "a little rough".  Would like to know if he can get an antibiotic.  Please advise.    "

## 2020-04-27 ENCOUNTER — OFFICE VISIT (OUTPATIENT)
Dept: FAMILY MEDICINE | Facility: CLINIC | Age: 55
End: 2020-04-27
Payer: MEDICARE

## 2020-04-27 DIAGNOSIS — J30.9 ALLERGIC SINUSITIS: ICD-10-CM

## 2020-04-27 DIAGNOSIS — I10 ESSENTIAL HYPERTENSION: Primary | ICD-10-CM

## 2020-04-27 PROCEDURE — 99214 OFFICE O/P EST MOD 30 MIN: CPT | Mod: 95,,, | Performed by: FAMILY MEDICINE

## 2020-04-27 PROCEDURE — 99214 PR OFFICE/OUTPT VISIT, EST, LEVL IV, 30-39 MIN: ICD-10-PCS | Mod: 95,,, | Performed by: FAMILY MEDICINE

## 2020-04-27 RX ORDER — DESLORATADINE 5 MG/1
5 TABLET ORAL DAILY
Qty: 30 TABLET | Refills: 11 | Status: SHIPPED | OUTPATIENT
Start: 2020-04-27 | End: 2020-08-25 | Stop reason: ALTCHOICE

## 2020-04-27 NOTE — PROGRESS NOTES
Subjective:       Patient ID: Rito Conley is a 54 y.o. male.    Chief Complaint: No chief complaint on file.    The patient location is: [place]  The chief complaint leading to consultation is: sinus  Visit type: audiovisual  Total time spent with patient: 10 minutes  Each patient to whom he or she provides medical services by telemedicine is:  (1) informed of the relationship between the physician and patient and the respective role of any other health care provider with respect to management of the patient; and (2) notified that he or she may decline to receive medical services by telemedicine and may withdraw from such care at any time.    Notes:   54 year old male presents for sinus issues. He was tested for corona virus and it was negative. He has some issues with sinus drainage and his ears popping. He has no fever or chills. He has been using flonase and astelin. He states the astelin works. He feels his lymph nodes and his ears are popping. He is taking claritin. He has tried zyrtec. He has tried allegra, but it dried him out too much.      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  No date: Hyperlipidemia  No date: Hypertension  No date: Lower back pain  No date: PTSD (post-traumatic stress disorder)  No date: Sleep apnea      Comment:  uses CPAP 1-2x/week   Past Surgical History:  No date: HERNIA REPAIR  Review of patient's family history indicates:  Problem: Hypertension      Relation: Mother          Age of Onset: (Not Specified)  Problem: Cancer      Relation: Maternal Grandmother          Age of Onset: (Not Specified)          Comment: something in her arm   Problem: Cancer      Relation: Maternal Aunt          Age of Onset: (Not Specified)          Comment: breast  Problem: Other      Relation: Father          Age of Onset: (Not Specified)          Comment: agent orange exposure   Problem: No Known Problems      Relation: Sister          Age of Onset: (Not  Specified)  Problem: No Known Problems      Relation: Brother          Age of Onset: (Not Specified)  Problem: Amblyopia      Relation: Neg Hx          Age of Onset: (Not Specified)  Problem: Blindness      Relation: Neg Hx          Age of Onset: (Not Specified)  Problem: Cataracts      Relation: Neg Hx          Age of Onset: (Not Specified)  Problem: Diabetes      Relation: Neg Hx          Age of Onset: (Not Specified)  Problem: Glaucoma      Relation: Neg Hx          Age of Onset: (Not Specified)  Problem: Macular degeneration      Relation: Neg Hx          Age of Onset: (Not Specified)  Problem: Retinal detachment      Relation: Neg Hx          Age of Onset: (Not Specified)  Problem: Strabismus      Relation: Neg Hx          Age of Onset: (Not Specified)  Problem: Stroke      Relation: Neg Hx          Age of Onset: (Not Specified)  Problem: Thyroid disease      Relation: Neg Hx          Age of Onset: (Not Specified)    Social History    Socioeconomic History      Marital status:       Spouse name: Not on file      Number of children: Not on file      Years of education: Not on file      Highest education level: Not on file    Occupational History      Not on file    Social Needs      Financial resource strain: Not hard at all      Food insecurity:        Worry: Never true        Inability: Never true      Transportation needs:        Medical: No        Non-medical: No    Tobacco Use      Smoking status: Never Smoker      Smokeless tobacco: Never Used    Substance and Sexual Activity      Alcohol use: Yes        Alcohol/week: 4.0 - 6.0 standard drinks        Types: 4 - 6 Glasses of wine per week        Frequency: 2-3 times a week        Drinks per session: 1 or 2        Binge frequency: Less than monthly        Comment: a week.       Drug use: No      Sexual activity: Yes    Lifestyle      Physical activity:        Days per week: 4 days        Minutes per session: 30 min      Stress: Only a little     Relationships      Social connections:        Talks on phone: Three times a week        Gets together: Once a week        Attends Christianity service: Not on file        Active member of club or organization: Yes        Attends meetings of clubs or organizations: More than 4 times per year        Relationship status:     Other Topics      Concerns:        Not on file    Social History Narrative      Not on file        Review of Systems    Objective:      Physical Exam    Assessment:       1. Essential hypertension    2. Allergic sinusitis        Plan:       Diagnoses and all orders for this visit:    Essential hypertension  -     Lipid panel; Future  -     Comprehensive metabolic panel; Future  -     CBC auto differential; Future  Will do labs once this is available to him.   Allergic sinusitis.   -     desloratadine (CLARINEX) 5 mg tablet; Take 1 tablet (5 mg total) by mouth once daily.

## 2020-04-27 NOTE — PROGRESS NOTES
The patient location is: [place]  The chief complaint leading to consultation is: sinus  Visit type: audiovisual  Total time spent with patient: 10 minutes  Each patient to whom he or she provides medical services by telemedicine is:  (1) informed of the relationship between the physician and patient and the respective role of any other health care provider with respect to management of the patient; and (2) notified that he or she may decline to receive medical services by telemedicine and may withdraw from such care at any time.    Notes:   54 year old male presents for sinus issues. He was tested for corona virus and it was negative. He has some issues with sinus drainage and his ears popping. He has no fever or chills. He has been using flonase and astelin. He states the astelin works. He feels his lymph nodes and his ears are popping. He is taking claritin. He has tried zyrtec. He has tried allegra, but it dried him out too much.     [Today's Date] : [unfilled] [Name] : Name: [unfilled] [] : : ~~ [Today's Date:] : [unfilled] [Dear  ___:] : Dear Dr. [unfilled]: [Consult] : I had the pleasure of evaluating your patient, [unfilled]. My full evaluation follows. [Consult - Single Provider] : Thank you very much for allowing me to participate in the care of this patient. If you have any questions, please do not hesitate to contact me. [Sincerely,] : Sincerely, [FreeTextEntry4] : Lien Sandoval, NP [FreeTextEntry5] : [address] [facility] [FreeTextEntry6] : [place] [id number] [FreeTextEnhxf1] : Phone# [phone] [de-identified] : Kaden Hayes MD, FAAP, FACC, JEAN-PIERRE, BENNY \par Chief, Pediatric Cardiology \par [facility] \par Director, Ambulatory Pediatric Cardiology \par

## 2020-04-29 ENCOUNTER — PATIENT MESSAGE (OUTPATIENT)
Dept: FAMILY MEDICINE | Facility: CLINIC | Age: 55
End: 2020-04-29

## 2020-05-04 DIAGNOSIS — J34.9 SINUS PROBLEM: Primary | ICD-10-CM

## 2020-05-06 ENCOUNTER — TELEPHONE (OUTPATIENT)
Dept: OTOLARYNGOLOGY | Facility: CLINIC | Age: 55
End: 2020-05-06

## 2020-05-06 DIAGNOSIS — J30.9 ALLERGIC RHINITIS, UNSPECIFIED SEASONALITY, UNSPECIFIED TRIGGER: Primary | ICD-10-CM

## 2020-05-07 ENCOUNTER — TELEPHONE (OUTPATIENT)
Dept: OTOLARYNGOLOGY | Facility: CLINIC | Age: 55
End: 2020-05-07

## 2020-06-11 ENCOUNTER — OFFICE VISIT (OUTPATIENT)
Dept: SURGERY | Facility: CLINIC | Age: 55
End: 2020-06-11
Payer: MEDICARE

## 2020-06-11 VITALS
SYSTOLIC BLOOD PRESSURE: 144 MMHG | HEIGHT: 71 IN | WEIGHT: 214.5 LBS | DIASTOLIC BLOOD PRESSURE: 90 MMHG | HEART RATE: 82 BPM | BODY MASS INDEX: 30.03 KG/M2 | TEMPERATURE: 99 F

## 2020-06-11 DIAGNOSIS — R12 HEARTBURN: Primary | ICD-10-CM

## 2020-06-11 PROCEDURE — 99999 PR PBB SHADOW E&M-EST. PATIENT-LVL III: CPT | Mod: PBBFAC,,, | Performed by: SURGERY

## 2020-06-11 PROCEDURE — 99211 OFF/OP EST MAY X REQ PHY/QHP: CPT | Mod: S$PBB,,, | Performed by: SURGERY

## 2020-06-11 PROCEDURE — 99999 PR PBB SHADOW E&M-EST. PATIENT-LVL III: ICD-10-PCS | Mod: PBBFAC,,, | Performed by: SURGERY

## 2020-06-11 PROCEDURE — 99213 OFFICE O/P EST LOW 20 MIN: CPT | Mod: PBBFAC | Performed by: SURGERY

## 2020-06-11 PROCEDURE — 99211 PR OFFICE/OUTPT VISIT, EST, LEVL I: ICD-10-PCS | Mod: S$PBB,,, | Performed by: SURGERY

## 2020-06-11 NOTE — PROGRESS NOTES
Patient called for appointment to be seen today complaining about heartburn which he thought might be related to her prior abdominal wall hernia repair by Dr Benson in 2018  He is on Omeprazole 40 mg/day and reports compliance with his meds  His symptoms are worse at night and after he lays down  On exam there is no evidence of hernia recurrence nor is there tenderness noted on abdominal exam  The patient should have an EGD  Will arrange  I have instructed him to follow up with his primary care physician

## 2020-06-12 ENCOUNTER — LAB VISIT (OUTPATIENT)
Dept: LAB | Facility: HOSPITAL | Age: 55
End: 2020-06-12
Attending: UROLOGY
Payer: MEDICARE

## 2020-06-12 DIAGNOSIS — N13.8 BPH WITH OBSTRUCTION/LOWER URINARY TRACT SYMPTOMS: ICD-10-CM

## 2020-06-12 DIAGNOSIS — N40.1 BPH WITH OBSTRUCTION/LOWER URINARY TRACT SYMPTOMS: ICD-10-CM

## 2020-06-12 DIAGNOSIS — R35.0 FREQUENCY OF URINATION: ICD-10-CM

## 2020-06-12 DIAGNOSIS — R35.1 NOCTURIA: ICD-10-CM

## 2020-06-12 PROCEDURE — 84153 ASSAY OF PSA TOTAL: CPT

## 2020-06-12 PROCEDURE — 36415 COLL VENOUS BLD VENIPUNCTURE: CPT | Mod: PO

## 2020-06-13 LAB — COMPLEXED PSA SERPL-MCNC: 0.53 NG/ML (ref 0–4)

## 2020-06-15 ENCOUNTER — LAB VISIT (OUTPATIENT)
Dept: FAMILY MEDICINE | Facility: CLINIC | Age: 55
End: 2020-06-15
Payer: MEDICARE

## 2020-06-15 PROCEDURE — U0003 INFECTIOUS AGENT DETECTION BY NUCLEIC ACID (DNA OR RNA); SEVERE ACUTE RESPIRATORY SYNDROME CORONAVIRUS 2 (SARS-COV-2) (CORONAVIRUS DISEASE [COVID-19]), AMPLIFIED PROBE TECHNIQUE, MAKING USE OF HIGH THROUGHPUT TECHNOLOGIES AS DESCRIBED BY CMS-2020-01-R: HCPCS

## 2020-06-16 LAB — SARS-COV-2 RNA RESP QL NAA+PROBE: NOT DETECTED

## 2020-06-17 ENCOUNTER — ANESTHESIA (OUTPATIENT)
Dept: ENDOSCOPY | Facility: HOSPITAL | Age: 55
End: 2020-06-17
Payer: MEDICARE

## 2020-06-17 ENCOUNTER — ANESTHESIA EVENT (OUTPATIENT)
Dept: ENDOSCOPY | Facility: HOSPITAL | Age: 55
End: 2020-06-17
Payer: MEDICARE

## 2020-06-17 ENCOUNTER — HOSPITAL ENCOUNTER (OUTPATIENT)
Facility: HOSPITAL | Age: 55
Discharge: HOME | End: 2020-06-17
Attending: INTERNAL MEDICINE | Admitting: INTERNAL MEDICINE
Payer: MEDICARE

## 2020-06-17 ENCOUNTER — PATIENT OUTREACH (OUTPATIENT)
Dept: ADMINISTRATIVE | Facility: OTHER | Age: 55
End: 2020-06-17

## 2020-06-17 VITALS
RESPIRATION RATE: 16 BRPM | DIASTOLIC BLOOD PRESSURE: 87 MMHG | TEMPERATURE: 98 F | WEIGHT: 210 LBS | HEART RATE: 74 BPM | HEIGHT: 71 IN | BODY MASS INDEX: 29.4 KG/M2 | OXYGEN SATURATION: 98 % | SYSTOLIC BLOOD PRESSURE: 122 MMHG

## 2020-06-17 DIAGNOSIS — R12 HEARTBURN: ICD-10-CM

## 2020-06-17 PROCEDURE — 88305 TISSUE EXAM BY PATHOLOGIST: CPT | Mod: 26,,, | Performed by: PATHOLOGY

## 2020-06-17 PROCEDURE — 88305 TISSUE EXAM BY PATHOLOGIST: CPT | Performed by: PATHOLOGY

## 2020-06-17 PROCEDURE — E9220 PRA ENDO ANESTHESIA: ICD-10-PCS | Mod: ,,, | Performed by: NURSE ANESTHETIST, CERTIFIED REGISTERED

## 2020-06-17 PROCEDURE — 43239 EGD BIOPSY SINGLE/MULTIPLE: CPT | Mod: ,,, | Performed by: INTERNAL MEDICINE

## 2020-06-17 PROCEDURE — 37000009 HC ANESTHESIA EA ADD 15 MINS: Performed by: INTERNAL MEDICINE

## 2020-06-17 PROCEDURE — 88305 TISSUE EXAM BY PATHOLOGIST: ICD-10-PCS | Mod: 26,,, | Performed by: PATHOLOGY

## 2020-06-17 PROCEDURE — E9220 PRA ENDO ANESTHESIA: HCPCS | Mod: ,,, | Performed by: NURSE ANESTHETIST, CERTIFIED REGISTERED

## 2020-06-17 PROCEDURE — 25000003 PHARM REV CODE 250: Performed by: NURSE ANESTHETIST, CERTIFIED REGISTERED

## 2020-06-17 PROCEDURE — 37000008 HC ANESTHESIA 1ST 15 MINUTES: Performed by: INTERNAL MEDICINE

## 2020-06-17 PROCEDURE — 25000003 PHARM REV CODE 250: Performed by: INTERNAL MEDICINE

## 2020-06-17 PROCEDURE — 27201012 HC FORCEPS, HOT/COLD, DISP: Performed by: INTERNAL MEDICINE

## 2020-06-17 PROCEDURE — 43239 PR EGD, FLEX, W/BIOPSY, SGL/MULTI: ICD-10-PCS | Mod: ,,, | Performed by: INTERNAL MEDICINE

## 2020-06-17 PROCEDURE — 63600175 PHARM REV CODE 636 W HCPCS: Performed by: NURSE ANESTHETIST, CERTIFIED REGISTERED

## 2020-06-17 PROCEDURE — 43239 EGD BIOPSY SINGLE/MULTIPLE: CPT | Performed by: INTERNAL MEDICINE

## 2020-06-17 RX ORDER — LIDOCAINE HCL/PF 100 MG/5ML
SYRINGE (ML) INTRAVENOUS
Status: DISCONTINUED | OUTPATIENT
Start: 2020-06-17 | End: 2020-06-17

## 2020-06-17 RX ORDER — PROPOFOL 10 MG/ML
INJECTION, EMULSION INTRAVENOUS
Status: DISCONTINUED | OUTPATIENT
Start: 2020-06-17 | End: 2020-06-17

## 2020-06-17 RX ORDER — PROPOFOL 10 MG/ML
VIAL (ML) INTRAVENOUS CONTINUOUS PRN
Status: DISCONTINUED | OUTPATIENT
Start: 2020-06-17 | End: 2020-06-17

## 2020-06-17 RX ORDER — SODIUM CHLORIDE 9 MG/ML
INJECTION, SOLUTION INTRAVENOUS CONTINUOUS PRN
Status: DISCONTINUED | OUTPATIENT
Start: 2020-06-17 | End: 2020-06-17

## 2020-06-17 RX ORDER — SODIUM CHLORIDE 9 MG/ML
INJECTION, SOLUTION INTRAVENOUS CONTINUOUS
Status: DISCONTINUED | OUTPATIENT
Start: 2020-06-17 | End: 2020-06-17 | Stop reason: HOSPADM

## 2020-06-17 RX ADMIN — PROPOFOL 200 MCG/KG/MIN: 10 INJECTION, EMULSION INTRAVENOUS at 01:06

## 2020-06-17 RX ADMIN — Medication 100 MG: at 01:06

## 2020-06-17 RX ADMIN — SODIUM CHLORIDE: 0.9 INJECTION, SOLUTION INTRAVENOUS at 12:06

## 2020-06-17 RX ADMIN — PROPOFOL 100 MG: 10 INJECTION, EMULSION INTRAVENOUS at 01:06

## 2020-06-17 NOTE — H&P
Short Stay Endoscopy History and Physical    PCP - Jennifer Huertas MD    Procedure - EGD  ASA - 2  Mallampati - per anesthesia  History of Anesthesia problems - no  Family history Anesthesia problems -  no     HPI:  This is a 55 y.o. male here for evaluation of :     Reflux - yes  Dysphagia - no  Abdominal pain - no  Diarrhea - no  Anemia - no  GI bleeding - no  Other - dyspepsia    ROS:  CONSTITUTIONAL: Denies weight change,  fatigue, fevers, chills, night sweats.  CARDIOVASCULAR: Denies chest pain, shortness of breath, orthopnea and edema.  RESPIRATORY: Denies cough, hemoptysis, dyspnea, and wheezing.  GI: See HPI.    Medical History:   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    Hyperlipidemia     Hypertension     Lower back pain     PTSD (post-traumatic stress disorder)     Sleep apnea     uses CPAP 1-2x/week       Surgical History:   Past Surgical History:   Procedure Laterality Date    COLONOSCOPY      HERNIA REPAIR         Family History:  Family History   Problem Relation Age of Onset    Hypertension Mother     Cancer Maternal Grandmother         something in her arm     Cancer Maternal Aunt         breast    Other Father         agent orange exposure     No Known Problems Sister     No Known Problems Brother     Amblyopia Neg Hx     Blindness Neg Hx     Cataracts Neg Hx     Diabetes Neg Hx     Glaucoma Neg Hx     Macular degeneration Neg Hx     Retinal detachment Neg Hx     Strabismus Neg Hx     Stroke Neg Hx     Thyroid disease Neg Hx        Social History:   Social History     Tobacco Use    Smoking status: Never Smoker    Smokeless tobacco: Never Used   Substance Use Topics    Alcohol use: Yes     Alcohol/week: 4.0 - 6.0 standard drinks     Types: 4 - 6 Glasses of wine per week     Frequency: 2-3 times a week     Drinks per session: 1 or 2     Binge frequency: Less than monthly     Comment: a week.     Drug use: No       Allergies:  Reviewed    Medications:   No current facility-administered medications on file prior to encounter.      Current Outpatient Medications on File Prior to Encounter   Medication Sig Dispense Refill    amLODIPine (NORVASC) 10 MG tablet Take 1 tablet (10 mg total) by mouth once daily. 90 tablet 1    azelastine (ASTELIN) 137 mcg (0.1 %) nasal spray 1 spray (137 mcg total) by Nasal route 2 (two) times daily. 30 mL 11    buPROPion (WELLBUTRIN XL) 150 MG TB24 tablet Take 150 mg by mouth once daily.       cetirizine (ZYRTEC) 10 MG tablet Take 1 tablet (10 mg total) by mouth once daily. 90 tablet 3    desloratadine (CLARINEX) 5 mg tablet Take 1 tablet (5 mg total) by mouth once daily. 30 tablet 11    fluticasone (FLONASE) 50 mcg/actuation nasal spray 1 spray by Each Nare route 2 (two) times daily. (Patient taking differently: 1 spray by Each Nare route 2 (two) times daily as needed. ) 3 Bottle 3    hydrochlorothiazide (HYDRODIURIL) 50 MG tablet Take 1 tablet (50 mg total) by mouth once daily. 90 tablet 1    ibuprofen (ADVIL,MOTRIN) 800 MG tablet Take 1 tablet (800 mg total) by mouth 3 (three) times daily as needed for Pain (TAKE WITH MEALS). 30 tablet 0    LIPITOR 40 mg tablet Take 1 tablet (40 mg total) by mouth once daily. 30 tablet 5    loratadine (CLARITIN) 10 mg tablet Take 1 tablet (10 mg total) by mouth once daily. 90 tablet 3    omeprazole (PRILOSEC) 40 MG capsule Take 1 capsule (40 mg total) by mouth once daily. 90 capsule 1    oxybutynin (DITROPAN-XL) 5 MG TR24 Take 1 tablet (5 mg total) by mouth once daily. 90 tablet 3    oxyCODONE-acetaminophen (PERCOCET) 5-325 mg per tablet Take 1 tablet by mouth every 4 (four) hours as needed. 31 tablet 0    oxyCODONE-acetaminophen (PERCOCET) 5-325 mg per tablet Take 1 tablet by mouth every 4 (four) hours as needed. 31 tablet 0    oxyCODONE-acetaminophen (PERCOCET) 5-325 mg per tablet Take 1 tablet by mouth every 4 (four) hours as needed. 31 tablet 0     paroxetine (PAXIL) 20 MG tablet TAKE ONE-HALF TABLET BY MOUTH EVERY DAY FOR MENTAL HEALTH      zolpidem (AMBIEN) 10 mg Tab Take 1 tablet (10 mg total) by mouth nightly as needed. 30 tablet 2       Physical Exam:  Vital Signs:   Vitals:    06/17/20 1244   BP: (!) 162/96   Pulse: 79   Resp: 20   Temp: 97.9 °F (36.6 °C)     General Appearance: Well appearing in no acute distress  ENT: OP clear  Chest: CTA B  CV: RRR, no m/r/g  Abd: s/nt/nd/nabs  Ext: no edema    Labs:  Reviewed    Plan:  I have explained the risks and benefits of endoscopy procedures to the patient including but not limited to bleeding, perforation, infection, and death. The patient wishes to proceed.

## 2020-06-17 NOTE — TRANSFER OF CARE
"Anesthesia Transfer of Care Note    Patient: Rito Conley    Procedure(s) Performed: Procedure(s) (LRB):  EGD (ESOPHAGOGASTRODUODENOSCOPY) (N/A)    Patient location: PACU    Anesthesia Type: general    Transport from OR: Transported from OR on room air with adequate spontaneous ventilation    Post pain: adequate analgesia    Post assessment: no apparent anesthetic complications and tolerated procedure well    Post vital signs: stable    Level of consciousness: sedated and responds to stimulation    Nausea/Vomiting: no nausea/vomiting    Complications: none    Transfer of care protocol was followed      Last vitals:   Visit Vitals  /61 (BP Location: Left arm, Patient Position: Lying)   Pulse 75   Temp 36.6 °C (97.9 °F) (Temporal)   Resp 16   Ht 5' 11" (1.803 m)   Wt 95.3 kg (210 lb)   SpO2 99%   BMI 29.29 kg/m²     "

## 2020-06-17 NOTE — ANESTHESIA POSTPROCEDURE EVALUATION
Anesthesia Post Evaluation    Patient: Rito Conley    Procedure(s) Performed: Procedure(s) (LRB):  EGD (ESOPHAGOGASTRODUODENOSCOPY) (N/A)    Final Anesthesia Type: general    Patient location during evaluation: PACU  Patient participation: Yes- Able to Participate  Level of consciousness: awake and alert  Post-procedure vital signs: reviewed and stable  Pain management: adequate  Airway patency: patent    PONV status at discharge: No PONV  Anesthetic complications: no      Cardiovascular status: hemodynamically stable  Respiratory status: unassisted  Hydration status: euvolemic  Follow-up not needed.          Vitals Value Taken Time   /87 06/17/20 1342   Temp 36.6 °C (97.9 °F) 06/17/20 1312   Pulse 74 06/17/20 1342   Resp 16 06/17/20 1342   SpO2 98 % 06/17/20 1342         Event Time   Out of Recovery 13:43:34         Pain/Rodo Score: Rodo Score: 10 (6/17/2020  1:27 PM)

## 2020-06-17 NOTE — [employer] PATIENT INSTRUCTIONS
Discharge Summary/Instructions after an Endoscopic Procedure  Patient Name: Rito Conley  Patient MRN: [id number]  Patient YOB: 1965  Wednesday, June 17, 2020  Jarad Holm MD  RESTRICTIONS:  During your procedure today, you received medications for sedation.  These   medications may affect your judgment, balance and coordination.  Therefore,   for 24 hours, you have the following restrictions:   - DO NOT drive a car, operate machinery, make legal/financial decisions,   sign important papers or drink alcohol.    ACTIVITY:  Today: no heavy lifting, straining or running due to procedural   sedation/anesthesia.  The following day: return to full activity including work.  DIET:  Eat and drink normally unless instructed otherwise.     TREATMENT FOR COMMON SIDE EFFECTS:  - Mild abdominal pain, nausea, belching, bloating or excessive gas:  rest,   eat lightly and use a heating pad.  - Sore Throat: treat with throat lozenges and/or gargle with warm salt   water.  - Because air was used during the procedure, expelling large amounts of air   from your rectum or belching is normal.  - If a bowel prep was taken, you may not have a bowel movement for 1-3 days.    This is normal.  SYMPTOMS TO WATCH FOR AND REPORT TO YOUR PHYSICIAN:  1. Abdominal pain or bloating, other than gas cramps.  2. Chest pain.  3. Back pain.  4. Signs of infection such as: chills or fever occurring within 24 hours   after the procedure.  5. Rectal bleeding, which would show as bright red, maroon, or black stools.   (A tablespoon of blood from the rectum is not serious, especially if   hemorrhoids are present.)  6. Vomiting.  7. Weakness or dizziness.  GO DIRECTLY TO THE NEAREST EMERGENCY ROOM IF YOU HAVE ANY OF THE FOLLOWING:      Difficulty breathing              Chills and/or fever over 101 F   Persistent vomiting and/or vomiting blood   Severe abdominal pain   Severe chest pain   Black, tarry stools   Bleeding- more than one  tablespoon   Any other symptom or condition that you feel may need urgent attention  Your doctor recommends these additional instructions:  If any biopsies were taken, your doctors clinic will contact you in 1 to 2   weeks with any results.  - Discharge patient to home (ambulatory).   - Resume previous diet today.   - Continue present medications.   - Await pathology results.   - Return to referring physician as previously scheduled.  For questions, problems or results please call your physician - Jarad Holm MD at Work:  [phone].  [facility], EMERGENCY ROOM PHONE NUMBER: [phone]  IF A COMPLICATION OR EMERGENCY SITUATION ARISES AND YOU ARE UNABLE TO REACH   YOUR PHYSICIAN - GO DIRECTLY TO THE EMERGENCY ROOM.  Jarad Holm MD  6/17/2020 1:11:07 PM  This report has been verified and signed electronically.  [employer]

## 2020-06-17 NOTE — ANESTHESIA PREPROCEDURE EVALUATION
06/17/2020  Rito Conley is a 55 y.o., male. Here today for EGD.    Anesthesia Evaluation    I have reviewed the Patient Summary Reports.    I have reviewed the Nursing Notes.    I have reviewed the Medications.     Review of Systems  Anesthesia Hx:  No problems with previous Anesthesia  Denies Family Hx of Anesthesia complications.   Denies Personal Hx of Anesthesia complications.   Hematology/Oncology:  Hematology Normal   Oncology Normal     EENT/Dental:EENT/Dental Normal   Cardiovascular:   Hypertension    Pulmonary:   Sleep Apnea    Renal/:  Renal/ Normal     Hepatic/GI:  Hepatic/GI Normal    Musculoskeletal:   Arthritis     Neurological:  Neurology Normal    Endocrine:  Endocrine Normal    Dermatological:  Skin Normal    Psych:   Psychiatric History          Physical Exam  General:  Well nourished    Airway/Jaw/Neck:  Airway Findings: Mouth Opening: Normal Tongue: Normal  General Airway Assessment: Adult  Mallampati: II  Improves to I with phonation.  TM Distance: Normal, at least 6 cm        Eyes/Ears/Nose:  EYES/EARS/NOSE FINDINGS: Normal   Dental:  Dental Findings: In tact   Chest/Lungs:  Chest/Lungs Clear    Heart/Vascular:  Heart Findings: Normal Heart murmur: negative Vascular Findings: Normal    Abdomen:  Abdomen Findings: Normal    Musculoskeletal:  Musculoskeletal Findings: Normal   Skin:  Skin Findings: Normal    Mental Status:  Mental Status Findings: Normal        Anesthesia Plan  Type of Anesthesia, risks & benefits discussed:  Anesthesia Type:  general  Patient's Preference: General  Intra-op Monitoring Plan: standard ASA monitors  Intra-op Monitoring Plan Comments:   Post Op Pain Control Plan: per primary service following discharge from PACU  Post Op Pain Control Plan Comments:   Induction:   IV  Beta Blocker:  Patient is not currently on a Beta-Blocker (No further  documentation required).       Informed Consent: Patient understands risks and agrees with Anesthesia plan.  Questions answered. Anesthesia consent signed with patient.  ASA Score: 3     Day of Surgery Review of History & Physical: I have interviewed and examined the patient. I have reviewed the patient's H&P dated:  There are no significant changes.  H&P update referred to the provider.         Ready For Surgery From Anesthesia Perspective.

## 2020-06-17 NOTE — PLAN OF CARE
Pt verbalized an understanding of discharge instructions including diet, s/s to notify md, and follow up.  Pt refused wheel chair and will ambulate off unit with patient transport.

## 2020-06-18 ENCOUNTER — OFFICE VISIT (OUTPATIENT)
Dept: UROLOGY | Facility: CLINIC | Age: 55
End: 2020-06-18
Payer: MEDICARE

## 2020-06-18 VITALS
WEIGHT: 215.19 LBS | SYSTOLIC BLOOD PRESSURE: 132 MMHG | DIASTOLIC BLOOD PRESSURE: 90 MMHG | HEIGHT: 71 IN | BODY MASS INDEX: 30.13 KG/M2

## 2020-06-18 DIAGNOSIS — N13.8 BPH WITH OBSTRUCTION/LOWER URINARY TRACT SYMPTOMS: Primary | ICD-10-CM

## 2020-06-18 DIAGNOSIS — N40.1 BPH WITH OBSTRUCTION/LOWER URINARY TRACT SYMPTOMS: Primary | ICD-10-CM

## 2020-06-18 DIAGNOSIS — N52.9 ERECTILE DYSFUNCTION OF ORGANIC ORIGIN: ICD-10-CM

## 2020-06-18 DIAGNOSIS — R35.1 NOCTURIA: ICD-10-CM

## 2020-06-18 DIAGNOSIS — R35.0 URINARY FREQUENCY: ICD-10-CM

## 2020-06-18 LAB
BILIRUB SERPL-MCNC: ABNORMAL MG/DL
BLOOD URINE, POC: ABNORMAL
COLOR, POC UA: YELLOW
GLUCOSE UR QL STRIP: ABNORMAL
KETONES UR QL STRIP: ABNORMAL
LEUKOCYTE ESTERASE URINE, POC: ABNORMAL
NITRITE, POC UA: ABNORMAL
PH, POC UA: 5
PROTEIN, POC: ABNORMAL
SPECIFIC GRAVITY, POC UA: 1015
UROBILINOGEN, POC UA: ABNORMAL

## 2020-06-18 PROCEDURE — 99999 PR PBB SHADOW E&M-EST. PATIENT-LVL IV: ICD-10-PCS | Mod: PBBFAC,,, | Performed by: NURSE PRACTITIONER

## 2020-06-18 PROCEDURE — 99214 PR OFFICE/OUTPT VISIT, EST, LEVL IV, 30-39 MIN: ICD-10-PCS | Mod: S$PBB,,, | Performed by: NURSE PRACTITIONER

## 2020-06-18 PROCEDURE — 99214 OFFICE O/P EST MOD 30 MIN: CPT | Mod: PBBFAC | Performed by: NURSE PRACTITIONER

## 2020-06-18 PROCEDURE — 81001 URINALYSIS AUTO W/SCOPE: CPT | Mod: PBBFAC | Performed by: NURSE PRACTITIONER

## 2020-06-18 PROCEDURE — 99999 PR PBB SHADOW E&M-EST. PATIENT-LVL IV: CPT | Mod: PBBFAC,,, | Performed by: NURSE PRACTITIONER

## 2020-06-18 PROCEDURE — 99214 OFFICE O/P EST MOD 30 MIN: CPT | Mod: S$PBB,,, | Performed by: NURSE PRACTITIONER

## 2020-06-18 RX ORDER — TADALAFIL 20 MG/1
20 TABLET ORAL DAILY PRN
Qty: 30 TABLET | Refills: 11 | Status: SHIPPED | OUTPATIENT
Start: 2020-06-18 | End: 2020-08-25

## 2020-06-18 NOTE — PROGRESS NOTES
Subjective:       Patient ID: Rito Conley is a 55 y.o. male who is an established patient of Dr Mauricio though new to me was last seen in this office 10/14/19    Chief Complaint:   Chief Complaint   Patient presents with    Follow-up     PSA results ..Pt. states he's urinating more at night .. and his sex drive is low.. states no other issues      He reports issues with urinary frequency and nocturia. Given trial of Flomax previously. He did not noted change with Flomax and has since stopped taking this medication    He is currently taking Ditropan 5 mg. Doing well with this medication.     He is here today for PSA/YOSHI check. He has noted worsening of nocturia for the past few weeks. He tells me that his intake of evening fluids has increased.  Reports drinking wine in the evening.  Denies dysuria, gross hematuria or pain.    Reports issues with ED. Saw Dr Colindres in 2017 for this and given Cialis. He reports improvement in his erections with this medication. He is requesting a refill for Cialis. He states difficulty with attaining and maintaining erections at times. Full erections occur about 50 % of the time. His libido is not affected. No other complaints    PSA 4/18 - 0.43  PSA 4/19 - 0.48        ACTIVE MEDICAL ISSUES:  Patient Active Problem List   Diagnosis    Benign hypertensive heart disease without heart failure    DJD of shoulder    DJD (degenerative joint disease), lumbar    Allergic rhinitis    Lumbar scoliosis    Back injury    BPH with obstruction/lower urinary tract symptoms    PTSD (post-traumatic stress disorder)    Heartburn       ALLERGIES AND MEDICATIONS: updated and reviewed.  Review of patient's allergies indicates:  No Known Allergies  Current Outpatient Medications   Medication Sig    amLODIPine (NORVASC) 10 MG tablet Take 1 tablet (10 mg total) by mouth once daily.    azelastine (ASTELIN) 137 mcg (0.1 %) nasal spray 1 spray (137 mcg total) by Nasal route 2 (two) times  daily.    buPROPion (WELLBUTRIN XL) 150 MG TB24 tablet Take 150 mg by mouth once daily.     cetirizine (ZYRTEC) 10 MG tablet Take 1 tablet (10 mg total) by mouth once daily.    desloratadine (CLARINEX) 5 mg tablet Take 1 tablet (5 mg total) by mouth once daily.    fluticasone (FLONASE) 50 mcg/actuation nasal spray 1 spray by Each Nare route 2 (two) times daily. (Patient taking differently: 1 spray by Each Nare route 2 (two) times daily as needed. )    hydrochlorothiazide (HYDRODIURIL) 50 MG tablet Take 1 tablet (50 mg total) by mouth once daily.    ibuprofen (ADVIL,MOTRIN) 800 MG tablet Take 1 tablet (800 mg total) by mouth 3 (three) times daily as needed for Pain (TAKE WITH MEALS).    LIPITOR 40 mg tablet Take 1 tablet (40 mg total) by mouth once daily.    loratadine (CLARITIN) 10 mg tablet Take 1 tablet (10 mg total) by mouth once daily.    omeprazole (PRILOSEC) 40 MG capsule Take 1 capsule (40 mg total) by mouth once daily.    oxybutynin (DITROPAN-XL) 5 MG TR24 Take 1 tablet (5 mg total) by mouth once daily.    oxyCODONE-acetaminophen (PERCOCET) 5-325 mg per tablet Take 1 tablet by mouth every 4 (four) hours as needed.    oxyCODONE-acetaminophen (PERCOCET) 5-325 mg per tablet Take 1 tablet by mouth every 4 (four) hours as needed.    paroxetine (PAXIL) 20 MG tablet TAKE ONE-HALF TABLET BY MOUTH EVERY DAY FOR MENTAL HEALTH    zolpidem (AMBIEN) 10 mg Tab Take 1 tablet (10 mg total) by mouth nightly as needed.    oxyCODONE-acetaminophen (PERCOCET) 5-325 mg per tablet Take 1 tablet by mouth every 4 (four) hours as needed.    tadalafiL (CIALIS) 20 MG Tab Take 1 tablet (20 mg total) by mouth daily as needed (erectile dysfunction).     No current facility-administered medications for this visit.        Review of Systems   Constitutional: Negative for activity change, chills, fatigue, fever and unexpected weight change.   Eyes: Negative for discharge, redness and visual disturbance.   Respiratory: Negative  "for cough, shortness of breath and wheezing.    Cardiovascular: Negative for chest pain and leg swelling.   Gastrointestinal: Negative for abdominal distention, abdominal pain, constipation, diarrhea, nausea and vomiting.   Genitourinary: Negative for decreased urine volume, difficulty urinating, discharge, dysuria, flank pain, hematuria, penile swelling, scrotal swelling, testicular pain and urgency.   Musculoskeletal: Negative for arthralgias, joint swelling and myalgias.   Skin: Negative for color change and rash.   Neurological: Negative for dizziness and light-headedness.   Psychiatric/Behavioral: Negative for behavioral problems and confusion. The patient is not nervous/anxious.        Objective:      Vitals:    06/18/20 0927   BP: (!) 132/90   Weight: 97.6 kg (215 lb 2.7 oz)   Height: 5' 11" (1.803 m)     Physical Exam   Constitutional: He is oriented to person, place, and time. He appears well-developed.   HENT:   Head: Normocephalic and atraumatic.   Nose: Nose normal.   Eyes: Conjunctivae are normal. Right eye exhibits no discharge. Left eye exhibits no discharge.   Neck: Normal range of motion. Neck supple. No tracheal deviation present. No thyromegaly present.   Cardiovascular: Normal rate and regular rhythm.    Pulmonary/Chest: Effort normal. No respiratory distress. He has no wheezes.   Abdominal: Soft. He exhibits no distension. There is no abdominal tenderness. No hernia.   Genitourinary: Prostate is not tender.       Musculoskeletal: Normal range of motion.   Neurological: He is alert and oriented to person, place, and time.   Skin: Skin is warm and dry. No rash noted. No erythema.     Psychiatric: His behavior is normal. Judgment normal.       Urine dipstick shows negative for all components.  Micro exam: negative for WBC's or RBC's.     Ref Range & Units 6d ago   PSA DIAGNOSTIC 0.00 - 4.00 ng/mL 0.53    Comment: PSA Expected levels:   Hormonal Therapy: <0.05 ng/ml   Prostatectomy: <0.01 ng/ml "   Radiation Therapy: <1.00 ng/ml    Resulting Agency  [facility]      Narrative  Performed by: [facility]  6 months      Specimen Collected: 06/12/20 10:00 Last Resulted: 06/13/20 05:08        Reviewed with patient    Assessment:       1. BPH with obstruction/lower urinary tract symptoms    2. Urinary frequency    3. Nocturia    4. Erectile dysfunction of organic origin          Plan:       1. BPH with obstruction/lower urinary tract symptoms  -PSA/YOSHI normal  - POCT urinalysis, dipstick or tablet reag    2. Urinary frequency  -Continue Ditropan  - POCT urinalysis, dipstick or tablet reag    3. Nocturia  -Ditropan  -Limit evening fluids    4. Erectile dysfunction of organic origin  -Rx for Cialis and GoodRx discount card given to patient  - tadalafiL (CIALIS) 20 MG Tab; Take 1 tablet (20 mg total) by mouth daily as needed (erectile dysfunction).  Dispense: 30 tablet; Refill: 11            Follow up in about 6 months (around 12/18/2020) for Follow up.

## 2020-06-22 LAB
FINAL PATHOLOGIC DIAGNOSIS: NORMAL
GROSS: NORMAL

## 2020-06-26 ENCOUNTER — TELEPHONE (OUTPATIENT)
Dept: OTOLARYNGOLOGY | Facility: CLINIC | Age: 55
End: 2020-06-26

## 2020-06-26 DIAGNOSIS — J32.9 SINUSITIS, UNSPECIFIED CHRONICITY, UNSPECIFIED LOCATION: Primary | ICD-10-CM

## 2020-06-30 ENCOUNTER — NURSE TRIAGE (OUTPATIENT)
Dept: ADMINISTRATIVE | Facility: CLINIC | Age: 55
End: 2020-06-30

## 2020-06-30 NOTE — TELEPHONE ENCOUNTER
Pt contacted through Post Procedural Symptom Tracking. Denies any cough, fever, or difficulty breathing since procedure.     Reason for Disposition   Health Information question, no triage required and triager able to answer question    Protocols used: INFORMATION ONLY CALL-A-AH

## 2020-08-10 ENCOUNTER — LAB VISIT (OUTPATIENT)
Dept: FAMILY MEDICINE | Facility: CLINIC | Age: 55
End: 2020-08-10
Payer: MEDICARE

## 2020-08-10 DIAGNOSIS — J32.9 SINUSITIS, UNSPECIFIED CHRONICITY, UNSPECIFIED LOCATION: ICD-10-CM

## 2020-08-10 PROCEDURE — U0003 INFECTIOUS AGENT DETECTION BY NUCLEIC ACID (DNA OR RNA); SEVERE ACUTE RESPIRATORY SYNDROME CORONAVIRUS 2 (SARS-COV-2) (CORONAVIRUS DISEASE [COVID-19]), AMPLIFIED PROBE TECHNIQUE, MAKING USE OF HIGH THROUGHPUT TECHNOLOGIES AS DESCRIBED BY CMS-2020-01-R: HCPCS

## 2020-08-12 LAB — SARS-COV-2 RNA RESP QL NAA+PROBE: NOT DETECTED

## 2020-08-13 ENCOUNTER — OFFICE VISIT (OUTPATIENT)
Dept: OTOLARYNGOLOGY | Facility: CLINIC | Age: 55
End: 2020-08-13
Payer: MEDICARE

## 2020-08-13 VITALS
WEIGHT: 215.38 LBS | TEMPERATURE: 98 F | HEIGHT: 71 IN | SYSTOLIC BLOOD PRESSURE: 142 MMHG | BODY MASS INDEX: 30.15 KG/M2 | DIASTOLIC BLOOD PRESSURE: 80 MMHG

## 2020-08-13 DIAGNOSIS — J34.2 NASAL SEPTAL DEVIATION: ICD-10-CM

## 2020-08-13 DIAGNOSIS — M95.0 NASAL VALVE COLLAPSE: ICD-10-CM

## 2020-08-13 DIAGNOSIS — J34.3 HYPERTROPHY OF BOTH INFERIOR NASAL TURBINATES: ICD-10-CM

## 2020-08-13 DIAGNOSIS — J34.89 REFRACTORY OBSTRUCTION OF NASAL AIRWAY: Primary | ICD-10-CM

## 2020-08-13 DIAGNOSIS — K21.9 LARYNGOPHARYNGEAL REFLUX (LPR): ICD-10-CM

## 2020-08-13 DIAGNOSIS — R09.81 NASAL CONGESTION: ICD-10-CM

## 2020-08-13 DIAGNOSIS — R09.82 POSTNASAL DRIP: ICD-10-CM

## 2020-08-13 DIAGNOSIS — R49.0 HOARSENESS OF VOICE: ICD-10-CM

## 2020-08-13 DIAGNOSIS — K21.9 GASTROESOPHAGEAL REFLUX DISEASE, ESOPHAGITIS PRESENCE NOT SPECIFIED: ICD-10-CM

## 2020-08-13 DIAGNOSIS — J34.9 SINUS PROBLEM: ICD-10-CM

## 2020-08-13 DIAGNOSIS — R09.89 CHRONIC THROAT CLEARING: ICD-10-CM

## 2020-08-13 PROCEDURE — 31231 NASAL ENDOSCOPY DX: CPT | Mod: 59,S$GLB,, | Performed by: OTOLARYNGOLOGY

## 2020-08-13 PROCEDURE — 31575 PR LARYNGOSCOPY, FLEXIBLE; DIAGNOSTIC: ICD-10-PCS | Mod: S$GLB,,, | Performed by: OTOLARYNGOLOGY

## 2020-08-13 PROCEDURE — 31575 DIAGNOSTIC LARYNGOSCOPY: CPT | Mod: S$GLB,,, | Performed by: OTOLARYNGOLOGY

## 2020-08-13 PROCEDURE — 31231 NASAL/SINUS ENDOSCOPY: ICD-10-PCS | Mod: 59,S$GLB,, | Performed by: OTOLARYNGOLOGY

## 2020-08-13 PROCEDURE — 99205 PR OFFICE/OUTPT VISIT, NEW, LEVL V, 60-74 MIN: ICD-10-PCS | Mod: 25,S$GLB,, | Performed by: OTOLARYNGOLOGY

## 2020-08-13 PROCEDURE — 99205 OFFICE O/P NEW HI 60 MIN: CPT | Mod: 25,S$GLB,, | Performed by: OTOLARYNGOLOGY

## 2020-08-13 RX ORDER — LIDOCAINE HYDROCHLORIDE 10 MG/ML
1 INJECTION, SOLUTION EPIDURAL; INFILTRATION; INTRACAUDAL; PERINEURAL ONCE
Status: CANCELLED | OUTPATIENT
Start: 2020-08-13 | End: 2020-08-13

## 2020-08-13 RX ORDER — OXYMETAZOLINE HCL 0.05 %
2 SPRAY, NON-AEROSOL (ML) NASAL
Status: CANCELLED | OUTPATIENT
Start: 2020-08-13

## 2020-08-13 RX ORDER — DEXAMETHASONE SODIUM PHOSPHATE 4 MG/ML
8 INJECTION, SOLUTION INTRA-ARTICULAR; INTRALESIONAL; INTRAMUSCULAR; INTRAVENOUS; SOFT TISSUE
Status: CANCELLED | OUTPATIENT
Start: 2020-08-13

## 2020-08-13 RX ORDER — OMEPRAZOLE 40 MG/1
40 CAPSULE, DELAYED RELEASE ORAL
Qty: 180 CAPSULE | Refills: 1 | Status: SHIPPED | OUTPATIENT
Start: 2020-08-13 | End: 2021-09-28 | Stop reason: SDUPTHER

## 2020-08-13 NOTE — PROGRESS NOTES
Subjective:      Rito Conley is a 55 y.o. male who was referred to me by Josiane Farias in consultation for post-nasal drip, rhinitis, congestion, throat clearing, and question of sinus disease related to symptoms with some sinus pressure reported as well. He reports a long history of these types of symptoms for years. No significant improvement and some worsening despite multiple trials of medical therapies to address these issues. He was using Flonase daily for over a month previously without benefit so discontinued, then in the last few months was using daily again and continued to have no significant improvement. Was started on additional therapy with azelastine nasal spray and oral antihistamines over 6 moths ago and also had no significant benefits despite daily use as instructed. He notices symptoms are worse at night when laying down in general, with increased post-nasal drip, mucous in back of nose and throat, as well as nasal congestion. He denies a significant history of acute sinusitis, including facial pain and discolored nasal drainage. His nasal discharge is generally clear mucous material and normally post-nasal. No recurrent treatments with antibiotics or steroids for sinus infections. He does not notice any environmental triggers or specific seasonal association. No other allergy symptoms aside from some noted itchy eyes, no watery eyes, itchy nose or rash associated with other sinonasal symptoms. No fevers, chills, or shortness of breath.     Current sinonasal medications as above.  The last course of antibiotics was a long time ago.  He does not regularly use nasal decongestant sprays. He does not recall previously having allergy testing. He denies a history of asthma.    He relates a history of reflux symptoms which is currently managed with daily 40mg omeprazole.  He has previously had an EGD. Recently seen by Dr. Ceja on 6/11/2020 regarding GERD and records from that visit were  reviewed, describing his history of prior abdominal wall hernia repair in 2018 and continued compliance with daily omeprazole 40mg. EGD and follow-up with PCP was advised at that time per plan documented. EGD was performed 6/17/2020 and documentation was reviewed from that procedure describing overall normal findings with note of patulous LES, and plan for no change to diet or medications and discharge to prior provider. There has been no follow-up regarding this ongoing dyspepsia with persistent symptoms uncontrolled by his continued medication regimen of omeprazole 40mg daily.     He denies a diagnosis of obstructive sleep apnea but does report mouth breathing at night, and dry mouth related to this in the morning. Possible snoring. He has not had sinonasal surgery. He does not recall a prior history of nasal trauma.    QOL assessment deferred.    Past Medical History  He has a past medical history of Allergy, DJD of shoulder, Hyperlipidemia, Hypertension, Lower back pain, PTSD (post-traumatic stress disorder), and Sleep apnea.    Past Surgical History  He has a past surgical history that includes Hernia repair; Colonoscopy; and Esophagogastroduodenoscopy (N/A, 6/17/2020).    Family History  His family history includes Cancer in his maternal aunt and maternal grandmother; Hypertension in his mother; No Known Problems in his brother and sister; Other in his father.    Social History  He reports that he has never smoked. He has never used smokeless tobacco. He reports current alcohol use of about 4.0 - 6.0 standard drinks of alcohol per week. He reports that he does not use drugs.    Allergies  He has No Known Allergies.    Medications   He has a current medication list which includes the following prescription(s): amlodipine, azelastine, bupropion, cetirizine, desloratadine, fluticasone propionate, hydrochlorothiazide, ibuprofen, lipitor, loratadine, oxybutynin, oxycodone-acetaminophen, paroxetine, tadalafil,  "zolpidem, omeprazole, oxycodone-acetaminophen, and oxycodone-acetaminophen.    Review of Systems  Review of Systems   Constitutional: Negative.  Negative for chills, fatigue and fever.   HENT: Positive for hoarse voice, postnasal drip and sinus pressure. Negative for congestion, dental problem, ear discharge, ear pain, facial swelling, hearing loss, nosebleeds, rhinorrhea, sore throat, tinnitus, trouble swallowing and voice change.    Eyes: Positive for itching. Negative for photophobia, discharge and visual disturbance.   Respiratory: Negative.  Negative for apnea, cough, shortness of breath and wheezing.    Cardiovascular: Negative.  Negative for chest pain and palpitations.   Gastrointestinal: Negative.  Negative for abdominal pain, nausea and vomiting.   Endocrine: Negative.  Negative for cold intolerance and heat intolerance.   Genitourinary: Negative.  Negative for difficulty urinating.   Musculoskeletal: Negative.  Negative for arthralgias, back pain, myalgias and neck pain.        Degenerative Joint Disease   Skin: Negative.  Negative for rash.   Allergic/Immunologic: Negative.  Negative for environmental allergies and food allergies.   Neurological: Negative.  Negative for dizziness, seizures, syncope, light-headedness and headaches.   Hematological: Negative.  Negative for adenopathy. Does not bruise/bleed easily.   Psychiatric/Behavioral: Negative.  Negative for decreased concentration, dysphoric mood and sleep disturbance. The patient is not nervous/anxious.    All other systems reviewed and are negative.    Objective:     BP (!) 142/80 (BP Location: Right arm, Patient Position: Sitting, BP Method: Large (Manual))   Temp 97.9 °F (36.6 °C)   Ht 5' 11" (1.803 m)   Wt 97.7 kg (215 lb 6.2 oz)   BMI 30.04 kg/m²        Constitutional:   Vital signs are normal. He appears well-developed and well-nourished. He does not appear ill. No distress. Abnormal speech.  Hoarse voice.  No breathy voice.  "     Head:  Normocephalic and atraumatic. No skin lesions. Salivary glands normal.  Facial strength is normal.      Ears:    Right Ear: No drainage, swelling or tenderness. No mastoid tenderness. Tympanic membrane is not injected, not perforated, not erythematous, not retracted and not bulging. No middle ear effusion.   Left Ear: No drainage, swelling or tenderness. No mastoid tenderness. Tympanic membrane is not injected, not perforated, not erythematous, not retracted and not bulging.  No middle ear effusion.     Nose:  Mucosal edema (mild - moderate mucosal inflammation/edema) and septal deviation present. No rhinorrhea, sinus tenderness or polyps. Epistaxis (Inactive bloody crusting within left anterior nares along septum) is observed. Turbinate hypertrophy.  Right sinus exhibits no maxillary sinus tenderness and no frontal sinus tenderness. Left sinus exhibits no maxillary sinus tenderness and no frontal sinus tenderness.         Mouth/Throat  Oropharynx clear and moist without lesions or asymmetry, normal uvula midline and lips, teeth, and gums normal. No oral lesions, trismus or mucous membrane lesions. Posterior oropharyngeal edema and posterior oropharyngeal erythema present. No oropharyngeal exudate. Mirror exam not performed due to patient tolerance.  Mirror exam not performed due to patient tolerance.      Neck:  Neck normal without thyromegaly masses, asymmetry, normal tracheal structure, crepitus, and tenderness, trachea normal, phonation normal, full range of motion with neck supple and no adenopathy. No edema and no erythema present.     Cardiovascular:   Normal rate and regular rhythm.      Pulmonary/Chest:   Effort normal. No respiratory distress.     Psychiatric:   He has a normal mood and affect. His speech is abnormal and behavior is normal.     Neurological:   He has neurological normal, alert and oriented. No cranial nerve deficit or sensory deficit. Coordination and gait normal.     Skin:   No  abrasions, lacerations, lesions, or rashes.       Procedure    Nasal endoscopy performed. Details of procedure described in separate procedure note.      Images obtained from endoscopy procedure today representing key findings (images also uploaded to media associated with patient's chart):     Left side: Before decongestant application                    Right side: Before decongestant application                Left side: After decongestant application                            Right side: After decongestant application                          Flexible Laryngoscopy: [45763] Laryngoscopy was indicated by the patient's chief complaints for assessment of upper aerodigestive structure and function. A separate flexible laryngoscope was used for this exam following rigid nasal endoscopy described above. The flexible laryngoscope was used by transnasal passage to obtain an exam of the nasopharynx, oropharynx, pharynx, and larynx. After verbal consent was obtained, the patient was positioned and the nose was topically decongested with 1% phenylephrine and topically anesthetized with 4% lidocaine. The endoscope was passed through the most patent nasal cavity and positioned to image the nasopharynx, larynx, and hypopharynx in detail. The following features were examined: nasopharyngeal, laryngeal, hypopharyngeal masses; velopharyngeal strength, closure, and symmetry of motion; vocal fold structure and function including symmetry of motion during dynamic observation of movement with voice tasks; laryngeal mucosal edema, erythema, inflammation, and hydration; and salivary pooling. The flexible laryngoscope was then removed. The patient tolerated the procedure well without complication. All findings were normal except:    * Significant erythema and edema of the arytenoids, interarytenoid area, and generally inflamed and edematous appearing post-cricoid laryngeal mucosa.  * Cobblestoning changes consistent with inflammation  of the posterior pharyngeal wall mucosa.  * Findings overall most suggestive of changes resulting from laryngopharyngeal reflux related to GERD.    Images obtained from endoscopy procedure today representing key findings (images also uploaded to media associated with patient's chart):       Nasopharynx         Cobblestoning changes consistent with inflammation of the posterior pharyngeal wall mucosa.                    Significant erythema and edema of the arytenoids, interarytenoid area, and generally inflamed and edematous appearing post-cricoid laryngeal mucosa.        Data Reviewed    WBC (K/uL)   Date Value   05/14/2019 5.09     Eosinophil% (%)   Date Value   05/14/2019 4.3     Eos # (K/uL)   Date Value   05/14/2019 0.2     Platelets (K/uL)   Date Value   05/14/2019 308     Glucose (mg/dL)   Date Value   05/14/2019 95     No results found for: IGE    Past medical records were reviewed with data pertinent to the chief complaint summarized in the HPI. Data was obtained from records including specifically, the documentation from GI evaluation and EGD which was personally reviewed. EGD performed on 6/17/20 describes overall normal findings with note of patulous LES as per impressions and recommendations copied below:  Impression:   - Normal esophagus.                         - Z-line, 38 cm from the incisors.                         - Patulous lower esophageal sphincter.                         - Normal stomach. Biopsied.                         - Normal examined duodenum.   Recommendation:       - Discharge patient to home (ambulatory).                         - Resume previous diet today.                         - Continue present medications.                         - Await pathology results.                         - Return to referring physician as previously                         scheduled.     No sinus imaging available.       Assessment:     1. Refractory obstruction of nasal airway    2. Sinus problem     3. Gastroesophageal reflux disease, esophagitis presence not specified    4. Laryngopharyngeal reflux (LPR)    5. Hoarseness of voice    6. Postnasal drip    7. Nasal congestion    8. Nasal septal deviation    9. Nasal valve collapse    10. Chronic throat clearing    11. Hypertrophy of both inferior nasal turbinates         Plan:     I had a long discussion with the patient regarding his condition and the further workup and management options for the multiple issues addressed at today's visit:    Post-nasal drip  - I discussed the many etiologies which can contribute to post-nasal drip and potential multifactorial cause for these symptoms, including allergies, rhinitis of many causes, sinusitis of many causes, acid reflux, viral illnesses, medications, environmental factors (foods, temperature changes, irritants, etc), and abnormalities of nasal anatomy. The challenge of addressing this issue was also explained given there is often a less clear direct relationship to a single treatable cause, aside from when more clearly associated with a single specific event or other medical condition such as with trauma, or obvious association to change in medication.   -Based on thorough review of history, past evaluations, diagnostic studies available, exam findings, and both sinonasal endoscopy and laryngoscopy performed for evaluation of separate possible laryngopharyngeal etiologies and sinonasal etiologies for his symptoms, GERD with LPR is supported as the most likely cause of his post-nasal drip, throat clearing, and hoarseness of voice.  -I have advised addressing GERD/LPR as the most likely cause supported by today's evaluation and past history, while continuing to treat nasal etiologies which are less supported by findings as cause of post-nasal drip but may be contributing to symptoms overall by limiting nasal breathing and promoting mouth breathing and throat dryness and irritation.    Gastroesophageal reflux  disease (GERD), Laryngopharyngeal reflux (LPR)  - I am suspicious of presence of gastroesophageal reflux disease (GERD) with a component of laryngopharyngeal reflux (LPR); findings on laryngoscopy performed today are suggestive of LPR with characteristic, though not diagnostic/pathognomonic, findings including post-cricoid edema and erythema. There were no other concerning findings on laryngoscopy and the patient was reassured of this, specifically that there were no masses concerning for neoplastic or malignant pathology. Additionally there were no neck masses or physical exam findings to suggest additional pathology or concerns.    -After discussing options for medical therapy for GERD, I prescribed omeprazole (Prilosec) 40 mg twice daily for increased treatment of reflux given his persistent symptoms refractory to chronic daily use of omeprazole 40 mg once daily. Risks and benefits were discussed particularly those associated with prolonged use of PPIs which I advised be considered if ongoing long term therapy were to be indicated. I also counseled him on the benefit of reducing his intake of items that may worsen reflux including caffeine, alcohol, and rich/spicy foods. The role of diet and lifestyle in reflux was also specifically discussed. I recommended avoidance of eating within three hours of bedtime and considering head of bed elevation. The role of weight loss and physiologic impact of increased abdominal weight on reflux was also discussed. I also advised increased water consumption to clear reflux and reduce irritation to the laryngopharynx and to encourage hydration and promote normal bicarbonate-rich secretions to facilitate neutralization of acidic reflux. The importance of these diet and lifestyle interventions, regardless of therapy with medications, was emphasized. He voiced understanding of this discussion and instructions for management, and all of questions regarding this were answered.  - Will  evaluate response to therapy prescribed today and adjust as indicated, but have also advised future follow-up with GI to assist in optimizing management.    Nasal congestion, Refractory obstruction of nasal airway, Turbinate hypertrophy, Nasal septal deviation, and Nasal valve collapse  - Potential contributions to all symptoms as described above, though strongest predicted relationship to symptoms of difficulty with nasal breathing as findings primarily limited to anatomic causes without evidence of bacterial/infectious sinonasal disease, significant chronic inflammatory disease, or other concerning sinonasal pathology.  - I have recommended ongoing medical management with nasal saline irrigations, as well as inhaled nasal steroid (fluticasone).  - Regarding nasal saline irrigations, specific instructions were provided on using this treatment. Explanation was given regarding performing these irrigations, and also provided as written instructions.     - Discussed role for surgical interventions given that his symptoms have been refractory to adequate medical therapy including daily topical nasal steroid sprays for months. Discussed that this would address partial obstruction and congestion primarily, but would not be directly anticipated to impact symptoms of post-nasal drip, sinus/facial pressure or pain, or other sinonasal concerns, though could secondarily improve additional symptoms by improving nasal drug and rinse delivery. The need for ongoing medical management to treat inflammatory etiologies of symptoms despite possible surgery was also reviewed, emphasizing that surgical procedures would address physical causes of obstruction and congestion and serve as an adjunct rather than replacement of medical management.  - After a thorough discussion of treatment options, he is interested in proceeding with surgery:  He would specifically benefit from procedures including septoplasty, inferior turbinate reduction,  septal swell body reduction, and treatment of nasal valve collapse for the treatment of his condition. This is planned to be performed endoscopically and endonasally, without need for open approaches, and with possible use of Vivaer RF ablation to address obstructive intranasal tissue at the external nasal valves and septal swell bodies. The endoscopic surgical techniques as well as the Vivaer RF device for tissue ablation and airway remodeling were discussed and all questions answered regarding this. I discussed the risks, benefits and alternatives to surgery with the patient, as well as the expected postoperative course. I gave them the opportunity to ask questions and I answered all of them. Same-day discharge is anticipated, but overnight observation a possiblity if there are any suzanne-operative concerns such as ability to mainatain oxygen saturation, issues related to sleep apena, and other concerns. Expectations for post-operative discomfort and plans for management were reviewed, as were expected initial nasal congestion/partial obstruction related to splints/packing, nasal bleeding anticipated for the first few days after the procedures and instructions for managing this (including replacing mucstache dressing and use of Afrin), and recommendations for general cares and medication use after surgery. I also reviewed anticipated need to avoid strenous activity/exertion, straining, and heavy lifting for two weeks after the procedures with clearance to return to full activity to be determined at post-operative follow-up, but normal non-strenous activity to be resumed as tolerated post-operatively. He will have pre-anesthesia evaluation and pre-operative clinic visit prior to planned procedures and this has been scheduled. The surgery is scheduled for 10/13/2020. will need to return for a postoperative visit 1, possibly 2 (depending on first post-op findings), and 4 weeks after surgery. I encouraged him to call  with any questions that may arise prior to surgery, or for any significant changes or concerns in the meantime.    Chronic throat clearing, Hoarseness of voice  - I am suspicious of GERD/LPR as primary cause and have advised treatment as above.   - Potential contributions from sinonasal etiologies though less likely based on evaluation, but may benefit from addressing nasal obstruction as planned and described above. May also benefit from continued treatment of potential sinonasal inflammation contributing to post-nasal drainage and laryngopharyngitis as above.          Nick Gomez MD    Rhinology, Allergy, and Sinus-Skull Base Surgery    Department of Otorhinolaryngology    [facility]    Phone  [phone]    Fax      [phone]

## 2020-08-13 NOTE — LETTER
August 13, 2020      MARCELA Ley  [address] [address]  [place] [address]           [facility]  [address]   [place] [address]  Phone: [phone]          Patient: Rito Conley   MR Number: [id number]   YOB: 1965   Date of Visit: 8/13/2020       Dear Josiane Farias:    Thank you for referring Rito Conley to me for evaluation. Attached you will find relevant portions of my assessment and plan of care.    If you have questions, please do not hesitate to call me. I look forward to following Rito Conley along with you.    Sincerely,    Nick Gomez MD    Enclosure  CC:  No Recipients    If you would like to receive this communication electronically, please contact [email or web address] or [phone] to request more information on [employer] Link access.    For providers and/or their staff who would like to refer a patient to [facility], please contact us through our one-stop-shop provider referral line, Jamil Bsuch, at [phone].    If you feel you have received this communication in error or would no longer like to receive these types of communications, please e-mail [email or web address]

## 2020-08-13 NOTE — PATIENT INSTRUCTIONS
"Information and instructions from your visit with me today:    Treatment of acid reflux:    I prescribed omeprazole (Prilosec) 40 mg twice daily for treatment of reflux. This medication helps reduce production of stomach acid.     In addition to taking medication, try to reduce intake of items that may worsen reflux including caffeine, alcohol, and rich/spicy foods. Avoid eating within three hours of bedtime and consider head of bed elevation to reduce the reflux of stomach acid/contents up your throat when laying flat and gravity is working against you.  Diet and lifestyle is particularly important for managing causes of reflux.  The role of weight loss is also important as increased abdominal weight puts more pressure on the stomach, which can cause more stomach acid to move up your throat - increased weight combined with laying flat at night leads to even more pressure on the stomach and reflux. Increased water consumption to clear reflux and reduce irritation to the throat can also be helpful. Do your best to make changes to any of these diet and lifestyle interventions which apply to you and your lifestyle/diet, regardless of therapy with medications.       · Continue using fluticasone nasal spray:    Use this medication as 2 sprays on each side of your nose once daily. You need to use this medication every day regardless of symptoms, as it takes time to work and get the benefits. It does not work on an "as needed" basis like taking a decongestant. Place the tip of the medication bottle in your nose and aim slightly up and out on each side to get medication high and deep into your nose and sinuses, and not have it all deposit in the very front of your nose. You can imagine aiming towards the back of your eyeball on each side for this, as opposed to straight back to the center of your nose and head.     · Start nasal irrigations with saline solution:    SALINE SINUS RINSE ([employer] brand): You should do a full " bottle, half on one side of your nose and half on the other, 1-2 times per day. Follow the instructions on the box: mix the salt packet with clean water (bottle, previously boiled, distilled, etc -- not tap water) to the line on the bottle to make the irrigation.      · Do not use nasal decongestant sprays such as Afrin or similar products.    It was nice meeting you today, and I look forward to helping you feel better soon. Please don't hesitate to call if you have any other questions or concerns, or if I can be of any assistance in the meantime.       Nick [place] [facility]    Phone  [phone]    Fax      [phone]        Nick Gomez MD  Rhinology, Sinus, and Skull Base Surgery  Department of Otorhinolaryngology

## 2020-08-14 NOTE — PROCEDURES
Nasal/sinus endoscopy    Date/Time: 8/13/2020 9:30 AM  Performed by: Nick Gomez MD  Authorized by: Nick Gomez MD     Consent Done?:  Yes (Verbal)  Anesthesia:     Local anesthetic:  4% Xylocaine spray with Dane-Synephrine    Patient tolerance:  Patient tolerated the procedure well with no immediate complications  Nose:     Procedure Performed:  Nasal Endoscopy  External:      No external nasal deformity  Intranasal:      Mucosa no polyps     Mucosa ulcers not present     No mucosa lesions present     Enlarged turbinates     Septum gross deformity  Nasopharynx:      No mucosa lesions     Adenoids not present     Posterior choanae patent     Eustachian tube patent       Nasal endoscopy was performed as indicated to evaluate the patient's chief concerns and for further evaluation of findings not able to be fully assessed by physical exam with anterior rhinoscopy. The endoscope was utilized to evaluate the bilateral sinonasal cavities, mucosa, sinus ostia and turbinates. Verbal consent was obtained after describing the indication for this diagnostic procedure and potential risks. Topical anesthetic and decongestant was applied in the bilateral nares. The scope was placed in the patient's left anterior nares and advanced through the nasal cavity carefully examining structures including the nasal septum, nasal turbinates, osteomeatal complex, olfactory recess, sinus ostia, as well as the nasopharynx and eustachian tube orifice at the torus tubarius. The scope was then placed in the patient's right anterior nares and an identical diagnostic evaluation performed on this contralateral side. The patient tolerated the procedure well. The procedure was performed successfully, completely, and without complications.     Positive and negative findings are indicated for key anatomic structures and clinical questions addressed by this procedure. Findings were explained to the patient with all questions answered.  Descriptions and representative images of key findings from this diagnostic endoscopy procedure can be found in the associated clinic note of the same date of service. All uploaded images obtained during this exam may also be found in the media section of the patient's chart.      Summary of key findings:   + septal deviation present  + inferior turbinate hypertrophy present  - no purulent or abnormal sinonasal discharge  - no nasal polyps or masses present  - no septal perforation  - nasopharynx symmetric and torus without eustachian tube obstruction  - no obstructive adenoid tissue present  - no active epistaxis  No additional concerning findings on nasal endoscopy      Nick Gomez MD    Rhinology, Allergy, and Sinus-Skull Base Surgery    Department of Otorhinolaryngology    [facility]    Phone  [phone]    Fax      [phone]

## 2020-08-25 ENCOUNTER — OFFICE VISIT (OUTPATIENT)
Dept: FAMILY MEDICINE | Facility: CLINIC | Age: 55
End: 2020-08-25
Payer: MEDICARE

## 2020-08-25 VITALS
OXYGEN SATURATION: 97 % | SYSTOLIC BLOOD PRESSURE: 124 MMHG | TEMPERATURE: 98 F | HEIGHT: 71 IN | HEART RATE: 80 BPM | WEIGHT: 213.63 LBS | BODY MASS INDEX: 29.91 KG/M2 | RESPIRATION RATE: 16 BRPM | DIASTOLIC BLOOD PRESSURE: 80 MMHG

## 2020-08-25 DIAGNOSIS — J01.90 ACUTE SINUSITIS WITH SYMPTOMS GREATER THAN 10 DAYS: Primary | ICD-10-CM

## 2020-08-25 PROCEDURE — 99214 OFFICE O/P EST MOD 30 MIN: CPT | Mod: S$PBB,,, | Performed by: PHYSICIAN ASSISTANT

## 2020-08-25 PROCEDURE — 99999 PR PBB SHADOW E&M-EST. PATIENT-LVL V: CPT | Mod: PBBFAC,,, | Performed by: PHYSICIAN ASSISTANT

## 2020-08-25 PROCEDURE — 99999 PR PBB SHADOW E&M-EST. PATIENT-LVL V: ICD-10-PCS | Mod: PBBFAC,,, | Performed by: PHYSICIAN ASSISTANT

## 2020-08-25 PROCEDURE — 99215 OFFICE O/P EST HI 40 MIN: CPT | Mod: PBBFAC,PO | Performed by: PHYSICIAN ASSISTANT

## 2020-08-25 PROCEDURE — 99214 PR OFFICE/OUTPT VISIT, EST, LEVL IV, 30-39 MIN: ICD-10-PCS | Mod: S$PBB,,, | Performed by: PHYSICIAN ASSISTANT

## 2020-08-25 RX ORDER — AMOXICILLIN 500 MG/1
500 TABLET, FILM COATED ORAL EVERY 12 HOURS
Qty: 14 TABLET | Refills: 0 | Status: SHIPPED | OUTPATIENT
Start: 2020-08-25 | End: 2020-09-01

## 2020-08-25 RX ORDER — MINERAL OIL
180 ENEMA (ML) RECTAL DAILY
Qty: 30 TABLET | Refills: 11 | Status: SHIPPED | OUTPATIENT
Start: 2020-08-25 | End: 2021-02-22 | Stop reason: SDUPTHER

## 2020-08-25 NOTE — PROGRESS NOTES
Subjective:       Patient ID: Rito Conley is a 55 y.o. male with multiple medical diagnoses as listed in the medical history and problem list that presents for Sinusitis (having sinus surgery in october)  .    Chief Complaint: Sinusitis (having sinus surgery in october)      Sinusitis  This is a recurrent problem. The current episode started in the past 7 days. The problem has been waxing and waning since onset. There has been no fever. Associated symptoms include congestion, headaches and sinus pressure. Pertinent negatives include no chills, coughing, diaphoresis, ear pain, hoarse voice, neck pain, shortness of breath, sneezing, sore throat or swollen glands. Treatments tried: flonase, astelin bid and loratdine  The treatment provided mild relief.     Review of Systems   Constitutional: Negative for chills, diaphoresis, fatigue and fever.   HENT: Positive for congestion, postnasal drip and sinus pressure. Negative for ear pain, hoarse voice, rhinorrhea, sinus pain, sneezing, sore throat and trouble swallowing.    Eyes: Negative for photophobia, pain, discharge, redness and itching.   Respiratory: Negative for cough, chest tightness, shortness of breath and wheezing.    Cardiovascular: Negative for chest pain.   Gastrointestinal: Negative.    Musculoskeletal: Negative for neck pain.   Neurological: Positive for headaches.         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    Hyperlipidemia     Hypertension     Lower back pain     PTSD (post-traumatic stress disorder)     Sleep apnea     uses CPAP 1-2x/week       SOCIAL HISTORY:  Social History     Socioeconomic History    Marital status:      Spouse name: Not on file    Number of children: Not on file    Years of education: Not on file    Highest education level: Not on file   Occupational History    Not on file   Social Needs    Financial resource strain: Not hard at all     Food insecurity     Worry: Never true     Inability: Never true    Transportation needs     Medical: No     Non-medical: No   Tobacco Use    Smoking status: Never Smoker    Smokeless tobacco: Never Used   Substance and Sexual Activity    Alcohol use: Yes     Alcohol/week: 4.0 - 6.0 standard drinks     Types: 4 - 6 Glasses of wine per week     Frequency: 2-3 times a week     Drinks per session: 1 or 2     Binge frequency: Less than monthly     Comment: a week.     Drug use: No    Sexual activity: Yes   Lifestyle    Physical activity     Days per week: 4 days     Minutes per session: 30 min    Stress: Only a little   Relationships    Social connections     Talks on phone: Three times a week     Gets together: Once a week     Attends [employer] service: Not on file     Active member of club or organization: Yes     Attends meetings of clubs or organizations: More than 4 times per year     Relationship status:    Other Topics Concern    Not on file   Social History Narrative    Not on file       ALLERGIES AND MEDICATIONS: updated and reviewed.  Review of patient's allergies indicates:   Allergen Reactions    Grass pollen-june grass standard Itching, Other (See Comments) and Rash     Current Outpatient Medications   Medication Sig Dispense Refill    amLODIPine (NORVASC) 10 MG tablet Take 1 tablet (10 mg total) by mouth once daily. 90 tablet 1    azelastine (ASTELIN) 137 mcg (0.1 %) nasal spray 1 spray (137 mcg total) by Nasal route 2 (two) times daily. 30 mL 11    buPROPion (WELLBUTRIN XL) 150 MG TB24 tablet Take 150 mg by mouth once daily.       fluticasone (FLONASE) 50 mcg/actuation nasal spray 1 spray by Each Nare route 2 (two) times daily. (Patient taking differently: 1 spray by Each Nare route 2 (two) times daily as needed. ) 3 Bottle 3    hydrochlorothiazide (HYDRODIURIL) 50 MG tablet Take 1 tablet (50 mg total) by mouth once daily. 90 tablet 1    ibuprofen (ADVIL,MOTRIN) 800 MG tablet Take  "1 tablet (800 mg total) by mouth 3 (three) times daily as needed for Pain (TAKE WITH MEALS). 30 tablet 0    LIPITOR 40 mg tablet Take 1 tablet (40 mg total) by mouth once daily. 30 tablet 5    omeprazole (PRILOSEC) 40 MG capsule Take 1 capsule (40 mg total) by mouth 2 (two) times daily before meals. 180 capsule 1    oxybutynin (DITROPAN-XL) 5 MG TR24 Take 1 tablet (5 mg total) by mouth once daily. 90 tablet 3    oxyCODONE-acetaminophen (PERCOCET) 5-325 mg per tablet Take 1 tablet by mouth every 4 (four) hours as needed. 31 tablet 0    paroxetine (PAXIL) 20 MG tablet TAKE ONE-HALF TABLET BY MOUTH EVERY DAY FOR MENTAL HEALTH      zolpidem (AMBIEN) 10 mg Tab Take 1 tablet (10 mg total) by mouth nightly as needed. 30 tablet 2    amoxicillin (AMOXIL) 500 MG Tab Take 1 tablet (500 mg total) by mouth every 12 (twelve) hours. for 7 days 14 tablet 0    fexofenadine (ALLEGRA) 180 MG tablet Take 1 tablet (180 mg total) by mouth once daily. 30 tablet 11    oxyCODONE-acetaminophen (PERCOCET) 5-325 mg per tablet Take 1 tablet by mouth every 4 (four) hours as needed. 31 tablet 0    oxyCODONE-acetaminophen (PERCOCET) 5-325 mg per tablet Take 1 tablet by mouth every 4 (four) hours as needed. 31 tablet 0     No current facility-administered medications for this visit.          Objective:   /80   Pulse 80   Temp 98.2 °F (36.8 °C) (Oral)   Resp 16   Ht 5' 11" (1.803 m)   Wt 96.9 kg (213 lb 10 oz)   SpO2 97%   BMI 29.79 kg/m²      Physical Exam  Constitutional:       General: He is not in acute distress.  HENT:      Head: Normocephalic and atraumatic.      Right Ear: Ear canal and external ear normal. No middle ear effusion. Tympanic membrane is not injected or scarred.      Left Ear: Tympanic membrane, ear canal and external ear normal.  No middle ear effusion. Tympanic membrane is not injected or scarred.      Ears:      Comments: Air fluid levels bilaterally      Nose: Mucosal edema (right purulent discharge) " and congestion present. No rhinorrhea.      Right Sinus: No maxillary sinus tenderness or frontal sinus tenderness.      Left Sinus: No maxillary sinus tenderness or frontal sinus tenderness.      Mouth/Throat:      Pharynx: Uvula midline. Posterior oropharyngeal erythema (PND) present. No oropharyngeal exudate.   Eyes:      Conjunctiva/sclera: Conjunctivae normal.   Cardiovascular:      Rate and Rhythm: Normal rate and regular rhythm.      Heart sounds: Normal heart sounds.   Pulmonary:      Effort: Pulmonary effort is normal.      Breath sounds: Normal breath sounds. No wheezing.   Lymphadenopathy:      Cervical: No cervical adenopathy.   Neurological:      Mental Status: He is alert and oriented to person, place, and time.             Assessment:       1. Acute sinusitis with symptoms greater than 10 days        Plan:       Acute sinusitis with symptoms greater than 10 days  -     fexofenadine (ALLEGRA) 180 MG tablet; Take 1 tablet (180 mg total) by mouth once daily.  Dispense: 30 tablet; Refill: 11  -     amoxicillin (AMOXIL) 500 MG Tab; Take 1 tablet (500 mg total) by mouth every 12 (twelve) hours. for 7 days  Dispense: 14 tablet; Refill: 0    continue flonase and astelin  Demo tilting his head down when she sprays  Avoid dairy which he states he does not consume much of         No follow-ups on file.

## 2020-09-26 ENCOUNTER — PATIENT OUTREACH (OUTPATIENT)
Dept: ADMINISTRATIVE | Facility: OTHER | Age: 55
End: 2020-09-26

## 2020-09-28 ENCOUNTER — HOSPITAL ENCOUNTER (OUTPATIENT)
Dept: PREADMISSION TESTING | Facility: HOSPITAL | Age: 55
Discharge: HOME | End: 2020-09-28
Attending: OTOLARYNGOLOGY
Payer: MEDICARE

## 2020-09-28 ENCOUNTER — OFFICE VISIT (OUTPATIENT)
Dept: OTOLARYNGOLOGY | Facility: CLINIC | Age: 55
End: 2020-09-28
Payer: MEDICARE

## 2020-09-28 VITALS
OXYGEN SATURATION: 97 % | RESPIRATION RATE: 17 BRPM | TEMPERATURE: 98 F | WEIGHT: 214.31 LBS | HEIGHT: 71 IN | DIASTOLIC BLOOD PRESSURE: 94 MMHG | HEART RATE: 76 BPM | SYSTOLIC BLOOD PRESSURE: 148 MMHG | BODY MASS INDEX: 30 KG/M2

## 2020-09-28 VITALS
DIASTOLIC BLOOD PRESSURE: 82 MMHG | WEIGHT: 215.5 LBS | BODY MASS INDEX: 30.17 KG/M2 | HEIGHT: 71 IN | TEMPERATURE: 98 F | SYSTOLIC BLOOD PRESSURE: 124 MMHG

## 2020-09-28 DIAGNOSIS — J34.2 NASAL SEPTAL DEVIATION: ICD-10-CM

## 2020-09-28 DIAGNOSIS — M95.0 NASAL VALVE COLLAPSE: ICD-10-CM

## 2020-09-28 DIAGNOSIS — J34.89 REFRACTORY OBSTRUCTION OF NASAL AIRWAY: ICD-10-CM

## 2020-09-28 DIAGNOSIS — K21.9 LARYNGOPHARYNGEAL REFLUX (LPR): ICD-10-CM

## 2020-09-28 DIAGNOSIS — J34.3 HYPERTROPHY OF BOTH INFERIOR NASAL TURBINATES: ICD-10-CM

## 2020-09-28 DIAGNOSIS — Z01.818 PREOP TESTING: Primary | ICD-10-CM

## 2020-09-28 DIAGNOSIS — R09.82 POSTNASAL DRIP: ICD-10-CM

## 2020-09-28 DIAGNOSIS — R09.81 NASAL CONGESTION: Primary | ICD-10-CM

## 2020-09-28 DIAGNOSIS — R09.89 CHRONIC THROAT CLEARING: ICD-10-CM

## 2020-09-28 DIAGNOSIS — R49.0 HOARSENESS OF VOICE: ICD-10-CM

## 2020-09-28 LAB
ANION GAP SERPL CALC-SCNC: 10 MMOL/L (ref 8–16)
BASOPHILS # BLD AUTO: 0.03 K/UL (ref 0–0.2)
BASOPHILS NFR BLD: 0.5 % (ref 0–1.9)
BUN SERPL-MCNC: 12 MG/DL (ref 6–20)
CALCIUM SERPL-MCNC: 9.4 MG/DL (ref 8.7–10.5)
CHLORIDE SERPL-SCNC: 103 MMOL/L (ref 95–110)
CO2 SERPL-SCNC: 26 MMOL/L (ref 23–29)
CREAT SERPL-MCNC: 0.9 MG/DL (ref 0.5–1.4)
DIFFERENTIAL METHOD: NORMAL
EOSINOPHIL # BLD AUTO: 0.2 K/UL (ref 0–0.5)
EOSINOPHIL NFR BLD: 2.6 % (ref 0–8)
ERYTHROCYTE [DISTWIDTH] IN BLOOD BY AUTOMATED COUNT: 13.7 % (ref 11.5–14.5)
EST. GFR  (AFRICAN AMERICAN): >60 ML/MIN/1.73 M^2
EST. GFR  (NON AFRICAN AMERICAN): >60 ML/MIN/1.73 M^2
GLUCOSE SERPL-MCNC: 98 MG/DL (ref 70–110)
HCT VFR BLD AUTO: 43.8 % (ref 40–54)
HGB BLD-MCNC: 14.5 G/DL (ref 14–18)
IMM GRANULOCYTES # BLD AUTO: 0.02 K/UL (ref 0–0.04)
IMM GRANULOCYTES NFR BLD AUTO: 0.3 % (ref 0–0.5)
LYMPHOCYTES # BLD AUTO: 2.8 K/UL (ref 1–4.8)
LYMPHOCYTES NFR BLD: 44.3 % (ref 18–48)
MCH RBC QN AUTO: 29.8 PG (ref 27–31)
MCHC RBC AUTO-ENTMCNC: 33.1 G/DL (ref 32–36)
MCV RBC AUTO: 90 FL (ref 82–98)
MONOCYTES # BLD AUTO: 0.6 K/UL (ref 0.3–1)
MONOCYTES NFR BLD: 9.4 % (ref 4–15)
NEUTROPHILS # BLD AUTO: 2.7 K/UL (ref 1.8–7.7)
NEUTROPHILS NFR BLD: 42.9 % (ref 38–73)
NRBC BLD-RTO: 0 /100 WBC
PLATELET # BLD AUTO: 289 K/UL (ref 150–350)
PMV BLD AUTO: 9.2 FL (ref 9.2–12.9)
POTASSIUM SERPL-SCNC: 3.9 MMOL/L (ref 3.5–5.1)
RBC # BLD AUTO: 4.87 M/UL (ref 4.6–6.2)
SODIUM SERPL-SCNC: 139 MMOL/L (ref 136–145)
WBC # BLD AUTO: 6.27 K/UL (ref 3.9–12.7)

## 2020-09-28 PROCEDURE — 93010 EKG 12-LEAD: ICD-10-PCS | Mod: ,,, | Performed by: INTERNAL MEDICINE

## 2020-09-28 PROCEDURE — 93005 ELECTROCARDIOGRAM TRACING: CPT

## 2020-09-28 PROCEDURE — 93010 ELECTROCARDIOGRAM REPORT: CPT | Mod: ,,, | Performed by: INTERNAL MEDICINE

## 2020-09-28 PROCEDURE — 80048 BASIC METABOLIC PNL TOTAL CA: CPT

## 2020-09-28 PROCEDURE — 99213 PR OFFICE/OUTPT VISIT, EST, LEVL III, 20-29 MIN: ICD-10-PCS | Mod: S$GLB,,, | Performed by: OTOLARYNGOLOGY

## 2020-09-28 PROCEDURE — 85025 COMPLETE CBC W/AUTO DIFF WBC: CPT

## 2020-09-28 PROCEDURE — 99213 OFFICE O/P EST LOW 20 MIN: CPT | Mod: S$GLB,,, | Performed by: OTOLARYNGOLOGY

## 2020-09-28 NOTE — DISCHARGE INSTRUCTIONS
Your surgery is scheduled for _Tuesday Oct. 13, 2020_______.    Call [phone] between 2 p.m. and 5 p.m. on   _Monday 10-12-2020__ to find out your arrival time for the day of your surgery.      Please report to SAME DAY SURGERY UNIT on the 2nd FLOOR at _______ a.m.  Use front door entrance. The doors open at 0530 am.          INSTRUCTIONS IMPORTANT!!!  ¨ Do not eat or drink after 12 midnight-including water. OK to brush teeth, no   gum, candy or mints!    ¨ Take only these medicines with a small swallow of water-morning of surgery.  Take med's checked on MED LIST            __x__  Return to Hospital Lab on _Oct. 12, 2020 at 7:40 am_for Covid test.    _x___  Prep instructions:    SHOWER         _x___  No powder, lotions or creams to your body.  _x___  You may wear only deodorant on the day of surgery.  _x___  Please remove all jewelry, including piercings and leave at home.  _x___  No money or valuables needed. Please leave at home.  You may bring your  cell phone.  ____  Please bring any documents given by your doctor.  _x___  If going home the same day, arrange for a ride home. You will not be able to   drive if Anesthesia was used.    _x___  Wear loose fitting clothing. Allow for dressings, bandages.  _x___  Stop Aspirin, Ibuprofen, Motrin and Aleve at least 3-5 days before  surgery, unless otherwise instructed by your doctor, or the nurse.       x       You MAY use Tylenol/acetaminophen until day of surgery.    __x__  Call MD for temperature above 101 degrees.        _x___ Stop taking any Fish Oil supplement or any Vitamins that contain Vitamin  E at least 5 days prior to surgery.          I have read or had read and explained to me, and understand the above information.  Additional comments or instructions:Please call   [phone] if you have any questions regarding the instructions above.

## 2020-09-28 NOTE — PROGRESS NOTES
Subjective:    Rito is a 55 y.o. male who comes for follow-up to his last visit with me on 8/13/2020. Since this last evaluation, he reports some changes in symptoms with appropriate medical management in regard to GERD/LPR as I had increased his PPI dosing from 40mg omeprazole daily to 40mg twice daily. He has some reduced post-nasal drip and throat clearing. His nasal symptoms have not changed at all. As his adequate months or longer trials of appropriate medical therapy has failed to provide significant improvement, he wishes to proceed with surgery for treatment of these medically refractory symptoms.    He denies any new symptoms from those previously addressed, including no new abnormal discharge, pain, or epistaxis. There have been no acute episodes of worsening since our last evaluation, and no new therapies aside from management as previously advised. Current sinonasal therapies include topical nasal steroid spray and nasal saline irrigations. He has been using these therapies appropriately as previously instructed.     Patient reported quality of life assessments:    SNOT-22  SNOT-22 score: : (P) 46    NOSE Score  NOSE Scale Total Score : (P) 13  NOSE score:: (P) 65    ETDQ Questionnaire Scores     History and subjective data from prior evaluation, which this current visit is in follow-up to, is copied below for reference to interim updates described above.  History from 8/13/2020:     Rito Conley is a 55 y.o. male who was referred to me by Josiane Farias in consultation for post-nasal drip, rhinitis, congestion, throat clearing, and question of sinus disease related to symptoms with some sinus pressure reported as well. He reports a long history of these types of symptoms for years. No significant improvement and some worsening despite multiple trials of medical therapies to address these issues. He was using Flonase daily for over a month previously without benefit so discontinued, then in  the last few months was using daily again and continued to have no significant improvement. Was started on additional therapy with azelastine nasal spray and oral antihistamines over 6 moths ago and also had no significant benefits despite daily use as instructed. He notices symptoms are worse at night when laying down in general, with increased post-nasal drip, mucous in back of nose and throat, as well as nasal congestion. He denies a significant history of acute sinusitis, including facial pain and discolored nasal drainage. His nasal discharge is generally clear mucous material and normally post-nasal. No recurrent treatments with antibiotics or steroids for sinus infections. He does not notice any environmental triggers or specific seasonal association. No other allergy symptoms aside from some noted itchy eyes, no watery eyes, itchy nose or rash associated with other sinonasal symptoms. No fevers, chills, or shortness of breath.     Current sinonasal medications as above.  The last course of antibiotics was a long time ago.  He does not regularly use nasal decongestant sprays. He does not recall previously having allergy testing. He denies a history of asthma.    He relates a history of reflux symptoms which is currently managed with daily 40mg omeprazole.  He has previously had an EGD. Recently seen by Dr. Ceja on 6/11/2020 regarding GERD and records from that visit were reviewed, describing his history of prior abdominal wall hernia repair in 2018 and continued compliance with daily omeprazole 40mg. EGD and follow-up with PCP was advised at that time per plan documented. EGD was performed 6/17/2020 and documentation was reviewed from that procedure describing overall normal findings with note of patulous LES, and plan for no change to diet or medications and discharge to prior provider. There has been no follow-up regarding this ongoing dyspepsia with persistent symptoms uncontrolled by his continued  medication regimen of omeprazole 40mg daily.     He denies a diagnosis of obstructive sleep apnea but does report mouth breathing at night, and dry mouth related to this in the morning. Possible snoring. He has not had sinonasal surgery. He does not recall a prior history of nasal trauma.    QOL assessment deferred.    Past Medical History  He has a past medical history of Allergy, DJD of shoulder, Hyperlipidemia, Hypertension, Lower back pain, PTSD (post-traumatic stress disorder), and Sleep apnea.    Past Surgical History  He has a past surgical history that includes Hernia repair; Colonoscopy; and Esophagogastroduodenoscopy (N/A, 6/17/2020).    Family History  His family history includes Cancer in his maternal aunt and maternal grandmother; Hypertension in his mother; No Known Problems in his brother and sister; Other in his father.    Social History  He reports that he has never smoked. He has never used smokeless tobacco. He reports current alcohol use of about 4.0 - 6.0 standard drinks of alcohol per week. He reports that he does not use drugs.    Allergies  He is allergic to grass pollen-minnie grass standard.    Medications   He has a current medication list which includes the following prescription(s): amlodipine, azelastine, bupropion, fluticasone propionate, hydrochlorothiazide, ibuprofen, lipitor, omeprazole, oxybutynin, oxycodone-acetaminophen, oxycodone-acetaminophen, paroxetine, zolpidem, fexofenadine, and oxycodone-acetaminophen.    Review of Systems  Review of Systems   Constitutional: Negative.  Negative for chills, fatigue and fever.   HENT: Positive for hoarse voice, postnasal drip and sinus pressure. Negative for congestion, dental problem, ear discharge, ear pain, facial swelling, hearing loss, nosebleeds, rhinorrhea, sore throat, tinnitus, trouble swallowing and voice change.    Eyes: Positive for itching. Negative for photophobia, discharge and visual disturbance.   Respiratory: Negative.   "Negative for apnea, cough, shortness of breath and wheezing.    Cardiovascular: Negative.  Negative for chest pain and palpitations.   Gastrointestinal: Negative.  Negative for abdominal pain, nausea and vomiting.   Endocrine: Negative.  Negative for cold intolerance and heat intolerance.   Genitourinary: Negative.  Negative for difficulty urinating.   Musculoskeletal: Negative.  Negative for arthralgias, back pain, myalgias and neck pain.        Degenerative Joint Disease   Skin: Negative.  Negative for rash.   Allergic/Immunologic: Negative.  Negative for environmental allergies and food allergies.   Neurological: Negative.  Negative for dizziness, seizures, syncope, light-headedness and headaches.   Hematological: Negative.  Negative for adenopathy. Does not bruise/bleed easily.   Psychiatric/Behavioral: Negative.  Negative for decreased concentration, dysphoric mood and sleep disturbance. The patient is not nervous/anxious.    All other systems reviewed and are negative.      Answers for HPI/ROS submitted by the patient on 9/26/2020   Fatigue (Tiredness)?: Yes  Sinus infection(s)?: Yes  Snoring?: Yes  Sleep Apnea?: Yes  heartburn: Yes  Acid Reflux?: Yes  Urinating too frequently?: Yes  Seasonal Allergies?: Yes    Objective:     /82 (BP Location: Right arm, Patient Position: Sitting, BP Method: Medium (Manual))   Temp 98.4 °F (36.9 °C)   Ht 5' 11" (1.803 m)   Wt 97.7 kg (215 lb 8 oz)   BMI 30.06 kg/m²        Constitutional:   Vital signs are normal. He appears well-developed and well-nourished. He does not appear ill. No distress. Abnormal speech.  Hoarse voice.  No breathy voice.      Head:  Normocephalic and atraumatic. No skin lesions. Salivary glands normal.  Facial strength is normal.      Ears:    Right Ear: No drainage, swelling or tenderness. No mastoid tenderness. Tympanic membrane is not injected, not perforated, not erythematous, not retracted and not bulging. No middle ear effusion.   Left " Ear: No drainage, swelling or tenderness. No mastoid tenderness. Tympanic membrane is not injected, not perforated, not erythematous, not retracted and not bulging.  No middle ear effusion.     Nose:  Mucosal edema (mild - moderate mucosal inflammation/edema) and septal deviation present. No rhinorrhea, sinus tenderness or polyps. Epistaxis (Inactive bloody crusting within left anterior nares along septum) is observed. Turbinate hypertrophy.  Right sinus exhibits no maxillary sinus tenderness and no frontal sinus tenderness. Left sinus exhibits no maxillary sinus tenderness and no frontal sinus tenderness.         Mouth/Throat  Oropharynx clear and moist without lesions or asymmetry and lips, teeth, and gums normal. No oral lesions, trismus or mucous membrane lesions. No oropharyngeal exudate.     Neck:  Neck normal without thyromegaly masses, asymmetry, normal tracheal structure, crepitus, and tenderness, trachea normal, phonation normal, full range of motion with neck supple and no adenopathy. No edema and no erythema present.     Cardiovascular:   Normal rate and regular rhythm.      Pulmonary/Chest:   Effort normal. No respiratory distress.     Psychiatric:   He has a normal mood and affect. His speech is abnormal and behavior is normal.     Neurological:   He has neurological normal, alert and oriented. No cranial nerve deficit or sensory deficit. Coordination and gait normal.     Skin:   No abrasions, lacerations, lesions, or rashes.       Procedure      Previous endoscopy reviewed, no new endoscopy performed.  Endoscopy was previously performed and repeat endoscopy not indicated for today's evaluation. Findings including saved images from prior endoscopy evaluation were reviewed today for context of current signs/symptoms, and as reference for any significant changes since prior evaluation. See documentation and uploaded images from prior clinic evaluation for findings on this prior evaluation with endoscopy.        Data Reviewed    WBC (K/uL)   Date Value   05/14/2019 5.09     Eosinophil% (%)   Date Value   05/14/2019 4.3     Eos # (K/uL)   Date Value   05/14/2019 0.2     Platelets (K/uL)   Date Value   05/14/2019 308     Glucose (mg/dL)   Date Value   05/14/2019 95     No results found for: IGE    Past medical records were reviewed with data pertinent to the chief complaint summarized in the HPI. Data was obtained from records including specifically, the documentation from GI evaluation and EGD which was personally reviewed. EGD performed on 6/17/20 describes overall normal findings with note of patulous LES as per impressions and recommendations copied below:  Impression:   - Normal esophagus.                         - Z-line, 38 cm from the incisors.                         - Patulous lower esophageal sphincter.                         - Normal stomach. Biopsied.                         - Normal examined duodenum.   Recommendation:       - Discharge patient to home (ambulatory).                         - Resume previous diet today.                         - Continue present medications.                         - Await pathology results.                         - Return to referring physician as previously                         scheduled.     No sinus imaging available.       Assessment:     1. Nasal congestion    2. Postnasal drip    3. Nasal septal deviation    4. Nasal valve collapse    5. Refractory obstruction of nasal airway    6. Hypertrophy of both inferior nasal turbinates    7. Chronic throat clearing    8. Laryngopharyngeal reflux (LPR)    9. Hoarseness of voice         Plan:     I had a long discussion with the patient regarding his condition and the further workup and management options for the multiple issues addressed at today's visit:    Gastroesophageal reflux disease (GERD), Laryngopharyngeal reflux (LPR)  -  omeprazole (Prilosec) 40 mg twice daily for continued treatment of reflux.   - have advised  future follow-up with GI to assist in optimizing management.    Nasal congestion, Refractory obstruction of nasal airway, Turbinate hypertrophy, Nasal septal deviation, and Nasal valve collapse  - Potential contributions to all symptoms as described above, though strongest predicted relationship to symptoms of difficulty with nasal breathing as findings primarily limited to anatomic causes without evidence of bacterial/infectious sinonasal disease, significant chronic inflammatory disease, or other concerning sinonasal pathology.  - I have recommended ongoing medical management with nasal saline irrigations, as well as inhaled nasal steroid (fluticasone).  - Regarding nasal saline irrigations, specific instructions were provided on using this treatment. Explanation was given regarding performing these irrigations, and also provided as written instructions.     - Symptoms have been refractory to adequate medical therapy including daily topical nasal steroid sprays for months and surgical management is indicated. He would specifically benefit from procedures including septoplasty, inferior turbinate reduction, septal swell body reduction, and treatment of nasal valve collapse for the treatment of his condition. This is planned to be performed endoscopically and endonasally, without need for open approaches, and with possible use of Vivaer RF ablation to address obstructive intranasal tissue at the external nasal valves and septal swell bodies. The endoscopic surgical techniques as well as the Vivaer RF device for tissue ablation and airway remodeling were discussed and all questions answered regarding this. I discussed the risks, benefits and alternatives to surgery with the patient, as well as the expected postoperative course. I gave them the opportunity to ask questions and I answered all of them. Same-day discharge is anticipated, but overnight observation a possiblity if there are any suzanne-operative concerns such  as ability to mainatain oxygen saturation, issues related to sleep apena, and other concerns. Expectations for post-operative discomfort and plans for management were reviewed, as were expected initial nasal congestion/partial obstruction related to splints/packing, nasal bleeding anticipated for the first few days after the procedures and instructions for managing this (including replacing mucstache dressing and use of Afrin), and recommendations for general cares and medication use after surgery. I also reviewed anticipated need to avoid strenous activity/exertion, straining, and heavy lifting for two weeks after the procedures with clearance to return to full activity to be determined at post-operative follow-up, but normal non-strenous activity to be resumed as tolerated post-operatively. He will have pre-anesthesia evaluation and pre-operative clinic visit prior to planned procedures and this has been scheduled. The surgery is scheduled for 10/13/2020. will need to return for a postoperative visit 1, possibly 2 (depending on first post-op findings), and 4 weeks after surgery. I encouraged him to call with any questions that may arise prior to surgery, or for any significant changes or concerns in the meantime.    Chronic throat clearing, Hoarseness of voice  - I am suspicious of GERD/LPR as primary cause and have advised treatment as above.   - Potential contributions from sinonasal etiologies though less likely based on evaluation, but may benefit from addressing nasal obstruction as planned and described above. May also benefit from continued treatment of potential sinonasal inflammation contributing to post-nasal drainage and laryngopharyngitis as above.          Nick Gomez MD    Rhinology, Allergy, and Sinus-Skull Base Surgery    Department of Otorhinolaryngology    [facility]    Phone  [phone]    Fax      [phone]

## 2020-09-28 NOTE — H&P (VIEW-ONLY)
Subjective:    Rito is a 55 y.o. male who comes for follow-up to his last visit with me on 8/13/2020. Since this last evaluation, he reports some changes in symptoms with appropriate medical management in regard to GERD/LPR as I had increased his PPI dosing from 40mg omeprazole daily to 40mg twice daily. He has some reduced post-nasal drip and throat clearing. His nasal symptoms have not changed at all. As his adequate months or longer trials of appropriate medical therapy has failed to provide significant improvement, he wishes to proceed with surgery for treatment of these medically refractory symptoms.    He denies any new symptoms from those previously addressed, including no new abnormal discharge, pain, or epistaxis. There have been no acute episodes of worsening since our last evaluation, and no new therapies aside from management as previously advised. Current sinonasal therapies include topical nasal steroid spray and nasal saline irrigations. He has been using these therapies appropriately as previously instructed.     Patient reported quality of life assessments:    SNOT-22  SNOT-22 score: : (P) 46    NOSE Score  NOSE Scale Total Score : (P) 13  NOSE score:: (P) 65    ETDQ Questionnaire Scores     History and subjective data from prior evaluation, which this current visit is in follow-up to, is copied below for reference to interim updates described above.  History from 8/13/2020:     Rito Conley is a 55 y.o. male who was referred to me by Josiane Farias in consultation for post-nasal drip, rhinitis, congestion, throat clearing, and question of sinus disease related to symptoms with some sinus pressure reported as well. He reports a long history of these types of symptoms for years. No significant improvement and some worsening despite multiple trials of medical therapies to address these issues. He was using Flonase daily for over a month previously without benefit so discontinued, then in  the last few months was using daily again and continued to have no significant improvement. Was started on additional therapy with azelastine nasal spray and oral antihistamines over 6 moths ago and also had no significant benefits despite daily use as instructed. He notices symptoms are worse at night when laying down in general, with increased post-nasal drip, mucous in back of nose and throat, as well as nasal congestion. He denies a significant history of acute sinusitis, including facial pain and discolored nasal drainage. His nasal discharge is generally clear mucous material and normally post-nasal. No recurrent treatments with antibiotics or steroids for sinus infections. He does not notice any environmental triggers or specific seasonal association. No other allergy symptoms aside from some noted itchy eyes, no watery eyes, itchy nose or rash associated with other sinonasal symptoms. No fevers, chills, or shortness of breath.     Current sinonasal medications as above.  The last course of antibiotics was a long time ago.  He does not regularly use nasal decongestant sprays. He does not recall previously having allergy testing. He denies a history of asthma.    He relates a history of reflux symptoms which is currently managed with daily 40mg omeprazole.  He has previously had an EGD. Recently seen by Dr. Ceja on 6/11/2020 regarding GERD and records from that visit were reviewed, describing his history of prior abdominal wall hernia repair in 2018 and continued compliance with daily omeprazole 40mg. EGD and follow-up with PCP was advised at that time per plan documented. EGD was performed 6/17/2020 and documentation was reviewed from that procedure describing overall normal findings with note of patulous LES, and plan for no change to diet or medications and discharge to prior provider. There has been no follow-up regarding this ongoing dyspepsia with persistent symptoms uncontrolled by his continued  medication regimen of omeprazole 40mg daily.     He denies a diagnosis of obstructive sleep apnea but does report mouth breathing at night, and dry mouth related to this in the morning. Possible snoring. He has not had sinonasal surgery. He does not recall a prior history of nasal trauma.    QOL assessment deferred.    Past Medical History  He has a past medical history of Allergy, DJD of shoulder, Hyperlipidemia, Hypertension, Lower back pain, PTSD (post-traumatic stress disorder), and Sleep apnea.    Past Surgical History  He has a past surgical history that includes Hernia repair; Colonoscopy; and Esophagogastroduodenoscopy (N/A, 6/17/2020).    Family History  His family history includes Cancer in his maternal aunt and maternal grandmother; Hypertension in his mother; No Known Problems in his brother and sister; Other in his father.    Social History  He reports that he has never smoked. He has never used smokeless tobacco. He reports current alcohol use of about 4.0 - 6.0 standard drinks of alcohol per week. He reports that he does not use drugs.    Allergies  He is allergic to grass pollen-minnie grass standard.    Medications   He has a current medication list which includes the following prescription(s): amlodipine, azelastine, bupropion, fluticasone propionate, hydrochlorothiazide, ibuprofen, lipitor, omeprazole, oxybutynin, oxycodone-acetaminophen, oxycodone-acetaminophen, paroxetine, zolpidem, fexofenadine, and oxycodone-acetaminophen.    Review of Systems  Review of Systems   Constitutional: Negative.  Negative for chills, fatigue and fever.   HENT: Positive for hoarse voice, postnasal drip and sinus pressure. Negative for congestion, dental problem, ear discharge, ear pain, facial swelling, hearing loss, nosebleeds, rhinorrhea, sore throat, tinnitus, trouble swallowing and voice change.    Eyes: Positive for itching. Negative for photophobia, discharge and visual disturbance.   Respiratory: Negative.   "Negative for apnea, cough, shortness of breath and wheezing.    Cardiovascular: Negative.  Negative for chest pain and palpitations.   Gastrointestinal: Negative.  Negative for abdominal pain, nausea and vomiting.   Endocrine: Negative.  Negative for cold intolerance and heat intolerance.   Genitourinary: Negative.  Negative for difficulty urinating.   Musculoskeletal: Negative.  Negative for arthralgias, back pain, myalgias and neck pain.        Degenerative Joint Disease   Skin: Negative.  Negative for rash.   Allergic/Immunologic: Negative.  Negative for environmental allergies and food allergies.   Neurological: Negative.  Negative for dizziness, seizures, syncope, light-headedness and headaches.   Hematological: Negative.  Negative for adenopathy. Does not bruise/bleed easily.   Psychiatric/Behavioral: Negative.  Negative for decreased concentration, dysphoric mood and sleep disturbance. The patient is not nervous/anxious.    All other systems reviewed and are negative.      Answers for HPI/ROS submitted by the patient on 9/26/2020   Fatigue (Tiredness)?: Yes  Sinus infection(s)?: Yes  Snoring?: Yes  Sleep Apnea?: Yes  heartburn: Yes  Acid Reflux?: Yes  Urinating too frequently?: Yes  Seasonal Allergies?: Yes    Objective:     /82 (BP Location: Right arm, Patient Position: Sitting, BP Method: Medium (Manual))   Temp 98.4 °F (36.9 °C)   Ht 5' 11" (1.803 m)   Wt 97.7 kg (215 lb 8 oz)   BMI 30.06 kg/m²        Constitutional:   Vital signs are normal. He appears well-developed and well-nourished. He does not appear ill. No distress. Abnormal speech.  Hoarse voice.  No breathy voice.      Head:  Normocephalic and atraumatic. No skin lesions. Salivary glands normal.  Facial strength is normal.      Ears:    Right Ear: No drainage, swelling or tenderness. No mastoid tenderness. Tympanic membrane is not injected, not perforated, not erythematous, not retracted and not bulging. No middle ear effusion.   Left " Ear: No drainage, swelling or tenderness. No mastoid tenderness. Tympanic membrane is not injected, not perforated, not erythematous, not retracted and not bulging.  No middle ear effusion.     Nose:  Mucosal edema (mild - moderate mucosal inflammation/edema) and septal deviation present. No rhinorrhea, sinus tenderness or polyps. Epistaxis (Inactive bloody crusting within left anterior nares along septum) is observed. Turbinate hypertrophy.  Right sinus exhibits no maxillary sinus tenderness and no frontal sinus tenderness. Left sinus exhibits no maxillary sinus tenderness and no frontal sinus tenderness.         Mouth/Throat  Oropharynx clear and moist without lesions or asymmetry and lips, teeth, and gums normal. No oral lesions, trismus or mucous membrane lesions. No oropharyngeal exudate.     Neck:  Neck normal without thyromegaly masses, asymmetry, normal tracheal structure, crepitus, and tenderness, trachea normal, phonation normal, full range of motion with neck supple and no adenopathy. No edema and no erythema present.     Cardiovascular:   Normal rate and regular rhythm.      Pulmonary/Chest:   Effort normal. No respiratory distress.     Psychiatric:   He has a normal mood and affect. His speech is abnormal and behavior is normal.     Neurological:   He has neurological normal, alert and oriented. No cranial nerve deficit or sensory deficit. Coordination and gait normal.     Skin:   No abrasions, lacerations, lesions, or rashes.       Procedure      Previous endoscopy reviewed, no new endoscopy performed.  Endoscopy was previously performed and repeat endoscopy not indicated for today's evaluation. Findings including saved images from prior endoscopy evaluation were reviewed today for context of current signs/symptoms, and as reference for any significant changes since prior evaluation. See documentation and uploaded images from prior clinic evaluation for findings on this prior evaluation with endoscopy.        Data Reviewed    WBC (K/uL)   Date Value   05/14/2019 5.09     Eosinophil% (%)   Date Value   05/14/2019 4.3     Eos # (K/uL)   Date Value   05/14/2019 0.2     Platelets (K/uL)   Date Value   05/14/2019 308     Glucose (mg/dL)   Date Value   05/14/2019 95     No results found for: IGE    Past medical records were reviewed with data pertinent to the chief complaint summarized in the HPI. Data was obtained from records including specifically, the documentation from GI evaluation and EGD which was personally reviewed. EGD performed on 6/17/20 describes overall normal findings with note of patulous LES as per impressions and recommendations copied below:  Impression:   - Normal esophagus.                         - Z-line, 38 cm from the incisors.                         - Patulous lower esophageal sphincter.                         - Normal stomach. Biopsied.                         - Normal examined duodenum.   Recommendation:       - Discharge patient to home (ambulatory).                         - Resume previous diet today.                         - Continue present medications.                         - Await pathology results.                         - Return to referring physician as previously                         scheduled.     No sinus imaging available.       Assessment:     1. Nasal congestion    2. Postnasal drip    3. Nasal septal deviation    4. Nasal valve collapse    5. Refractory obstruction of nasal airway    6. Hypertrophy of both inferior nasal turbinates    7. Chronic throat clearing    8. Laryngopharyngeal reflux (LPR)    9. Hoarseness of voice         Plan:     I had a long discussion with the patient regarding his condition and the further workup and management options for the multiple issues addressed at today's visit:    Gastroesophageal reflux disease (GERD), Laryngopharyngeal reflux (LPR)  -  omeprazole (Prilosec) 40 mg twice daily for continued treatment of reflux.   - have advised  future follow-up with GI to assist in optimizing management.    Nasal congestion, Refractory obstruction of nasal airway, Turbinate hypertrophy, Nasal septal deviation, and Nasal valve collapse  - Potential contributions to all symptoms as described above, though strongest predicted relationship to symptoms of difficulty with nasal breathing as findings primarily limited to anatomic causes without evidence of bacterial/infectious sinonasal disease, significant chronic inflammatory disease, or other concerning sinonasal pathology.  - I have recommended ongoing medical management with nasal saline irrigations, as well as inhaled nasal steroid (fluticasone).  - Regarding nasal saline irrigations, specific instructions were provided on using this treatment. Explanation was given regarding performing these irrigations, and also provided as written instructions.     - Symptoms have been refractory to adequate medical therapy including daily topical nasal steroid sprays for months and surgical management is indicated. He would specifically benefit from procedures including septoplasty, inferior turbinate reduction, septal swell body reduction, and treatment of nasal valve collapse for the treatment of his condition. This is planned to be performed endoscopically and endonasally, without need for open approaches, and with possible use of Vivaer RF ablation to address obstructive intranasal tissue at the external nasal valves and septal swell bodies. The endoscopic surgical techniques as well as the Vivaer RF device for tissue ablation and airway remodeling were discussed and all questions answered regarding this. I discussed the risks, benefits and alternatives to surgery with the patient, as well as the expected postoperative course. I gave them the opportunity to ask questions and I answered all of them. Same-day discharge is anticipated, but overnight observation a possiblity if there are any suzanne-operative concerns such  as ability to mainatain oxygen saturation, issues related to sleep apena, and other concerns. Expectations for post-operative discomfort and plans for management were reviewed, as were expected initial nasal congestion/partial obstruction related to splints/packing, nasal bleeding anticipated for the first few days after the procedures and instructions for managing this (including replacing mucstache dressing and use of Afrin), and recommendations for general cares and medication use after surgery. I also reviewed anticipated need to avoid strenous activity/exertion, straining, and heavy lifting for two weeks after the procedures with clearance to return to full activity to be determined at post-operative follow-up, but normal non-strenous activity to be resumed as tolerated post-operatively. He will have pre-anesthesia evaluation and pre-operative clinic visit prior to planned procedures and this has been scheduled. The surgery is scheduled for 10/13/2020. will need to return for a postoperative visit 1, possibly 2 (depending on first post-op findings), and 4 weeks after surgery. I encouraged him to call with any questions that may arise prior to surgery, or for any significant changes or concerns in the meantime.    Chronic throat clearing, Hoarseness of voice  - I am suspicious of GERD/LPR as primary cause and have advised treatment as above.   - Potential contributions from sinonasal etiologies though less likely based on evaluation, but may benefit from addressing nasal obstruction as planned and described above. May also benefit from continued treatment of potential sinonasal inflammation contributing to post-nasal drainage and laryngopharyngitis as above.          Nick Gomez MD    Rhinology, Allergy, and Sinus-Skull Base Surgery    Department of Otorhinolaryngology    [facility]    Phone  [phone]    Fax      [phone]

## 2020-09-29 ENCOUNTER — PATIENT MESSAGE (OUTPATIENT)
Dept: OTHER | Facility: OTHER | Age: 55
End: 2020-09-29

## 2020-10-12 ENCOUNTER — ANESTHESIA EVENT (OUTPATIENT)
Dept: SURGERY | Facility: HOSPITAL | Age: 55
End: 2020-10-12
Payer: MEDICARE

## 2020-10-12 ENCOUNTER — OFFICE VISIT (OUTPATIENT)
Dept: FAMILY MEDICINE | Facility: CLINIC | Age: 55
End: 2020-10-12
Payer: MEDICARE

## 2020-10-12 ENCOUNTER — HOSPITAL ENCOUNTER (OUTPATIENT)
Dept: PREADMISSION TESTING | Facility: HOSPITAL | Age: 55
Discharge: HOME | End: 2020-10-12
Attending: OTOLARYNGOLOGY
Payer: MEDICARE

## 2020-10-12 VITALS
HEART RATE: 82 BPM | BODY MASS INDEX: 30.25 KG/M2 | TEMPERATURE: 98 F | HEIGHT: 71 IN | WEIGHT: 216.06 LBS | SYSTOLIC BLOOD PRESSURE: 134 MMHG | OXYGEN SATURATION: 97 % | DIASTOLIC BLOOD PRESSURE: 80 MMHG

## 2020-10-12 DIAGNOSIS — M54.50 CHRONIC LOW BACK PAIN WITHOUT SCIATICA, UNSPECIFIED BACK PAIN LATERALITY: ICD-10-CM

## 2020-10-12 DIAGNOSIS — I11.9 BENIGN HYPERTENSIVE HEART DISEASE WITHOUT HEART FAILURE: ICD-10-CM

## 2020-10-12 DIAGNOSIS — Z01.812 ENCOUNTER FOR PREOPERATIVE SCREENING LABORATORY TESTING FOR COVID-19 VIRUS: ICD-10-CM

## 2020-10-12 DIAGNOSIS — E78.5 HYPERLIPIDEMIA, UNSPECIFIED HYPERLIPIDEMIA TYPE: ICD-10-CM

## 2020-10-12 DIAGNOSIS — G89.29 CHRONIC LOW BACK PAIN WITHOUT SCIATICA, UNSPECIFIED BACK PAIN LATERALITY: ICD-10-CM

## 2020-10-12 DIAGNOSIS — Z11.52 ENCOUNTER FOR PREOPERATIVE SCREENING LABORATORY TESTING FOR COVID-19 VIRUS: ICD-10-CM

## 2020-10-12 LAB — SARS-COV-2 RDRP RESP QL NAA+PROBE: NEGATIVE

## 2020-10-12 PROCEDURE — 99213 OFFICE O/P EST LOW 20 MIN: CPT | Mod: PBBFAC,PO | Performed by: FAMILY MEDICINE

## 2020-10-12 PROCEDURE — U0002 COVID-19 LAB TEST NON-CDC: HCPCS

## 2020-10-12 PROCEDURE — 99214 OFFICE O/P EST MOD 30 MIN: CPT | Mod: S$PBB,,, | Performed by: FAMILY MEDICINE

## 2020-10-12 PROCEDURE — 99999 PR PBB SHADOW E&M-EST. PATIENT-LVL III: ICD-10-PCS | Mod: PBBFAC,,, | Performed by: FAMILY MEDICINE

## 2020-10-12 PROCEDURE — 99214 PR OFFICE/OUTPT VISIT, EST, LEVL IV, 30-39 MIN: ICD-10-PCS | Mod: S$PBB,,, | Performed by: FAMILY MEDICINE

## 2020-10-12 PROCEDURE — 99999 PR PBB SHADOW E&M-EST. PATIENT-LVL III: CPT | Mod: PBBFAC,,, | Performed by: FAMILY MEDICINE

## 2020-10-12 RX ORDER — OXYCODONE AND ACETAMINOPHEN 5; 325 MG/1; MG/1
1 TABLET ORAL EVERY 4 HOURS PRN
Qty: 31 TABLET | Refills: 0 | Status: SHIPPED | OUTPATIENT
Start: 2020-10-12 | End: 2020-11-17 | Stop reason: SDUPTHER

## 2020-10-12 RX ORDER — ATORVASTATIN CALCIUM 40 MG
40 TABLET ORAL DAILY
Qty: 30 TABLET | Refills: 5 | Status: SHIPPED | OUTPATIENT
Start: 2020-10-12 | End: 2021-02-22 | Stop reason: SDUPTHER

## 2020-10-12 RX ORDER — AMLODIPINE BESYLATE 10 MG/1
10 TABLET ORAL DAILY
Qty: 90 TABLET | Refills: 1 | Status: SHIPPED | OUTPATIENT
Start: 2020-10-12 | End: 2021-02-22 | Stop reason: SDUPTHER

## 2020-10-12 NOTE — PROGRESS NOTES
Subjective:       Patient ID: Rito Conley is a 55 y.o. male.    Chief Complaint: Follow Up/ Meds    55 year old male presents for refills of medications. His blood pressure has been controlled at home. He denies side effects form his medication.       He is going to have septoplasty tomorrow morning due to chronic congestion.   He is using the bathroom a lot thought.       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  No date: Hyperlipidemia  No date: Hypertension  No date: Lower back pain  No date: PTSD (post-traumatic stress disorder)  No date: Sleep apnea      Comment:  uses CPAP 1-2x/week   Past Surgical History:  No date: COLONOSCOPY  6/17/2020: ESOPHAGOGASTRODUODENOSCOPY; N/A      Comment:  Procedure: EGD (ESOPHAGOGASTRODUODENOSCOPY);  Surgeon:                Jarad Holm MD;  Location: [address]);                 Service: Endoscopy;  Laterality: N/A;  covid test                6/15-[facility]  No date: HERNIA REPAIR  Review of patient's family history indicates:  Problem: Hypertension      Relation: Mother          Age of Onset: (Not Specified)  Problem: Cancer      Relation: Maternal Grandmother          Age of Onset: (Not Specified)          Comment: something in her arm   Problem: Cancer      Relation: Maternal Aunt          Age of Onset: (Not Specified)          Comment: breast  Problem: Other      Relation: Father          Age of Onset: (Not Specified)          Comment: agent orange exposure   Problem: No Known Problems      Relation: Sister          Age of Onset: (Not Specified)  Problem: No Known Problems      Relation: Brother          Age of Onset: (Not Specified)  Problem: Amblyopia      Relation: Neg Hx          Age of Onset: (Not Specified)  Problem: Blindness      Relation: Neg Hx          Age of Onset: (Not Specified)  Problem: Cataracts      Relation: Neg Hx          Age of Onset: (Not Specified)  Problem: Diabetes      Relation: Neg Hx           Age of Onset: (Not Specified)  Problem: Glaucoma      Relation: Neg Hx          Age of Onset: (Not Specified)  Problem: Macular degeneration      Relation: Neg Hx          Age of Onset: (Not Specified)  Problem: Retinal detachment      Relation: Neg Hx          Age of Onset: (Not Specified)  Problem: Strabismus      Relation: Neg Hx          Age of Onset: (Not Specified)  Problem: Stroke      Relation: Neg Hx          Age of Onset: (Not Specified)  Problem: Thyroid disease      Relation: Neg Hx          Age of Onset: (Not Specified)    Social History    Socioeconomic History      Marital status:       Spouse name: Not on file      Number of children: Not on file      Years of education: Not on file      Highest education level: Not on file    Occupational History      Not on file    Social Needs      Financial resource strain: Not hard at all      Food insecurity        Worry: Never true        Inability: Never true      Transportation needs        Medical: No        Non-medical: No    Tobacco Use      Smoking status: Never Smoker      Smokeless tobacco: Never Used    Substance and Sexual Activity      Alcohol use: Yes        Alcohol/week: 4.0 - 6.0 standard drinks        Types: 4 - 6 Glasses of wine per week        Frequency: 2-3 times a week        Drinks per session: 1 or 2        Binge frequency: Less than monthly        Comment: a week.       Drug use: No      Sexual activity: Yes        Partners: Female    Lifestyle      Physical activity        Days per week: 3 days        Minutes per session: 40 min      Stress: Not at all    Relationships      Social connections        Talks on phone: Patient refused        Gets together: Patient refused        Attends Congregational service: Not on file        Active member of club or organization: Patient refused        Attends meetings of clubs or organizations: Patient refused        Relationship status:     Other Topics      Concerns:        Not on file     Social History Narrative      Not on file        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    Hyperlipidemia     Hypertension     Lower back pain     PTSD (post-traumatic stress disorder)     Sleep apnea     uses CPAP 1-2x/week      Past Surgical History:   Procedure Laterality Date    COLONOSCOPY      ESOPHAGOGASTRODUODENOSCOPY N/A 6/17/2020    Procedure: EGD (ESOPHAGOGASTRODUODENOSCOPY);  Surgeon: Jarad Holm MD;  Location: [address];  Service: Endoscopy;  Laterality: N/A;  covid test 6/15-[facility]    HERNIA REPAIR       Family History   Problem Relation Age of Onset    Hypertension Mother     Cancer Maternal Grandmother         something in her arm     Cancer Maternal Aunt         breast    Other Father         agent orange exposure     No Known Problems Sister     No Known Problems Brother     Amblyopia Neg Hx     Blindness Neg Hx     Cataracts Neg Hx     Diabetes Neg Hx     Glaucoma Neg Hx     Macular degeneration Neg Hx     Retinal detachment Neg Hx     Strabismus Neg Hx     Stroke Neg Hx     Thyroid disease Neg Hx      Social History     Socioeconomic History    Marital status:      Spouse name: Not on file    Number of children: Not on file    Years of education: Not on file    Highest education level: Not on file   Occupational History    Not on file   Social Needs    Financial resource strain: Not hard at all    Food insecurity     Worry: Never true     Inability: Never true    Transportation needs     Medical: No     Non-medical: No   Tobacco Use    Smoking status: Never Smoker    Smokeless tobacco: Never Used   Substance and Sexual Activity    Alcohol use: Yes     Alcohol/week: 4.0 - 6.0 standard drinks     Types: 4 - 6 Glasses of wine per week     Frequency: 2-3 times a week     Drinks per session: 1 or 2     Binge frequency: Less than monthly     Comment: a week.     Drug use: No    Sexual activity: Yes  "    Partners: Female   Lifestyle    Physical activity     Days per week: 3 days     Minutes per session: 40 min    Stress: Not at all   Relationships    Social connections     Talks on phone: Patient refused     Gets together: Patient refused     Attends Roman Catholic service: Not on file     Active member of club or organization: Patient refused     Attends meetings of clubs or organizations: Patient refused     Relationship status:    Other Topics Concern    Not on file   Social History Narrative    Not on file       Review of Systems   Constitutional: Negative for fatigue.   Respiratory: Negative for cough, chest tightness, shortness of breath and wheezing.    Cardiovascular: Negative for chest pain, palpitations and leg swelling.   Gastrointestinal: Negative for abdominal pain, nausea and vomiting.   Endocrine: Negative for polydipsia, polyphagia and polyuria.   Neurological: Negative for dizziness, syncope, light-headedness and headaches.         Objective:       Vitals:    10/12/20 1530   BP: 134/80   Pulse: 82   Temp: 98.2 °F (36.8 °C)   TempSrc: Oral   SpO2: 97%   Weight: 98 kg (216 lb 0.8 oz)   Height: 5' 11" (1.803 m)       Physical Exam  Constitutional:       General: He is not in acute distress.     Appearance: He is well-developed. He is not ill-appearing or diaphoretic.   HENT:      Head: Normocephalic.      Right Ear: External ear normal.      Left Ear: External ear normal.      Mouth/Throat:      Pharynx: No oropharyngeal exudate.   Eyes:      Conjunctiva/sclera: Conjunctivae normal.   Neck:      Musculoskeletal: Normal range of motion and neck supple.      Thyroid: No thyromegaly.   Cardiovascular:      Rate and Rhythm: Normal rate and regular rhythm.      Heart sounds: Normal heart sounds. No murmur. No friction rub. No gallop.    Pulmonary:      Effort: Pulmonary effort is normal. No respiratory distress.      Breath sounds: Normal breath sounds. No stridor. No wheezing, rhonchi or rales. "   Chest:      Chest wall: No tenderness.   Lymphadenopathy:      Cervical: No cervical adenopathy.   Skin:     Findings: No rash.   Neurological:      Mental Status: He is alert.   Psychiatric:         Mood and Affect: Mood normal.         Assessment:       1. Benign hypertensive heart disease without heart failure    2. Hyperlipidemia, unspecified hyperlipidemia type    3. Chronic low back pain without sciatica, unspecified back pain laterality        Plan:       Rito was seen today for follow up/ meds.    Diagnoses and all orders for this visit:     Benign hypertensive heart disease without heart failure  -     amLODIPine (NORVASC) 10 MG tablet; Take 1 tablet (10 mg total) by mouth once daily.  Stable and controlled. Continue current treatment plan as previously prescribed.     Hyperlipidemia, unspecified hyperlipidemia type  -     LIPITOR 40 mg tablet; Take 1 tablet (40 mg total) by mouth once daily.  Stable and controlled. Continue current treatment plan as previously prescribed.     Chronic low back pain without sciatica, unspecified back pain laterality  -     oxyCODONE-acetaminophen (PERCOCET) 5-325 mg per tablet; Take 1 tablet by mouth every 4 (four) hours as needed.

## 2020-10-13 ENCOUNTER — ANESTHESIA (OUTPATIENT)
Dept: SURGERY | Facility: HOSPITAL | Age: 55
End: 2020-10-13
Payer: MEDICARE

## 2020-10-13 ENCOUNTER — HOSPITAL ENCOUNTER (OUTPATIENT)
Facility: HOSPITAL | Age: 55
Discharge: HOME | End: 2020-10-13
Attending: OTOLARYNGOLOGY | Admitting: OTOLARYNGOLOGY
Payer: MEDICARE

## 2020-10-13 VITALS
WEIGHT: 214.31 LBS | DIASTOLIC BLOOD PRESSURE: 89 MMHG | HEART RATE: 89 BPM | TEMPERATURE: 98 F | RESPIRATION RATE: 16 BRPM | SYSTOLIC BLOOD PRESSURE: 163 MMHG | BODY MASS INDEX: 29.89 KG/M2 | OXYGEN SATURATION: 97 %

## 2020-10-13 DIAGNOSIS — J34.2 NASAL SEPTAL DEVIATION: ICD-10-CM

## 2020-10-13 DIAGNOSIS — J34.3 HYPERTROPHY OF BOTH INFERIOR NASAL TURBINATES: ICD-10-CM

## 2020-10-13 DIAGNOSIS — K21.9 GASTROESOPHAGEAL REFLUX DISEASE, ESOPHAGITIS PRESENCE NOT SPECIFIED: ICD-10-CM

## 2020-10-13 DIAGNOSIS — Z11.52 ENCOUNTER FOR PREOPERATIVE SCREENING LABORATORY TESTING FOR COVID-19 VIRUS: ICD-10-CM

## 2020-10-13 DIAGNOSIS — Z01.812 ENCOUNTER FOR PREOPERATIVE SCREENING LABORATORY TESTING FOR COVID-19 VIRUS: ICD-10-CM

## 2020-10-13 DIAGNOSIS — J34.89 REFRACTORY OBSTRUCTION OF NASAL AIRWAY: Primary | ICD-10-CM

## 2020-10-13 DIAGNOSIS — M95.0 NASAL VALVE COLLAPSE: ICD-10-CM

## 2020-10-13 DIAGNOSIS — R09.81 NASAL CONGESTION: ICD-10-CM

## 2020-10-13 PROBLEM — J34.829 NASAL VALVE COLLAPSE: Status: ACTIVE | Noted: 2020-10-13

## 2020-10-13 PROCEDURE — 63600175 PHARM REV CODE 636 W HCPCS: Performed by: NURSE ANESTHETIST, CERTIFIED REGISTERED

## 2020-10-13 PROCEDURE — 37000009 HC ANESTHESIA EA ADD 15 MINS: Performed by: OTOLARYNGOLOGY

## 2020-10-13 PROCEDURE — D9220A PRA ANESTHESIA: ICD-10-PCS | Mod: CRNA,,, | Performed by: NURSE ANESTHETIST, CERTIFIED REGISTERED

## 2020-10-13 PROCEDURE — 25000003 PHARM REV CODE 250: Performed by: NURSE ANESTHETIST, CERTIFIED REGISTERED

## 2020-10-13 PROCEDURE — 27201423 OPTIME MED/SURG SUP & DEVICES STERILE SUPPLY: Performed by: OTOLARYNGOLOGY

## 2020-10-13 PROCEDURE — 30140 RESECT INFERIOR TURBINATE: CPT | Mod: 50,51,, | Performed by: OTOLARYNGOLOGY

## 2020-10-13 PROCEDURE — 30520 PR REPAIR, NASAL SEPTUM: ICD-10-PCS | Mod: ,,, | Performed by: OTOLARYNGOLOGY

## 2020-10-13 PROCEDURE — 71000016 HC POSTOP RECOV ADDL HR: Performed by: OTOLARYNGOLOGY

## 2020-10-13 PROCEDURE — 30520 REPAIR OF NASAL SEPTUM: CPT | Mod: ,,, | Performed by: OTOLARYNGOLOGY

## 2020-10-13 PROCEDURE — 36000708 HC OR TIME LEV III 1ST 15 MIN: Performed by: OTOLARYNGOLOGY

## 2020-10-13 PROCEDURE — 25000003 PHARM REV CODE 250: Performed by: OTOLARYNGOLOGY

## 2020-10-13 PROCEDURE — D9220A PRA ANESTHESIA: Mod: CRNA,,, | Performed by: NURSE ANESTHETIST, CERTIFIED REGISTERED

## 2020-10-13 PROCEDURE — 63600175 PHARM REV CODE 636 W HCPCS: Performed by: ANESTHESIOLOGY

## 2020-10-13 PROCEDURE — 37000008 HC ANESTHESIA 1ST 15 MINUTES: Performed by: OTOLARYNGOLOGY

## 2020-10-13 PROCEDURE — 71000015 HC POSTOP RECOV 1ST HR: Performed by: OTOLARYNGOLOGY

## 2020-10-13 PROCEDURE — 63600175 PHARM REV CODE 636 W HCPCS: Performed by: OTOLARYNGOLOGY

## 2020-10-13 PROCEDURE — C1894 INTRO/SHEATH, NON-LASER: HCPCS | Performed by: OTOLARYNGOLOGY

## 2020-10-13 PROCEDURE — 71000033 HC RECOVERY, INTIAL HOUR: Performed by: OTOLARYNGOLOGY

## 2020-10-13 PROCEDURE — 36000709 HC OR TIME LEV III EA ADD 15 MIN: Performed by: OTOLARYNGOLOGY

## 2020-10-13 PROCEDURE — 30140 PR EXCISION TURBINATE,SUBMUCOUS: ICD-10-PCS | Mod: 50,51,, | Performed by: OTOLARYNGOLOGY

## 2020-10-13 PROCEDURE — D9220A PRA ANESTHESIA: Mod: ANES,,, | Performed by: ANESTHESIOLOGY

## 2020-10-13 PROCEDURE — D9220A PRA ANESTHESIA: ICD-10-PCS | Mod: ANES,,, | Performed by: ANESTHESIOLOGY

## 2020-10-13 PROCEDURE — 30117 REMOVAL OF INTRANASAL LESION: CPT | Mod: 51,,, | Performed by: OTOLARYNGOLOGY

## 2020-10-13 PROCEDURE — 30117 PR EXCIS/DEST INTRANAS LESION; INT APP: ICD-10-PCS | Mod: 51,,, | Performed by: OTOLARYNGOLOGY

## 2020-10-13 RX ORDER — NEOSTIGMINE METHYLSULFATE 1 MG/ML
INJECTION, SOLUTION INTRAVENOUS
Status: DISCONTINUED | OUTPATIENT
Start: 2020-10-13 | End: 2020-10-13

## 2020-10-13 RX ORDER — ONDANSETRON 2 MG/ML
INJECTION INTRAMUSCULAR; INTRAVENOUS
Status: DISCONTINUED | OUTPATIENT
Start: 2020-10-13 | End: 2020-10-13

## 2020-10-13 RX ORDER — OXYMETAZOLINE HCL 0.05 %
2 SPRAY, NON-AEROSOL (ML) NASAL
Status: COMPLETED | OUTPATIENT
Start: 2020-10-13 | End: 2020-10-13

## 2020-10-13 RX ORDER — ROCURONIUM BROMIDE 10 MG/ML
INJECTION, SOLUTION INTRAVENOUS
Status: DISCONTINUED | OUTPATIENT
Start: 2020-10-13 | End: 2020-10-13

## 2020-10-13 RX ORDER — LIDOCAINE HYDROCHLORIDE 10 MG/ML
1 INJECTION, SOLUTION EPIDURAL; INFILTRATION; INTRACAUDAL; PERINEURAL ONCE
Status: DISCONTINUED | OUTPATIENT
Start: 2020-10-13 | End: 2020-10-13 | Stop reason: HOSPADM

## 2020-10-13 RX ORDER — SUCCINYLCHOLINE CHLORIDE 20 MG/ML
INJECTION INTRAMUSCULAR; INTRAVENOUS
Status: DISCONTINUED | OUTPATIENT
Start: 2020-10-13 | End: 2020-10-13

## 2020-10-13 RX ORDER — ACETAMINOPHEN 325 MG/1
650 TABLET ORAL EVERY 4 HOURS PRN
Status: DISCONTINUED | OUTPATIENT
Start: 2020-10-13 | End: 2020-10-13 | Stop reason: HOSPADM

## 2020-10-13 RX ORDER — ONDANSETRON 4 MG/1
8 TABLET, ORALLY DISINTEGRATING ORAL ONCE
Status: DISCONTINUED | OUTPATIENT
Start: 2020-10-13 | End: 2020-10-13 | Stop reason: HOSPADM

## 2020-10-13 RX ORDER — OXYCODONE HYDROCHLORIDE 5 MG/1
5 TABLET ORAL EVERY 4 HOURS PRN
Qty: 10 TABLET | Refills: 0 | Status: SHIPPED | OUTPATIENT
Start: 2020-10-13 | End: 2020-11-17 | Stop reason: ALTCHOICE

## 2020-10-13 RX ORDER — LIDOCAINE HYDROCHLORIDE 20 MG/ML
INJECTION INTRAVENOUS
Status: DISCONTINUED | OUTPATIENT
Start: 2020-10-13 | End: 2020-10-13

## 2020-10-13 RX ORDER — HYDROMORPHONE HYDROCHLORIDE 2 MG/ML
0.2 INJECTION, SOLUTION INTRAMUSCULAR; INTRAVENOUS; SUBCUTANEOUS EVERY 5 MIN PRN
Status: DISCONTINUED | OUTPATIENT
Start: 2020-10-13 | End: 2020-10-13 | Stop reason: HOSPADM

## 2020-10-13 RX ORDER — SODIUM CHLORIDE 0.9 % (FLUSH) 0.9 %
10 SYRINGE (ML) INJECTION
Status: DISCONTINUED | OUTPATIENT
Start: 2020-10-13 | End: 2020-10-13 | Stop reason: HOSPADM

## 2020-10-13 RX ORDER — SODIUM CHLORIDE, SODIUM LACTATE, POTASSIUM CHLORIDE, CALCIUM CHLORIDE 600; 310; 30; 20 MG/100ML; MG/100ML; MG/100ML; MG/100ML
INJECTION, SOLUTION INTRAVENOUS CONTINUOUS PRN
Status: DISCONTINUED | OUTPATIENT
Start: 2020-10-13 | End: 2020-10-13

## 2020-10-13 RX ORDER — LIDOCAINE HYDROCHLORIDE AND EPINEPHRINE 10; 10 MG/ML; UG/ML
INJECTION, SOLUTION INFILTRATION; PERINEURAL
Status: DISCONTINUED | OUTPATIENT
Start: 2020-10-13 | End: 2020-10-13 | Stop reason: HOSPADM

## 2020-10-13 RX ORDER — OXYCODONE HYDROCHLORIDE 5 MG/1
5 TABLET ORAL EVERY 4 HOURS PRN
Status: DISCONTINUED | OUTPATIENT
Start: 2020-10-13 | End: 2020-10-13 | Stop reason: HOSPADM

## 2020-10-13 RX ORDER — MUPIROCIN 20 MG/G
OINTMENT TOPICAL
Status: DISCONTINUED | OUTPATIENT
Start: 2020-10-13 | End: 2020-10-13 | Stop reason: HOSPADM

## 2020-10-13 RX ORDER — MUPIROCIN 20 MG/G
OINTMENT TOPICAL 2 TIMES DAILY
Qty: 22 G | Refills: 0 | Status: SHIPPED | OUTPATIENT
Start: 2020-10-13 | End: 2020-10-27

## 2020-10-13 RX ORDER — CEFAZOLIN SODIUM 2 G/50ML
2 SOLUTION INTRAVENOUS
Status: COMPLETED | OUTPATIENT
Start: 2020-10-13 | End: 2020-10-13

## 2020-10-13 RX ORDER — ACETAMINOPHEN 10 MG/ML
1000 INJECTION, SOLUTION INTRAVENOUS ONCE
Status: COMPLETED | OUTPATIENT
Start: 2020-10-13 | End: 2020-10-13

## 2020-10-13 RX ORDER — EPINEPHRINE NASAL SOLUTION 1 MG/ML
SOLUTION NASAL
Status: DISCONTINUED | OUTPATIENT
Start: 2020-10-13 | End: 2020-10-13 | Stop reason: HOSPADM

## 2020-10-13 RX ORDER — MIDAZOLAM HYDROCHLORIDE 1 MG/ML
INJECTION, SOLUTION INTRAMUSCULAR; INTRAVENOUS
Status: DISCONTINUED | OUTPATIENT
Start: 2020-10-13 | End: 2020-10-13

## 2020-10-13 RX ORDER — ONDANSETRON 8 MG/1
8 TABLET, ORALLY DISINTEGRATING ORAL EVERY 6 HOURS PRN
Qty: 15 TABLET | Refills: 0 | Status: SHIPPED | OUTPATIENT
Start: 2020-10-13 | End: 2023-05-09

## 2020-10-13 RX ORDER — PROPOFOL 10 MG/ML
VIAL (ML) INTRAVENOUS
Status: DISCONTINUED | OUTPATIENT
Start: 2020-10-13 | End: 2020-10-13

## 2020-10-13 RX ORDER — FENTANYL CITRATE 50 UG/ML
INJECTION, SOLUTION INTRAMUSCULAR; INTRAVENOUS
Status: DISCONTINUED | OUTPATIENT
Start: 2020-10-13 | End: 2020-10-13

## 2020-10-13 RX ORDER — AMOXICILLIN AND CLAVULANATE POTASSIUM 875; 125 MG/1; MG/1
1 TABLET, FILM COATED ORAL 2 TIMES DAILY
Qty: 20 TABLET | Refills: 0 | Status: SHIPPED | OUTPATIENT
Start: 2020-10-13 | End: 2020-10-23

## 2020-10-13 RX ORDER — PHENYLEPHRINE HYDROCHLORIDE 10 MG/ML
INJECTION INTRAVENOUS
Status: DISCONTINUED | OUTPATIENT
Start: 2020-10-13 | End: 2020-10-13

## 2020-10-13 RX ORDER — DEXAMETHASONE SODIUM PHOSPHATE 4 MG/ML
8 INJECTION, SOLUTION INTRA-ARTICULAR; INTRALESIONAL; INTRAMUSCULAR; INTRAVENOUS; SOFT TISSUE
Status: DISCONTINUED | OUTPATIENT
Start: 2020-10-13 | End: 2020-10-13 | Stop reason: HOSPADM

## 2020-10-13 RX ORDER — IBUPROFEN 600 MG/1
600 TABLET ORAL EVERY 6 HOURS PRN
Qty: 30 TABLET | Refills: 0 | Status: SHIPPED | OUTPATIENT
Start: 2020-10-13 | End: 2021-03-28 | Stop reason: ALTCHOICE

## 2020-10-13 RX ADMIN — PHENYLEPHRINE HYDROCHLORIDE 200 MCG: 10 INJECTION INTRAVENOUS at 08:10

## 2020-10-13 RX ADMIN — Medication 100 MG: at 07:10

## 2020-10-13 RX ADMIN — NASAL DECONGESTANT 2 SPRAY: 0.05 SPRAY NASAL at 07:10

## 2020-10-13 RX ADMIN — PHENYLEPHRINE HYDROCHLORIDE 100 MCG: 10 INJECTION INTRAVENOUS at 08:10

## 2020-10-13 RX ADMIN — SUCCINYLCHOLINE CHLORIDE 140 MG: 20 INJECTION, SOLUTION INTRAMUSCULAR; INTRAVENOUS at 07:10

## 2020-10-13 RX ADMIN — GLYCOPYRROLATE 0.4 MG: 0.2 INJECTION, SOLUTION INTRAMUSCULAR; INTRAVITREAL at 09:10

## 2020-10-13 RX ADMIN — ROCURONIUM BROMIDE 15 MG: 10 INJECTION, SOLUTION INTRAVENOUS at 08:10

## 2020-10-13 RX ADMIN — PROPOFOL 180 MG: 10 INJECTION, EMULSION INTRAVENOUS at 07:10

## 2020-10-13 RX ADMIN — ONDANSETRON 4 MG: 2 INJECTION, SOLUTION INTRAMUSCULAR; INTRAVENOUS at 07:10

## 2020-10-13 RX ADMIN — SODIUM CHLORIDE, SODIUM LACTATE, POTASSIUM CHLORIDE, AND CALCIUM CHLORIDE: .6; .31; .03; .02 INJECTION, SOLUTION INTRAVENOUS at 09:10

## 2020-10-13 RX ADMIN — NEOSTIGMINE METHYLSULFATE 3.5 MG: 1 INJECTION INTRAVENOUS at 09:10

## 2020-10-13 RX ADMIN — ESMOLOL HYDROCHLORIDE 20 MG: 10 INJECTION INTRAVENOUS at 08:10

## 2020-10-13 RX ADMIN — ACETAMINOPHEN 1000 MG: 10 INJECTION, SOLUTION INTRAVENOUS at 10:10

## 2020-10-13 RX ADMIN — FENTANYL CITRATE 50 MCG: 50 INJECTION INTRAMUSCULAR; INTRAVENOUS at 09:10

## 2020-10-13 RX ADMIN — NASAL DECONGESTANT 2 SPRAY: 0.05 SPRAY NASAL at 06:10

## 2020-10-13 RX ADMIN — FENTANYL CITRATE 100 MCG: 50 INJECTION INTRAMUSCULAR; INTRAVENOUS at 07:10

## 2020-10-13 RX ADMIN — MIDAZOLAM HYDROCHLORIDE 2 MG: 1 INJECTION, SOLUTION INTRAMUSCULAR; INTRAVENOUS at 07:10

## 2020-10-13 RX ADMIN — ROCURONIUM BROMIDE 25 MG: 10 INJECTION, SOLUTION INTRAVENOUS at 07:10

## 2020-10-13 RX ADMIN — FENTANYL CITRATE 50 MCG: 50 INJECTION INTRAMUSCULAR; INTRAVENOUS at 07:10

## 2020-10-13 RX ADMIN — CEFAZOLIN SODIUM 2 G: 2 SOLUTION INTRAVENOUS at 07:10

## 2020-10-13 RX ADMIN — PHENYLEPHRINE HYDROCHLORIDE 200 MCG: 10 INJECTION INTRAVENOUS at 09:10

## 2020-10-13 RX ADMIN — PROPOFOL 40 MG: 10 INJECTION, EMULSION INTRAVENOUS at 09:10

## 2020-10-13 RX ADMIN — SODIUM CHLORIDE, SODIUM LACTATE, POTASSIUM CHLORIDE, AND CALCIUM CHLORIDE: .6; .31; .03; .02 INJECTION, SOLUTION INTRAVENOUS at 07:10

## 2020-10-13 RX ADMIN — ROCURONIUM BROMIDE 5 MG: 10 INJECTION, SOLUTION INTRAVENOUS at 07:10

## 2020-10-13 RX ADMIN — GLYCOPYRROLATE 0.2 MG: 0.2 INJECTION, SOLUTION INTRAMUSCULAR; INTRAVITREAL at 07:10

## 2020-10-13 RX ADMIN — DEXAMETHASONE SODIUM PHOSPHATE 8 MG: 4 INJECTION, SOLUTION INTRAMUSCULAR; INTRAVENOUS at 07:10

## 2020-10-13 NOTE — ANESTHESIA PREPROCEDURE EVALUATION
10/13/2020  Rito Conley is a 55 y.o., male.  Pre-operative evaluation for Procedure(s) (LRB):  SEPTOPLASTY, NOSE, ENDOSCOPIC (N/A)  EXCISION, NASAL TURBINATE, SUBMUCOSAL (Bilateral)  ENDOSCOPY, NOSE (Bilateral)  REPAIR, STENOSIS, NOSE, VESTIBULE (Bilateral)    NPO >8h  METS >4    Vitals:    10/09/20 1443 10/13/20 0633   BP:  (!) 142/88   BP Location:  Left arm   Patient Position:  Lying   Pulse:  71   Resp:  18   Temp:  37 °C (98.6 °F)   TempSrc:  Oral   Weight: 97.2 kg (214 lb 4.6 oz)        Patient Active Problem List   Diagnosis    Benign hypertensive heart disease without heart failure    DJD of shoulder    DJD (degenerative joint disease), lumbar    Allergic rhinitis    Lumbar scoliosis    Back injury    BPH with obstruction/lower urinary tract symptoms    PTSD (post-traumatic stress disorder)    Heartburn       Review of patient's allergies indicates:   Allergen Reactions    Grass pollen-june grass standard Itching, Other (See Comments) and Rash       No current facility-administered medications on file prior to encounter.      Current Outpatient Medications on File Prior to Encounter   Medication Sig Dispense Refill    azelastine (ASTELIN) 137 mcg (0.1 %) nasal spray 1 spray (137 mcg total) by Nasal route 2 (two) times daily. 30 mL 11    buPROPion (WELLBUTRIN XL) 150 MG TB24 tablet Take 150 mg by mouth once daily.       fluticasone (FLONASE) 50 mcg/actuation nasal spray 1 spray by Each Nare route 2 (two) times daily. (Patient taking differently: 1 spray by Each Nare route 2 (two) times daily as needed. ) 3 Bottle 3    ibuprofen (ADVIL,MOTRIN) 800 MG tablet Take 1 tablet (800 mg total) by mouth 3 (three) times daily as needed for Pain (TAKE WITH MEALS). 30 tablet 0    omeprazole (PRILOSEC) 40 MG capsule Take 1 capsule (40 mg total) by mouth 2 (two) times daily before meals.  180 capsule 1    oxybutynin (DITROPAN-XL) 5 MG TR24 Take 1 tablet (5 mg total) by mouth once daily. 90 tablet 3    paroxetine (PAXIL) 20 MG tablet TAKE ONE-HALF TABLET BY MOUTH EVERY DAY FOR MENTAL HEALTH      zolpidem (AMBIEN) 10 mg Tab Take 1 tablet (10 mg total) by mouth nightly as needed. 30 tablet 2       Past Surgical History:   Procedure Laterality Date    COLONOSCOPY      ESOPHAGOGASTRODUODENOSCOPY N/A 6/17/2020    Procedure: EGD (ESOPHAGOGASTRODUODENOSCOPY);  Surgeon: Jarad Holm MD;  Location: [address]);  Service: Endoscopy;  Laterality: N/A;  covid test 6/15-[facility]ap[facility]    HERNIA REPAIR         Social History     Socioeconomic History    Marital status:      Spouse name: Not on file    Number of children: Not on file    Years of education: Not on file    Highest education level: Not on file   Occupational History    Not on file   Social Needs    Financial resource strain: Not hard at all    Food insecurity     Worry: Never true     Inability: Never true    Transportation needs     Medical: No     Non-medical: No   Tobacco Use    Smoking status: Never Smoker    Smokeless tobacco: Never Used   Substance and Sexual Activity    Alcohol use: Yes     Alcohol/week: 4.0 - 6.0 standard drinks     Types: 4 - 6 Glasses of wine per week     Frequency: 2-3 times a week     Drinks per session: 1 or 2     Binge frequency: Less than monthly     Comment: a week.     Drug use: No    Sexual activity: Yes     Partners: Female   Lifestyle    Physical activity     Days per week: 3 days     Minutes per session: 40 min    Stress: Not at all   Relationships    Social connections     Talks on phone: Patient refused     Gets together: Patient refused     Attends Jewish service: Not on file     Active member of club or organization: Patient refused     Attends meetings of clubs or organizations: Patient refused     Relationship status:    Other Topics Concern    Not on file    Social History Narrative    Not on file         Lab Results   Component Value Date    WBC 6.27 09/28/2020    HGB 14.5 09/28/2020    HCT 43.8 09/28/2020    MCV 90 09/28/2020     09/28/2020       CMP  Sodium   Date Value Ref Range Status   09/28/2020 139 136 - 145 mmol/L Final     Potassium   Date Value Ref Range Status   09/28/2020 3.9 3.5 - 5.1 mmol/L Final     Chloride   Date Value Ref Range Status   09/28/2020 103 95 - 110 mmol/L Final     CO2   Date Value Ref Range Status   09/28/2020 26 23 - 29 mmol/L Final     Glucose   Date Value Ref Range Status   09/28/2020 98 70 - 110 mg/dL Final     BUN, Bld   Date Value Ref Range Status   09/28/2020 12 6 - 20 mg/dL Final     Creatinine   Date Value Ref Range Status   09/28/2020 0.9 0.5 - 1.4 mg/dL Final     Calcium   Date Value Ref Range Status   09/28/2020 9.4 8.7 - 10.5 mg/dL Final     Total Protein   Date Value Ref Range Status   05/14/2019 7.9 6.0 - 8.4 g/dL Final     Albumin   Date Value Ref Range Status   05/14/2019 3.9 3.5 - 5.2 g/dL Final     Total Bilirubin   Date Value Ref Range Status   05/14/2019 0.6 0.1 - 1.0 mg/dL Final     Comment:     For infants and newborns, interpretation of results should be based  on gestational age, weight and in agreement with clinical  observations.  Premature Infant recommended reference ranges:  Up to 24 hours.............<8.0 mg/dL  Up to 48 hours............<12.0 mg/dL  3-5 days..................<15.0 mg/dL  6-29 days.................<15.0 mg/dL       Alkaline Phosphatase   Date Value Ref Range Status   05/14/2019 101 55 - 135 U/L Final     AST   Date Value Ref Range Status   05/14/2019 28 10 - 40 U/L Final     ALT   Date Value Ref Range Status   05/14/2019 29 10 - 44 U/L Final     Anion Gap   Date Value Ref Range Status   09/28/2020 10 8 - 16 mmol/L Final     eGFR if    Date Value Ref Range Status   09/28/2020 >60 >60 mL/min/1.73 m^2 Final     eGFR if non    Date Value Ref Range Status    09/28/2020 >60 >60 mL/min/1.73 m^2 Final     Comment:     Calculation used to obtain the estimated glomerular filtration  rate (eGFR) is the CKD-EPI equation.            Diagnostic Studies:      EKG:  Vent. Rate : 068 BPM     Atrial Rate : 068 BPM      P-R Int : 166 ms          QRS Dur : 088 ms       QT Int : 418 ms       P-R-T Axes : 066 036 013 degrees      QTc Int : 444 ms     Normal sinus rhythm   Normal ECG   When compared with ECG of 25-JAN-2018 10:19,   Significant changes have occurred   Confirmed by Micheal Maciel MD ([id number]) on 9/28/2020 3:31:57 PM     2D Echo:  No results found for this or any previous visit.  ]    Anesthesia Evaluation    I have reviewed the Patient Summary Reports.    I have reviewed the Nursing Notes. I have reviewed the NPO Status.      Review of Systems  Anesthesia Hx:  No problems with previous Anesthesia  History of prior surgery of interest to airway management or planning:  Denies Personal Hx of Anesthesia complications.   Social:  Non-Smoker, Social Alcohol Use    Hematology/Oncology:  Hematology Normal   Oncology Normal     EENT/Dental:   chronic allergic rhinitis   Cardiovascular:   Exercise tolerance: good Hypertension Denies MI.  Denies CAD.    Denies Dysrhythmias.  ECG has been reviewed.    Pulmonary:   Sleep Apnea Non-compliant with CPAP due to PTSD   Education provided regarding risk of obstructive sleep apnea     Renal/:  Renal/ Normal     Hepatic/GI:   GERD    Musculoskeletal:   Arthritis     Neurological:  Neurology Normal Denies TIA.  Denies CVA. Denies Seizures.    Endocrine:  Endocrine Normal    Psych:   Psychiatric History anxiety depression PTSD         Physical Exam  General:  Obesity    Airway/Jaw/Neck:  Airway Findings: Mouth Opening: Normal Tongue: Normal  General Airway Assessment: Adult  Mallampati: II  TM Distance: Normal, at least 6 cm        Eyes/Ears/Nose:  EYES/EARS/NOSE FINDINGS: Normal   Dental:  Dental Findings: In tact    Chest/Lungs:  Chest/Lungs Clear    Heart/Vascular:  Heart Findings: Normal       Mental Status:  Mental Status Findings: Normal        Anesthesia Plan  Type of Anesthesia, risks & benefits discussed:  Anesthesia Type:  general  Patient's Preference:   Intra-op Monitoring Plan: standard ASA monitors  Intra-op Monitoring Plan Comments:   Post Op Pain Control Plan: multimodal analgesia, IV/PO Opioids PRN and per primary service following discharge from PACU  Post Op Pain Control Plan Comments:   Induction:   IV  Beta Blocker:  Patient is not currently on a Beta-Blocker (No further documentation required).       Informed Consent: Patient understands risks and agrees with Anesthesia plan.  Questions answered. Anesthesia consent signed with patient.  ASA Score: 2     Day of Surgery Review of History & Physical:  There are no significant changes.          Ready For Surgery From Anesthesia Perspective.

## 2020-10-13 NOTE — DISCHARGE SUMMARY
[facility]  Discharge Note  Short Stay    Procedure(s) (LRB):  SEPTOPLASTY, NOSE, ENDOSCOPIC (N/A)  EXCISION, NASAL TURBINATE, SUBMUCOSAL (Bilateral)  ENDOSCOPY, NOSE (Bilateral)  REPAIR, STENOSIS, NOSE, VESTIBULE (Bilateral)    OUTCOME: Patient tolerated treatment/procedure well without complication and is now ready for discharge.    DISPOSITION: Home or Self Care    FINAL DIAGNOSIS:  Nasal obstruction - refractory to medical therapy, nasal septal deviation, turbinate hypertrophy, nasal valve collapse    FOLLOWUP: In clinic    DISCHARGE INSTRUCTIONS:    Discharge Procedure Orders   Diet general     Keep surgical extremity elevated     Other restrictions (specify):   Order Comments: Do NOT blow your nose for 2 weeks. If you have to sneeze or cough, do so with your mouth open. Allow drainage from nose to fall on a mustache dressing (gauze placed under nose) and change it as needed, or gently wipe outside of nose without blowing.  AVOID all heavy lifting, straining or bending for 2 weeks.   AVOID any sexual activity for 2 weeks after surgery.  AVOID semi-contact sports or vigorous exercising for 3-4 weeks. Dr. Gomez will let you know when you are cleared to resume exercise.  AVOID flying or swimming for 2 weeks.    Do NOT operate a motor vehicle or any type of heavy machinery within 24 hours of taking pain medication.  DO NOT smoke or be around smokers.  AVOID irritating substances that might make you sneeze, such as dust, chalk, harsh chemicals, and allergic triggers.  This might also include spicy foods.     Wound care routine (specify)   Order Comments: Wound care routine: See discharge instructions for sinus and nasal surgery        Clinical Reference Documents Added to Patient Instructions       Document    NASAL SURGERY: SEPTOPLASTY (ENGLISH)    NASAL SURGERY: TURBINATE SURGERY (ENGLISH)    NASAL SURGERY: YOUR RECOVERY (ENGLISH)

## 2020-10-13 NOTE — ANESTHESIA PROCEDURE NOTES
Intubation  Performed by: Nabila Pelayo CRNA  Authorized by: Elida Phelan MD     Intubation:     Induction:  Intravenous    Intubated:  Postinduction    Mask Ventilation:  N/a    Attempts:  1    Attempted By:  Student and other (see comments) ([place], SRNA)    Method of Intubation:  Direct    Blade:  Kay 4    Laryngeal View Grade: Grade I - full view of chords      Difficult Airway Encountered?: No      Complications:  None    Airway Device:  Oral endotracheal tube    Airway Device Size:  7.5    Style/Cuff Inflation:  Cuffed (inflated to minimal occlusive pressure)    Inflation Amount (mL):  5    Tube secured:  23    Secured at:  The lips    Placement Verified By:  Capnometry    Complicating Factors:  None

## 2020-10-13 NOTE — TRANSFER OF CARE
Anesthesia Transfer of Care Note    Patient: Rito Conley    Procedure(s) Performed: Procedure(s) (LRB):  SEPTOPLASTY, NOSE, ENDOSCOPIC (N/A)  EXCISION, NASAL TURBINATE, SUBMUCOSAL (Bilateral)  ENDOSCOPY, NOSE (Bilateral)  REPAIR, STENOSIS, NOSE, VESTIBULE (Bilateral)    Patient location: PACU    Anesthesia Type: general    Transport from OR: Transported from OR on room air with adequate spontaneous ventilation    Post pain: adequate analgesia    Post assessment: no apparent anesthetic complications and tolerated procedure well    Post vital signs: stable    Level of consciousness: awake and responds to stimulation    Nausea/Vomiting: no nausea/vomiting    Complications: none    Transfer of care protocol was followed      Last vitals:   Visit Vitals  BP (!) 126/94 (BP Location: Right arm, Patient Position: Lying)   Pulse (!) 117   Temp 36.4 °C (97.5 °F) (Temporal)   Resp 16   Wt 97.2 kg (214 lb 4.6 oz)   SpO2 98%   BMI 29.89 kg/m²

## 2020-10-13 NOTE — BRIEF OP NOTE
Brief Operative Note     SUMMARY     Surgery Date: 10/13/2020     Surgeon: Nick Gomez MD    Surgeon(s) and Role:     * Nick Gomez MD - Primary    Pre-op Diagnosis:  Nasal congestion [R09.81]  Nasal septal deviation [J34.2]  Nasal valve collapse [J34.89]  Refractory obstruction of nasal airway [J34.89]  Hypertrophy of both inferior nasal turbinates [J34.3]    Post-op Diagnosis:  Nasal congestion [R09.81]  Nasal septal deviation [J34.2]  Nasal valve collapse [J34.89]  Refractory obstruction of nasal airway [J34.89]  Hypertrophy of both inferior nasal turbinates [J34.3]    Procedures Performed:   Procedure(s) (LRB):  SEPTOPLASTY, NOSE, ENDOSCOPIC (N/A)  EXCISION, NASAL TURBINATE, SUBMUCOSAL (Bilateral)  ENDOSCOPY, NOSE (Bilateral)  REPAIR, STENOSIS, NOSE, VESTIBULE (Bilateral) -- Ablation of obstructive intranasal tissue (Vivaer procedure)    Anesthesia: General    Findings/Key Components:  Nasal obstruction bilateral from multiple sources: nasal septal deviation, turbinate hypertrophy, septal swell body hypertrophy, and nasal valve collapse. No other unanticipated or concerning findings.    Estimated Blood Loss: <50cc         Specimens (From admission, onward)    None          Disposition:   The patient did well with surgery and there were no complications. he was extubated and taken to PACU in anticipation of discharge to home for this planned outpatient same day procedure. Post-operative orders were placed and prescriptions provided for post-operative medications.    Additional Information and Post-op Instructions:   For additional information and answers to common questions regarding the procedures performed, please see the patient instructions I have included in the discharge materials for today's surgery.    Thank You  MD Nick Menezes MD    Rhinology, Allergy, and Sinus-Skull Base Surgery    Department of Otorhinolaryngology    [facility]     Phone  [phone]    Fax      [phone]

## 2020-10-13 NOTE — ANESTHESIA POSTPROCEDURE EVALUATION
Anesthesia Post Evaluation    Patient: Rito Conley    Procedure(s) Performed: Procedure(s) (LRB):  SEPTOPLASTY, NOSE, ENDOSCOPIC (N/A)  EXCISION, NASAL TURBINATE, SUBMUCOSAL (Bilateral)  ENDOSCOPY, NOSE (Bilateral)  REPAIR, STENOSIS, NOSE, VESTIBULE (Bilateral)    Final Anesthesia Type: general    Patient location during evaluation: PACU  Patient participation: Yes- Able to Participate  Level of consciousness: awake and alert and oriented  Post-procedure vital signs: reviewed and stable  Pain management: adequate  Airway patency: patent    PONV status at discharge: No PONV  Anesthetic complications: no      Cardiovascular status: hemodynamically stable and blood pressure returned to baseline  Respiratory status: spontaneous ventilation, room air and unassisted  Hydration status: euvolemic  Follow-up not needed.          Vitals Value Taken Time   /89 10/13/20 1111   Temp 36.6 °C (97.8 °F) 10/13/20 1111   Pulse 89 10/13/20 1111   Resp 16 10/13/20 1111   SpO2 97 % 10/13/20 1111         Event Time   Out of Recovery 10:41:13         Pain/Rodo Score: Pain Rating Prior to Med Admin: 0 (10/13/2020 10:37 AM)  Pain Rating Post Med Admin: 0 (10/13/2020 10:05 AM)  Rodo Score: 10 (10/13/2020 10:41 AM)  Modified Rodo Score: 19 (10/13/2020 11:23 AM)

## 2020-10-13 NOTE — PLAN OF CARE
Pacu plan of care note. Patient alert. VSS. Rodo 10/10. Denies pain and nausea.. Safety precautions maintained. Ready for transfer to same day surgery unit via stretcher and surgery transporter.

## 2020-10-13 NOTE — OP NOTE
Patient: Rito Conley  MRN: [id number]    DOS: 10/13/2020    Attending Surgeon: Nick Gomez MD    OPERATIVE REPORT    Preoperative Diagnoses:   1. Nasal airway obstruction, refractory to medical management  2. Septal deviation  3. Bilateral inferior turbinate hypertrophy  4. Bilateral nasal septal body hypertrophy  5. Nasal valve collapse    Postoperative Diagnoses:   1. Nasal airway obstruction, refractory to medical management  2. Septal deviation  3. Bilateral inferior turbinate hypertrophy  4. Bilateral nasal septal body hypertrophy  5. Nasal valve collapse    Procedures Performed:   1. Diagnostic nasal endoscopy  2. Septoplasty  3. Bilateral submucosal debridement and ablation of hypertrophic, partially obstructive, septal swell body tissue  4. Internal intranasal ablation of lesions/obstructive tissue and remodeling of the right external nasal valve   5. Internal intranasal ablation of lesions/obstructive tissue and remodeling of the left external nasal valve  6. Bilateral submucosal partial resection of the inferior turbinates with therapeutic outfracture    Indications: Rito Conley is a 55 y.o. male with a history of significant nasal congestion and partial nasal obstruction. He had previously demonstrated some improvement bilaterally with a modified Reeves maneuver performed in clinic. Diagnostic nasal endoscopy was indicated to evaluate these chronic sinonasal symptoms which have not improved with medical management. Internal nasal valve compromise was identified with contributions from inferior turbinate hypertrophy, nasal septum deviation, and septal swell body hypertrophy; external nasal valve static and dynamic collapse was also identified as contributing to nasal airway obstruction. Planned procedures were indicated to address these sources of nasal airway obstruction and improve nasal airflow bilaterally.    Findings: Mr. Conley had evidence by bilateral diagnostic nasal  endoscopy of bilateral inferior turbinate hypertrophy, septal deviation and spurring, bilateral septal swell body hypertrophy, and nasal airway obstruction from internal and external nasal valve narrowing. Obstructive intranasal tissue at the bilateral nasal ala with collapse at the external nasal valves. There was specifically deflection of the nasal septum to the left side, as well as bilateral widening due to hypertrophic swell body tissue, with significant narrowing at the osteomeatal complex and internal nasal valve bilaterally. There was no evidence of polyps or purulence from the visualized middle meatus or sphenoethmoidal recess. The nasal cavity was thoroughly evaluated with findings of turbinate hypertrophy and septal deviation but no masses, polyps, or purulence. The nasopharynx was examined and symmetric, with no masses or eustachian tube obstruction. At the conclusion of the procedures performed there was significant improvement in bilateral nasal airway patency with greatly reduced obstruction from all sources addressed.    Specimens: None    Estimated Blood Loss: Approximately 50 mL (less than 200 mL)    Complications: None apparent    Description of Procedure:   Patient was identified in the holding area and taken to the operating room, where he was placed in the supine position, and induced under general anesthesia per Anesthesiology. A formal surgical pause was held, and all aspects were addressed. The nose was topically decongested with 1:1,000 epinephrine (dyed with fluorescein) on neuropledgets. Patient was draped in the normal fashion. A 0-degree endoscope was used to perform diagnostic nasal endoscopy through several passes of the nares bilaterally examining the nasal cavity, inferior and middle turbinates, middle meatus, sphenoethmoidal recess and superior turbinate, as well as the nasopharynx (including the posterior naspharyngeal wall, posterior choana, and fossa of Rosenmueller/eustachian  tube orifice). This demonstrated the findings listed above and informed the subsequent surgical procedures performed.    Injections of local anesthetic using local anesthetic with 1:100,000 epinephrine were performed at the anterior nasal septum and in the nasal cavity bilaterally. Septoplasty was first addressed. An incision was created in the left anterior septum and a left mucoperichondrial flap was elevated. The bony-cartilaginous junction was disarticulated and offending portions of cartilage and bone were resected taking great care to spare dorsal and caudal struts. A cartilage graft was harvested from the resected nasal septal cartilage and saved for later use as a crushed cartilage graft and for support of the caudal strut. Excellent improvement in the airway was noted bilaterally, though residual narrowing of the internal nasal valves and partial obstruction of the nasal airway was present from hypertrophic septal swell body tissue bilaterally which were therefore addressed next.    The hypertrophied bilateral septal swell bodies, with abnormal submucosal tissue overlying the nasal septum narrowing of the internal nasal valves bilaterally, were then each addressed with both RF ablation (Vivaer) and submucosal partial resection (microdebrider). Local anesthetic with 1:100,000 epinephrine was infiltrated submucosally to the swell body tissue bilaterally. The left septal swell body was ablated using the Vivaer Stylus to create 3 separate transmucosal lesions with low-temperature radiofrequency energy. This same radiofrequency method was used to ablate the abnormal submucosal tissue at the right septal swell body in an identical fashion. The intended transmucosal ablation of abnormal tissue was achieved bilaterally, with minimal impact to the mucosa, no bleeding, and no apparent complications. Incisions were made bilaterally at the anterior medial surface of the septal swell body tissue, from within the septal  flaps on the submucosal surface of septal mucosal flap. The abnormal hypertrophic tissue was then partially removed using the turbinate blade microdebrider to carefully remove the tissue from medial to lateral, facing toward the nasal cavity laterally, with care to avoid injury to the overlying mucosa of the nasal septum on each side. The intended submucosal destruction of medial abnormal tissue was achieved bilaterally, without impact to the mucosa, and no apparent complications. Debridement of this medial tissue produced scattered areas of slow oozing and fresh edges of tissue as intended to approximate the mucosal flaps upon closure with quilting.     The area of the septal bodies and previously widened obstructive septal tissue was then narrowed by quilting to approximate the septal mucosa and further reduce obstruction of the nasal airways from widening of the septal tissue. Before closing the septum, previously harvested cartilage graft was crushed into fine pieces which were used as a thin layer between the mucosal flaps of the nasal septum, and a thin straight portion of harvested septal cartilage was placed along the caudal strut and sutured in place to anchor it in the midline and further stabilize the caudal strut in the midline given findings of shift off the maxillary crest requiring repositioning. Initial closure of the nasal septum was accomplished using stratafix unidirectional barbed suture which was passed through the nasal septum and overlying mucosa in a quilting fashion, holding the tissue at the area of the septal swell bodies and the mid-posterior mucosal flaps in approximation securely. Additional suture passes were made along the nasal septum mucosal flaps to further secure the approximation of the septal flaps, fix cartilage grafts in place, and anchor/secure the barbed suture.  A 4-0 plain gut suture was used to quilt suture the mid-anterior nasal septal mucosal flaps, and the anterior  incision was closed with 4-0 chromic gut sutures in an interrupted fashion.    Next, the inferior turbinates were addressed. An incision was made in the head of the inferior turbinates bilaterally after infiltrating local anesthetic with 1:100,000 epinephrine in the submucosal plane. A caudal elevator was used to elevate the submucosa from underlying conchal bone bilaterally. The inferior turbinate microdebrider blade was placed through the anterior mucosal incision, within the submucosal tissue of the inferior turbinates, to perform submucous resection of the inferior turbinates and portions of the underlying conchal bones bilaterally. The turbinates were then therapeutically outfractured to lateralize the position of the inferior turbinate bilaterally.    Partially obstructing abnormal tissue contributing to static and dynamic lower nasal valve collapse was then ablated using low-power and temperature-controlled radiofrequency energy ([employer].orgaer device). Local anesthetic with 1:100,000 epinephrine was infiltrated subcutaneously at planned sites of treatment intranasally along the nasal ala bilaterally. The Vivaer Stylus was used anterior to the bony pyriform aperture to ablate tissue and create 4 distinct lesions along the fibrofatty alar tissue, lower lateral cartilage, and the caudal boarder of the upper lateral cartilage within the external nasal valve. Each ablation was performed as a -defined full treatment of 30 seconds at default settings. Effective tissue ablation was confirmed visually for each lesion, and overall increased aperture of the external nasal valve achieved consistent with intended therapeutic remodeling of this site. An identical series of procedures was then performed on the contralateral side to ablate tissue at 4 separate sites within the same anatomic area of the external nasal valve. Effective tissue ablation and overall improvement in the external nasal valve area was confirmed  on this contralateral side as well. There was no bleeding and no apparent complications bilaterally.     The sinonasal cavities were copiously irrigated with normal saline and all debris was removed. Diagnostic nasal endoscopy was repeated through several passes of the nares bilaterally, demonstrating much improved nasal airway with greatly improved septal deviation and all anatomic sources of obstruction which were identified and addressed. Again, there were no masses, polyps, purulence, or other concerning findings noted and the nasopharynx was clear. Hemostasis was achieved with application of absorbable hemostatic material along areas of oozing mucosal edges/incisions. A chitin dressing was placed extending from the osteomeatal complex anteriorly in the nasal cavity bilaterally. The chitin dressing was saturated with local anesthetic with 1:100,000 epinephrine. Intranasal silicone septal splints (Mitchell Open Lumen Splint) were placed bilaterally and secured anteriorly with a prolene suture tied within the left nares. Mupirocin ointment was applied on the nasal splints and within the bilateral nares. The patient was then returned to the care of Anesthesia for awakening and transfer to post-operative recovery.    Disposition:  Plan is for discharge to home, as all procedures were tolerated well with no operative or suzanne-operative adverse events.    Postoperative Care:  Postoperative care instructions have been discussed with the patient and provided in written form. Prescriptions for postoperative medications were provided along with instructions for use. Postoperative nasal stent removal is planned at one week following date of surgery.       Nick Gomez MD    Rhinology, Allergy, and Sinus-Skull Base Surgery    Department of Otorhinolaryngology    [facility]    Phone  [phone]    Fax      [phone]

## 2020-10-19 ENCOUNTER — OFFICE VISIT (OUTPATIENT)
Dept: OTOLARYNGOLOGY | Facility: CLINIC | Age: 55
End: 2020-10-19
Payer: MEDICARE

## 2020-10-19 VITALS
SYSTOLIC BLOOD PRESSURE: 128 MMHG | DIASTOLIC BLOOD PRESSURE: 82 MMHG | HEIGHT: 71 IN | WEIGHT: 212.31 LBS | TEMPERATURE: 98 F | BODY MASS INDEX: 29.72 KG/M2

## 2020-10-19 DIAGNOSIS — J34.89 REFRACTORY OBSTRUCTION OF NASAL AIRWAY: Primary | ICD-10-CM

## 2020-10-19 DIAGNOSIS — Z98.890 STATUS POST NASAL SURGERY: ICD-10-CM

## 2020-10-19 DIAGNOSIS — R09.81 NASAL CONGESTION: ICD-10-CM

## 2020-10-19 DIAGNOSIS — J34.89 NASAL CRUSTING: ICD-10-CM

## 2020-10-19 PROCEDURE — 99024 POSTOP FOLLOW-UP VISIT: CPT | Mod: S$GLB,POP,, | Performed by: OTOLARYNGOLOGY

## 2020-10-19 PROCEDURE — 99024 PR POST-OP FOLLOW-UP VISIT: ICD-10-PCS | Mod: S$GLB,POP,, | Performed by: OTOLARYNGOLOGY

## 2020-10-19 PROCEDURE — 31237 PR NASAL/SINUS ENDOSCOPY,BX/RMV POLYP/DEBRID: ICD-10-PCS | Mod: 50,79,S$GLB, | Performed by: OTOLARYNGOLOGY

## 2020-10-19 PROCEDURE — 31237 NSL/SINS NDSC SURG BX POLYPC: CPT | Mod: 50,79,S$GLB, | Performed by: OTOLARYNGOLOGY

## 2020-10-20 NOTE — PROGRESS NOTES
Subjective:      Rito Conley is a 55 y.o. male who comes for follow-up 1 week status-post endoscopic sinonasal surgery on 10/13/2020 (procedures listed below). He has been doing well overall without significant issues. He denies abnormal discharge, pain, or unusual bleeding at this point. No fevers, chills, or concerns for acute infection reported. No abnormal severity of post-operative pain since time of surgery. He has taken post-operative medications as prescribed, adhered to post-operative instructions including initial activity restrictions, and has performed recommended post-operative cares including nasal saline irrigations. Recovery has overall followed an expected course for the procedures performed and no other new or unusual associated symptoms are present. There has been significant improvement noted in nasal breathing and pre-operative symptoms already.    Patient reported quality of life assessments:  Not completed     Surgical procedures performed on 10/13/2020:  1. Diagnostic nasal endoscopy  2. Septoplasty  3. Bilateral submucosal debridement and ablation of hypertrophic, partially obstructive, septal swell body tissue  4. Internal intranasal ablation of lesions/obstructive tissue and remodeling of the right external nasal valve   5. Internal intranasal ablation of lesions/obstructive tissue and remodeling of the left external nasal valve  6. Bilateral submucosal partial resection of the inferior turbinates with therapeutic outfracture    Findings at time of surgery:  Mr. Conley had evidence by bilateral diagnostic nasal endoscopy of bilateral inferior turbinate hypertrophy, septal deviation and spurring, bilateral septal swell body hypertrophy, and nasal airway obstruction from internal and external nasal valve narrowing. Obstructive intranasal tissue at the bilateral nasal ala with collapse at the external nasal valves. There was specifically deflection of the nasal septum to the left  side, as well as bilateral widening due to hypertrophic swell body tissue, with significant narrowing at the osteomeatal complex and internal nasal valve bilaterally. There was no evidence of polyps or purulence from the visualized middle meatus or sphenoethmoidal recess. The nasal cavity was thoroughly evaluated with findings of turbinate hypertrophy and septal deviation but no masses, polyps, or purulence. The nasopharynx was examined and symmetric, with no masses or eustachian tube obstruction. At the conclusion of the procedures performed there was significant improvement in bilateral nasal airway patency with greatly reduced obstruction from all sources addressed.    Past Medical History  He has a past medical history of Allergy, DJD of shoulder, Hyperlipidemia, Hypertension, Lower back pain, PTSD (post-traumatic stress disorder), and Sleep apnea.    Past Surgical History  He has a past surgical history that includes Hernia repair; Colonoscopy; Esophagogastroduodenoscopy (N/A, 6/17/2020); Endoscopic nasal septoplasty (N/A, 10/13/2020); Nasal endoscopy (Bilateral, 10/13/2020); and Nasal stenosis repair (Bilateral, 10/13/2020).    Allergies  He is allergic to grass pollen-minnie grass standard.    Medications   He has a current medication list which includes the following prescription(s): amlodipine, amoxicillin-clavulanate 875-125mg, azelastine, bupropion, fluticasone propionate, ibuprofen, ibuprofen, lipitor, mupirocin, omeprazole, ondansetron, oxybutynin, oxycodone, oxycodone-acetaminophen, paroxetine, zolpidem, and fexofenadine.    Review of Systems    Review of Systems   Constitutional: Negative.  Negative for chills, fatigue and fever.   HENT: Positive for facial swelling. Negative for congestion, dental problem, ear discharge, ear pain, hearing loss, hoarse voice, nosebleeds, postnasal drip, rhinorrhea, sinus pressure, sore throat, tinnitus, trouble swallowing and voice change.    Eyes: Negative.  Negative  "for photophobia, discharge, itching and visual disturbance.   Respiratory: Negative.  Negative for apnea, cough, shortness of breath and wheezing.    Cardiovascular: Negative.  Negative for chest pain and palpitations.   Gastrointestinal: Negative.  Negative for abdominal pain, nausea and vomiting.   Endocrine: Negative.  Negative for cold intolerance and heat intolerance.   Genitourinary: Negative.  Negative for difficulty urinating.   Musculoskeletal: Negative.  Negative for arthralgias, back pain, myalgias and neck pain.   Skin: Negative.  Negative for rash.   Allergic/Immunologic: Negative.  Negative for environmental allergies and food allergies.   Neurological: Negative.  Negative for dizziness, seizures, syncope, light-headedness and headaches.   Hematological: Negative.  Negative for adenopathy. Does not bruise/bleed easily.   Psychiatric/Behavioral: Negative.  Negative for decreased concentration, dysphoric mood and sleep disturbance. The patient is not nervous/anxious.    All other systems reviewed and are negative.      Objective:     /82 (BP Location: Right arm, Patient Position: Sitting, BP Method: Large (Manual))   Temp 98.4 °F (36.9 °C)   Ht 5' 11" (1.803 m)   Wt 96.3 kg (212 lb 4.9 oz)   BMI 29.61 kg/m²        Constitutional:   He appears well-developed and well-nourished. He is active. He does not appear ill. No distress. Normal speech.  No hoarse voice and breathy voice.      Head:  Normocephalic and atraumatic. No skin lesions. Facial strength is normal.      Ears:    Right Ear: No drainage, swelling or tenderness. No decreased hearing is noted.   Left Ear: No drainage, swelling or tenderness. No decreased hearing is noted.     Nose:  Mucosal edema (mucosal edema consistent with recent sinonasal procedures), rhinorrhea (drainage and debris related to surgery) and sinus tenderness (tenderness appropriate for post-operative recovery from recent sinonasal surgery) present. No septal " deviation, nasal septal hematoma or polyps. No epistaxis (no active epistaxis is present, some old blood/crusting related to recent surgery). No turbinate hypertrophy.      Mouth/Throat  Oropharynx clear and moist without lesions or asymmetry and normal uvula midline. No oral lesions. No oropharyngeal exudate, posterior oropharyngeal edema or posterior oropharyngeal erythema.     Neck:  Neck normal without thyromegaly masses, asymmetry, normal tracheal structure, crepitus, and tenderness, phonation normal and full range of motion with neck supple. No edema, no erythema and no neck rigidity present.     Pulmonary/Chest:   Effort normal.     Psychiatric:   He has a normal mood and affect. His speech is normal and behavior is normal.     Neurological:   He has neurological normal, alert and oriented. No cranial nerve deficit or sensory deficit.     Skin:   No abrasions, lacerations, lesions, or rashes.       Procedure    Endoscopic Sinonasal Debridement  Indications: Debridement was indicated for obstructive material within the sinonasal cavities and persistent crusting, which without removal would result in persistent nasal airway partial obstruction, potential scarring, obstruction of sinus outflow to the nasal cavity, failure to deliver topical therapy such as nasal sprays and irrigations, and inability to assess and treat pathology of underlying mucosa and etiologies of the persistent and pathologic crusting/debris present.     Description of procedure: Rigid nasal endoscopy was used to assist with debridement of the sinonasal cavities. The nasal cavity was decongested and anesthetized.  A rigid nasal endoscope was introduced into the nasal cavity and used to evaluate the sinonasal cavities, mucosa, sinus ostia and turbinates bilaterally. Crusting/partially obstructive debris was removed with forceps from the nasal cavity and osteomeatal complex bilaterally. Additional crusting, retained debris, and/or necrotic  materials were removed from the nasal cavities and paranasal sinuses bilaterally with forceps and suction. The patient tolerated the procedure well and there were no complications/adverse events.      Findings/Assessment: Successful debridement and evaluation with endoscopy. Crusting and partially obstructive debris present, and all of this material was removed from the sinonasal cavities bilaterally with debridement. Mucosal edema/inflammation present bilaterally. There were no complications or other specifically concerning findings. Representative images of key findings from this diagnostic endoscopy procedure and debridement below:    Images from sinonasal endoscopy and debridement:    Left:                          Right:                          Data Reviewed    Operative report reviewed and procedures performed as well as key operative findings are described in history above.    Past records were reviewed to compare pre-op to current post-op signs/symptoms and findings, and determine significant changes.       Assessment:     Doing well following bilateral endoscopic nasal surgery to address medically refractory nasal airway obstruction, including septum/turbinate surgery, ablation of obstructive intranasal tissue, and remodeling of nasal valves.     1. Refractory obstruction of nasal airway    2. Nasal congestion    3. Nasal crusting    4. Status post nasal surgery         Plan:     We discussed post-operative cares following surgery and ongoing medical management. Importance of debridements if needed to remove crusting, debris, and potential scar tissue to promote optimal healing and outcome. Importance of medical therapies following surgery were also reviewed including saline irrigations, ointment to moisten nares, and saline spray to keep nasal passages moist while healing. All questions were answered.     He will continue with medical management consisting of nasal saline irrigations and topical nasal  steroid spray. We will continue to follow his response to surgery and this ongoing medical therapy. Follow-up is scheduled. I encouraged him to call with any questions or concerns in the meantime.          Nick Gomez MD    Rhinology, Allergy, and Sinus-Skull Base Surgery    Department of Otorhinolaryngology    [facility]    Phone  [phone]    Fax      [phone]    Answers for HPI/ROS submitted by the patient on 10/16/2020   Fatigue (Tiredness)?: Yes  Eye Drainage?: Yes  Snoring?: Yes

## 2020-10-27 ENCOUNTER — TELEPHONE (OUTPATIENT)
Dept: FAMILY MEDICINE | Facility: CLINIC | Age: 55
End: 2020-10-27

## 2020-10-27 NOTE — TELEPHONE ENCOUNTER
----- Message from Ellie Dunlap sent at 10/27/2020  7:55 AM CDT -----  Regarding: Self/  [phone]  Type: Patient Call Back    Who called:  Patient    What is the request in detail:  Pt returned staffs call and would like a call back.  Thank you      Would the patient rather a call back or a response via [facility]?   Call back    Best call back number:  [phone] (home)       Thank you

## 2020-11-16 ENCOUNTER — LAB VISIT (OUTPATIENT)
Dept: FAMILY MEDICINE | Facility: CLINIC | Age: 55
End: 2020-11-16
Payer: MEDICARE

## 2020-11-16 DIAGNOSIS — J34.2 NASAL SEPTAL DEVIATION: ICD-10-CM

## 2020-11-16 DIAGNOSIS — K21.9 GASTROESOPHAGEAL REFLUX DISEASE: ICD-10-CM

## 2020-11-16 DIAGNOSIS — M95.0 NASAL VALVE COLLAPSE: ICD-10-CM

## 2020-11-16 DIAGNOSIS — K21.9 LARYNGOPHARYNGEAL REFLUX (LPR): ICD-10-CM

## 2020-11-16 DIAGNOSIS — R49.0 HOARSENESS OF VOICE: ICD-10-CM

## 2020-11-16 DIAGNOSIS — J34.89 REFRACTORY OBSTRUCTION OF NASAL AIRWAY: ICD-10-CM

## 2020-11-16 DIAGNOSIS — R09.81 NASAL CONGESTION: ICD-10-CM

## 2020-11-16 DIAGNOSIS — R09.82 POSTNASAL DRIP: ICD-10-CM

## 2020-11-16 DIAGNOSIS — R09.89 CHRONIC THROAT CLEARING: ICD-10-CM

## 2020-11-16 DIAGNOSIS — J34.9 SINUS PROBLEM: ICD-10-CM

## 2020-11-16 DIAGNOSIS — J34.3 HYPERTROPHY OF BOTH INFERIOR NASAL TURBINATES: ICD-10-CM

## 2020-11-16 PROCEDURE — U0003 INFECTIOUS AGENT DETECTION BY NUCLEIC ACID (DNA OR RNA); SEVERE ACUTE RESPIRATORY SYNDROME CORONAVIRUS 2 (SARS-COV-2) (CORONAVIRUS DISEASE [COVID-19]), AMPLIFIED PROBE TECHNIQUE, MAKING USE OF HIGH THROUGHPUT TECHNOLOGIES AS DESCRIBED BY CMS-2020-01-R: HCPCS

## 2020-11-17 ENCOUNTER — OFFICE VISIT (OUTPATIENT)
Dept: FAMILY MEDICINE | Facility: CLINIC | Age: 55
End: 2020-11-17
Payer: MEDICARE

## 2020-11-17 VITALS
HEART RATE: 71 BPM | BODY MASS INDEX: 30.25 KG/M2 | HEIGHT: 71 IN | SYSTOLIC BLOOD PRESSURE: 130 MMHG | OXYGEN SATURATION: 98 % | WEIGHT: 216.06 LBS | TEMPERATURE: 98 F | DIASTOLIC BLOOD PRESSURE: 80 MMHG

## 2020-11-17 DIAGNOSIS — J30.9 CHRONIC ALLERGIC RHINITIS: ICD-10-CM

## 2020-11-17 DIAGNOSIS — R60.9 EDEMA, UNSPECIFIED TYPE: ICD-10-CM

## 2020-11-17 DIAGNOSIS — M54.50 CHRONIC LOW BACK PAIN WITHOUT SCIATICA, UNSPECIFIED BACK PAIN LATERALITY: ICD-10-CM

## 2020-11-17 DIAGNOSIS — G89.29 CHRONIC LOW BACK PAIN WITHOUT SCIATICA, UNSPECIFIED BACK PAIN LATERALITY: ICD-10-CM

## 2020-11-17 DIAGNOSIS — H00.012 HORDEOLUM EXTERNUM OF RIGHT LOWER EYELID: Primary | ICD-10-CM

## 2020-11-17 LAB — SARS-COV-2 RNA RESP QL NAA+PROBE: NOT DETECTED

## 2020-11-17 PROCEDURE — 99214 OFFICE O/P EST MOD 30 MIN: CPT | Mod: PBBFAC,PO | Performed by: FAMILY MEDICINE

## 2020-11-17 PROCEDURE — 90686 IIV4 VACC NO PRSV 0.5 ML IM: CPT | Mod: PBBFAC,PO

## 2020-11-17 PROCEDURE — 99214 PR OFFICE/OUTPT VISIT, EST, LEVL IV, 30-39 MIN: ICD-10-PCS | Mod: S$PBB,,, | Performed by: FAMILY MEDICINE

## 2020-11-17 PROCEDURE — 99214 OFFICE O/P EST MOD 30 MIN: CPT | Mod: S$PBB,,, | Performed by: FAMILY MEDICINE

## 2020-11-17 PROCEDURE — 99999 PR PBB SHADOW E&M-EST. PATIENT-LVL IV: ICD-10-PCS | Mod: PBBFAC,,, | Performed by: FAMILY MEDICINE

## 2020-11-17 PROCEDURE — 99999 PR PBB SHADOW E&M-EST. PATIENT-LVL IV: CPT | Mod: PBBFAC,,, | Performed by: FAMILY MEDICINE

## 2020-11-17 RX ORDER — OXYCODONE AND ACETAMINOPHEN 5; 325 MG/1; MG/1
1 TABLET ORAL EVERY 4 HOURS PRN
Qty: 31 TABLET | Refills: 0 | Status: SHIPPED | OUTPATIENT
Start: 2020-11-17 | End: 2021-03-08 | Stop reason: SDUPTHER

## 2020-11-17 RX ORDER — AZELASTINE 1 MG/ML
1 SPRAY, METERED NASAL 2 TIMES DAILY
Qty: 30 ML | Refills: 11 | Status: SHIPPED | OUTPATIENT
Start: 2020-11-17 | End: 2022-05-24 | Stop reason: SDUPTHER

## 2020-11-17 NOTE — PROGRESS NOTES
Subjective:       Patient ID: Rito Conley is a 55 y.o. male.    Chief Complaint: Bilateral Foot Swelling and Check under left eye (swollen)    55 year old male presents for concerns about swelling of his bottom right eyelid. He state he noticed this this morning.    He states he also had some swelling of his ankles after travelling to take care of his sister's estate. He admits to more salt with pork rinds. He also has been standing for long periods of times and moving furniture.    He states he feels much better after his sinus surgery.     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    Hyperlipidemia     Hypertension     Lower back pain     PTSD (post-traumatic stress disorder)     Sleep apnea     uses CPAP 1-2x/week      Past Surgical History:   Procedure Laterality Date    COLONOSCOPY      ENDOSCOPIC NASAL SEPTOPLASTY N/A 10/13/2020    Procedure: SEPTOPLASTY, NOSE, ENDOSCOPIC;  Surgeon: Nick Gomez MD;  Location: [facility] OR;  Service: ENT;  Laterality: N/A;  RN PRE OP 9- COVID NEGATIVE ON  10-  [place]    ESOPHAGOGASTRODUODENOSCOPY N/A 6/17/2020    Procedure: EGD (ESOPHAGOGASTRODUODENOSCOPY);  Surgeon: Jarad Holm MD;  Location: [facility] ([address] [place] [address]);  Service: Endoscopy;  Laterality: N/A;  covid test 6/15-[facility]    HERNIA REPAIR      NASAL ENDOSCOPY Bilateral 10/13/2020    Procedure: ENDOSCOPY, NOSE;  Surgeon: Nick Gomez MD;  Location: [facility] OR;  Service: ENT;  Laterality: Bilateral;    NASAL STENOSIS REPAIR Bilateral 10/13/2020    Procedure: REPAIR, STENOSIS, NOSE, VESTIBULE;  Surgeon: Nick Gomez MD;  Location: [facility] OR;  Service: ENT;  Laterality: Bilateral;  Vivaer console and wand available for use.  NO DISC  10/7/2020@ 247PM-LO     Family History   Problem Relation Age of Onset    Hypertension Mother     Cancer Maternal Grandmother         something in her arm     Cancer Maternal Aunt         breast    Other  Father         agent orange exposure     No Known Problems Sister     No Known Problems Brother     Amblyopia Neg Hx     Blindness Neg Hx     Cataracts Neg Hx     Diabetes Neg Hx     Glaucoma Neg Hx     Macular degeneration Neg Hx     Retinal detachment Neg Hx     Strabismus Neg Hx     Stroke Neg Hx     Thyroid disease Neg Hx      Social History     Socioeconomic History    Marital status:      Spouse name: Not on file    Number of children: Not on file    Years of education: Not on file    Highest education level: Not on file   Occupational History    Not on file   Social Needs    Financial resource strain: Not hard at all    Food insecurity     Worry: Never true     Inability: Never true    Transportation needs     Medical: No     Non-medical: No   Tobacco Use    Smoking status: Never Smoker    Smokeless tobacco: Never Used   Substance and Sexual Activity    Alcohol use: Yes     Alcohol/week: 4.0 - 6.0 standard drinks     Types: 4 - 6 Glasses of wine per week     Frequency: 2-3 times a week     Drinks per session: 1 or 2     Binge frequency: Less than monthly     Comment: a week.     Drug use: No    Sexual activity: Yes     Partners: Female   Lifestyle    Physical activity     Days per week: 3 days     Minutes per session: 40 min    Stress: Not at all   Relationships    Social connections     Talks on phone: Patient refused     Gets together: Patient refused     Attends Yazidi service: Not on file     Active member of club or organization: Patient refused     Attends meetings of clubs or organizations: Patient refused     Relationship status:    Other Topics Concern    Not on file   Social History Narrative    Not on file       Review of Systems   Constitutional: Negative for activity change and unexpected weight change.   HENT: Negative for hearing loss, rhinorrhea and trouble swallowing.    Eyes: Negative for discharge and visual disturbance.   Respiratory: Negative  "for chest tightness and wheezing.    Cardiovascular: Negative for chest pain and palpitations.   Gastrointestinal: Negative for blood in stool, constipation, diarrhea and vomiting.   Endocrine: Negative for polydipsia and polyuria.   Genitourinary: Negative for difficulty urinating, hematuria and urgency.   Musculoskeletal: Negative for arthralgias, joint swelling and neck pain.   Neurological: Negative for weakness and headaches.   Psychiatric/Behavioral: Negative for confusion and dysphoric mood.         Objective:       Vitals:    11/17/20 0952   BP: 130/80   Pulse: 71   Temp: 98.2 °F (36.8 °C)   TempSrc: Oral   SpO2: 98%   Weight: 98 kg (216 lb 0.8 oz)   Height: 5' 11" (1.803 m)       Physical Exam  Constitutional:       Appearance: Normal appearance.   Eyes:     Cardiovascular:      Rate and Rhythm: Normal rate and regular rhythm.      Heart sounds: No murmur. No friction rub. No gallop.    Pulmonary:      Effort: Pulmonary effort is normal. No respiratory distress.      Breath sounds: Normal breath sounds. No stridor. No wheezing, rhonchi or rales.   Chest:      Chest wall: No tenderness.   Neurological:      Mental Status: He is alert.         Assessment:       1. Hordeolum externum of right lower eyelid    2. Chronic allergic rhinitis    3. Chronic low back pain without sciatica, unspecified back pain laterality    4. Edema, unspecified type        Plan:       Rito was seen today for bilateral foot swelling and check under left eye (swollen).    Diagnoses and all orders for this visit:    Hordeolum externum of right lower eyelid  Warm compresses to the right eye    Chronic allergic rhinitis  -     azelastine (ASTELIN) 137 mcg (0.1 %) nasal spray; 1 spray (137 mcg total) by Nasal route 2 (two) times daily.    Chronic low back pain without sciatica, unspecified back pain laterality  -     oxyCODONE-acetaminophen (PERCOCET) 5-325 mg per tablet; Take 1 tablet by mouth every 4 (four) hours as needed.  Stable. " Refilled meds.     Edema, unspecified type  Advised to do a low sodium diet. Swelling is resolved.     Other orders  -     Influenza - Quadrivalent *Preferred* (6 months+) (PF)

## 2020-11-17 NOTE — PROGRESS NOTES
Answers for HPI/ROS submitted by the patient on 11/17/2020   activity change: No  unexpected weight change: No  neck pain: No  hearing loss: No  rhinorrhea: No  trouble swallowing: No  eye discharge: No  visual disturbance: No  chest tightness: No  wheezing: No  chest pain: No  palpitations: No  blood in stool: No  constipation: No  vomiting: No  diarrhea: No  polydipsia: No  polyuria: No  difficulty urinating: No  urgency: No  hematuria: No  joint swelling: No  arthralgias: No  headaches: No  weakness: No  confusion: No  dysphoric mood: No

## 2020-11-17 NOTE — PROGRESS NOTES
Administered Flu vaccine IM to left deltoid.  Patient tolerated injection well.  Patient advised to wait in lobby for 15 minutes for observation and to report any adverse reactions immediately.  Patient verbalized understanding.

## 2020-11-18 ENCOUNTER — OFFICE VISIT (OUTPATIENT)
Dept: OTOLARYNGOLOGY | Facility: CLINIC | Age: 55
End: 2020-11-18
Payer: MEDICARE

## 2020-11-18 VITALS
HEIGHT: 71 IN | DIASTOLIC BLOOD PRESSURE: 78 MMHG | BODY MASS INDEX: 30.14 KG/M2 | WEIGHT: 215.25 LBS | TEMPERATURE: 99 F | RESPIRATION RATE: 18 BRPM | SYSTOLIC BLOOD PRESSURE: 120 MMHG

## 2020-11-18 DIAGNOSIS — R09.82 POSTNASAL DRIP: ICD-10-CM

## 2020-11-18 DIAGNOSIS — Z98.890 STATUS POST NASAL SURGERY: Primary | ICD-10-CM

## 2020-11-18 DIAGNOSIS — J34.89 NASAL CRUSTING: ICD-10-CM

## 2020-11-18 DIAGNOSIS — R09.81 NASAL CONGESTION: ICD-10-CM

## 2020-11-18 PROCEDURE — 99213 OFFICE O/P EST LOW 20 MIN: CPT | Mod: 25,24,S$GLB, | Performed by: OTOLARYNGOLOGY

## 2020-11-18 PROCEDURE — 31231 NASAL ENDOSCOPY DX: CPT | Mod: 79,S$GLB,, | Performed by: OTOLARYNGOLOGY

## 2020-11-18 PROCEDURE — 99213 PR OFFICE/OUTPT VISIT, EST, LEVL III, 20-29 MIN: ICD-10-PCS | Mod: 25,24,S$GLB, | Performed by: OTOLARYNGOLOGY

## 2020-11-18 PROCEDURE — 31231 NASAL/SINUS ENDOSCOPY: ICD-10-PCS | Mod: 79,S$GLB,, | Performed by: OTOLARYNGOLOGY

## 2020-11-18 NOTE — PROGRESS NOTES
Subjective:      Rito Conley is a 55 y.o. male who comes for follow-up 1 month status-post endoscopic sinonasal surgery on 10/13/2020 (procedures listed below). He has been doing well overall without significant issues. He denies abnormal discharge, pain, or unusual bleeding at this point. No fevers, chills, or concerns for acute infection reported. No abnormal severity of post-operative pain since time of surgery. He has taken post-operative medications as prescribed, adhered to post-operative instructions including initial activity restrictions, and has performed recommended post-operative cares including nasal saline irrigations. Recovery has overall followed an expected course for the procedures performed and no other new or unusual associated symptoms are present. There has been significant improvement noted in nasal breathing and pre-operative symptoms.    Patient reported quality of life assessments:    SNOT-22  SNOT-22 score: : (P) 13    NOSE Score  NOSE Scale Total Score : (P) 2  NOSE score:: (P) 10    ETDQ Questionnaire Scores       Surgical procedures performed on 10/13/2020:  1. Diagnostic nasal endoscopy  2. Septoplasty  3. Bilateral submucosal debridement and ablation of hypertrophic, partially obstructive, septal swell body tissue  4. Internal intranasal ablation of lesions/obstructive tissue and remodeling of the right external nasal valve   5. Internal intranasal ablation of lesions/obstructive tissue and remodeling of the left external nasal valve  6. Bilateral submucosal partial resection of the inferior turbinates with therapeutic outfracture    Findings at time of surgery:  Mr. Conley had evidence by bilateral diagnostic nasal endoscopy of bilateral inferior turbinate hypertrophy, septal deviation and spurring, bilateral septal swell body hypertrophy, and nasal airway obstruction from internal and external nasal valve narrowing. Obstructive intranasal tissue at the bilateral nasal  ala with collapse at the external nasal valves. There was specifically deflection of the nasal septum to the left side, as well as bilateral widening due to hypertrophic swell body tissue, with significant narrowing at the osteomeatal complex and internal nasal valve bilaterally. There was no evidence of polyps or purulence from the visualized middle meatus or sphenoethmoidal recess. The nasal cavity was thoroughly evaluated with findings of turbinate hypertrophy and septal deviation but no masses, polyps, or purulence. The nasopharynx was examined and symmetric, with no masses or eustachian tube obstruction. At the conclusion of the procedures performed there was significant improvement in bilateral nasal airway patency with greatly reduced obstruction from all sources addressed.    Past Medical History  He has a past medical history of Allergy, DJD of shoulder, Hyperlipidemia, Hypertension, Lower back pain, PTSD (post-traumatic stress disorder), and Sleep apnea.    Past Surgical History  He has a past surgical history that includes Hernia repair; Colonoscopy; Esophagogastroduodenoscopy (N/A, 6/17/2020); Endoscopic nasal septoplasty (N/A, 10/13/2020); Nasal endoscopy (Bilateral, 10/13/2020); and Nasal stenosis repair (Bilateral, 10/13/2020).    Allergies  He is allergic to grass pollen-minnie grass standard.    Medications   He has a current medication list which includes the following prescription(s): amlodipine, azelastine, bupropion, fluticasone propionate, ibuprofen, ibuprofen, lipitor, oxybutynin, oxycodone-acetaminophen, paroxetine, zolpidem, fexofenadine, omeprazole, and ondansetron.    Review of Systems      Review of Systems   Constitutional: Negative.  Negative for chills, fatigue and fever.   HENT: Positive for postnasal drip. Negative for congestion, dental problem, ear discharge, ear pain, facial swelling, hearing loss, hoarse voice, nosebleeds, rhinorrhea, sinus pressure, sore throat, tinnitus, trouble  "swallowing and voice change.    Eyes: Negative.  Negative for photophobia, discharge, itching and visual disturbance.   Respiratory: Negative.  Negative for apnea, cough, shortness of breath and wheezing.    Cardiovascular: Negative.  Negative for chest pain and palpitations.   Gastrointestinal: Negative.  Negative for abdominal pain, nausea and vomiting.   Endocrine: Negative.  Negative for cold intolerance and heat intolerance.   Genitourinary: Negative.  Negative for difficulty urinating.   Musculoskeletal: Negative.  Negative for arthralgias, back pain, myalgias and neck pain.   Skin: Negative.  Negative for rash.   Allergic/Immunologic: Negative.  Negative for environmental allergies and food allergies.   Neurological: Negative.  Negative for dizziness, seizures, syncope, light-headedness and headaches.   Hematological: Negative.  Negative for adenopathy. Does not bruise/bleed easily.   Psychiatric/Behavioral: Negative for decreased concentration, dysphoric mood and sleep disturbance. The patient is nervous/anxious.    All other systems reviewed and are negative.        Objective:       /78 (BP Location: Right arm, Patient Position: Sitting, BP Method: Large (Manual))   Temp 99 °F (37.2 °C) (Oral)   Resp 18   Ht 5' 11" (1.803 m)   Wt 97.7 kg (215 lb 4.5 oz)   BMI 30.03 kg/m²        Constitutional:   He appears well-developed and well-nourished. He is active. He does not appear ill. No distress. Normal speech.  No hoarse voice and breathy voice.      Head:  Normocephalic and atraumatic. No skin lesions. Facial strength is normal.      Ears:    Right Ear: No drainage, swelling or tenderness. No decreased hearing is noted.   Left Ear: No drainage, swelling or tenderness. No decreased hearing is noted.     Nose:  Mucosal edema (mucosal edema consistent with recent sinonasal procedures and appropriate for post-operative course) present. No rhinorrhea, sinus tenderness, septal deviation, nasal septal " hematoma or polyps. No epistaxis. No turbinate hypertrophy.      Mouth/Throat  Oropharynx clear and moist without lesions or asymmetry and normal uvula midline. No oral lesions. No oropharyngeal exudate, posterior oropharyngeal edema or posterior oropharyngeal erythema.     Neck:  Neck normal without thyromegaly masses, asymmetry, normal tracheal structure, crepitus, and tenderness, phonation normal and full range of motion with neck supple. No edema, no erythema and no neck rigidity present.     Pulmonary/Chest:   Effort normal.     Psychiatric:   He has a normal mood and affect. His speech is normal and behavior is normal.     Neurological:   He has neurological normal, alert and oriented. No cranial nerve deficit or sensory deficit.     Skin:   No abrasions, lacerations, lesions, or rashes.         Procedure    Nasal endoscopy performed. Details of procedure described in separate procedure note.     Images obtained from endoscopy procedure today representing key findings (images also uploaded to media associated with patient's chart):     Left side:                          Right side:                   Data Reviewed     Operative report reviewed and procedures performed as well as key operative findings are described in history above.    Past records were reviewed to compare pre-op to current post-op signs/symptoms and findings, and determine significant changes.       Assessment:     Doing well following bilateral endoscopic nasal surgery to address medically refractory nasal airway obstruction, including septum/turbinate surgery, ablation of obstructive intranasal tissue, and remodeling of nasal valves.     1. Status post nasal surgery    2. Nasal congestion    3. Nasal crusting    4. Postnasal drip         Plan:     We discussed post-operative cares following surgery and ongoing medical management. Importance of debridements if needed to remove crusting, debris, and potential scar tissue to promote optimal  healing and outcome. Importance of medical therapies following surgery were also reviewed including saline irrigations, ointment to moisten nares, and saline spray to keep nasal passages moist while healing. All questions were answered.     He will continue with medical management consisting of nasal saline irrigations and topical nasal steroid spray. We will continue to follow his response to surgery and this ongoing medical therapy. Follow-up is scheduled. I encouraged him to call with any questions or concerns in the meantime.          Nick Gomez MD    Rhinology, Allergy, and Sinus-Skull Base Surgery    Department of Otorhinolaryngology    [facility]    Phone  [phone]    Fax      [phone]

## 2020-11-23 NOTE — PROCEDURES
Nasal/sinus endoscopy    Date/Time: 11/18/2020 3:30 PM  Performed by: Nick Gomez MD  Authorized by: Nick Gomez MD     Consent Done?:  Yes (Verbal)  Anesthesia:     Local anesthetic:  4% Xylocaine spray with Dane-Synephrine    Patient tolerance:  Patient tolerated the procedure well with no immediate complications  Nose:     Procedure Performed:  Nasal Endoscopy  External:      No external nasal deformity  Intranasal:      Mucosa no polyps     Mucosa ulcers not present     Mucosa lesions present (mucosal crusting, mild and appropriate post-op)     Turbinates not enlarged     No septum gross deformity  Nasopharynx:      No mucosa lesions     Adenoids not present     Posterior choanae patent     Eustachian tube patent       Nasal endoscopy was performed as indicated to evaluate the patient's chief concerns and for further evaluation of findings not able to be fully assessed by physical exam with anterior rhinoscopy. The procedure was performed successfully, completely, and without complications. Positive and negative findings are indicated above for key anatomic structures and clinical questions addressed by this procedure.     Descriptions and representative images of key findings from this diagnostic endoscopy procedure can be found in the associated clinic note of the same date of service. All uploaded images obtained during this exam may also be found in the media section of the patient's chart.        Nick Gomez MD    Rhinology, Allergy, and Sinus-Skull Base Surgery    Department of Otorhinolaryngology    [facility]    Phone  [phone]    Fax      [phone]

## 2020-12-11 ENCOUNTER — PATIENT MESSAGE (OUTPATIENT)
Dept: OTHER | Facility: OTHER | Age: 55
End: 2020-12-11

## 2020-12-16 ENCOUNTER — TELEPHONE (OUTPATIENT)
Dept: OTOLARYNGOLOGY | Facility: CLINIC | Age: 55
End: 2020-12-16

## 2020-12-16 NOTE — TELEPHONE ENCOUNTER
----- Message from TitoMerry Conley sent at 12/16/2020  4:01 PM CST -----  Regarding: Nasal Drip  Contact: Patient  Type: Patient Call Back    Who called: Patient    What is the request in detail: He states his post nasal drip is more excessive than before.    Can the clinic reply by [facility]? Yes    Would the patient rather a call back or a response via [facility]? Call    Best call back number: [phone] (home)     Additional Information:   [facility] [place] [facility] - [place] - [address]  [address]  [facility] [address]  Phone: [phone] Fax: [phone]    Thanks

## 2020-12-16 NOTE — TELEPHONE ENCOUNTER
pateint complaining of a bad post nasal drip. Using Flonase Nasal Spray, Astelin Nasal Spray and Saline sinus rinse but it is not  Helping. States his nose is wide open for him to breath out of but just a very bad PND. Should he take any OTC antihistamine? Pleas advise    [facility] - [place] [place] - [address]  [address]  [facility] [address]  Phone: [phone] Fax: [phone]

## 2020-12-28 ENCOUNTER — OFFICE VISIT (OUTPATIENT)
Dept: UROLOGY | Facility: CLINIC | Age: 55
End: 2020-12-28
Payer: MEDICARE

## 2020-12-28 VITALS — WEIGHT: 221.69 LBS | BODY MASS INDEX: 31.04 KG/M2 | HEIGHT: 71 IN

## 2020-12-28 DIAGNOSIS — N52.9 ERECTILE DYSFUNCTION OF ORGANIC ORIGIN: ICD-10-CM

## 2020-12-28 DIAGNOSIS — N13.8 BPH WITH OBSTRUCTION/LOWER URINARY TRACT SYMPTOMS: Primary | ICD-10-CM

## 2020-12-28 DIAGNOSIS — R35.0 URINARY FREQUENCY: ICD-10-CM

## 2020-12-28 DIAGNOSIS — R35.1 NOCTURIA: ICD-10-CM

## 2020-12-28 DIAGNOSIS — N40.1 BPH WITH OBSTRUCTION/LOWER URINARY TRACT SYMPTOMS: Primary | ICD-10-CM

## 2020-12-28 PROCEDURE — 99214 PR OFFICE/OUTPT VISIT, EST, LEVL IV, 30-39 MIN: ICD-10-PCS | Mod: S$PBB,,, | Performed by: UROLOGY

## 2020-12-28 PROCEDURE — 99999 PR PBB SHADOW E&M-EST. PATIENT-LVL IV: CPT | Mod: PBBFAC,,, | Performed by: UROLOGY

## 2020-12-28 PROCEDURE — 99999 PR PBB SHADOW E&M-EST. PATIENT-LVL IV: ICD-10-PCS | Mod: PBBFAC,,, | Performed by: UROLOGY

## 2020-12-28 PROCEDURE — 99214 OFFICE O/P EST MOD 30 MIN: CPT | Mod: PBBFAC | Performed by: UROLOGY

## 2020-12-28 PROCEDURE — 99214 OFFICE O/P EST MOD 30 MIN: CPT | Mod: S$PBB,,, | Performed by: UROLOGY

## 2020-12-28 RX ORDER — OXYBUTYNIN CHLORIDE 10 MG/1
10 TABLET, EXTENDED RELEASE ORAL DAILY
Qty: 30 TABLET | Refills: 11 | Status: SHIPPED | OUTPATIENT
Start: 2020-12-28 | End: 2021-02-22 | Stop reason: SDUPTHER

## 2020-12-28 RX ORDER — SILDENAFIL 100 MG/1
100 TABLET, FILM COATED ORAL DAILY PRN
Qty: 6 TABLET | Refills: 11 | Status: SHIPPED | OUTPATIENT
Start: 2020-12-28 | End: 2021-06-28 | Stop reason: SDUPTHER

## 2020-12-28 NOTE — PROGRESS NOTES
"Subjective:       Rito Conley is a 55 y.o. male who is an established patient with Dr Colindres, though is new to me was seen for evaluation of urinary issues.      Last saw Bernadine 4/18. Reported frequency, VON, nocturia x 1. Denies straining, urgency, weak stream. Was on Flomax. Also saw  for ED.    He is referred back now with c/o urinary frequency and worsening nocturia. Nocturia x 2-3. Strong flow. Mild urgency. Denies UI. Out of Flomax x 1 week though symptoms pre-date that. Has not noted change with Flomax. Denies hematuria, dysuria.     PSA 4/18 - 0.43  PSA 4/19 - 0.48  PSA 6/20 - 0.53    PVR (bladder scan) initial visit - 0cc     4/15/2019  PSA normal. Given trial Ditropan last visit - some improvement. Strong flow. Nocturia x 1.    10/14/2019  Frequency improved with Ditropan. No further issues.   PVR (bladder scan) today - 0cc    12/28/2020  Increased nocturia but admits to drinking more in evening. Given Cialis by NP in 6/20 but unable to get at [facility].       The following portions of the patient's history were reviewed and updated as appropriate: allergies, current medications, past family history, past medical history, past social history, past surgical history and problem list.    Review of Systems  Constitutional: no fever or chills  ENT: no nasal congestion or sore throat  Respiratory: no cough or shortness of breath  Cardiovascular: no chest pain or palpitations  Gastrointestinal: no nausea or vomiting, tolerating diet  Genitourinary: as per HPI  Hematologic/Lymphatic: no easy bruising or lymphadenopathy  Musculoskeletal: no arthralgias or myalgias  Skin: no rashes or lesions  Neurological: no seizures or tremors  Behavioral/Psych: no auditory or visual hallucinations       Objective:    Vitals: Ht 5' 11" (1.803 m)   Wt 100.5 kg (221 lb 10.8 oz)   BMI 30.92 kg/m²     Physical Exam   General: well developed, well nourished in no acute distress  Head: normocephalic, atraumatic  Neck: " supple, trachea midline, no obvious enlargement of thyroid  HEENT: EOMI, mucus membranes moist, sclera anicteric, no hearing impairment  Lungs: symmetric expansion, non-labored breathing  Cardiovascular: regular rate and rhythm, normal pulses  Abdomen: soft, non tender, non distended, no palpable masses, no hepatosplenomegaly, no hernias, bladder nonpalpable. No CVA tenderness.  Musculoskeletal: no peripheral edema, normal ROM in bilateral upper and lower extremities  Lymphatics: no cervical or inguinal lymphadenopathy  Skin: no rashes or lesions  Neuro: alert and oriented x 3, no gross deficits  Psych: normal judgment and insight, normal mood/affect and non-anxious  Genitourinary: (last visit 6/20 - Claudine MACIAS)  patient declined exam   YOSHI: Symmetric 40g prostate without nodularity or tenderness. Normal landmarks. seminal vesicles non palpable, normal sphincter tone without hemorrhoids or rectal masses. Normal appearance of anus and perineum. Difficult to palpate.       Lab Review   Urine analysis today in clinic shows Negative     Lab Results   Component Value Date    WBC 6.27 09/28/2020    HGB 14.5 09/28/2020    HCT 43.8 09/28/2020    MCV 90 09/28/2020     09/28/2020     Lab Results   Component Value Date    CREATININE 0.9 09/28/2020    BUN 12 09/28/2020     Lab Results   Component Value Date    PSA 0.76 01/30/2017     Lab Results   Component Value Date    PSADIAG 0.53 06/12/2020       Imaging  NA       Assessment/Plan:      1. Frequency of urination    - Seems like OAB component   - Limited response to Flomax   - Ditropan 10mg. Discussed possible SE.      2. Nocturia    - Reduce PM fluids   - Ditropan increase to 10mg      3. BPH with obstruction/lower urinary tract symptoms    - Limited response with Flomax   - Okay to stop   - PSA/YOSHI normal - recheck 6mths     4. ED   - Trial Viagra. Discussed SE. Rx printed.     Follow up in 6 months with PSA/YOSHI/PVR

## 2021-01-07 NOTE — DISCHARGE INSTRUCTIONS
INSTRUCTIONS TO FOLLOW AFTER SINUS AND NASAL SURGERY  DR. GOMEZ - [facility]    Office hours:  Weekdays 8:00 am to 5:00 pm.  Please call [phone] and ask to speak with one of his medical assistants, Iraida or Srini, at the [facility].    After-hours & weekends:  Please call [phone] and ask to speak with the ENT resident doctor.    Your first office visit with Dr. Gomez after surgery should have been already scheduled.  If you don't know when it is, call our office at [phone] and we can help you schedule   the appointment or notify you of the scheduled time. Normally there will be a follow-up visit one week after surgery, and another approximately three weeks after surgery.     Please call IMMMEDIATELY if you have:   Temperature of 101° F or greater   Any unusual, painful swelling   Any active bleeding that saturates more than a 4x4 gauze   Any thick drainage green or yellow drainage   Changes in vision or swelling around the eye   Pain not relieved by your prescribed pain medication    ACTIVITY:  Sleep on your back with the head of the bead elevated, up on 2-3 pillows, or in a recliner for the first 3 to 5 days. This will help with swelling.     After surgery you may have a lot of nasal drainage. This is normal. You may breathe through your nose after surgery, though may notice you feel congested because of swelling, splints and/or absorbable sponges placed in the nose. Nasal breathing usually improves significantly after your first visit after surgery when this material is removed.     If you use CPAP or BiPAP to sleep at night, you should wait at least 48 hours before resuming use.  Dr. Gomez will advise you when it is safe to do this.  You may shower after surgery.    RESTRICTED ACTIVITIES AFTER SURGERY:   Do NOT blow your nose for 2 weeks. If you have to sneeze or cough, do so with your mouth open. Allow drainage from nose to fall on a mustache dressing (gauze placed  Pt has appt 1/21 and asking for her lab orders to be placed please under nose) and change it as needed, or gently wipe outside of nose without blowing.   AVOID all heavy lifting, straining or bending for 2 weeks.    AVOID any sexual activity for 2 weeks after surgery.   AVOID semi-contact sports or vigorous exercising for 3-4 weeks. Dr. Gomez will let you know when you are cleared to resume exercise.   AVOID flying or swimming for 2 weeks.     Do NOT operate a motor vehicle or any type of heavy machinery within 24 hours of taking pain medication.   DO NOT smoke or be around smokers.   AVOID irritating substances that might make you sneeze, such as dust, chalk, harsh chemicals, and allergic triggers.  This might also include spicy foods.    DRESSINGS:  Change the gauze mustache dressing under your nose as needed if one was placed. You will likely have pinkish-red drainage for 2-3 days, small amounts of bleeding may occur until the nose heals, and this is normal; you should notify us if there is significant bleeding and if there is ongoing bleeding which is not quickly stopping.    The nose should be kept moist with nasal saline spray and irrigations to allow it to heal and prevent crusting and scabs from forming which can impair optimal healing and make it more difficult to easily remove the packing/splints at your visit after surgery. You may notice small fragments of materials placed as packing/dressings come out of your nose in the weeks following surgery; this is not problematic if parts of these materials come out. If nasal septum stents were placed you may see the front portions of them inside your nose on both sides with a suture holding them in place inside the front of your nose, these are planned to be removed at the first visit approximately one week after surgery.    MEDICATIONS:  After surgery, you will be sent home with prescriptions for:    [ x ] Pain medication - take as needed for more severe pain, but Tylenol and ibuprofen is usually sufficient to control  most of the pain related to surgery    [ x ] Anti-nausea medication - for use as needed after surgery    [ x ] Antibiotics - take all of this medication as instructed until completed.       Most people need pain medication for the first few days after surgery, although a narcotic is rarely necessary.  The best pain control comes from a combination of ACETAMINOPHEN (Tylenol) and IBUPROFEN (Motrin).  You will be given prescriptions for these so you have the recommended dose and usage instructions, but can use any over-the-counter versions of acetaminophen and ibuprofen.  These can be alternated so that you are taking something about every 3 hours while awake.     Some people have problems with bowel movements after surgery. If you have NOT had a bowel movement 3-5 days after surgery, go to your local pharmacy and purchase an over the counter stool softener such as COLACE. You can also ask the pharmacist for his or her recommendation. If you still do not have a bowel movement after starting the softener, please call the office. Antibiotics can also upset your stomach and cause nausea, diarrhea, and/or constipation by reducing the good bacteria in your gut. Replenish the good bacteria by consuming PROBIOTICS either as supplement pills, eating probiotic yogurt, or drinking kefir yogurt drink. Look for terms like live and active cultures or live probiotics on the product - a common brand of probiotic yogurt is Wir3s's.    You may be given an ointment called MUPIROCIN to use after surgery.  If you are given this, use a cotton swab to apply gently inside the nostrils on both sides.  Do this 2 times a day for 1 week.    You will need these over-the-counter medications after surgery:  SALINE SINUS RINSE ([employer] brand): You will use this saline sinus irrigation to rinse out your nose and sinuses after surgery.  Begin doing this the day after surgery, unless instructed otherwise by Dr. Gomez. You may notice limited flow  through your nose while splints/packing are in place but it is still important to use saline rinses to keep the nose clear and moist. Irrigations will be more effective after your first post-operative visit when material partially blocking the nose is removed. You should do a full bottle, half on one side of your nose and half on the other, 2 times per day (or more if able to, you cannot do this too much). These are very important to allow the nose to heal well, remove debris, and reduce risk of unwanted scar tissue from forming. Follow the instructions on the box: mix the salt packet with clean water (bottle, previously boiled, distilled, etc -- not tap water) to the line on the bottle to make the irrigation.    AFRIN (regular strength): Only use if you have nasal congestion or bleeding. Use 2 times per day for 3 days, stop for 1 day, continue 2 times per day for 3 days, then stop completely. This is very successful at resolving minor nose bleeds following surgery. The generic drug name for Afrin is oxymetazoline, and it is sold as a nasal decongestant.  NOTE:  You may not need to do this at all.    DIET:   Avoid hot and spicy foods for 1 week after surgery.   Begin with bland foods the evening after surgery and advance to your regular diet as tolerated.  It is not necessary to take only soft food unless you are recovering from tonsil surgery.   Drink plenty of fluids (water is best).    Avoid alcoholic and caffeinated beverages for 1 week after surgery because they can cause you to become dehydrated.      Following these instructions, remembering to do the sinus irrigations, and returning for your follow-up visits after surgery will make sure you have the best possible result. Wishing you a quick and easy recovery from surgery, and looking forward to your improvements.     Nick Gomez MD  Rhinology, Sinus, and Skull Base Surgery  Department of Otorhinolaryngology      Medications: Pain medication should be  taken only if needed and as directed. If antibiotics are prescribed, the medication should be taken until completed.     No driving, alcoholic beverages or signing legal documents for next 24 hours or while taking pain medication.       Fall Prevention  Millions of people fall every year and injure themselves. You may have had anesthesia or sedation which may increase your risk of falling. You may have health issues that put you at an increased risk of falling.     Here are ways to reduce your risk of falling.  ·   · Make your home safe by keeping walkways clear of objects you may trip over.  · Use non-slip pads under rugs. Do not use area rugs or small throw rugs.  · Use non-slip mats in bathtubs and showers.  · Install handrails and lights on staircases.  · Do not walk in poorly lit areas.  · Do not stand on chairs or wobbly ladders.  · Use caution when reaching overhead or looking upward. This position can cause a loss of balance.  · Be sure your shoes fit properly, have non-slip bottoms and are in good condition.   · Wear shoes both inside and out. Avoid going barefoot or wearing slippers.  · Be cautious when going up and down stairs, curbs, and when walking on uneven sidewalks.  · If your balance is poor, consider using a cane or walker.  · If your fall was related to alcohol use, stop or limit alcohol intake.   · If your fall was related to use of sleeping medicines, talk to your doctor about this. You may need to reduce your dosage at bedtime if you awaken during the night to go to the bathroom.    · To reduce the need for nighttime bathroom trips:  ¨ Avoid drinking fluids for several hours before going to bed  ¨ Empty your bladder before going to bed  ¨ Men can keep a urinal at the bedside  · Stay as active as you can. Balance, flexibility, strength, and endurance all come from exercise. They all play a role in preventing falls. Ask your healthcare provider which types of activity are right for you.  · Get  your vision checked on a regular basis.  · If you have pets, know where they are before you stand up or walk so you don't trip over them.  · Use night lights.

## 2021-02-22 DIAGNOSIS — E78.5 HYPERLIPIDEMIA, UNSPECIFIED HYPERLIPIDEMIA TYPE: ICD-10-CM

## 2021-02-22 DIAGNOSIS — J45.909 ASTHMA WITH ALLERGIC RHINITIS, UNSPECIFIED ASTHMA SEVERITY, UNCOMPLICATED: ICD-10-CM

## 2021-02-22 DIAGNOSIS — I11.9 BENIGN HYPERTENSIVE HEART DISEASE WITHOUT HEART FAILURE: ICD-10-CM

## 2021-02-22 DIAGNOSIS — J01.90 ACUTE SINUSITIS WITH SYMPTOMS GREATER THAN 10 DAYS: ICD-10-CM

## 2021-02-22 RX ORDER — OXYBUTYNIN CHLORIDE 10 MG/1
10 TABLET, EXTENDED RELEASE ORAL DAILY
Qty: 30 TABLET | Refills: 11 | Status: SHIPPED | OUTPATIENT
Start: 2021-02-22 | End: 2021-06-28 | Stop reason: SDUPTHER

## 2021-02-22 RX ORDER — OXYBUTYNIN CHLORIDE 10 MG/1
10 TABLET, EXTENDED RELEASE ORAL DAILY
Qty: 30 TABLET | Refills: 11 | Status: SHIPPED | OUTPATIENT
Start: 2021-02-22 | End: 2021-02-22 | Stop reason: SDUPTHER

## 2021-02-23 RX ORDER — AMLODIPINE BESYLATE 10 MG/1
10 TABLET ORAL DAILY
Qty: 90 TABLET | Refills: 1 | Status: SHIPPED | OUTPATIENT
Start: 2021-02-23 | End: 2021-09-28 | Stop reason: SDUPTHER

## 2021-02-23 RX ORDER — FLUTICASONE PROPIONATE 50 MCG
1 SPRAY, SUSPENSION (ML) NASAL 2 TIMES DAILY
Qty: 58 G | Refills: 3 | Status: SHIPPED | OUTPATIENT
Start: 2021-02-23 | End: 2022-08-05 | Stop reason: SDUPTHER

## 2021-02-23 RX ORDER — MINERAL OIL
180 ENEMA (ML) RECTAL DAILY
Qty: 30 TABLET | Refills: 11 | Status: SHIPPED | OUTPATIENT
Start: 2021-02-23 | End: 2021-05-05 | Stop reason: SDUPTHER

## 2021-02-23 RX ORDER — ATORVASTATIN CALCIUM 40 MG
40 TABLET ORAL DAILY
Qty: 30 TABLET | Refills: 5 | Status: SHIPPED | OUTPATIENT
Start: 2021-02-23 | End: 2021-03-15 | Stop reason: SDUPTHER

## 2021-03-01 ENCOUNTER — LAB VISIT (OUTPATIENT)
Dept: FAMILY MEDICINE | Facility: CLINIC | Age: 56
End: 2021-03-01
Payer: MEDICARE

## 2021-03-01 DIAGNOSIS — Z98.890 STATUS POST NASAL SURGERY: ICD-10-CM

## 2021-03-01 LAB — SARS-COV-2 RNA RESP QL NAA+PROBE: NOT DETECTED

## 2021-03-01 PROCEDURE — U0005 INFEC AGEN DETEC AMPLI PROBE: HCPCS

## 2021-03-01 PROCEDURE — U0003 INFECTIOUS AGENT DETECTION BY NUCLEIC ACID (DNA OR RNA); SEVERE ACUTE RESPIRATORY SYNDROME CORONAVIRUS 2 (SARS-COV-2) (CORONAVIRUS DISEASE [COVID-19]), AMPLIFIED PROBE TECHNIQUE, MAKING USE OF HIGH THROUGHPUT TECHNOLOGIES AS DESCRIBED BY CMS-2020-01-R: HCPCS

## 2021-03-03 ENCOUNTER — OFFICE VISIT (OUTPATIENT)
Dept: OTOLARYNGOLOGY | Facility: CLINIC | Age: 56
End: 2021-03-03
Payer: MEDICARE

## 2021-03-03 VITALS
HEIGHT: 71 IN | BODY MASS INDEX: 30.91 KG/M2 | WEIGHT: 220.81 LBS | SYSTOLIC BLOOD PRESSURE: 130 MMHG | TEMPERATURE: 99 F | DIASTOLIC BLOOD PRESSURE: 90 MMHG

## 2021-03-03 DIAGNOSIS — R09.82 POSTNASAL DRIP: ICD-10-CM

## 2021-03-03 DIAGNOSIS — J34.89 NASAL CRUSTING: ICD-10-CM

## 2021-03-03 DIAGNOSIS — R09.81 NASAL CONGESTION: ICD-10-CM

## 2021-03-03 DIAGNOSIS — Z98.890 STATUS POST NASAL SURGERY: Primary | ICD-10-CM

## 2021-03-03 PROCEDURE — 99213 PR OFFICE/OUTPT VISIT, EST, LEVL III, 20-29 MIN: ICD-10-PCS | Mod: 25,S$GLB,, | Performed by: OTOLARYNGOLOGY

## 2021-03-03 PROCEDURE — 99213 OFFICE O/P EST LOW 20 MIN: CPT | Mod: 25,S$GLB,, | Performed by: OTOLARYNGOLOGY

## 2021-03-03 PROCEDURE — 31231 NASAL ENDOSCOPY DX: CPT | Mod: S$GLB,,, | Performed by: OTOLARYNGOLOGY

## 2021-03-03 PROCEDURE — 31231 NASAL/SINUS ENDOSCOPY: ICD-10-PCS | Mod: S$GLB,,, | Performed by: OTOLARYNGOLOGY

## 2021-03-08 ENCOUNTER — OFFICE VISIT (OUTPATIENT)
Dept: FAMILY MEDICINE | Facility: CLINIC | Age: 56
End: 2021-03-08
Payer: MEDICARE

## 2021-03-08 VITALS
HEART RATE: 84 BPM | SYSTOLIC BLOOD PRESSURE: 130 MMHG | HEIGHT: 71 IN | DIASTOLIC BLOOD PRESSURE: 82 MMHG | OXYGEN SATURATION: 97 % | BODY MASS INDEX: 31.05 KG/M2 | TEMPERATURE: 98 F | WEIGHT: 221.81 LBS

## 2021-03-08 DIAGNOSIS — I11.9 BENIGN HYPERTENSIVE HEART DISEASE WITHOUT HEART FAILURE: ICD-10-CM

## 2021-03-08 DIAGNOSIS — M54.50 CHRONIC LOW BACK PAIN WITHOUT SCIATICA, UNSPECIFIED BACK PAIN LATERALITY: ICD-10-CM

## 2021-03-08 DIAGNOSIS — Z12.11 COLON CANCER SCREENING: Primary | ICD-10-CM

## 2021-03-08 DIAGNOSIS — G89.29 CHRONIC LOW BACK PAIN WITHOUT SCIATICA, UNSPECIFIED BACK PAIN LATERALITY: ICD-10-CM

## 2021-03-08 DIAGNOSIS — L30.9 DERMATITIS: ICD-10-CM

## 2021-03-08 DIAGNOSIS — Z12.5 SCREENING FOR PROSTATE CANCER: ICD-10-CM

## 2021-03-08 PROCEDURE — 99214 OFFICE O/P EST MOD 30 MIN: CPT | Mod: S$PBB,,, | Performed by: FAMILY MEDICINE

## 2021-03-08 PROCEDURE — 99214 OFFICE O/P EST MOD 30 MIN: CPT | Mod: PBBFAC,PO | Performed by: FAMILY MEDICINE

## 2021-03-08 PROCEDURE — 99999 PR PBB SHADOW E&M-EST. PATIENT-LVL IV: CPT | Mod: PBBFAC,,, | Performed by: FAMILY MEDICINE

## 2021-03-08 PROCEDURE — 99999 PR PBB SHADOW E&M-EST. PATIENT-LVL IV: ICD-10-PCS | Mod: PBBFAC,,, | Performed by: FAMILY MEDICINE

## 2021-03-08 PROCEDURE — 99214 PR OFFICE/OUTPT VISIT, EST, LEVL IV, 30-39 MIN: ICD-10-PCS | Mod: S$PBB,,, | Performed by: FAMILY MEDICINE

## 2021-03-08 RX ORDER — HYDROCORTISONE 25 MG/G
CREAM TOPICAL 2 TIMES DAILY
Qty: 20 G | Refills: 2 | Status: SHIPPED | OUTPATIENT
Start: 2021-03-08

## 2021-03-08 RX ORDER — OXYCODONE AND ACETAMINOPHEN 5; 325 MG/1; MG/1
1 TABLET ORAL EVERY 4 HOURS PRN
Qty: 31 TABLET | Refills: 0 | Status: SHIPPED | OUTPATIENT
Start: 2021-03-08 | End: 2021-05-05 | Stop reason: SDUPTHER

## 2021-03-15 ENCOUNTER — LAB VISIT (OUTPATIENT)
Dept: LAB | Facility: HOSPITAL | Age: 56
End: 2021-03-15
Attending: FAMILY MEDICINE
Payer: MEDICARE

## 2021-03-15 DIAGNOSIS — E78.5 HYPERLIPIDEMIA, UNSPECIFIED HYPERLIPIDEMIA TYPE: ICD-10-CM

## 2021-03-15 DIAGNOSIS — I11.9 BENIGN HYPERTENSIVE HEART DISEASE WITHOUT HEART FAILURE: ICD-10-CM

## 2021-03-15 DIAGNOSIS — Z12.5 SCREENING FOR PROSTATE CANCER: ICD-10-CM

## 2021-03-15 LAB
ALBUMIN SERPL BCP-MCNC: 3.9 G/DL (ref 3.5–5.2)
ALP SERPL-CCNC: 107 U/L (ref 55–135)
ALT SERPL W/O P-5'-P-CCNC: 29 U/L (ref 10–44)
ANION GAP SERPL CALC-SCNC: 11 MMOL/L (ref 8–16)
AST SERPL-CCNC: 25 U/L (ref 10–40)
BASOPHILS # BLD AUTO: 0.04 K/UL (ref 0–0.2)
BASOPHILS NFR BLD: 0.8 % (ref 0–1.9)
BILIRUB SERPL-MCNC: 0.8 MG/DL (ref 0.1–1)
BUN SERPL-MCNC: 13 MG/DL (ref 6–20)
CALCIUM SERPL-MCNC: 8.6 MG/DL (ref 8.7–10.5)
CHLORIDE SERPL-SCNC: 102 MMOL/L (ref 95–110)
CHOLEST SERPL-MCNC: 186 MG/DL (ref 120–199)
CHOLEST/HDLC SERPL: 4 {RATIO} (ref 2–5)
CO2 SERPL-SCNC: 27 MMOL/L (ref 23–29)
COMPLEXED PSA SERPL-MCNC: 0.41 NG/ML (ref 0–4)
CREAT SERPL-MCNC: 1 MG/DL (ref 0.5–1.4)
DIFFERENTIAL METHOD: ABNORMAL
EOSINOPHIL # BLD AUTO: 0.2 K/UL (ref 0–0.5)
EOSINOPHIL NFR BLD: 4.4 % (ref 0–8)
ERYTHROCYTE [DISTWIDTH] IN BLOOD BY AUTOMATED COUNT: 13.9 % (ref 11.5–14.5)
EST. GFR  (AFRICAN AMERICAN): >60 ML/MIN/1.73 M^2
EST. GFR  (NON AFRICAN AMERICAN): >60 ML/MIN/1.73 M^2
GLUCOSE SERPL-MCNC: 100 MG/DL (ref 70–110)
HCT VFR BLD AUTO: 45.8 % (ref 40–54)
HDLC SERPL-MCNC: 47 MG/DL (ref 40–75)
HDLC SERPL: 25.3 % (ref 20–50)
HGB BLD-MCNC: 15 G/DL (ref 14–18)
IMM GRANULOCYTES # BLD AUTO: 0.01 K/UL (ref 0–0.04)
IMM GRANULOCYTES NFR BLD AUTO: 0.2 % (ref 0–0.5)
LDLC SERPL CALC-MCNC: 115 MG/DL (ref 63–159)
LYMPHOCYTES # BLD AUTO: 2.6 K/UL (ref 1–4.8)
LYMPHOCYTES NFR BLD: 52.5 % (ref 18–48)
MCH RBC QN AUTO: 29.1 PG (ref 27–31)
MCHC RBC AUTO-ENTMCNC: 32.8 G/DL (ref 32–36)
MCV RBC AUTO: 89 FL (ref 82–98)
MONOCYTES # BLD AUTO: 0.5 K/UL (ref 0.3–1)
MONOCYTES NFR BLD: 10.3 % (ref 4–15)
NEUTROPHILS # BLD AUTO: 1.6 K/UL (ref 1.8–7.7)
NEUTROPHILS NFR BLD: 31.8 % (ref 38–73)
NONHDLC SERPL-MCNC: 139 MG/DL
NRBC BLD-RTO: 0 /100 WBC
PLATELET # BLD AUTO: 314 K/UL (ref 150–350)
PMV BLD AUTO: 10.1 FL (ref 9.2–12.9)
POTASSIUM SERPL-SCNC: 3.7 MMOL/L (ref 3.5–5.1)
PROT SERPL-MCNC: 8 G/DL (ref 6–8.4)
RBC # BLD AUTO: 5.16 M/UL (ref 4.6–6.2)
SODIUM SERPL-SCNC: 140 MMOL/L (ref 136–145)
TRIGL SERPL-MCNC: 120 MG/DL (ref 30–150)
WBC # BLD AUTO: 4.95 K/UL (ref 3.9–12.7)

## 2021-03-15 PROCEDURE — 84153 ASSAY OF PSA TOTAL: CPT | Performed by: FAMILY MEDICINE

## 2021-03-15 PROCEDURE — 80053 COMPREHEN METABOLIC PANEL: CPT | Performed by: FAMILY MEDICINE

## 2021-03-15 PROCEDURE — 85025 COMPLETE CBC W/AUTO DIFF WBC: CPT | Performed by: FAMILY MEDICINE

## 2021-03-15 PROCEDURE — 80061 LIPID PANEL: CPT | Performed by: FAMILY MEDICINE

## 2021-03-15 PROCEDURE — 36415 COLL VENOUS BLD VENIPUNCTURE: CPT | Mod: PO | Performed by: FAMILY MEDICINE

## 2021-03-15 RX ORDER — ATORVASTATIN CALCIUM 40 MG
40 TABLET ORAL DAILY
Qty: 30 TABLET | Refills: 5 | Status: SHIPPED | OUTPATIENT
Start: 2021-03-15 | End: 2021-05-05 | Stop reason: SDUPTHER

## 2021-03-25 ENCOUNTER — PES CALL (OUTPATIENT)
Dept: ADMINISTRATIVE | Facility: CLINIC | Age: 56
End: 2021-03-25

## 2021-03-28 ENCOUNTER — PATIENT MESSAGE (OUTPATIENT)
Dept: FAMILY MEDICINE | Facility: CLINIC | Age: 56
End: 2021-03-28

## 2021-03-28 ENCOUNTER — HOSPITAL ENCOUNTER (EMERGENCY)
Facility: HOSPITAL | Age: 56
Discharge: HOME | End: 2021-03-28
Attending: EMERGENCY MEDICINE
Payer: MEDICARE

## 2021-03-28 VITALS
DIASTOLIC BLOOD PRESSURE: 84 MMHG | WEIGHT: 210 LBS | OXYGEN SATURATION: 97 % | BODY MASS INDEX: 29.4 KG/M2 | TEMPERATURE: 98 F | HEART RATE: 78 BPM | SYSTOLIC BLOOD PRESSURE: 138 MMHG | HEIGHT: 71 IN | RESPIRATION RATE: 18 BRPM

## 2021-03-28 DIAGNOSIS — M79.89 SWELLING OF RIGHT MIDDLE FINGER: Primary | ICD-10-CM

## 2021-03-28 DIAGNOSIS — S63.632A SPRAIN OF INTERPHALANGEAL JOINT OF RIGHT MIDDLE FINGER, INITIAL ENCOUNTER: ICD-10-CM

## 2021-03-28 PROCEDURE — 99284 EMERGENCY DEPT VISIT MOD MDM: CPT | Mod: 25,ER

## 2021-03-28 PROCEDURE — 29130 APPL FINGER SPLINT STATIC: CPT

## 2021-03-28 RX ORDER — KETOROLAC TROMETHAMINE 10 MG/1
10 TABLET, FILM COATED ORAL EVERY 6 HOURS PRN
Qty: 12 TABLET | Refills: 0 | Status: SHIPPED | OUTPATIENT
Start: 2021-03-28 | End: 2021-03-31

## 2021-03-28 RX ORDER — METHYLPREDNISOLONE 4 MG/1
TABLET ORAL
Qty: 1 PACKAGE | Refills: 0 | Status: SHIPPED | OUTPATIENT
Start: 2021-03-28 | End: 2021-04-18

## 2021-03-28 RX ORDER — SULFAMETHOXAZOLE AND TRIMETHOPRIM 800; 160 MG/1; MG/1
1 TABLET ORAL 2 TIMES DAILY
Qty: 20 TABLET | Refills: 0 | Status: SHIPPED | OUTPATIENT
Start: 2021-03-28 | End: 2021-04-07

## 2021-03-28 RX ORDER — MUPIROCIN 20 MG/G
OINTMENT TOPICAL 2 TIMES DAILY
Qty: 22 G | Refills: 0 | Status: SHIPPED | OUTPATIENT
Start: 2021-03-28 | End: 2021-04-07

## 2021-04-09 ENCOUNTER — TELEPHONE (OUTPATIENT)
Dept: ADMINISTRATIVE | Facility: CLINIC | Age: 56
End: 2021-04-09

## 2021-04-21 ENCOUNTER — PES CALL (OUTPATIENT)
Dept: ADMINISTRATIVE | Facility: CLINIC | Age: 56
End: 2021-04-21

## 2021-05-05 DIAGNOSIS — E78.5 HYPERLIPIDEMIA, UNSPECIFIED HYPERLIPIDEMIA TYPE: ICD-10-CM

## 2021-05-05 DIAGNOSIS — J01.90 ACUTE SINUSITIS WITH SYMPTOMS GREATER THAN 10 DAYS: ICD-10-CM

## 2021-05-05 DIAGNOSIS — M54.50 CHRONIC LOW BACK PAIN WITHOUT SCIATICA, UNSPECIFIED BACK PAIN LATERALITY: ICD-10-CM

## 2021-05-05 DIAGNOSIS — G89.29 CHRONIC LOW BACK PAIN WITHOUT SCIATICA, UNSPECIFIED BACK PAIN LATERALITY: ICD-10-CM

## 2021-05-05 RX ORDER — OXYCODONE AND ACETAMINOPHEN 5; 325 MG/1; MG/1
1 TABLET ORAL EVERY 4 HOURS PRN
Qty: 31 TABLET | Refills: 0 | Status: SHIPPED | OUTPATIENT
Start: 2021-05-05 | End: 2021-09-28 | Stop reason: SDUPTHER

## 2021-05-05 RX ORDER — ATORVASTATIN CALCIUM 40 MG
40 TABLET ORAL DAILY
Qty: 30 TABLET | Refills: 5 | Status: SHIPPED | OUTPATIENT
Start: 2021-05-05 | End: 2021-09-28 | Stop reason: SDUPTHER

## 2021-05-05 RX ORDER — MINERAL OIL
180 ENEMA (ML) RECTAL DAILY
Qty: 30 TABLET | Refills: 11 | Status: SHIPPED | OUTPATIENT
Start: 2021-05-05 | End: 2022-04-06

## 2021-05-11 ENCOUNTER — TELEPHONE (OUTPATIENT)
Dept: FAMILY MEDICINE | Facility: CLINIC | Age: 56
End: 2021-05-11

## 2021-05-11 DIAGNOSIS — G89.29 CHRONIC LOW BACK PAIN WITHOUT SCIATICA, UNSPECIFIED BACK PAIN LATERALITY: ICD-10-CM

## 2021-05-11 DIAGNOSIS — M54.50 CHRONIC LOW BACK PAIN WITHOUT SCIATICA, UNSPECIFIED BACK PAIN LATERALITY: ICD-10-CM

## 2021-05-11 DIAGNOSIS — E78.5 HYPERLIPIDEMIA, UNSPECIFIED HYPERLIPIDEMIA TYPE: ICD-10-CM

## 2021-05-13 ENCOUNTER — OFFICE VISIT (OUTPATIENT)
Dept: FAMILY MEDICINE | Facility: CLINIC | Age: 56
End: 2021-05-13
Payer: MEDICARE

## 2021-05-13 ENCOUNTER — TELEPHONE (OUTPATIENT)
Dept: ENDOSCOPY | Facility: HOSPITAL | Age: 56
End: 2021-05-13

## 2021-05-13 ENCOUNTER — PATIENT MESSAGE (OUTPATIENT)
Dept: ENDOSCOPY | Facility: HOSPITAL | Age: 56
End: 2021-05-13

## 2021-05-13 ENCOUNTER — TELEPHONE (OUTPATIENT)
Dept: FAMILY MEDICINE | Facility: CLINIC | Age: 56
End: 2021-05-13

## 2021-05-13 ENCOUNTER — HOSPITAL ENCOUNTER (OUTPATIENT)
Dept: RADIOLOGY | Facility: HOSPITAL | Age: 56
Discharge: HOME | End: 2021-05-13
Attending: NURSE PRACTITIONER
Payer: MEDICARE

## 2021-05-13 VITALS
OXYGEN SATURATION: 97 % | RESPIRATION RATE: 19 BRPM | DIASTOLIC BLOOD PRESSURE: 90 MMHG | TEMPERATURE: 98 F | SYSTOLIC BLOOD PRESSURE: 136 MMHG | WEIGHT: 210.13 LBS | BODY MASS INDEX: 29.42 KG/M2 | HEART RATE: 82 BPM | HEIGHT: 71 IN

## 2021-05-13 DIAGNOSIS — Z12.11 SPECIAL SCREENING FOR MALIGNANT NEOPLASMS, COLON: Primary | ICD-10-CM

## 2021-05-13 DIAGNOSIS — M79.662 PAIN OF LEFT CALF: ICD-10-CM

## 2021-05-13 DIAGNOSIS — R22.42 LOCALIZED SWELLING OF LEFT LOWER LEG: ICD-10-CM

## 2021-05-13 DIAGNOSIS — R22.42 LOCALIZED SWELLING OF LEFT LOWER LEG: Primary | ICD-10-CM

## 2021-05-13 PROCEDURE — 99999 PR PBB SHADOW E&M-EST. PATIENT-LVL V: ICD-10-PCS | Mod: PBBFAC,,, | Performed by: NURSE PRACTITIONER

## 2021-05-13 PROCEDURE — 93971 EXTREMITY STUDY: CPT | Mod: TC,LT

## 2021-05-13 PROCEDURE — 93971 US LOWER EXTREMITY VEINS LEFT: ICD-10-PCS | Mod: 26,LT,, | Performed by: RADIOLOGY

## 2021-05-13 PROCEDURE — 99214 OFFICE O/P EST MOD 30 MIN: CPT | Mod: S$PBB,,, | Performed by: NURSE PRACTITIONER

## 2021-05-13 PROCEDURE — 93971 EXTREMITY STUDY: CPT | Mod: 26,LT,, | Performed by: RADIOLOGY

## 2021-05-13 PROCEDURE — 99215 OFFICE O/P EST HI 40 MIN: CPT | Mod: PBBFAC,PN,25 | Performed by: NURSE PRACTITIONER

## 2021-05-13 PROCEDURE — 99214 PR OFFICE/OUTPT VISIT, EST, LEVL IV, 30-39 MIN: ICD-10-PCS | Mod: S$PBB,,, | Performed by: NURSE PRACTITIONER

## 2021-05-13 PROCEDURE — 99999 PR PBB SHADOW E&M-EST. PATIENT-LVL V: CPT | Mod: PBBFAC,,, | Performed by: NURSE PRACTITIONER

## 2021-05-13 RX ORDER — SODIUM, POTASSIUM,MAG SULFATES 17.5-3.13G
1 SOLUTION, RECONSTITUTED, ORAL ORAL DAILY
Qty: 1 KIT | Refills: 0 | Status: SHIPPED | OUTPATIENT
Start: 2021-05-13 | End: 2021-12-07

## 2021-05-14 ENCOUNTER — PATIENT MESSAGE (OUTPATIENT)
Dept: FAMILY MEDICINE | Facility: CLINIC | Age: 56
End: 2021-05-14

## 2021-05-14 ENCOUNTER — TELEPHONE (OUTPATIENT)
Dept: FAMILY MEDICINE | Facility: CLINIC | Age: 56
End: 2021-05-14

## 2021-06-01 ENCOUNTER — PES CALL (OUTPATIENT)
Dept: ADMINISTRATIVE | Facility: CLINIC | Age: 56
End: 2021-06-01

## 2021-06-01 ENCOUNTER — LAB VISIT (OUTPATIENT)
Dept: FAMILY MEDICINE | Facility: CLINIC | Age: 56
End: 2021-06-01
Payer: MEDICARE

## 2021-06-01 DIAGNOSIS — Z01.818 PRE-OP TESTING: ICD-10-CM

## 2021-06-01 PROCEDURE — U0005 INFEC AGEN DETEC AMPLI PROBE: HCPCS | Performed by: INTERNAL MEDICINE

## 2021-06-01 PROCEDURE — U0003 INFECTIOUS AGENT DETECTION BY NUCLEIC ACID (DNA OR RNA); SEVERE ACUTE RESPIRATORY SYNDROME CORONAVIRUS 2 (SARS-COV-2) (CORONAVIRUS DISEASE [COVID-19]), AMPLIFIED PROBE TECHNIQUE, MAKING USE OF HIGH THROUGHPUT TECHNOLOGIES AS DESCRIBED BY CMS-2020-01-R: HCPCS | Performed by: INTERNAL MEDICINE

## 2021-06-02 ENCOUNTER — TELEPHONE (OUTPATIENT)
Dept: ENDOSCOPY | Facility: HOSPITAL | Age: 56
End: 2021-06-02

## 2021-06-02 ENCOUNTER — TELEPHONE (OUTPATIENT)
Dept: ADMINISTRATIVE | Facility: CLINIC | Age: 56
End: 2021-06-02

## 2021-06-02 LAB — SARS-COV-2 RNA RESP QL NAA+PROBE: NOT DETECTED

## 2021-06-04 ENCOUNTER — ANESTHESIA EVENT (OUTPATIENT)
Dept: ENDOSCOPY | Facility: HOSPITAL | Age: 56
End: 2021-06-04
Payer: MEDICARE

## 2021-06-04 ENCOUNTER — ANESTHESIA (OUTPATIENT)
Dept: ENDOSCOPY | Facility: HOSPITAL | Age: 56
End: 2021-06-04
Payer: MEDICARE

## 2021-06-04 ENCOUNTER — HOSPITAL ENCOUNTER (OUTPATIENT)
Facility: HOSPITAL | Age: 56
Discharge: HOME | End: 2021-06-04
Attending: INTERNAL MEDICINE | Admitting: INTERNAL MEDICINE
Payer: MEDICARE

## 2021-06-04 VITALS
OXYGEN SATURATION: 97 % | SYSTOLIC BLOOD PRESSURE: 142 MMHG | RESPIRATION RATE: 18 BRPM | HEART RATE: 60 BPM | TEMPERATURE: 98 F | DIASTOLIC BLOOD PRESSURE: 80 MMHG

## 2021-06-04 DIAGNOSIS — Z86.010 ENCOUNTER FOR COLONOSCOPY DUE TO HISTORY OF COLONIC POLYP: ICD-10-CM

## 2021-06-04 DIAGNOSIS — Z12.11 ENCOUNTER FOR COLONOSCOPY DUE TO HISTORY OF COLONIC POLYP: ICD-10-CM

## 2021-06-04 PROBLEM — Z86.0100 ENCOUNTER FOR COLONOSCOPY DUE TO HISTORY OF COLONIC POLYP: Status: ACTIVE | Noted: 2021-06-04

## 2021-06-04 PROCEDURE — 88305 TISSUE EXAM BY PATHOLOGIST: CPT | Mod: 26,,, | Performed by: PATHOLOGY

## 2021-06-04 PROCEDURE — 88305 TISSUE EXAM BY PATHOLOGIST: CPT | Mod: 59 | Performed by: PATHOLOGY

## 2021-06-04 PROCEDURE — 88305 TISSUE EXAM BY PATHOLOGIST: ICD-10-PCS | Mod: 26,,, | Performed by: PATHOLOGY

## 2021-06-04 PROCEDURE — 27201012 HC FORCEPS, HOT/COLD, DISP: Performed by: INTERNAL MEDICINE

## 2021-06-04 PROCEDURE — 45380 PR COLONOSCOPY,BIOPSY: ICD-10-PCS | Mod: PT,,, | Performed by: INTERNAL MEDICINE

## 2021-06-04 PROCEDURE — 45380 COLONOSCOPY AND BIOPSY: CPT | Mod: PT,,, | Performed by: INTERNAL MEDICINE

## 2021-06-04 PROCEDURE — D9220A PRA ANESTHESIA: ICD-10-PCS | Mod: PT,CRNA,, | Performed by: STUDENT IN AN ORGANIZED HEALTH CARE EDUCATION/TRAINING PROGRAM

## 2021-06-04 PROCEDURE — 25000003 PHARM REV CODE 250: Performed by: STUDENT IN AN ORGANIZED HEALTH CARE EDUCATION/TRAINING PROGRAM

## 2021-06-04 PROCEDURE — 45380 COLONOSCOPY AND BIOPSY: CPT | Performed by: INTERNAL MEDICINE

## 2021-06-04 PROCEDURE — 63600175 PHARM REV CODE 636 W HCPCS: Performed by: STUDENT IN AN ORGANIZED HEALTH CARE EDUCATION/TRAINING PROGRAM

## 2021-06-04 PROCEDURE — 37000008 HC ANESTHESIA 1ST 15 MINUTES: Performed by: INTERNAL MEDICINE

## 2021-06-04 PROCEDURE — D9220A PRA ANESTHESIA: ICD-10-PCS | Mod: PT,ANES,, | Performed by: ANESTHESIOLOGY

## 2021-06-04 PROCEDURE — D9220A PRA ANESTHESIA: Mod: PT,CRNA,, | Performed by: STUDENT IN AN ORGANIZED HEALTH CARE EDUCATION/TRAINING PROGRAM

## 2021-06-04 PROCEDURE — D9220A PRA ANESTHESIA: Mod: PT,ANES,, | Performed by: ANESTHESIOLOGY

## 2021-06-04 PROCEDURE — 37000009 HC ANESTHESIA EA ADD 15 MINS: Performed by: INTERNAL MEDICINE

## 2021-06-04 RX ORDER — LIDOCAINE HYDROCHLORIDE 20 MG/ML
INJECTION, SOLUTION EPIDURAL; INFILTRATION; INTRACAUDAL; PERINEURAL
Status: DISCONTINUED
Start: 2021-06-04 | End: 2021-06-04 | Stop reason: HOSPADM

## 2021-06-04 RX ORDER — PROPOFOL 10 MG/ML
VIAL (ML) INTRAVENOUS
Status: DISCONTINUED | OUTPATIENT
Start: 2021-06-04 | End: 2021-06-04

## 2021-06-04 RX ORDER — SODIUM CHLORIDE 9 MG/ML
INJECTION, SOLUTION INTRAVENOUS CONTINUOUS
Status: DISCONTINUED | OUTPATIENT
Start: 2021-06-04 | End: 2021-06-04 | Stop reason: HOSPADM

## 2021-06-04 RX ORDER — PROPOFOL 10 MG/ML
INJECTION, EMULSION INTRAVENOUS
Status: DISCONTINUED
Start: 2021-06-04 | End: 2021-06-04 | Stop reason: HOSPADM

## 2021-06-04 RX ORDER — LIDOCAINE HYDROCHLORIDE 20 MG/ML
INJECTION INTRAVENOUS
Status: DISCONTINUED | OUTPATIENT
Start: 2021-06-04 | End: 2021-06-04

## 2021-06-04 RX ORDER — SODIUM CHLORIDE 9 MG/ML
INJECTION, SOLUTION INTRAVENOUS CONTINUOUS PRN
Status: DISCONTINUED | OUTPATIENT
Start: 2021-06-04 | End: 2021-06-04

## 2021-06-04 RX ADMIN — PROPOFOL 40 MG: 10 INJECTION, EMULSION INTRAVENOUS at 10:06

## 2021-06-04 RX ADMIN — Medication 100 MG: at 10:06

## 2021-06-04 RX ADMIN — PROPOFOL 100 MG: 10 INJECTION, EMULSION INTRAVENOUS at 10:06

## 2021-06-04 RX ADMIN — SODIUM CHLORIDE: 0.9 INJECTION, SOLUTION INTRAVENOUS at 10:06

## 2021-06-08 LAB
FINAL PATHOLOGIC DIAGNOSIS: NORMAL
GROSS: NORMAL
Lab: NORMAL

## 2021-06-17 ENCOUNTER — TELEPHONE (OUTPATIENT)
Dept: FAMILY MEDICINE | Facility: CLINIC | Age: 56
End: 2021-06-17

## 2021-06-17 DIAGNOSIS — K21.9 GASTROESOPHAGEAL REFLUX DISEASE: ICD-10-CM

## 2021-06-17 DIAGNOSIS — K21.9 LARYNGOPHARYNGEAL REFLUX (LPR): ICD-10-CM

## 2021-06-24 ENCOUNTER — LAB VISIT (OUTPATIENT)
Dept: LAB | Facility: HOSPITAL | Age: 56
End: 2021-06-24
Attending: UROLOGY
Payer: MEDICARE

## 2021-06-24 DIAGNOSIS — N40.1 BPH WITH OBSTRUCTION/LOWER URINARY TRACT SYMPTOMS: ICD-10-CM

## 2021-06-24 DIAGNOSIS — N13.8 BPH WITH OBSTRUCTION/LOWER URINARY TRACT SYMPTOMS: ICD-10-CM

## 2021-06-24 LAB — COMPLEXED PSA SERPL-MCNC: 1.9 NG/ML (ref 0–4)

## 2021-06-24 PROCEDURE — 36415 COLL VENOUS BLD VENIPUNCTURE: CPT | Mod: PO | Performed by: UROLOGY

## 2021-06-24 PROCEDURE — 84153 ASSAY OF PSA TOTAL: CPT | Performed by: UROLOGY

## 2021-06-28 ENCOUNTER — OFFICE VISIT (OUTPATIENT)
Dept: UROLOGY | Facility: CLINIC | Age: 56
End: 2021-06-28
Payer: MEDICARE

## 2021-06-28 VITALS — BODY MASS INDEX: 29.02 KG/M2 | HEIGHT: 71 IN | WEIGHT: 207.25 LBS

## 2021-06-28 DIAGNOSIS — N52.9 ERECTILE DYSFUNCTION OF ORGANIC ORIGIN: ICD-10-CM

## 2021-06-28 DIAGNOSIS — N13.8 BPH WITH OBSTRUCTION/LOWER URINARY TRACT SYMPTOMS: Primary | ICD-10-CM

## 2021-06-28 DIAGNOSIS — R35.1 NOCTURIA: ICD-10-CM

## 2021-06-28 DIAGNOSIS — R35.0 URINARY FREQUENCY: ICD-10-CM

## 2021-06-28 DIAGNOSIS — N40.1 BPH WITH OBSTRUCTION/LOWER URINARY TRACT SYMPTOMS: Primary | ICD-10-CM

## 2021-06-28 DIAGNOSIS — Z12.5 PROSTATE CANCER SCREENING: ICD-10-CM

## 2021-06-28 PROCEDURE — 99214 PR OFFICE/OUTPT VISIT, EST, LEVL IV, 30-39 MIN: ICD-10-PCS | Mod: S$PBB,,, | Performed by: UROLOGY

## 2021-06-28 PROCEDURE — 99999 PR PBB SHADOW E&M-EST. PATIENT-LVL III: ICD-10-PCS | Mod: PBBFAC,,, | Performed by: UROLOGY

## 2021-06-28 PROCEDURE — 99999 PR PBB SHADOW E&M-EST. PATIENT-LVL III: CPT | Mod: PBBFAC,,, | Performed by: UROLOGY

## 2021-06-28 PROCEDURE — 99213 OFFICE O/P EST LOW 20 MIN: CPT | Mod: PBBFAC | Performed by: UROLOGY

## 2021-06-28 PROCEDURE — 99214 OFFICE O/P EST MOD 30 MIN: CPT | Mod: S$PBB,,, | Performed by: UROLOGY

## 2021-06-28 RX ORDER — SILDENAFIL 100 MG/1
100 TABLET, FILM COATED ORAL DAILY PRN
Qty: 6 TABLET | Refills: 11 | Status: SHIPPED | OUTPATIENT
Start: 2021-06-28 | End: 2021-09-15 | Stop reason: SDUPTHER

## 2021-06-28 RX ORDER — OXYBUTYNIN CHLORIDE 10 MG/1
10 TABLET, EXTENDED RELEASE ORAL DAILY
Qty: 90 TABLET | Refills: 3 | Status: SHIPPED | OUTPATIENT
Start: 2021-06-28 | End: 2021-09-15 | Stop reason: SDUPTHER

## 2021-07-06 ENCOUNTER — PES CALL (OUTPATIENT)
Dept: ADMINISTRATIVE | Facility: CLINIC | Age: 56
End: 2021-07-06
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
## 2021-08-17 ENCOUNTER — PATIENT MESSAGE (OUTPATIENT)
Dept: FAMILY MEDICINE | Facility: CLINIC | Age: 56
End: 2021-08-17

## 2021-09-16 RX ORDER — SILDENAFIL 100 MG/1
100 TABLET, FILM COATED ORAL DAILY PRN
Qty: 6 TABLET | Refills: 11 | Status: SHIPPED | OUTPATIENT
Start: 2021-09-16 | End: 2021-10-07 | Stop reason: SDUPTHER

## 2021-09-16 RX ORDER — OXYBUTYNIN CHLORIDE 10 MG/1
10 TABLET, EXTENDED RELEASE ORAL DAILY
Qty: 90 TABLET | Refills: 3 | Status: SHIPPED | OUTPATIENT
Start: 2021-09-16 | End: 2021-12-28 | Stop reason: SDUPTHER

## 2021-09-28 ENCOUNTER — OFFICE VISIT (OUTPATIENT)
Dept: FAMILY MEDICINE | Facility: CLINIC | Age: 56
End: 2021-09-28
Payer: MEDICARE

## 2021-09-28 VITALS
DIASTOLIC BLOOD PRESSURE: 80 MMHG | TEMPERATURE: 98 F | WEIGHT: 216.94 LBS | OXYGEN SATURATION: 97 % | HEIGHT: 71 IN | HEART RATE: 70 BPM | BODY MASS INDEX: 30.37 KG/M2 | SYSTOLIC BLOOD PRESSURE: 128 MMHG

## 2021-09-28 DIAGNOSIS — M67.432 GANGLION CYST OF WRIST, LEFT: ICD-10-CM

## 2021-09-28 DIAGNOSIS — K21.9 GASTROESOPHAGEAL REFLUX DISEASE WITHOUT ESOPHAGITIS: ICD-10-CM

## 2021-09-28 DIAGNOSIS — G47.00 INSOMNIA, UNSPECIFIED TYPE: ICD-10-CM

## 2021-09-28 DIAGNOSIS — G89.29 CHRONIC LOW BACK PAIN WITHOUT SCIATICA, UNSPECIFIED BACK PAIN LATERALITY: Primary | ICD-10-CM

## 2021-09-28 DIAGNOSIS — K21.9 LARYNGOPHARYNGEAL REFLUX (LPR): ICD-10-CM

## 2021-09-28 DIAGNOSIS — I11.9 BENIGN HYPERTENSIVE HEART DISEASE WITHOUT HEART FAILURE: ICD-10-CM

## 2021-09-28 DIAGNOSIS — M54.50 CHRONIC LOW BACK PAIN WITHOUT SCIATICA, UNSPECIFIED BACK PAIN LATERALITY: Primary | ICD-10-CM

## 2021-09-28 DIAGNOSIS — R35.0 URINARY FREQUENCY: ICD-10-CM

## 2021-09-28 DIAGNOSIS — E78.5 HYPERLIPIDEMIA, UNSPECIFIED HYPERLIPIDEMIA TYPE: ICD-10-CM

## 2021-09-28 DIAGNOSIS — I88.9 LYMPHADENITIS: ICD-10-CM

## 2021-09-28 PROCEDURE — 99214 PR OFFICE/OUTPT VISIT, EST, LEVL IV, 30-39 MIN: ICD-10-PCS | Mod: S$PBB,,, | Performed by: FAMILY MEDICINE

## 2021-09-28 PROCEDURE — 99213 OFFICE O/P EST LOW 20 MIN: CPT | Mod: PBBFAC,PO | Performed by: FAMILY MEDICINE

## 2021-09-28 PROCEDURE — 99999 PR PBB SHADOW E&M-EST. PATIENT-LVL III: ICD-10-PCS | Mod: PBBFAC,,, | Performed by: FAMILY MEDICINE

## 2021-09-28 PROCEDURE — 99999 PR PBB SHADOW E&M-EST. PATIENT-LVL III: CPT | Mod: PBBFAC,,, | Performed by: FAMILY MEDICINE

## 2021-09-28 PROCEDURE — 87086 URINE CULTURE/COLONY COUNT: CPT | Performed by: FAMILY MEDICINE

## 2021-09-28 PROCEDURE — 99214 OFFICE O/P EST MOD 30 MIN: CPT | Mod: S$PBB,,, | Performed by: FAMILY MEDICINE

## 2021-09-28 RX ORDER — OXYCODONE AND ACETAMINOPHEN 5; 325 MG/1; MG/1
1 TABLET ORAL EVERY 4 HOURS PRN
Qty: 31 TABLET | Refills: 0 | Status: SHIPPED | OUTPATIENT
Start: 2021-09-28 | End: 2022-04-06 | Stop reason: SDUPTHER

## 2021-09-28 RX ORDER — OMEPRAZOLE 40 MG/1
40 CAPSULE, DELAYED RELEASE ORAL
Qty: 180 CAPSULE | Refills: 1 | Status: SHIPPED | OUTPATIENT
Start: 2021-09-28 | End: 2022-03-29 | Stop reason: SDUPTHER

## 2021-09-28 RX ORDER — PAROXETINE HYDROCHLORIDE 20 MG/1
TABLET, FILM COATED ORAL
Status: CANCELLED | OUTPATIENT
Start: 2021-09-28

## 2021-09-28 RX ORDER — CEPHALEXIN 500 MG/1
500 CAPSULE ORAL EVERY 12 HOURS
Qty: 14 CAPSULE | Refills: 0 | Status: SHIPPED | OUTPATIENT
Start: 2021-09-28 | End: 2021-10-05

## 2021-09-28 RX ORDER — AMLODIPINE BESYLATE 10 MG/1
10 TABLET ORAL DAILY
Qty: 90 TABLET | Refills: 1 | Status: SHIPPED | OUTPATIENT
Start: 2021-09-28 | End: 2021-12-07 | Stop reason: SDUPTHER

## 2021-09-28 RX ORDER — ZOLPIDEM TARTRATE 10 MG/1
10 TABLET ORAL NIGHTLY PRN
Qty: 30 TABLET | Refills: 5 | Status: SHIPPED | OUTPATIENT
Start: 2021-09-28

## 2021-09-28 RX ORDER — ATORVASTATIN CALCIUM 40 MG
40 TABLET ORAL DAILY
Qty: 30 TABLET | Refills: 5 | Status: SHIPPED | OUTPATIENT
Start: 2021-09-28 | End: 2021-12-07 | Stop reason: SDUPTHER

## 2021-09-29 ENCOUNTER — PES CALL (OUTPATIENT)
Dept: ADMINISTRATIVE | Facility: CLINIC | Age: 56
End: 2021-09-29

## 2021-09-30 LAB — BACTERIA UR CULT: NORMAL

## 2021-10-04 ENCOUNTER — PES CALL (OUTPATIENT)
Dept: ADMINISTRATIVE | Facility: CLINIC | Age: 56
End: 2021-10-04

## 2021-10-07 ENCOUNTER — OFFICE VISIT (OUTPATIENT)
Dept: UROLOGY | Facility: CLINIC | Age: 56
End: 2021-10-07
Payer: MEDICARE

## 2021-10-07 VITALS — WEIGHT: 222.88 LBS | BODY MASS INDEX: 31.2 KG/M2 | HEIGHT: 71 IN | RESPIRATION RATE: 18 BRPM

## 2021-10-07 DIAGNOSIS — N52.9 ERECTILE DYSFUNCTION OF ORGANIC ORIGIN: ICD-10-CM

## 2021-10-07 DIAGNOSIS — R35.0 URINARY FREQUENCY: ICD-10-CM

## 2021-10-07 DIAGNOSIS — N40.1 BPH WITH OBSTRUCTION/LOWER URINARY TRACT SYMPTOMS: ICD-10-CM

## 2021-10-07 DIAGNOSIS — R35.1 NOCTURIA: Primary | ICD-10-CM

## 2021-10-07 DIAGNOSIS — Z12.5 PROSTATE CANCER SCREENING: ICD-10-CM

## 2021-10-07 DIAGNOSIS — N13.8 BPH WITH OBSTRUCTION/LOWER URINARY TRACT SYMPTOMS: ICD-10-CM

## 2021-10-07 PROCEDURE — 99213 PR OFFICE/OUTPT VISIT, EST, LEVL III, 20-29 MIN: ICD-10-PCS | Mod: S$PBB,,, | Performed by: UROLOGY

## 2021-10-07 PROCEDURE — 99214 OFFICE O/P EST MOD 30 MIN: CPT | Mod: PBBFAC | Performed by: UROLOGY

## 2021-10-07 PROCEDURE — 99999 PR PBB SHADOW E&M-EST. PATIENT-LVL IV: CPT | Mod: PBBFAC,,, | Performed by: UROLOGY

## 2021-10-07 PROCEDURE — 99213 OFFICE O/P EST LOW 20 MIN: CPT | Mod: S$PBB,,, | Performed by: UROLOGY

## 2021-10-07 PROCEDURE — 99999 PR PBB SHADOW E&M-EST. PATIENT-LVL IV: ICD-10-PCS | Mod: PBBFAC,,, | Performed by: UROLOGY

## 2021-10-07 RX ORDER — SILDENAFIL 100 MG/1
100 TABLET, FILM COATED ORAL DAILY PRN
Qty: 30 TABLET | Refills: 11 | Status: SHIPPED | OUTPATIENT
Start: 2021-10-07 | End: 2021-12-28 | Stop reason: SDUPTHER

## 2021-10-20 DIAGNOSIS — M79.642 PAIN OF LEFT HAND: Primary | ICD-10-CM

## 2021-11-10 ENCOUNTER — PES CALL (OUTPATIENT)
Dept: ADMINISTRATIVE | Facility: CLINIC | Age: 56
End: 2021-11-10
Payer: MEDICARE

## 2021-11-22 ENCOUNTER — LAB VISIT (OUTPATIENT)
Dept: LAB | Facility: HOSPITAL | Age: 56
End: 2021-11-22
Attending: UROLOGY
Payer: MEDICARE

## 2021-11-22 DIAGNOSIS — N13.8 BPH WITH OBSTRUCTION/LOWER URINARY TRACT SYMPTOMS: ICD-10-CM

## 2021-11-22 DIAGNOSIS — N40.1 BPH WITH OBSTRUCTION/LOWER URINARY TRACT SYMPTOMS: ICD-10-CM

## 2021-11-22 DIAGNOSIS — Z12.5 PROSTATE CANCER SCREENING: ICD-10-CM

## 2021-11-22 PROCEDURE — 36415 COLL VENOUS BLD VENIPUNCTURE: CPT | Performed by: UROLOGY

## 2021-11-22 PROCEDURE — 84153 ASSAY OF PSA TOTAL: CPT | Performed by: UROLOGY

## 2021-11-23 LAB — COMPLEXED PSA SERPL-MCNC: 1.4 NG/ML (ref 0–4)

## 2021-11-29 ENCOUNTER — PES CALL (OUTPATIENT)
Dept: ADMINISTRATIVE | Facility: CLINIC | Age: 56
End: 2021-11-29
Payer: MEDICARE

## 2021-12-06 ENCOUNTER — TELEPHONE (OUTPATIENT)
Dept: FAMILY MEDICINE | Facility: CLINIC | Age: 56
End: 2021-12-06
Payer: MEDICARE

## 2021-12-07 ENCOUNTER — OFFICE VISIT (OUTPATIENT)
Dept: FAMILY MEDICINE | Facility: CLINIC | Age: 56
End: 2021-12-07
Payer: MEDICARE

## 2021-12-07 VITALS
HEART RATE: 76 BPM | TEMPERATURE: 98 F | WEIGHT: 222.88 LBS | OXYGEN SATURATION: 98 % | SYSTOLIC BLOOD PRESSURE: 130 MMHG | HEIGHT: 71 IN | BODY MASS INDEX: 31.2 KG/M2 | DIASTOLIC BLOOD PRESSURE: 76 MMHG

## 2021-12-07 DIAGNOSIS — E78.5 HYPERLIPIDEMIA, UNSPECIFIED HYPERLIPIDEMIA TYPE: ICD-10-CM

## 2021-12-07 DIAGNOSIS — H65.93 FLUID LEVEL BEHIND TYMPANIC MEMBRANE OF BOTH EARS: ICD-10-CM

## 2021-12-07 DIAGNOSIS — I11.9 BENIGN HYPERTENSIVE HEART DISEASE WITHOUT HEART FAILURE: ICD-10-CM

## 2021-12-07 DIAGNOSIS — J06.9 UPPER RESPIRATORY TRACT INFECTION, UNSPECIFIED TYPE: Primary | ICD-10-CM

## 2021-12-07 DIAGNOSIS — L02.92 BOIL: ICD-10-CM

## 2021-12-07 PROCEDURE — 99214 OFFICE O/P EST MOD 30 MIN: CPT | Mod: S$PBB,,, | Performed by: NURSE PRACTITIONER

## 2021-12-07 PROCEDURE — 99215 OFFICE O/P EST HI 40 MIN: CPT | Mod: PBBFAC,PO | Performed by: NURSE PRACTITIONER

## 2021-12-07 PROCEDURE — 99999 PR PBB SHADOW E&M-EST. PATIENT-LVL V: ICD-10-PCS | Mod: PBBFAC,,, | Performed by: NURSE PRACTITIONER

## 2021-12-07 PROCEDURE — 99999 PR PBB SHADOW E&M-EST. PATIENT-LVL V: CPT | Mod: PBBFAC,,, | Performed by: NURSE PRACTITIONER

## 2021-12-07 PROCEDURE — 99214 PR OFFICE/OUTPT VISIT, EST, LEVL IV, 30-39 MIN: ICD-10-PCS | Mod: S$PBB,,, | Performed by: NURSE PRACTITIONER

## 2021-12-07 RX ORDER — CETIRIZINE HYDROCHLORIDE 10 MG/1
TABLET ORAL
COMMUNITY
Start: 2021-05-05 | End: 2022-04-06 | Stop reason: SDUPTHER

## 2021-12-07 RX ORDER — AMOXICILLIN AND CLAVULANATE POTASSIUM 875; 125 MG/1; MG/1
1 TABLET, FILM COATED ORAL 2 TIMES DAILY
Qty: 20 TABLET | Refills: 0 | Status: SHIPPED | OUTPATIENT
Start: 2021-12-07 | End: 2021-12-17

## 2021-12-07 RX ORDER — ATORVASTATIN CALCIUM 40 MG
40 TABLET ORAL DAILY
Qty: 30 TABLET | Refills: 5 | Status: SHIPPED | OUTPATIENT
Start: 2021-12-07 | End: 2022-03-14 | Stop reason: SDUPTHER

## 2021-12-07 RX ORDER — AMLODIPINE BESYLATE 10 MG/1
10 TABLET ORAL DAILY
Qty: 90 TABLET | Refills: 1 | Status: CANCELLED | OUTPATIENT
Start: 2021-12-07

## 2021-12-07 RX ORDER — AMLODIPINE BESYLATE 10 MG/1
10 TABLET ORAL DAILY
Qty: 90 TABLET | Refills: 1 | Status: SHIPPED | OUTPATIENT
Start: 2021-12-07 | End: 2022-03-29 | Stop reason: SDUPTHER

## 2021-12-27 ENCOUNTER — PATIENT OUTREACH (OUTPATIENT)
Dept: ADMINISTRATIVE | Facility: OTHER | Age: 56
End: 2021-12-27
Payer: MEDICARE

## 2021-12-28 ENCOUNTER — OFFICE VISIT (OUTPATIENT)
Dept: UROLOGY | Facility: CLINIC | Age: 56
End: 2021-12-28
Payer: MEDICARE

## 2021-12-28 VITALS — BODY MASS INDEX: 30.76 KG/M2 | HEIGHT: 71 IN | WEIGHT: 219.69 LBS

## 2021-12-28 DIAGNOSIS — R35.1 NOCTURIA: Primary | ICD-10-CM

## 2021-12-28 DIAGNOSIS — N52.9 ERECTILE DYSFUNCTION OF ORGANIC ORIGIN: ICD-10-CM

## 2021-12-28 DIAGNOSIS — N13.8 BPH WITH OBSTRUCTION/LOWER URINARY TRACT SYMPTOMS: ICD-10-CM

## 2021-12-28 DIAGNOSIS — Z12.5 PROSTATE CANCER SCREENING: ICD-10-CM

## 2021-12-28 DIAGNOSIS — R35.0 URINARY FREQUENCY: ICD-10-CM

## 2021-12-28 DIAGNOSIS — N40.1 BPH WITH OBSTRUCTION/LOWER URINARY TRACT SYMPTOMS: ICD-10-CM

## 2021-12-28 PROCEDURE — 99999 PR PBB SHADOW E&M-EST. PATIENT-LVL III: CPT | Mod: PBBFAC,,, | Performed by: UROLOGY

## 2021-12-28 PROCEDURE — 99999 PR PBB SHADOW E&M-EST. PATIENT-LVL III: ICD-10-PCS | Mod: PBBFAC,,, | Performed by: UROLOGY

## 2021-12-28 PROCEDURE — 99213 OFFICE O/P EST LOW 20 MIN: CPT | Mod: PBBFAC | Performed by: UROLOGY

## 2021-12-28 PROCEDURE — 99214 PR OFFICE/OUTPT VISIT, EST, LEVL IV, 30-39 MIN: ICD-10-PCS | Mod: S$PBB,,, | Performed by: UROLOGY

## 2021-12-28 PROCEDURE — 99214 OFFICE O/P EST MOD 30 MIN: CPT | Mod: S$PBB,,, | Performed by: UROLOGY

## 2021-12-28 RX ORDER — SILDENAFIL 100 MG/1
100 TABLET, FILM COATED ORAL DAILY PRN
Qty: 30 TABLET | Refills: 11 | Status: SHIPPED | OUTPATIENT
Start: 2021-12-28 | End: 2022-07-20 | Stop reason: SDUPTHER

## 2021-12-28 RX ORDER — OXYBUTYNIN CHLORIDE 10 MG/1
10 TABLET, EXTENDED RELEASE ORAL DAILY
Qty: 90 TABLET | Refills: 3 | Status: SHIPPED | OUTPATIENT
Start: 2021-12-28 | End: 2022-06-28

## 2022-01-16 NOTE — TELEPHONE ENCOUNTER
Pt states he doesn't have any covid symptoms. He has post surgery symptoms and he will just see  next week.    See above

## 2022-01-26 ENCOUNTER — PES CALL (OUTPATIENT)
Dept: ADMINISTRATIVE | Facility: CLINIC | Age: 57
End: 2022-01-26
Payer: MEDICARE

## 2022-03-07 ENCOUNTER — PES CALL (OUTPATIENT)
Dept: ADMINISTRATIVE | Facility: CLINIC | Age: 57
End: 2022-03-07
Payer: MEDICARE

## 2022-03-11 ENCOUNTER — TELEPHONE (OUTPATIENT)
Dept: ADMINISTRATIVE | Facility: CLINIC | Age: 57
End: 2022-03-11
Payer: MEDICARE

## 2022-03-14 ENCOUNTER — OFFICE VISIT (OUTPATIENT)
Dept: FAMILY MEDICINE | Facility: CLINIC | Age: 57
End: 2022-03-14
Payer: MEDICARE

## 2022-03-14 VITALS
OXYGEN SATURATION: 98 % | SYSTOLIC BLOOD PRESSURE: 132 MMHG | BODY MASS INDEX: 31.28 KG/M2 | HEIGHT: 71 IN | HEART RATE: 90 BPM | TEMPERATURE: 99 F | DIASTOLIC BLOOD PRESSURE: 78 MMHG | WEIGHT: 223.44 LBS

## 2022-03-14 DIAGNOSIS — M47.816 OSTEOARTHRITIS OF LUMBAR SPINE, UNSPECIFIED SPINAL OSTEOARTHRITIS COMPLICATION STATUS: ICD-10-CM

## 2022-03-14 DIAGNOSIS — Z00.00 ENCOUNTER FOR PREVENTIVE HEALTH EXAMINATION: Primary | ICD-10-CM

## 2022-03-14 DIAGNOSIS — F43.10 PTSD (POST-TRAUMATIC STRESS DISORDER): ICD-10-CM

## 2022-03-14 DIAGNOSIS — E78.5 HYPERLIPIDEMIA, UNSPECIFIED HYPERLIPIDEMIA TYPE: ICD-10-CM

## 2022-03-14 DIAGNOSIS — M41.9 SCOLIOSIS OF LUMBAR SPINE, UNSPECIFIED SCOLIOSIS TYPE: ICD-10-CM

## 2022-03-14 DIAGNOSIS — J30.1 ALLERGIC RHINITIS DUE TO POLLEN, UNSPECIFIED SEASONALITY: ICD-10-CM

## 2022-03-14 DIAGNOSIS — I11.9 BENIGN HYPERTENSIVE HEART DISEASE WITHOUT HEART FAILURE: ICD-10-CM

## 2022-03-14 DIAGNOSIS — J34.2 NASAL SEPTAL DEVIATION: ICD-10-CM

## 2022-03-14 DIAGNOSIS — N13.8 BPH WITH OBSTRUCTION/LOWER URINARY TRACT SYMPTOMS: ICD-10-CM

## 2022-03-14 DIAGNOSIS — N40.1 BPH WITH OBSTRUCTION/LOWER URINARY TRACT SYMPTOMS: ICD-10-CM

## 2022-03-14 DIAGNOSIS — E66.09 CLASS 1 OBESITY DUE TO EXCESS CALORIES WITH SERIOUS COMORBIDITY AND BODY MASS INDEX (BMI) OF 31.0 TO 31.9 IN ADULT: ICD-10-CM

## 2022-03-14 DIAGNOSIS — Z23 NEEDS FLU SHOT: ICD-10-CM

## 2022-03-14 PROBLEM — E66.811 CLASS 1 OBESITY DUE TO EXCESS CALORIES WITH SERIOUS COMORBIDITY IN ADULT: Status: ACTIVE | Noted: 2022-03-14

## 2022-03-14 PROCEDURE — G0008 ADMIN INFLUENZA VIRUS VAC: HCPCS | Mod: PBBFAC,PO

## 2022-03-14 PROCEDURE — G0439 PR MEDICARE ANNUAL WELLNESS SUBSEQUENT VISIT: ICD-10-PCS | Mod: ,,, | Performed by: NURSE PRACTITIONER

## 2022-03-14 PROCEDURE — 99999 PR PBB SHADOW E&M-EST. PATIENT-LVL V: CPT | Mod: PBBFAC,,, | Performed by: NURSE PRACTITIONER

## 2022-03-14 PROCEDURE — 99999 PR PBB SHADOW E&M-EST. PATIENT-LVL V: ICD-10-PCS | Mod: PBBFAC,,, | Performed by: NURSE PRACTITIONER

## 2022-03-14 PROCEDURE — 99215 OFFICE O/P EST HI 40 MIN: CPT | Mod: PBBFAC,PO | Performed by: NURSE PRACTITIONER

## 2022-03-14 PROCEDURE — G0439 PPPS, SUBSEQ VISIT: HCPCS | Mod: ,,, | Performed by: NURSE PRACTITIONER

## 2022-03-14 RX ORDER — ATORVASTATIN CALCIUM 40 MG
40 TABLET ORAL DAILY
Qty: 30 TABLET | Refills: 5 | Status: SHIPPED | OUTPATIENT
Start: 2022-03-14 | End: 2022-04-06 | Stop reason: SDUPTHER

## 2022-03-14 NOTE — PROGRESS NOTES
"  Rito Conley presented for a  Medicare AWV and comprehensive Health Risk Assessment today. The following components were reviewed and updated:    · Medical history  · Family History  · Social history  · Allergies and Current Medications  · Health Risk Assessment  · Health Maintenance  · Care Team       ** See Completed Assessments for Annual Wellness Visit within the encounter summary.**       The following assessments were completed:  · Living Situation  · CAGE  · Depression Screening  · Timed Get Up and Go  · Whisper Test  · Cognitive Function Screening  · Nutrition Screening  · ADL Screening  · PAQ Screening        Vitals:    03/14/22 0846   BP: 132/78   BP Location: Left arm   Patient Position: Sitting   BP Method: Large (Manual)   Pulse: 90   Temp: 98.6 °F (37 °C)   TempSrc: Oral   SpO2: 98%   Weight: 101.4 kg (223 lb 7 oz)   Height: 5' 11" (1.803 m)     Body mass index is 31.16 kg/m².  Physical Exam  Vitals and nursing note reviewed.   Constitutional:       Appearance: Normal appearance.   Cardiovascular:      Rate and Rhythm: Normal rate.      Pulses: Normal pulses.      Heart sounds: Normal heart sounds.   Pulmonary:      Effort: Pulmonary effort is normal.      Breath sounds: Normal breath sounds.   Abdominal:      General: Bowel sounds are normal.      Palpations: Abdomen is soft.   Musculoskeletal:         General: Normal range of motion.   Skin:     General: Skin is warm and dry.   Neurological:      Mental Status: He is alert and oriented to person, place, and time.   Psychiatric:         Mood and Affect: Mood normal.         Behavior: Behavior normal.             Diagnoses and health risks identified today and associated recommendations/orders:    1. Encounter for preventive health examination  Pt was seen today for an Annual Wellness visit. Healthcare maintenance and screening recommendations were discussed and updated as indicated. Return in one year for AWV.    Review current opioid " prescriptions: yes  Screen for potential Substance Use Disorders: yes    - Influenza - Quadrivalent *Preferred* (6 months+) (PF)    2. Class 1 obesity due to excess calories with serious comorbidity and body mass index (BMI) of 31.0 to 31.9 in adult  The patient is asked to make an attempt to improve diet and exercise patterns to aid in medical management of this problem.    3. Hyperlipidemia, unspecified hyperlipidemia type  The current medical regimen is effective;  continue present plan and medications.    - LIPITOR 40 mg tablet; Take 1 tablet (40 mg total) by mouth once daily.  Dispense: 30 tablet; Refill: 5    4. Benign hypertensive heart disease without heart failure  The current medical regimen is effective;  continue present plan and medications.    5. BPH with obstruction/lower urinary tract symptoms  The current medical regimen is effective;  continue present plan and medications.    6. Osteoarthritis of lumbar spine, unspecified spinal osteoarthritis complication status  The current medical regimen is effective;  continue present plan and medications.    7. PTSD (post-traumatic stress disorder)  The current medical regimen is effective;  continue present plan and medications.    8. Allergic rhinitis due to pollen, unspecified seasonality  The current medical regimen is effective;  continue present plan and medications.    9. Scoliosis of lumbar spine, unspecified scoliosis type  The current medical regimen is effective;  continue present plan and medications.    10. Nasal septal deviation  The current medical regimen is effective;  continue present plan and medications.        Provided Rito with a 5-10 year written screening schedule and personal prevention plan. Recommendations were developed using the USPSTF age appropriate recommendations. Education, counseling, and referrals were provided as needed. After Visit Summary printed and given to patient which includes a list of additional  screenings\tests needed.    Follow up in about 7 months (around 9/28/2022) with provider.    DENIS Joshi  I offered to discuss advanced care planning, including how to pick a person who would make decisions for you if you were unable to make them for yourself, called a health care power of , and what kind of decisions you might make such as use of life sustaining treatments such as ventilators and tube feeding when faced with a life limiting illness recorded on a living will that they will need to know. (How you want to be cared for as you near the end of your natural life)     X Patient is interested in learning more about how to make advanced directives.  I provided them paperwork and offered to discuss this with them.

## 2022-03-14 NOTE — PATIENT INSTRUCTIONS
Counseling and Referral of Other Preventative  (Italic type indicates deductible and co-insurance are waived)    Patient Name: Rito Conley  Today's Date: 3/14/2022    Health Maintenance       Date Due Completion Date    HIV Screening Never done ---    Influenza Vaccine (1) 09/01/2021 11/17/2020    TETANUS VACCINE 10/02/2023 10/2/2013    Lipid Panel 03/15/2026 3/15/2021    Colorectal Cancer Screening 06/04/2031 6/4/2021        No orders of the defined types were placed in this encounter.    The following information is provided to all patients.  This information is to help you find resources for any of the problems found today that may be affecting your health:                Living healthy guide: www.[facility].[place].gov      Understanding Diabetes: www.diabetes.org      Eating healthy: www.cdc.gov/healthyweight      [facility] home safety checklist: www.cdc.gov/steadi/patient.html      Agency on Aging: www.goea.[place].[facility]      Alcoholics anonymous (AA): www.aa.org      Physical Activity: www.[place].nih.gov/si8kgjs      Tobacco use: www.quitwithusla.org

## 2022-03-28 ENCOUNTER — PATIENT MESSAGE (OUTPATIENT)
Dept: FAMILY MEDICINE | Facility: CLINIC | Age: 57
End: 2022-03-28
Payer: MEDICARE

## 2022-03-29 ENCOUNTER — OFFICE VISIT (OUTPATIENT)
Dept: FAMILY MEDICINE | Facility: CLINIC | Age: 57
End: 2022-03-29
Payer: MEDICARE

## 2022-03-29 VITALS
DIASTOLIC BLOOD PRESSURE: 88 MMHG | BODY MASS INDEX: 30.83 KG/M2 | HEART RATE: 73 BPM | WEIGHT: 220.25 LBS | SYSTOLIC BLOOD PRESSURE: 138 MMHG | HEIGHT: 71 IN | OXYGEN SATURATION: 99 % | TEMPERATURE: 99 F

## 2022-03-29 DIAGNOSIS — K21.9 LARYNGOPHARYNGEAL REFLUX (LPR): ICD-10-CM

## 2022-03-29 DIAGNOSIS — J30.89 SEASONAL ALLERGIC RHINITIS DUE TO OTHER ALLERGIC TRIGGER: Primary | ICD-10-CM

## 2022-03-29 DIAGNOSIS — R09.89 CHEST CONGESTION: ICD-10-CM

## 2022-03-29 DIAGNOSIS — I11.9 BENIGN HYPERTENSIVE HEART DISEASE WITHOUT HEART FAILURE: ICD-10-CM

## 2022-03-29 DIAGNOSIS — R09.82 PND (POST-NASAL DRIP): ICD-10-CM

## 2022-03-29 DIAGNOSIS — K21.9 GASTROESOPHAGEAL REFLUX DISEASE WITHOUT ESOPHAGITIS: ICD-10-CM

## 2022-03-29 PROCEDURE — 99999 PR PBB SHADOW E&M-EST. PATIENT-LVL IV: CPT | Mod: PBBFAC,,, | Performed by: FAMILY MEDICINE

## 2022-03-29 PROCEDURE — 99999 PR PBB SHADOW E&M-EST. PATIENT-LVL IV: ICD-10-PCS | Mod: PBBFAC,,, | Performed by: FAMILY MEDICINE

## 2022-03-29 PROCEDURE — 99214 PR OFFICE/OUTPT VISIT, EST, LEVL IV, 30-39 MIN: ICD-10-PCS | Mod: S$PBB,,, | Performed by: FAMILY MEDICINE

## 2022-03-29 PROCEDURE — 99214 OFFICE O/P EST MOD 30 MIN: CPT | Mod: S$PBB,,, | Performed by: FAMILY MEDICINE

## 2022-03-29 PROCEDURE — 99214 OFFICE O/P EST MOD 30 MIN: CPT | Mod: PBBFAC,PO | Performed by: FAMILY MEDICINE

## 2022-03-29 RX ORDER — OMEPRAZOLE 40 MG/1
40 CAPSULE, DELAYED RELEASE ORAL
Qty: 180 CAPSULE | Refills: 1 | Status: SHIPPED | OUTPATIENT
Start: 2022-03-29 | End: 2022-12-30 | Stop reason: SDUPTHER

## 2022-03-29 RX ORDER — GUAIFENESIN 1200 MG/1
1 TABLET, EXTENDED RELEASE ORAL EVERY 12 HOURS PRN
Qty: 20 TABLET | Refills: 0 | Status: SHIPPED | OUTPATIENT
Start: 2022-03-29 | End: 2022-04-08

## 2022-03-29 RX ORDER — AMLODIPINE BESYLATE 10 MG/1
10 TABLET ORAL DAILY
Qty: 90 TABLET | Refills: 1 | Status: SHIPPED | OUTPATIENT
Start: 2022-03-29 | End: 2022-12-30 | Stop reason: SDUPTHER

## 2022-03-29 NOTE — PROGRESS NOTES
"  Physical Exam  /88   Pulse 73   Temp 98.5 °F (36.9 °C) (Oral)   Ht 5' 11" (1.803 m)   Wt 99.9 kg (220 lb 3.8 oz)   SpO2 99%   BMI 30.72 kg/m²      Office Visit    Patient Name: Rito Conley    : 1965  MRN: [id number]      Assessment/Plan:  Rito Conley is a 56 y.o. male who presents today for :    Seasonal allergic rhinitis due to other allergic trigger  PND (post-nasal drip)  Chest congestion  -     guaiFENesin (MUCINEX) 1,200 mg Ta12; Take 1 tablet by mouth every 12 (twelve) hours as needed (chest congestion).  Dispense: 20 tablet; Refill: 0  -continue Zyrtec daily, also advised daily Flonase as wellas nasal saline rinses as needed especially after he works outdoors. Consider wearing a nose mask as appropriate to avoid inhaling fine dust and/or pollen particles  -discussed use of air humidifier/filter at home, changing AC filters regularly, avoid second-hand smoking.   -supportive therapy and symptomatic management.   -f/u as needed         Benign hypertensive heart disease without heart failure  -     amLODIPine (NORVASC) 10 MG tablet; Take 1 tablet (10 mg total) by mouth once daily.  Dispense: 90 tablet; Refill: 1  -continue current medication regimen  -DASH diet, regular cardiovascular exercises  -weight loss    Gastroesophageal reflux disease without esophagitis  -     omeprazole (PRILOSEC) 40 MG capsule; Take 1 capsule (40 mg total) by mouth 2 (two) times daily before meals.  Dispense: 180 capsule; Refill: 1  Laryngopharyngeal reflux (LPR)  -     omeprazole (PRILOSEC) 40 MG capsule; Take 1 capsule (40 mg total) by mouth 2 (two) times daily before meals.  Dispense: 180 capsule; Refill: 1  -stable, continue PPI          Follow up PRN    This note was created by combination of typed  and MModal dictation.  Transcription errors may be present.  If there are any questions, please contact " me.      ----------------------------------------------------------------------------------------------------------------------      HPI:  Patient Care Team:  Jennifer Huertas MD as PCP - General (Family Medicine)  Jory Mauricio MD as Consulting Physician (Urology)  TREVOR Colindres MD as Consulting Physician (Urology)  Lorie Gage PA-C (Inactive) as Physician Assistant (Surgery)  Sabra Awad MA (Inactive) as Care Coordinator    Rito is a 56 y.o. male with      Patient Active Problem List   Diagnosis    Benign hypertensive heart disease without heart failure    DJD of shoulder    DJD (degenerative joint disease), lumbar    Allergic rhinitis    Lumbar scoliosis    Back injury    BPH with obstruction/lower urinary tract symptoms    PTSD (post-traumatic stress disorder)    Heartburn    Nasal septal deviation    Nasal valve collapse    Refractory obstruction of nasal airway    Hypertrophy of both inferior nasal turbinates    Nasal congestion    Encounter for colonoscopy due to history of colonic polyp    Class 1 obesity due to excess calories with serious comorbidity in adult     This patient is new to me       Patient with PMHx as above presents today for follow up HTN and med refills, as well as allergy Sx.    HTN -  compliant with Norvasc as prescribed, denies any side effects. BP is controlled at home.  No CP/SOB/VIDES/vision changes/urinary changes/leg swelling.     GERD - controlled on PPI    Sinus allergies - seasonal, has been having more mucus in his chest the past few days. No ear pain nor sinus congestion, but has been having increased runny nose as he works outdoors around the house a lot, and is allergic to grass pollen. He does have Zyrtec at home that he takes daily. but does not use his nasal irrigation kit at all.       Additional ROS      CONST: no fever, no activity change  EYES: no vision change.   ENT: no sore throat. No dysphagia.   CV: no CP with exertion  RESP:  no SOB  GI: no N/V/diarrhea/constipation  : no urinary concerns  MSK: no new myalgias or arthralgias.   SKIN: no new rashes  NEURO: no focal deficits.   PSYCH: no new issues.                 Patient Active Problem List   Diagnosis    Benign hypertensive heart disease without heart failure    DJD of shoulder    DJD (degenerative joint disease), lumbar    Allergic rhinitis    Lumbar scoliosis    Back injury    BPH with obstruction/lower urinary tract symptoms    PTSD (post-traumatic stress disorder)    Heartburn    Nasal septal deviation    Nasal valve collapse    Refractory obstruction of nasal airway    Hypertrophy of both inferior nasal turbinates    Nasal congestion    Encounter for colonoscopy due to history of colonic polyp    Class 1 obesity due to excess calories with serious comorbidity in adult       Current Medications  Medications reviewed/updated.     Current Outpatient Medications on File Prior to Visit   Medication Sig Dispense Refill    azelastine (ASTELIN) 137 mcg (0.1 %) nasal spray 1 spray (137 mcg total) by Nasal route 2 (two) times daily. 30 mL 11    buPROPion (WELLBUTRIN XL) 150 MG TB24 tablet Take 150 mg by mouth once daily.       cetirizine (ZYRTEC) 10 MG tablet TAKE ONE TABLET BY MOUTH ONCE DAILY FOR ALLERGIES      fexofenadine (ALLEGRA) 180 MG tablet Take 1 tablet (180 mg total) by mouth once daily. 30 tablet 11    fluticasone propionate (FLONASE) 50 mcg/actuation nasal spray 1 spray (50 mcg total) by Each Nostril route 2 (two) times daily. 58 g 3    hydrocortisone 2.5 % cream Apply topically 2 (two) times daily. 20 g 2    LIPITOR 40 mg tablet Take 1 tablet (40 mg total) by mouth once daily. 30 tablet 5    ondansetron (ZOFRAN-ODT) 8 MG TbDL Take 1 tablet (8 mg total) by mouth every 6 (six) hours as needed. For nausea 15 tablet 0    oxybutynin (DITROPAN-XL) 10 MG 24 hr tablet Take 1 tablet (10 mg total) by mouth once daily. 90 tablet 3    oxyCODONE-acetaminophen  (PERCOCET) 5-325 mg per tablet Take 1 tablet by mouth every 4 (four) hours as needed. 31 tablet 0    paroxetine (PAXIL) 20 MG tablet TAKE ONE-HALF TABLET BY MOUTH EVERY DAY FOR MENTAL HEALTH      sildenafiL (VIAGRA) 100 MG tablet Take 1 tablet (100 mg total) by mouth daily as needed for Erectile Dysfunction. 30 tablet 11    zolpidem (AMBIEN) 10 mg Tab Take 1 tablet (10 mg total) by mouth nightly as needed. 30 tablet 5    [DISCONTINUED] amLODIPine (NORVASC) 10 MG tablet Take 1 tablet (10 mg total) by mouth once daily. 90 tablet 1    [DISCONTINUED] omeprazole (PRILOSEC) 40 MG capsule Take 1 capsule (40 mg total) by mouth 2 (two) times daily before meals. 180 capsule 1     No current facility-administered medications on file prior to visit.           Past Surgical History:   Procedure Laterality Date    COLONOSCOPY      COLONOSCOPY N/A 6/4/2021    Procedure: COLONOSCOPY;  Surgeon: Shanthi Horton MD;  Location: [facility];  Service: Endoscopy;  Laterality: N/A;  prep instr portal,   6/3 pt v/u of earlier arrival time and prep time -ml    ENDOSCOPIC NASAL SEPTOPLASTY N/A 10/13/2020    Procedure: SEPTOPLASTY, NOSE, ENDOSCOPIC;  Surgeon: Nick Gomez MD;  Location: [facility] OR;  Service: ENT;  Laterality: N/A;  RN PRE OP 9- COVID NEGATIVE ON  10-  [place]    ESOPHAGOGASTRODUODENOSCOPY N/A 6/17/2020    Procedure: EGD (ESOPHAGOGASTRODUODENOSCOPY);  Surgeon: Jarad Holm MD;  [facility]  Service: Endoscopy;  Laterality: N/A;  covid test 6/15-[facility]    HERNIA REPAIR      NASAL ENDOSCOPY Bilateral 10/13/2020    Procedure: ENDOSCOPY, NOSE;  Surgeon: Nick Gomez MD;  Location: [facility] OR;  Service: ENT;  Laterality: Bilateral;    NASAL STENOSIS REPAIR Bilateral 10/13/2020    Procedure: REPAIR, STENOSIS, NOSE, VESTIBULE;  Surgeon: Nick Gomez MD;  Location: [facility] OR;  Service: ENT;  Laterality: Bilateral;  Hotelbar console and wand available for use.  NO DISC  10/7/2020@  "247PM-LO       Family History   Problem Relation Age of Onset    Hypertension Mother     Other Father         agent orange exposure     Heart attacks under age 50 Sister     No Known Problems Brother     No Known Problems Daughter     No Known Problems Son     No Known Problems Son     No Known Problems Son     No Known Problems Son     Cancer Maternal Aunt         breast    Cancer Maternal Grandmother         something in her arm     Amblyopia Neg Hx     Blindness Neg Hx     Cataracts Neg Hx     Diabetes Neg Hx     Glaucoma Neg Hx     Macular degeneration Neg Hx     Retinal detachment Neg Hx     Strabismus Neg Hx     Stroke Neg Hx     Thyroid disease Neg Hx        Social History     Socioeconomic History    Marital status:    Tobacco Use    Smoking status: Never Smoker    Smokeless tobacco: Never Used   Substance and Sexual Activity    Alcohol use: Yes     Alcohol/week: 7.0 standard drinks     Types: 4 Glasses of wine, 1 Cans of beer, 2 Shots of liquor per week    Drug use: Yes     Types: Oxycodone     Comment: sometimes 1 tab weekly as needed; probably 2 tabs a month    Sexual activity: Yes     Partners: Female             Allergies   Review of patient's allergies indicates:   Allergen Reactions    Grass pollen-june grass standard Itching, Other (See Comments) and Rash             Review of Systems  See HPI      [unfilled]  /88   Pulse 73   Temp 98.5 °F (36.9 °C) (Oral)   Ht 5' 11" (1.803 m)   Wt 99.9 kg (220 lb 3.8 oz)   SpO2 99%   BMI 30.72 kg/m²       GEN: NAD, pleasant  HEENT: NCAT, PERRLA, EOMI, sclera clear, anicteric, TM clear bilaterally, O/P with copious PND but otherwise normal, MMM with +mild cobblestoning, +boggy nasal mucosa with clear nasal discharge, no maxillary/frontal TTP   NECK: normal, supple  LUNGS: CTAB, no w/r/r, normal respiratory effort  HEART: RRR, normal S1 and S2, no m/r/g, no palpitations, no edema  ABD: s/nt/nd, NABS, no " organomegaly  SKIN: warm and dry with normal turgor, no rashes, no other lesions.   PSYCH: AOx3, appropriate mood and affect.   MSK: extremities warm/well perfused, normal ROM in all 4 extremities, no c/c/e.   NEURO: normal without focal findings, CN II-XII are intact

## 2022-04-06 ENCOUNTER — OFFICE VISIT (OUTPATIENT)
Dept: FAMILY MEDICINE | Facility: CLINIC | Age: 57
End: 2022-04-06
Payer: MEDICARE

## 2022-04-06 VITALS
HEART RATE: 73 BPM | WEIGHT: 222.69 LBS | TEMPERATURE: 98 F | OXYGEN SATURATION: 97 % | DIASTOLIC BLOOD PRESSURE: 80 MMHG | BODY MASS INDEX: 31.18 KG/M2 | HEIGHT: 71 IN | SYSTOLIC BLOOD PRESSURE: 120 MMHG

## 2022-04-06 DIAGNOSIS — G89.29 CHRONIC LOW BACK PAIN WITHOUT SCIATICA, UNSPECIFIED BACK PAIN LATERALITY: ICD-10-CM

## 2022-04-06 DIAGNOSIS — M54.50 CHRONIC LOW BACK PAIN WITHOUT SCIATICA, UNSPECIFIED BACK PAIN LATERALITY: ICD-10-CM

## 2022-04-06 DIAGNOSIS — E78.5 HYPERLIPIDEMIA, UNSPECIFIED HYPERLIPIDEMIA TYPE: Primary | ICD-10-CM

## 2022-04-06 DIAGNOSIS — J30.9 ALLERGIC SINUSITIS: ICD-10-CM

## 2022-04-06 PROCEDURE — 99214 PR OFFICE/OUTPT VISIT, EST, LEVL IV, 30-39 MIN: ICD-10-PCS | Mod: S$PBB,,, | Performed by: FAMILY MEDICINE

## 2022-04-06 PROCEDURE — 99999 PR PBB SHADOW E&M-EST. PATIENT-LVL III: CPT | Mod: PBBFAC,,, | Performed by: FAMILY MEDICINE

## 2022-04-06 PROCEDURE — 96372 THER/PROPH/DIAG INJ SC/IM: CPT | Mod: PBBFAC,PO

## 2022-04-06 PROCEDURE — 99999 PR PBB SHADOW E&M-EST. PATIENT-LVL III: ICD-10-PCS | Mod: PBBFAC,,, | Performed by: FAMILY MEDICINE

## 2022-04-06 PROCEDURE — 99214 OFFICE O/P EST MOD 30 MIN: CPT | Mod: S$PBB,,, | Performed by: FAMILY MEDICINE

## 2022-04-06 PROCEDURE — 99213 OFFICE O/P EST LOW 20 MIN: CPT | Mod: PBBFAC,PO | Performed by: FAMILY MEDICINE

## 2022-04-06 RX ORDER — OXYCODONE AND ACETAMINOPHEN 5; 325 MG/1; MG/1
1 TABLET ORAL EVERY 4 HOURS PRN
Qty: 31 TABLET | Refills: 0 | Status: SHIPPED | OUTPATIENT
Start: 2022-04-06 | End: 2022-08-05 | Stop reason: SDUPTHER

## 2022-04-06 RX ORDER — ATORVASTATIN CALCIUM 40 MG
40 TABLET ORAL DAILY
Qty: 30 TABLET | Refills: 5 | Status: SHIPPED | OUTPATIENT
Start: 2022-04-06 | End: 2022-07-20 | Stop reason: SDUPTHER

## 2022-04-06 RX ORDER — CETIRIZINE HYDROCHLORIDE 10 MG/1
TABLET ORAL
Qty: 90 TABLET | Refills: 1 | Status: SHIPPED | OUTPATIENT
Start: 2022-04-06 | End: 2022-07-20 | Stop reason: SDUPTHER

## 2022-04-06 RX ORDER — METHYLPREDNISOLONE ACETATE 40 MG/ML
40 INJECTION, SUSPENSION INTRA-ARTICULAR; INTRALESIONAL; INTRAMUSCULAR; SOFT TISSUE
Status: COMPLETED | OUTPATIENT
Start: 2022-04-06 | End: 2022-04-06

## 2022-04-06 RX ADMIN — METHYLPREDNISOLONE ACETATE 40 MG: 40 INJECTION, SUSPENSION INTRA-ARTICULAR; INTRALESIONAL; INTRAMUSCULAR; INTRASYNOVIAL; SOFT TISSUE at 11:04

## 2022-04-06 NOTE — PROGRESS NOTES
Administered Depo-Medrol 40mg/mL IM to left upper outer glut. No s/s of any adverse reaction noted.

## 2022-04-06 NOTE — PROGRESS NOTES
Subjective:       Patient ID: Rito Conley is a 56 y.o. male.    Chief Complaint: Allergies and Back Pain    56 year old male presents for allergy symptoms, which are cough, post nasal drip. He had swollen lymph nodes, but they have resolved. His nasal congestion has imporoved significantly. He states his nose is not clogged and he attributes this to the success of his sinus surgery. He has some sneezing and watery eyes. He has not taken anything for his symptoms.     He has chornic back pain and needs refills. He states it hasn't been having as much back pain as the weather has been stable. He would like refills of his percocet. He denies any issues with it or fatigue associated with this.     Past Medical History:   Diagnosis Date    Allergy     Class 1 obesity due to excess calories with serious comorbidity in adult 3/14/2022    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    Hyperlipidemia     Hypertension     Lower back pain     PTSD (post-traumatic stress disorder)     Sleep apnea     uses CPAP 1-2x/week      Past Surgical History:   Procedure Laterality Date    COLONOSCOPY      COLONOSCOPY N/A 6/4/2021    Procedure: COLONOSCOPY;  Surgeon: Shanthi Horton MD;  Location: [facility];  Service: Endoscopy;  Laterality: N/A;  prep instr portal,   6/3 pt v/u of earlier arrival time and prep time -ml    ENDOSCOPIC NASAL SEPTOPLASTY N/A 10/13/2020    Procedure: SEPTOPLASTY, NOSE, ENDOSCOPIC;  Surgeon: Nick Gomez MD;  Location[facility];  Service: ENT;  Laterality: N/A;  RN PRE OP 9- COVID NEGATIVE ON  10-  [place]    ESOPHAGOGASTRODUODENOSCOPY N/A 6/17/2020    Procedure: EGD (ESOPHAGOGASTRODUODENOSCOPY);  Surgeon: Jarad Holm MD;  [facility]  Service: Endoscopy;  Laterality: N/A;  covid test 6/15-[facility]    HERNIA REPAIR      NASAL ENDOSCOPY Bilateral 10/13/2020    Procedure: ENDOSCOPY, NOSE;  Surgeon: Nick Gomez MD;  Location: [facility];  Service: ENT;   "Laterality: Bilateral;    NASAL STENOSIS REPAIR Bilateral 10/13/2020    Procedure: REPAIR, STENOSIS, NOSE, VESTIBULE;  Surgeon: Nick Gomez MD;  Location: [facility];  Service: ENT;  Laterality: Bilateral;  Vivaer console and wand available for use.  NO DISC  10/7/2020@ 247PM-LO     Family History   Problem Relation Age of Onset    Hypertension Mother     Other Father         agent orange exposure     Heart attacks under age 50 Sister     No Known Problems Brother     No Known Problems Daughter     No Known Problems Son     No Known Problems Son     No Known Problems Son     No Known Problems Son     Cancer Maternal Aunt         breast    Cancer Maternal Grandmother         something in her arm     Amblyopia Neg Hx     Blindness Neg Hx     Cataracts Neg Hx     Diabetes Neg Hx     Glaucoma Neg Hx     Macular degeneration Neg Hx     Retinal detachment Neg Hx     Strabismus Neg Hx     Stroke Neg Hx     Thyroid disease Neg Hx      Social History     Socioeconomic History    Marital status:    Tobacco Use    Smoking status: Never Smoker    Smokeless tobacco: Never Used   Substance and Sexual Activity    Alcohol use: Yes     Alcohol/week: 7.0 standard drinks     Types: 4 Glasses of wine, 1 Cans of beer, 2 Shots of liquor per week    Drug use: Yes     Types: Oxycodone     Comment: sometimes 1 tab weekly as needed; probably 2 tabs a month    Sexual activity: Yes     Partners: Female       Review of Systems         Objective:       Vitals:    04/06/22 1027   BP: 120/80   Pulse: 73   Temp: 98.2 °F (36.8 °C)   TempSrc: Oral   SpO2: 97%   Weight: 101 kg (222 lb 10.6 oz)   Height: 5' 11" (1.803 m)       Physical Exam  Constitutional:       General: He is not in acute distress.     Appearance: He is well-developed. He is not ill-appearing, toxic-appearing or diaphoretic.   HENT:      Head: Normocephalic and atraumatic.      Right Ear: Tympanic membrane and external ear normal.      Left Ear: " Tympanic membrane and external ear normal.   Eyes:      Conjunctiva/sclera: Conjunctivae normal.   Cardiovascular:      Rate and Rhythm: Normal rate and regular rhythm.      Heart sounds: Normal heart sounds. No murmur heard.    No friction rub. No gallop.   Pulmonary:      Effort: Pulmonary effort is normal. No respiratory distress.      Breath sounds: Normal breath sounds. No stridor. No wheezing, rhonchi or rales.   Musculoskeletal:      Cervical back: Normal range of motion and neck supple.   Skin:     Coloration: Skin is not jaundiced.   Neurological:      Mental Status: He is alert and oriented to person, place, and time.      Motor: No tremor or seizure activity.         Assessment:       Problem List Items Addressed This Visit    None     Visit Diagnoses     Hyperlipidemia, unspecified hyperlipidemia type    -  Primary    Relevant Orders    CBC Auto Differential (Completed)    Comprehensive Metabolic Panel (Completed)    Lipid Panel (Completed)    Chronic low back pain without sciatica, unspecified back pain laterality        Relevant Medications    oxyCODONE-acetaminophen (PERCOCET) 5-325 mg per tablet    methylPREDNISolone acetate injection 40 mg (Completed)    Allergic sinusitis        Relevant Medications    LIPITOR 40 mg tablet    cetirizine (ZYRTEC) 10 MG tablet          Plan:       Rito was seen today for allergies and back pain.    Diagnoses and all orders for this visit:    Hyperlipidemia, unspecified hyperlipidemia type  -     CBC Auto Differential; Future  -     Comprehensive Metabolic Panel; Future  -     Lipid Panel; Future  Due for labs.     Chronic low back pain without sciatica, unspecified back pain laterality  -     oxyCODONE-acetaminophen (PERCOCET) 5-325 mg per tablet; Take 1 tablet by mouth every 4 (four) hours as needed.  -     methylPREDNISolone acetate injection 40 mg  given a steroid injection and percocet for pain refill.   Allergic sinusitis  -     LIPITOR 40 mg tablet; Take 1  tablet (40 mg total) by mouth once daily.  -     cetirizine (ZYRTEC) 10 MG tablet; TAKE ONE TABLET BY MOUTH ONCE DAILY FOR ALLERGIES

## 2022-04-12 ENCOUNTER — LAB VISIT (OUTPATIENT)
Dept: LAB | Facility: HOSPITAL | Age: 57
End: 2022-04-12
Attending: FAMILY MEDICINE
Payer: MEDICARE

## 2022-04-12 DIAGNOSIS — E78.5 HYPERLIPIDEMIA, UNSPECIFIED HYPERLIPIDEMIA TYPE: ICD-10-CM

## 2022-04-12 LAB
ALBUMIN SERPL BCP-MCNC: 4 G/DL (ref 3.5–5.2)
ALP SERPL-CCNC: 108 U/L (ref 55–135)
ALT SERPL W/O P-5'-P-CCNC: 36 U/L (ref 10–44)
ANION GAP SERPL CALC-SCNC: 11 MMOL/L (ref 8–16)
AST SERPL-CCNC: 38 U/L (ref 10–40)
BASOPHILS # BLD AUTO: 0.03 K/UL (ref 0–0.2)
BASOPHILS NFR BLD: 0.5 % (ref 0–1.9)
BILIRUB SERPL-MCNC: 0.6 MG/DL (ref 0.1–1)
BUN SERPL-MCNC: 12 MG/DL (ref 6–20)
CALCIUM SERPL-MCNC: 9.7 MG/DL (ref 8.7–10.5)
CHLORIDE SERPL-SCNC: 103 MMOL/L (ref 95–110)
CHOLEST SERPL-MCNC: 222 MG/DL (ref 120–199)
CHOLEST/HDLC SERPL: 4.1 {RATIO} (ref 2–5)
CO2 SERPL-SCNC: 24 MMOL/L (ref 23–29)
CREAT SERPL-MCNC: 0.9 MG/DL (ref 0.5–1.4)
DIFFERENTIAL METHOD: ABNORMAL
EOSINOPHIL # BLD AUTO: 0.1 K/UL (ref 0–0.5)
EOSINOPHIL NFR BLD: 1.9 % (ref 0–8)
ERYTHROCYTE [DISTWIDTH] IN BLOOD BY AUTOMATED COUNT: 14.2 % (ref 11.5–14.5)
EST. GFR  (AFRICAN AMERICAN): >60 ML/MIN/1.73 M^2
EST. GFR  (NON AFRICAN AMERICAN): >60 ML/MIN/1.73 M^2
GLUCOSE SERPL-MCNC: 107 MG/DL (ref 70–110)
HCT VFR BLD AUTO: 44.3 % (ref 40–54)
HDLC SERPL-MCNC: 54 MG/DL (ref 40–75)
HDLC SERPL: 24.3 % (ref 20–50)
HGB BLD-MCNC: 14.2 G/DL (ref 14–18)
IMM GRANULOCYTES # BLD AUTO: 0.02 K/UL (ref 0–0.04)
IMM GRANULOCYTES NFR BLD AUTO: 0.3 % (ref 0–0.5)
LDLC SERPL CALC-MCNC: 145.8 MG/DL (ref 63–159)
LYMPHOCYTES # BLD AUTO: 3 K/UL (ref 1–4.8)
LYMPHOCYTES NFR BLD: 51.8 % (ref 18–48)
MCH RBC QN AUTO: 29.9 PG (ref 27–31)
MCHC RBC AUTO-ENTMCNC: 32.1 G/DL (ref 32–36)
MCV RBC AUTO: 93 FL (ref 82–98)
MONOCYTES # BLD AUTO: 0.5 K/UL (ref 0.3–1)
MONOCYTES NFR BLD: 8.3 % (ref 4–15)
NEUTROPHILS # BLD AUTO: 2.2 K/UL (ref 1.8–7.7)
NEUTROPHILS NFR BLD: 37.2 % (ref 38–73)
NONHDLC SERPL-MCNC: 168 MG/DL
NRBC BLD-RTO: 0 /100 WBC
PLATELET # BLD AUTO: 344 K/UL (ref 150–450)
PMV BLD AUTO: 10.8 FL (ref 9.2–12.9)
POTASSIUM SERPL-SCNC: 4.1 MMOL/L (ref 3.5–5.1)
PROT SERPL-MCNC: 8.2 G/DL (ref 6–8.4)
RBC # BLD AUTO: 4.75 M/UL (ref 4.6–6.2)
SODIUM SERPL-SCNC: 138 MMOL/L (ref 136–145)
TRIGL SERPL-MCNC: 111 MG/DL (ref 30–150)
WBC # BLD AUTO: 5.81 K/UL (ref 3.9–12.7)

## 2022-04-12 PROCEDURE — 80061 LIPID PANEL: CPT | Performed by: FAMILY MEDICINE

## 2022-04-12 PROCEDURE — 85025 COMPLETE CBC W/AUTO DIFF WBC: CPT | Performed by: FAMILY MEDICINE

## 2022-04-12 PROCEDURE — 36415 COLL VENOUS BLD VENIPUNCTURE: CPT | Mod: PO | Performed by: FAMILY MEDICINE

## 2022-04-12 PROCEDURE — 80053 COMPREHEN METABOLIC PANEL: CPT | Performed by: FAMILY MEDICINE

## 2022-04-19 ENCOUNTER — OFFICE VISIT (OUTPATIENT)
Dept: FAMILY MEDICINE | Facility: CLINIC | Age: 57
End: 2022-04-19
Payer: MEDICARE

## 2022-04-19 VITALS
SYSTOLIC BLOOD PRESSURE: 130 MMHG | WEIGHT: 222.69 LBS | TEMPERATURE: 98 F | HEIGHT: 71 IN | HEART RATE: 86 BPM | BODY MASS INDEX: 31.18 KG/M2 | DIASTOLIC BLOOD PRESSURE: 80 MMHG | OXYGEN SATURATION: 97 %

## 2022-04-19 DIAGNOSIS — I10 ESSENTIAL HYPERTENSION: ICD-10-CM

## 2022-04-19 DIAGNOSIS — E66.01 MORBID OBESITY: ICD-10-CM

## 2022-04-19 DIAGNOSIS — Z00.00 ANNUAL PHYSICAL EXAM: Primary | ICD-10-CM

## 2022-04-19 PROCEDURE — 99999 PR PBB SHADOW E&M-EST. PATIENT-LVL III: ICD-10-PCS | Mod: PBBFAC,,, | Performed by: FAMILY MEDICINE

## 2022-04-19 PROCEDURE — 99213 OFFICE O/P EST LOW 20 MIN: CPT | Mod: PBBFAC,PO | Performed by: FAMILY MEDICINE

## 2022-04-19 PROCEDURE — 99214 OFFICE O/P EST MOD 30 MIN: CPT | Mod: S$PBB,,, | Performed by: FAMILY MEDICINE

## 2022-04-19 PROCEDURE — 99214 PR OFFICE/OUTPT VISIT, EST, LEVL IV, 30-39 MIN: ICD-10-PCS | Mod: S$PBB,,, | Performed by: FAMILY MEDICINE

## 2022-04-19 PROCEDURE — 99999 PR PBB SHADOW E&M-EST. PATIENT-LVL III: CPT | Mod: PBBFAC,,, | Performed by: FAMILY MEDICINE

## 2022-04-19 NOTE — PROGRESS NOTES
Subjective:       Patient ID: Rito Conley is a 56 y.o. male.    Chief Complaint: Annual Exam    56 year old male presents for a check up. His labs were reviewed and his cholesterol was elevated, but he has been off his medication. He  Has since restarted. He is starting to work out again.      Past Medical History:  No date: Allergy  3/14/2022: Class 1 obesity due to excess calories with serious   comorbidity in adult  5/7/2014: DJD of shoulder  No date: Hyperlipidemia  No date: Hypertension  No date: Lower back pain  No date: PTSD (post-traumatic stress disorder)  No date: Sleep apnea      Comment:  uses CPAP 1-2x/week   Past Surgical History:  No date: COLONOSCOPY  6/4/2021: COLONOSCOPY; N/A      Comment:  Procedure: COLONOSCOPY;  Surgeon: Shanthi Horton MD;                Location: [facility];  Service: Endoscopy;  Laterality:                N/A;  prep instr portal, 6/3 pt v/u of earlier arrival                time and prep time -ml  10/13/2020: ENDOSCOPIC NASAL SEPTOPLASTY; N/A      Comment:  Procedure: SEPTOPLASTY, NOSE, ENDOSCOPIC;  Surgeon:                Nick Gomez MD;  Location: [facility];  Service: ENT;                Laterality: N/A;  RN PRE OP 9- COVID NEGATIVE ON                 10-  [place]  6/17/2020: ESOPHAGOGASTRODUODENOSCOPY; N/A      Comment:  Procedure: EGD (ESOPHAGOGASTRODUODENOSCOPY);  Surgeon:                Jarad Holm MD;  Location: [facility] ([address] [place] [address]);                 Service: Endoscopy;  Laterality: N/A;  covid test                6/15-[facility]  No date: HERNIA REPAIR  10/13/2020: NASAL ENDOSCOPY; Bilateral      Comment:  Procedure: ENDOSCOPY, NOSE;  Surgeon: Nick Gomez MD;  Location: [facility] OR;  Service: ENT;  Laterality:                Bilateral;  10/13/2020: NASAL STENOSIS REPAIR; Bilateral      Comment:  Procedure: REPAIR, STENOSIS, NOSE, VESTIBULE;  Surgeon:                Nick Gomez MD;  Location: [facility] OR;  Service: ENT;                 Laterality: Bilateral;  Edwin handley and jennifer available               for use.NO DISC  10/7/2020@ [facility]-  Review of patient's family history indicates:  Problem: Hypertension      Relation: Mother          Age of Onset: (Not Specified)  Problem: Other      Relation: Father          Age of Onset: (Not Specified)          Comment: agent orange exposure   Problem: Heart attacks under age 50      Relation: Sister          Age of Onset: (Not Specified)  Problem: No Known Problems      Relation: Brother          Age of Onset: (Not Specified)  Problem: No Known Problems      Relation: Daughter          Age of Onset: (Not Specified)  Problem: No Known Problems      Relation: Son          Age of Onset: (Not Specified)  Problem: No Known Problems      Relation: Son          Age of Onset: (Not Specified)  Problem: No Known Problems      Relation: Son          Age of Onset: (Not Specified)  Problem: No Known Problems      Relation: Son          Age of Onset: (Not Specified)  Problem: Cancer      Relation: Maternal Aunt          Age of Onset: (Not Specified)          Comment: breast  Problem: Cancer      Relation: Maternal Grandmother          Age of Onset: (Not Specified)          Comment: something in her arm   Problem: Amblyopia      Relation: Neg Hx          Age of Onset: (Not Specified)  Problem: Blindness      Relation: Neg Hx          Age of Onset: (Not Specified)  Problem: Cataracts      Relation: Neg Hx          Age of Onset: (Not Specified)  Problem: Diabetes      Relation: Neg Hx          Age of Onset: (Not Specified)  Problem: Glaucoma      Relation: Neg Hx          Age of Onset: (Not Specified)  Problem: Macular degeneration      Relation: Neg Hx          Age of Onset: (Not Specified)  Problem: Retinal detachment      Relation: Neg Hx          Age of Onset: (Not Specified)  Problem: Strabismus      Relation: Neg Hx          Age of Onset: (Not Specified)  Problem: Stroke      Relation: Neg Hx        "   Age of Onset: (Not Specified)  Problem: Thyroid disease      Relation: Neg Hx          Age of Onset: (Not Specified)    Social History    Socioeconomic History      Marital status:     Tobacco Use      Smoking status: Never Smoker      Smokeless tobacco: Never Used    Substance and Sexual Activity      Alcohol use: Yes        Alcohol/week: 7.0 standard drinks        Types: 4 Glasses of wine, 1 Cans of beer, 2 Shots of liquor per week      Drug use: Yes        Types: Oxycodone        Comment: sometimes 1 tab weekly as needed; probably 2 tabs a month      Sexual activity: Yes        Partners: Female        Review of Systems      Objective:       Vitals:    04/19/22 1002   BP: 130/80   Pulse: 86   Temp: 98.1 °F (36.7 °C)   TempSrc: Oral   SpO2: 97%   Weight: 101 kg (222 lb 10.6 oz)   Height: 5' 11" (1.803 m)       Physical Exam  Constitutional:       General: He is not in acute distress.     Appearance: Normal appearance. He is well-developed. He is not ill-appearing, toxic-appearing or diaphoretic.   HENT:      Head: Normocephalic and atraumatic.      Right Ear: External ear normal.      Left Ear: External ear normal.      Mouth/Throat:      Pharynx: No oropharyngeal exudate.   Eyes:      Conjunctiva/sclera: Conjunctivae normal.   Neck:      Thyroid: No thyromegaly.   Cardiovascular:      Rate and Rhythm: Normal rate and regular rhythm.      Heart sounds: Normal heart sounds. No murmur heard.    No friction rub. No gallop.   Pulmonary:      Effort: Pulmonary effort is normal. No respiratory distress.      Breath sounds: Normal breath sounds. No stridor. No wheezing, rhonchi or rales.   Abdominal:      General: Bowel sounds are normal. There is no distension.      Palpations: Abdomen is soft. There is no mass.      Tenderness: There is no abdominal tenderness. There is no guarding or rebound.      Hernia: No hernia is present.   Musculoskeletal:      Cervical back: Normal range of motion and neck supple. "   Lymphadenopathy:      Cervical: No cervical adenopathy.   Skin:     Findings: No rash.   Neurological:      Mental Status: He is alert and oriented to person, place, and time.   Psychiatric:         Mood and Affect: Mood normal.         Behavior: Behavior normal.         Assessment:       Problem List Items Addressed This Visit    None     Visit Diagnoses     Annual physical exam    -  Primary    Morbid obesity        Essential hypertension              Plan:       Rito was seen today for annual exam.    Diagnoses and all orders for this visit:    Annual physical exam  Labs reviewed and were normal.    Morbid obesity    Essential hypertension  Stable. Refilled meds.

## 2022-05-17 ENCOUNTER — OFFICE VISIT (OUTPATIENT)
Dept: FAMILY MEDICINE | Facility: CLINIC | Age: 57
End: 2022-05-17
Payer: MEDICARE

## 2022-05-17 VITALS
OXYGEN SATURATION: 98 % | DIASTOLIC BLOOD PRESSURE: 82 MMHG | SYSTOLIC BLOOD PRESSURE: 136 MMHG | TEMPERATURE: 99 F | WEIGHT: 222.13 LBS | BODY MASS INDEX: 30.98 KG/M2 | HEART RATE: 77 BPM

## 2022-05-17 DIAGNOSIS — E66.09 CLASS 1 OBESITY DUE TO EXCESS CALORIES WITH SERIOUS COMORBIDITY AND BODY MASS INDEX (BMI) OF 31.0 TO 31.9 IN ADULT: ICD-10-CM

## 2022-05-17 DIAGNOSIS — R12 HEARTBURN: ICD-10-CM

## 2022-05-17 DIAGNOSIS — I11.9 BENIGN HYPERTENSIVE HEART DISEASE WITHOUT HEART FAILURE: ICD-10-CM

## 2022-05-17 DIAGNOSIS — H00.014 HORDEOLUM EXTERNUM OF LEFT UPPER EYELID: Primary | ICD-10-CM

## 2022-05-17 DIAGNOSIS — N13.8 BPH WITH OBSTRUCTION/LOWER URINARY TRACT SYMPTOMS: ICD-10-CM

## 2022-05-17 DIAGNOSIS — L91.8 MULTIPLE ACQUIRED SKIN TAGS: ICD-10-CM

## 2022-05-17 DIAGNOSIS — J30.1 ALLERGIC RHINITIS DUE TO POLLEN, UNSPECIFIED SEASONALITY: ICD-10-CM

## 2022-05-17 DIAGNOSIS — N40.1 BPH WITH OBSTRUCTION/LOWER URINARY TRACT SYMPTOMS: ICD-10-CM

## 2022-05-17 PROCEDURE — 99214 OFFICE O/P EST MOD 30 MIN: CPT | Mod: S$PBB,,, | Performed by: NURSE PRACTITIONER

## 2022-05-17 PROCEDURE — 99214 PR OFFICE/OUTPT VISIT, EST, LEVL IV, 30-39 MIN: ICD-10-PCS | Mod: S$PBB,,, | Performed by: NURSE PRACTITIONER

## 2022-05-17 PROCEDURE — 99999 PR PBB SHADOW E&M-EST. PATIENT-LVL V: CPT | Mod: PBBFAC,,, | Performed by: NURSE PRACTITIONER

## 2022-05-17 PROCEDURE — 99999 PR PBB SHADOW E&M-EST. PATIENT-LVL V: ICD-10-PCS | Mod: PBBFAC,,, | Performed by: NURSE PRACTITIONER

## 2022-05-17 PROCEDURE — 99215 OFFICE O/P EST HI 40 MIN: CPT | Mod: PBBFAC,PO | Performed by: NURSE PRACTITIONER

## 2022-05-17 RX ORDER — ERYTHROMYCIN 5 MG/G
OINTMENT OPHTHALMIC EVERY 6 HOURS
Qty: 3.5 G | Refills: 0 | Status: SHIPPED | OUTPATIENT
Start: 2022-05-17 | End: 2023-05-09

## 2022-05-17 NOTE — ASSESSMENT & PLAN NOTE
Symptoms are mild, denies pain or vision changes. Denies fever. Afebile, vss.     Erythromycin opthalamic qid  placed into the conjunctival sac approximately until symptoms have resolved.  Warm compresses may be created by soaking a cloth towel in hot water and then applied with gentle pressure onto the closed eyelid. They may be applied 4 to 5 times per day for 10 to 15 minutes and may be accompanied by gentle massage of the area.    Discussed hand hygiene    ED precautions given.   Notify provider if symptoms do not resolve or increase in severity.   Patient verbalizes understanding and agrees with plan of care.

## 2022-05-17 NOTE — PROGRESS NOTES
HPI     Chief Complaint:  Chief Complaint   Patient presents with    Stye     Left eye    Skin Tags     Left armpit       Rito Conley is a 57 y.o. male with multiple medical diagnoses as listed in the medical history and problem list that presents for bump on his left eye and skin tags to both axilla.  Pt is new to me but is known to this clinic with his last appointment being 3/29/2022.      Eye Problem   The left eye is affected. This is a new problem. The current episode started in the past 7 days. The problem has been gradually improving. There was no injury mechanism. The patient is experiencing no pain. There is no known exposure to pink eye. He does not wear contacts. Pertinent negatives include no blurred vision, eye discharge, double vision, fever, foreign body sensation, itching, nausea, photophobia, recent URI or vomiting. He has tried nothing for the symptoms.       he is compliant with medications daily without any adverse side effects.    Patient Care Team:  Jennifer Huertas MD as PCP - General (Family Medicine)  Jory Mauricio MD as Consulting Physician (Urology)  TREVOR Colindres MD as Consulting Physician (Urology)  Lorie Gage PA-C (Inactive) as Physician Assistant (Surgery)  Sabra Awad MA (Inactive) as Care Coordinator    History     Past Medical History:  Past Medical History:   Diagnosis Date    Allergy     Class 1 obesity due to excess calories with serious comorbidity in adult 3/14/2022    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    Hyperlipidemia     Hypertension     Lower back pain     PTSD (post-traumatic stress disorder)     Sleep apnea     uses CPAP 1-2x/week       Past Surgical History:  Past Surgical History:   Procedure Laterality Date    COLONOSCOPY      COLONOSCOPY N/A 6/4/2021    Procedure: COLONOSCOPY;  Surgeon: Shanthi Horton MD;  Location: [facility];  Service: Endoscopy;  Laterality: N/A;  prep instr portal,   6/3 pt v/u of earlier arrival time and  prep time -ml    ENDOSCOPIC NASAL SEPTOPLASTY N/A 10/13/2020    Procedure: SEPTOPLASTY, NOSE, ENDOSCOPIC;  Surgeon: Nick Gomez MD;  Location: [facility] OR;  Service: ENT;  Laterality: N/A;  RN PRE OP 9- COVID NEGATIVE ON  10-  [place]    ESOPHAGOGASTRODUODENOSCOPY N/A 6/17/2020    Procedure: EGD (ESOPHAGOGASTRODUODENOSCOPY);  Surgeon: Jarad Holm MD;  [facility]  Service: Endoscopy;  Laterality: N/A;  covid test 6/15-[facility]    HERNIA REPAIR      NASAL ENDOSCOPY Bilateral 10/13/2020    Procedure: ENDOSCOPY, NOSE;  Surgeon: Nick Gomez MD;  Location: [facility] OR;  Service: ENT;  Laterality: Bilateral;    NASAL STENOSIS REPAIR Bilateral 10/13/2020    Procedure: REPAIR, STENOSIS, NOSE, VESTIBULE;  Surgeon: Nick Gomez MD;  Location: [facility] OR;  Service: ENT;  Laterality: Bilateral;  Vivaer console and wand available for use.  NO DISC  10/7/2020@ 247PM-       Social History:  Social History     Socioeconomic History    Marital status:    Tobacco Use    Smoking status: Never Smoker    Smokeless tobacco: Never Used   Substance and Sexual Activity    Alcohol use: Yes     Alcohol/week: 7.0 standard drinks     Types: 4 Glasses of wine, 1 Cans of beer, 2 Shots of liquor per week    Drug use: Yes     Types: Oxycodone     Comment: sometimes 1 tab weekly as needed; probably 2 tabs a month    Sexual activity: Yes     Partners: Female       Family History:  Family History   Problem Relation Age of Onset    Hypertension Mother     Other Father         agent orange exposure     Heart attacks under age 50 Sister     No Known Problems Brother     No Known Problems Daughter     No Known Problems Son     No Known Problems Son     No Known Problems Son     No Known Problems Son     Cancer Maternal Aunt         breast    Cancer Maternal Grandmother         something in her arm     Amblyopia Neg Hx     Blindness Neg Hx     Cataracts Neg Hx     Diabetes Neg Hx      Glaucoma Neg Hx     Macular degeneration Neg Hx     Retinal detachment Neg Hx     Strabismus Neg Hx     Stroke Neg Hx     Thyroid disease Neg Hx        Allergies and Medications: (updated and reviewed)  Review of patient's allergies indicates:   Allergen Reactions    Grass pollen-minnie grass standard Itching, Other (See Comments) and Rash     Current Outpatient Medications   Medication Sig Dispense Refill    amLODIPine (NORVASC) 10 MG tablet Take 1 tablet (10 mg total) by mouth once daily. 90 tablet 1    azelastine (ASTELIN) 137 mcg (0.1 %) nasal spray 1 spray (137 mcg total) by Nasal route 2 (two) times daily. 30 mL 11    buPROPion (WELLBUTRIN XL) 150 MG TB24 tablet Take 150 mg by mouth once daily.       cetirizine (ZYRTEC) 10 MG tablet TAKE ONE TABLET BY MOUTH ONCE DAILY FOR ALLERGIES 90 tablet 1    fluticasone propionate (FLONASE) 50 mcg/actuation nasal spray 1 spray (50 mcg total) by Each Nostril route 2 (two) times daily. 58 g 3    hydrocortisone 2.5 % cream Apply topically 2 (two) times daily. 20 g 2    LIPITOR 40 mg tablet Take 1 tablet (40 mg total) by mouth once daily. 30 tablet 5    omeprazole (PRILOSEC) 40 MG capsule Take 1 capsule (40 mg total) by mouth 2 (two) times daily before meals. 180 capsule 1    ondansetron (ZOFRAN-ODT) 8 MG TbDL Take 1 tablet (8 mg total) by mouth every 6 (six) hours as needed. For nausea 15 tablet 0    oxybutynin (DITROPAN-XL) 10 MG 24 hr tablet Take 1 tablet (10 mg total) by mouth once daily. 90 tablet 3    oxyCODONE-acetaminophen (PERCOCET) 5-325 mg per tablet Take 1 tablet by mouth every 4 (four) hours as needed. 31 tablet 0    paroxetine (PAXIL) 20 MG tablet TAKE ONE-HALF TABLET BY MOUTH EVERY DAY FOR MENTAL HEALTH      sildenafiL (VIAGRA) 100 MG tablet Take 1 tablet (100 mg total) by mouth daily as needed for Erectile Dysfunction. 30 tablet 11    zolpidem (AMBIEN) 10 mg Tab Take 1 tablet (10 mg total) by mouth nightly as needed. 30 tablet 5     erythromycin (ROMYCIN) ophthalmic ointment Place into the left eye every 6 (six) hours. 3.5 g 0     No current facility-administered medications for this visit.       Exam     Review of Systems:  (as noted above)  Review of Systems   Constitutional: Negative for chills, fever and unexpected weight change.   HENT: Negative for sore throat and trouble swallowing.    Eyes: Negative for blurred vision, double vision, photophobia, discharge, itching and visual disturbance.        Redness to upper left eyelid with mild swelling     Respiratory: Negative for cough, chest tightness, shortness of breath and wheezing.    Cardiovascular: Negative for chest pain and palpitations.   Gastrointestinal: Negative for anal bleeding, blood in stool, nausea and vomiting.   Endocrine: Negative for polydipsia and polyphagia.   Genitourinary: Negative for decreased urine volume and hematuria.   Skin: Negative for rash and wound.        Skin tags to both axilla     Neurological: Negative for seizures and speech difficulty.   Psychiatric/Behavioral: Negative for confusion, decreased concentration and suicidal ideas.       Physical Exam:   Physical Exam  Constitutional:       General: He is not in acute distress.     Appearance: He is not ill-appearing or diaphoretic.   HENT:      Head: Normocephalic and atraumatic.   Eyes:      General: No scleral icterus.        Left eye: Hordeolum present.     Pupils: Pupils are equal, round, and reactive to light.        Comments: Pustule at eyelid marigin left upper eyelid no discharge noted.    Neck:      Vascular: No carotid bruit.   Cardiovascular:      Rate and Rhythm: Normal rate and regular rhythm.      Pulses: Normal pulses.      Heart sounds: No murmur heard.    No friction rub. No gallop.   Pulmonary:      Effort: No respiratory distress.      Breath sounds: No wheezing.   Chest:      Chest wall: No tenderness.   Musculoskeletal:         General: No signs of injury.      Cervical back: No  rigidity or tenderness.   Lymphadenopathy:      Cervical: No cervical adenopathy.   Skin:     Capillary Refill: Capillary refill takes 2 to 3 seconds.      Findings: No rash.      Comments: Skin tag noted to both axilla     Neurological:      Mental Status: He is alert.      Cranial Nerves: No cranial nerve deficit.      Sensory: No sensory deficit.      Motor: No weakness.   Psychiatric:         Mood and Affect: Mood normal.       Vitals:    05/17/22 0927 05/17/22 0943   BP: (!) 140/80 136/82   BP Location: Right arm    Pulse: 77    Temp: 98.5 °F (36.9 °C)    TempSrc: Oral    SpO2: 98%    Weight: 100.8 kg (222 lb 1.8 oz)       Body mass index is 30.98 kg/m².    Assessment & Plan   (all problems are new to me)      Problem List Items Addressed This Visit        Ophtho    Hordeolum externum of left upper eyelid - Primary    Current Assessment & Plan     Symptoms are mild, denies pain or vision changes. Denies fever. Afebile, vss.     Erythromycin opthalamic qid  placed into the conjunctival sac approximately until symptoms have resolved.  Warm compresses may be created by soaking a cloth towel in hot water and then applied with gentle pressure onto the closed eyelid. They may be applied 4 to 5 times per day for 10 to 15 minutes and may be accompanied by gentle massage of the area.    Discussed hand hygiene    ED precautions given.   Notify provider if symptoms do not resolve or increase in severity.   Patient verbalizes understanding and agrees with plan of care.               Relevant Medications    erythromycin (ROMYCIN) ophthalmic ointment       ENT    Allergic rhinitis    Current Assessment & Plan     The current medical regimen is effective;  continue present plan and medications.                Derm    Multiple acquired skin tags    Current Assessment & Plan     To skin tags noted to left axilla 1 skin tag noted to right axilla.  No tenderness, redness, swelling to area.  Patient would like these removed for  cosmetic reasons.    Refer to Dermatology.           Relevant Orders    Ambulatory referral/consult to Dermatology       Cardiac/Vascular    Benign hypertensive heart disease without heart failure    Current Assessment & Plan     /82   Pulse 77   Temp 98.5 °F (36.9 °C) (Oral)   Wt 100.8 kg (222 lb 1.8 oz)   SpO2 98%   BMI 30.98 kg/m²      -continue current medication regimen  -DASH diet, regular cardiovascular exercises, portion control  - ?weight loss  -f/u with BP logs in 2 weeks if BP is not consistently <140/90                  Renal/    BPH with obstruction/lower urinary tract symptoms    Current Assessment & Plan     The current medical regimen is effective;  continue present plan and medications.                Endocrine    Class 1 obesity due to excess calories with serious comorbidity in adult    Current Assessment & Plan     We discussed weight issues and safe, effective ways of losing pounds, includin) diet:  low carbohydrate, low fat diet, stay away from fast food, fried and processed food, use whole grain, lot of fruits and vegetables, use healthy fat such as avocado, nuts and olive oil in reasonable quantity, stay away from sodas. Regular meals with lean proteins.  2) physical activity: ideally 150 min a week, with cardiovascular and resistance activity.  Patient was encouraged to set realistic attainable goals for weight loss, and we will follow up periodically.                GI    Heartburn    Current Assessment & Plan     The current medical regimen is effective;  continue present plan                   --------------------------------------------    Health Maintenance:  Health Maintenance       Date Due Completion Date    HIV Screening Never done ---    TETANUS VACCINE 10/02/2023 10/2/2013    Lipid Panel 2027    Colorectal Cancer Screening 2031          Health maintenance reviewed.     Follow Up:  Follow up in about 3 months (around 2022), or  if symptoms worsen or fail to improve.      The patient expressed understanding and no barriers to adherence were identified.      - The patient indicates understanding of these issues and agrees with the plan. Brief care plan is updated and reviewed with the patient as applicable.      - The patient is given an After Visit Summary that lists all medications with directions, allergies, education, orders placed during this encounter and follow-up instructions.      - I have reviewed the patient's medical information including past medical, family, and social history sections including the medications and allergies.      - We discussed the patient's current medications.     This note was created by combination of typed  and MModal dictation.  Transcription errors may be present.  If there are any questions, please contact me.       Mp Conley NP

## 2022-05-17 NOTE — ASSESSMENT & PLAN NOTE
To skin tags noted to left axilla 1 skin tag noted to right axilla.  No tenderness, redness, swelling to area.  Patient would like these removed for cosmetic reasons.    Refer to Dermatology.

## 2022-05-17 NOTE — ASSESSMENT & PLAN NOTE
/82   Pulse 77   Temp 98.5 °F (36.9 °C) (Oral)   Wt 100.8 kg (222 lb 1.8 oz)   SpO2 98%   BMI 30.98 kg/m²      -continue current medication regimen  -DASH diet, regular cardiovascular exercises, portion control  - ?weight loss  -f/u with BP logs in 2 weeks if BP is not consistently <140/90

## 2022-05-19 ENCOUNTER — TELEPHONE (OUTPATIENT)
Dept: FAMILY MEDICINE | Facility: CLINIC | Age: 57
End: 2022-05-19
Payer: MEDICARE

## 2022-05-19 NOTE — TELEPHONE ENCOUNTER
soke to patient regarding a referral to Dermatology patient states he will have to call back to schedule, letter has been mailed

## 2022-05-24 ENCOUNTER — OFFICE VISIT (OUTPATIENT)
Dept: FAMILY MEDICINE | Facility: CLINIC | Age: 57
End: 2022-05-24
Payer: MEDICARE

## 2022-05-24 VITALS
DIASTOLIC BLOOD PRESSURE: 78 MMHG | HEART RATE: 73 BPM | TEMPERATURE: 98 F | BODY MASS INDEX: 30.49 KG/M2 | SYSTOLIC BLOOD PRESSURE: 120 MMHG | WEIGHT: 218.56 LBS | OXYGEN SATURATION: 97 %

## 2022-05-24 DIAGNOSIS — E66.09 CLASS 1 OBESITY DUE TO EXCESS CALORIES WITH SERIOUS COMORBIDITY AND BODY MASS INDEX (BMI) OF 31.0 TO 31.9 IN ADULT: ICD-10-CM

## 2022-05-24 DIAGNOSIS — I11.9 BENIGN HYPERTENSIVE HEART DISEASE WITHOUT HEART FAILURE: ICD-10-CM

## 2022-05-24 DIAGNOSIS — J30.9 CHRONIC ALLERGIC RHINITIS: Primary | ICD-10-CM

## 2022-05-24 DIAGNOSIS — S39.92XD INJURY OF BACK, SUBSEQUENT ENCOUNTER: ICD-10-CM

## 2022-05-24 DIAGNOSIS — H00.014 HORDEOLUM EXTERNUM OF LEFT UPPER EYELID: ICD-10-CM

## 2022-05-24 PROCEDURE — 99214 PR OFFICE/OUTPT VISIT, EST, LEVL IV, 30-39 MIN: ICD-10-PCS | Mod: S$PBB,,, | Performed by: NURSE PRACTITIONER

## 2022-05-24 PROCEDURE — 99999 PR PBB SHADOW E&M-EST. PATIENT-LVL IV: ICD-10-PCS | Mod: PBBFAC,,, | Performed by: NURSE PRACTITIONER

## 2022-05-24 PROCEDURE — 99999 PR PBB SHADOW E&M-EST. PATIENT-LVL IV: CPT | Mod: PBBFAC,,, | Performed by: NURSE PRACTITIONER

## 2022-05-24 PROCEDURE — 99214 OFFICE O/P EST MOD 30 MIN: CPT | Mod: PBBFAC,PO | Performed by: NURSE PRACTITIONER

## 2022-05-24 PROCEDURE — 99214 OFFICE O/P EST MOD 30 MIN: CPT | Mod: S$PBB,,, | Performed by: NURSE PRACTITIONER

## 2022-05-24 RX ORDER — PREDNISONE 10 MG/1
10 TABLET ORAL DAILY
Qty: 4 TABLET | Refills: 0 | Status: SHIPPED | OUTPATIENT
Start: 2022-05-24 | End: 2022-05-28

## 2022-05-24 RX ORDER — PREDNISONE 10 MG/1
10 TABLET ORAL DAILY
Qty: 4 TABLET | Refills: 0 | Status: SHIPPED | OUTPATIENT
Start: 2022-05-24 | End: 2022-05-24

## 2022-05-24 RX ORDER — AZELASTINE 1 MG/ML
1 SPRAY, METERED NASAL 2 TIMES DAILY
Qty: 30 ML | Refills: 1 | Status: SHIPPED | OUTPATIENT
Start: 2022-05-24

## 2022-05-24 RX ORDER — AZELASTINE 1 MG/ML
1 SPRAY, METERED NASAL 2 TIMES DAILY
Qty: 30 ML | Refills: 1 | Status: SHIPPED | OUTPATIENT
Start: 2022-05-24 | End: 2022-05-24

## 2022-05-24 RX ORDER — AZELASTINE 1 MG/ML
1 SPRAY, METERED NASAL 2 TIMES DAILY
Qty: 30 ML | Refills: 0 | Status: SHIPPED | OUTPATIENT
Start: 2022-05-24 | End: 2022-05-24

## 2022-05-24 NOTE — PROGRESS NOTES
HPI     Chief Complaint:  Chief Complaint   Patient presents with    Sinus Problem    nasal drip       Rito Conley is a 57 y.o. male with multiple medical diagnoses as listed in the medical history and problem list that presents for sinus allergies.  Pt is known to me with his his last appointment 5/17/2022.      Sinusitis  This is a chronic problem. Episode onset: 5/21/22. The problem is unchanged. There has been no fever. He is experiencing no pain. Associated symptoms include congestion, a hoarse voice, sinus pressure and sneezing. Pertinent negatives include no chills, coughing, diaphoresis, ear pain, headaches, neck pain, shortness of breath, sore throat or swollen glands. Treatments tried: flonase, zyrtec. The treatment provided mild relief.       he is compliant with medications daily without any adverse side effects.    Patient Care Team:  Jennifer Huertas MD as PCP - General (Family Medicine)  Jory Mauricio MD as Consulting Physician (Urology)  TREVOR Colindres MD as Consulting Physician (Urology)  Lorie Gage PA-C (Inactive) as Physician Assistant (Surgery)  Sabra Awad MA (Inactive) as Care Coordinator    History     Past Medical History:  Past Medical History:   Diagnosis Date    Allergy     Class 1 obesity due to excess calories with serious comorbidity in adult 3/14/2022    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    Hyperlipidemia     Hypertension     Lower back pain     PTSD (post-traumatic stress disorder)     Sleep apnea     uses CPAP 1-2x/week       Past Surgical History:  Past Surgical History:   Procedure Laterality Date    COLONOSCOPY      COLONOSCOPY N/A 6/4/2021    Procedure: COLONOSCOPY;  Surgeon: Shanthi Horton MD;  Location: [facility];  Service: Endoscopy;  Laterality: N/A;  prep instr portal,   6/3 pt v/u of earlier arrival time and prep time -ml    ENDOSCOPIC NASAL SEPTOPLASTY N/A 10/13/2020    Procedure: SEPTOPLASTY, NOSE, ENDOSCOPIC;  Surgeon: Nick ACEVEDO  MD Jason;  Location: [facility] OR;  Service: ENT;  Laterality: N/A;  RN PRE OP 9- COVID NEGATIVE ON  10-  [place]    ESOPHAGOGASTRODUODENOSCOPY N/A 6/17/2020    Procedure: EGD (ESOPHAGOGASTRODUODENOSCOPY);  Surgeon: Jarad Holm MD;  Location: [facility] ([address] [place] [address]);  Service: Endoscopy;  Laterality: N/A;  covid test 6/15-[place]    HERNIA REPAIR      NASAL ENDOSCOPY Bilateral 10/13/2020    Procedure: ENDOSCOPY, NOSE;  Surgeon: Nick Gomez MD;  Location: [facility] OR;  Service: ENT;  Laterality: Bilateral;    NASAL STENOSIS REPAIR Bilateral 10/13/2020    Procedure: REPAIR, STENOSIS, NOSE, VESTIBULE;  Surgeon: Nick Gomez MD;  Location: [facility] OR;  Service: ENT;  Laterality: Bilateral;  Vivaer console and wand available for use.  NO DISC  10/7/2020@ 247PM-LO       Social History:  Social History     Socioeconomic History    Marital status:    Tobacco Use    Smoking status: Never Smoker    Smokeless tobacco: Never Used   Substance and Sexual Activity    Alcohol use: Yes     Alcohol/week: 7.0 standard drinks     Types: 4 Glasses of wine, 1 Cans of beer, 2 Shots of liquor per week    Drug use: Yes     Types: Oxycodone     Comment: sometimes 1 tab weekly as needed; probably 2 tabs a month    Sexual activity: Yes     Partners: Female     Social Determinants of Health     Financial Resource Strain: Low Risk     Difficulty of Paying Living Expenses: Not hard at all   Food Insecurity: No Food Insecurity    Worried About Running Out of Food in the Last Year: Never true    Ran Out of Food in the Last Year: Never true   Transportation Needs: No Transportation Needs    Lack of Transportation (Medical): No    Lack of Transportation (Non-Medical): No   Physical Activity: Unknown    Days of Exercise per Week: 4 days   Stress: Stress Concern Present    Feeling of Stress : To some extent   Social Connections: Unknown    Frequency of Communication with Friends and Family: Three times a week     Frequency of Social Gatherings with Friends and Family: Twice a week    Active Member of Clubs or Organizations: Yes    Attends Club or Organization Meetings: 1 to 4 times per year    Marital Status:    Housing Stability: Unknown    Unable to Pay for Housing in the Last Year: No    Unstable Housing in the Last Year: No       Family History:  Family History   Problem Relation Age of Onset    Hypertension Mother     Other Father         agent orange exposure     Heart attacks under age 50 Sister     No Known Problems Brother     No Known Problems Daughter     No Known Problems Son     No Known Problems Son     No Known Problems Son     No Known Problems Son     Cancer Maternal Aunt         breast    Cancer Maternal Grandmother         something in her arm     Amblyopia Neg Hx     Blindness Neg Hx     Cataracts Neg Hx     Diabetes Neg Hx     Glaucoma Neg Hx     Macular degeneration Neg Hx     Retinal detachment Neg Hx     Strabismus Neg Hx     Stroke Neg Hx     Thyroid disease Neg Hx        Allergies and Medications: (updated and reviewed)  Review of patient's allergies indicates:   Allergen Reactions    Grass pollen-june grass standard Itching, Other (See Comments) and Rash     Current Outpatient Medications   Medication Sig Dispense Refill    amLODIPine (NORVASC) 10 MG tablet Take 1 tablet (10 mg total) by mouth once daily. 90 tablet 1    buPROPion (WELLBUTRIN XL) 150 MG TB24 tablet Take 150 mg by mouth once daily.       cetirizine (ZYRTEC) 10 MG tablet TAKE ONE TABLET BY MOUTH ONCE DAILY FOR ALLERGIES 90 tablet 1    erythromycin (ROMYCIN) ophthalmic ointment Place into the left eye every 6 (six) hours. 3.5 g 0    fluticasone propionate (FLONASE) 50 mcg/actuation nasal spray 1 spray (50 mcg total) by Each Nostril route 2 (two) times daily. 58 g 3    hydrocortisone 2.5 % cream Apply topically 2 (two) times daily. 20 g 2    LIPITOR 40 mg tablet Take 1 tablet (40 mg total) by  mouth once daily. 30 tablet 5    omeprazole (PRILOSEC) 40 MG capsule Take 1 capsule (40 mg total) by mouth 2 (two) times daily before meals. 180 capsule 1    ondansetron (ZOFRAN-ODT) 8 MG TbDL Take 1 tablet (8 mg total) by mouth every 6 (six) hours as needed. For nausea 15 tablet 0    oxybutynin (DITROPAN-XL) 10 MG 24 hr tablet Take 1 tablet (10 mg total) by mouth once daily. 90 tablet 3    oxyCODONE-acetaminophen (PERCOCET) 5-325 mg per tablet Take 1 tablet by mouth every 4 (four) hours as needed. 31 tablet 0    paroxetine (PAXIL) 20 MG tablet TAKE ONE-HALF TABLET BY MOUTH EVERY DAY FOR MENTAL HEALTH      sildenafiL (VIAGRA) 100 MG tablet Take 1 tablet (100 mg total) by mouth daily as needed for Erectile Dysfunction. 30 tablet 11    zolpidem (AMBIEN) 10 mg Tab Take 1 tablet (10 mg total) by mouth nightly as needed. 30 tablet 5    azelastine (ASTELIN) 137 mcg (0.1 %) nasal spray 1 spray (137 mcg total) by Nasal route 2 (two) times daily. 30 mL 1    predniSONE (DELTASONE) 10 MG tablet Take 1 tablet (10 mg total) by mouth once daily. for 4 days 4 tablet 0     No current facility-administered medications for this visit.       Exam     Review of Systems:  (as noted above)  Review of Systems   Constitutional: Negative for chills, diaphoresis, fever and unexpected weight change.   HENT: Positive for congestion, hoarse voice, postnasal drip, sinus pressure and sneezing. Negative for ear pain, sore throat and trouble swallowing.    Eyes: Negative for visual disturbance.   Respiratory: Negative for cough, chest tightness, shortness of breath and wheezing.    Cardiovascular: Negative for chest pain and palpitations.   Gastrointestinal: Negative for anal bleeding and blood in stool.   Endocrine: Negative for polydipsia and polyphagia.   Genitourinary: Negative for decreased urine volume and hematuria.   Musculoskeletal: Negative for neck pain.   Skin: Negative for rash and wound.   Neurological: Negative for seizures,  speech difficulty and headaches.   Psychiatric/Behavioral: Negative for confusion, decreased concentration and suicidal ideas.       Physical Exam:   Physical Exam  Constitutional:       General: He is not in acute distress.     Appearance: He is not ill-appearing or diaphoretic.   HENT:      Head: Normocephalic and atraumatic.      Nose: Congestion and rhinorrhea present.      Mouth/Throat:      Pharynx: Posterior oropharyngeal erythema present. No oropharyngeal exudate.   Eyes:      General: No scleral icterus.     Pupils: Pupils are equal, round, and reactive to light.   Neck:      Vascular: No carotid bruit.   Cardiovascular:      Rate and Rhythm: Normal rate and regular rhythm.      Pulses: Normal pulses.      Heart sounds: No murmur heard.    No friction rub. No gallop.   Pulmonary:      Effort: No respiratory distress.      Breath sounds: No wheezing.   Chest:      Chest wall: No tenderness.   Musculoskeletal:         General: No signs of injury.      Cervical back: No rigidity or tenderness.   Lymphadenopathy:      Cervical: No cervical adenopathy.   Skin:     Capillary Refill: Capillary refill takes 2 to 3 seconds.      Findings: No rash.   Neurological:      Mental Status: He is alert.      Cranial Nerves: No cranial nerve deficit.      Sensory: No sensory deficit.      Motor: No weakness.   Psychiatric:         Mood and Affect: Mood normal.       Vitals:    05/24/22 1427   BP: 120/78   BP Location: Left arm   Pulse: 73   Temp: 98.3 °F (36.8 °C)   TempSrc: Oral   SpO2: 97%   Weight: 99.2 kg (218 lb 9.4 oz)      Body mass index is 30.49 kg/m².    Assessment & Plan     Problem List Items Addressed This Visit        Ophtho    Hordeolum externum of left upper eyelid    Current Assessment & Plan     Reports symptoms have resolved.               Cardiac/Vascular    Benign hypertensive heart disease without heart failure    Current Assessment & Plan     The current medical regimen is effective;  continue present  plan                Endocrine    Class 1 obesity due to excess calories with serious comorbidity in adult    Current Assessment & Plan     We discussed weight issues and safe, effective ways of losing pounds, includin) diet:  low carbohydrate, low fat diet, stay away from fast food, fried and processed food, use whole grain, lot of fruits and vegetables, use healthy fat such as avocado, nuts and olive oil in reasonable quantity, stay away from sodas. Regular meals with lean proteins.  2) physical activity: ideally 150 min a week, with cardiovascular and resistance activity.  Patient was encouraged to set realistic attainable goals for weight loss, and we will follow up periodically.                Orthopedic    Back injury    Current Assessment & Plan     Reports back pain has improved.              Other Visit Diagnoses     Chronic allergic rhinitis    -  Primary    S/s consistent with allergic rhinitis. Pt reports recent exposure to allergic triggers. VSS, afebrile. Symptoms are mild.     Continue zyrtec.   Start short course of low dose prednisone.   Restart astelin  Nasal saline prn    Avoid triggers  Education on medication and condition provided.     ED precautions given.   Notify provider if symptoms do not resolve or increase in severity.   Patient verbalizes understanding and agrees with plan of care.      Relevant Medications    azelastine (ASTELIN) 137 mcg (0.1 %) nasal spray    predniSONE (DELTASONE) 10 MG tablet          --------------------------------------------      Health Maintenance:  Health Maintenance       Date Due Completion Date    HIV Screening Never done ---    TETANUS VACCINE 10/02/2023 10/2/2013    Lipid Panel 2027    Colorectal Cancer Screening 2031          Health maintenance reviewed.    Follow Up:  Follow up in about 3 months (around 2022), or if symptoms worsen or fail to improve.      The patient expressed understanding and no barriers to  adherence were identified.      - The patient indicates understanding of these issues and agrees with the plan. Brief care plan is updated and reviewed with the patient as applicable.      - The patient is given an After Visit Summary that lists all medications with directions, allergies, education, orders placed during this encounter and follow-up instructions.      - I have reviewed the patient's medical information including past medical, family, and social history sections including the medications and allergies.      - We discussed the patient's current medications.     This note was created by combination of typed  and MModal dictation.  Transcription errors may be present.  If there are any questions, please contact me.       Mp Conley NP

## 2022-06-24 ENCOUNTER — LAB VISIT (OUTPATIENT)
Dept: LAB | Facility: HOSPITAL | Age: 57
End: 2022-06-24
Attending: UROLOGY
Payer: MEDICARE

## 2022-06-24 DIAGNOSIS — N40.1 BPH WITH OBSTRUCTION/LOWER URINARY TRACT SYMPTOMS: ICD-10-CM

## 2022-06-24 DIAGNOSIS — N13.8 BPH WITH OBSTRUCTION/LOWER URINARY TRACT SYMPTOMS: ICD-10-CM

## 2022-06-24 LAB — COMPLEXED PSA SERPL-MCNC: 0.63 NG/ML (ref 0–4)

## 2022-06-24 PROCEDURE — 36415 COLL VENOUS BLD VENIPUNCTURE: CPT | Mod: PO | Performed by: UROLOGY

## 2022-06-24 PROCEDURE — 84153 ASSAY OF PSA TOTAL: CPT | Performed by: UROLOGY

## 2022-06-28 ENCOUNTER — OFFICE VISIT (OUTPATIENT)
Dept: UROLOGY | Facility: CLINIC | Age: 57
End: 2022-06-28
Payer: MEDICARE

## 2022-06-28 VITALS — WEIGHT: 223.69 LBS | BODY MASS INDEX: 31.19 KG/M2

## 2022-06-28 DIAGNOSIS — N40.1 BENIGN PROSTATIC HYPERPLASIA WITH URINARY FREQUENCY: ICD-10-CM

## 2022-06-28 DIAGNOSIS — Z12.5 PROSTATE CANCER SCREENING: ICD-10-CM

## 2022-06-28 DIAGNOSIS — R35.0 BENIGN PROSTATIC HYPERPLASIA WITH URINARY FREQUENCY: ICD-10-CM

## 2022-06-28 DIAGNOSIS — R35.1 NOCTURIA: Primary | ICD-10-CM

## 2022-06-28 DIAGNOSIS — R35.0 URINARY FREQUENCY: ICD-10-CM

## 2022-06-28 PROCEDURE — 99214 OFFICE O/P EST MOD 30 MIN: CPT | Mod: S$PBB,,, | Performed by: UROLOGY

## 2022-06-28 PROCEDURE — 99213 OFFICE O/P EST LOW 20 MIN: CPT | Mod: PBBFAC | Performed by: UROLOGY

## 2022-06-28 PROCEDURE — 99999 PR PBB SHADOW E&M-EST. PATIENT-LVL III: ICD-10-PCS | Mod: PBBFAC,,, | Performed by: UROLOGY

## 2022-06-28 PROCEDURE — 99999 PR PBB SHADOW E&M-EST. PATIENT-LVL III: CPT | Mod: PBBFAC,,, | Performed by: UROLOGY

## 2022-06-28 PROCEDURE — 99214 PR OFFICE/OUTPT VISIT, EST, LEVL IV, 30-39 MIN: ICD-10-PCS | Mod: S$PBB,,, | Performed by: UROLOGY

## 2022-06-28 RX ORDER — OXYBUTYNIN CHLORIDE 15 MG/1
15 TABLET, EXTENDED RELEASE ORAL DAILY
Qty: 90 TABLET | Refills: 3 | Status: SHIPPED | OUTPATIENT
Start: 2022-06-28 | End: 2022-06-30 | Stop reason: SDUPTHER

## 2022-06-28 NOTE — PROGRESS NOTES
"Subjective:       Rito Conley is a 57 y.o. male who is an established patient who was seen for evaluation of urinary issues.      Last saw [facility]co 4/18. Reported frequency, VON, nocturia x 1. Denies straining, urgency, weak stream. Was on Flomax. Also saw  for ED.    He is referred back now with c/o urinary frequency and worsening nocturia. Nocturia x 2-3. Strong flow. Mild urgency. Denies UI. Out of Flomax x 1 week though symptoms pre-date that. Has not noted change with Flomax. Denies hematuria, dysuria.     PSA 4/18 - 0.43  PSA 4/19 - 0.48  PSA 6/20 - 0.53  PSA 3/21 - 0.41  PSA 6/21 - 1.9   PSA 11/21 - 1.4  PSA 6/22 - 0.63    PVR (bladder scan) initial visit - 0cc     4/15/2019  PSA normal. Given trial Ditropan last visit - some improvement. Strong flow. Nocturia x 1.    10/14/2019  Frequency improved with Ditropan. No further issues.   PVR (bladder scan) today - 0cc    12/28/2020  Increased nocturia but admits to drinking more in evening. Given Cialis by NP in 6/20 but unable to get at DOD.     6/28/2021  PSA with significant rise since last check. Ditropan 10mg working better. Nocturia x 0.  PVR (bladder scan) today - 0cc    10/7/2021  Returns with further c/o LUTS. Noted frequency and "tingling" about 2 weeks. Notes this was also around time of drinking more soda and water. Given abx by PCP. UCx ended up being negative. He reports symptoms have improved. Now drinking more water.     12/28/2021  Doing okay. Needs Ditropan refill. PSA trending down.     6/28/2022  PSA trending down. Notes increased frequency but also notes increased fluid intake. Some urgency. Denies weak stream, straining.       The following portions of the patient's history were reviewed and updated as appropriate: allergies, current medications, past family history, past medical history, past social history, past surgical history and problem list.    Review of Systems  Constitutional: no fever or chills  ENT: no nasal " congestion or sore throat  Respiratory: no cough or shortness of breath  Cardiovascular: no chest pain or palpitations  Gastrointestinal: no nausea or vomiting, tolerating diet  Genitourinary: as per HPI  Hematologic/Lymphatic: no easy bruising or lymphadenopathy  Musculoskeletal: no arthralgias or myalgias  Skin: no rashes or lesions  Neurological: no seizures or tremors  Behavioral/Psych: no auditory or visual hallucinations       Objective:    Vitals: Wt 101.5 kg (223 lb 10.5 oz)   BMI 31.19 kg/m²     Physical Exam   General: well developed, well nourished in no acute distress  Head: normocephalic, atraumatic  Neck: supple, trachea midline, no obvious enlargement of thyroid  HEENT: EOMI, mucus membranes moist, sclera anicteric, no hearing impairment  Lungs: symmetric expansion, non-labored breathing  Cardiovascular: regular rate and rhythm, normal pulses  Abdomen: soft, non tender, non distended, no palpable masses, no hepatosplenomegaly, no hernias, bladder nonpalpable. No CVA tenderness.  Musculoskeletal: no peripheral edema, normal ROM in bilateral upper and lower extremities  Lymphatics: no cervical or inguinal lymphadenopathy  Skin: no rashes or lesions  Neuro: alert and oriented x 3, no gross deficits  Psych: normal judgment and insight, normal mood/affect and non-anxious  Genitourinary: (last visit)  patient declined exam   YOSHI: Symmetric 40g prostate without nodularity or tenderness. Normal landmarks. seminal vesicles non palpable, normal sphincter tone without hemorrhoids or rectal masses. Normal appearance of anus and perineum. Difficult to palpate.       Lab Review   Urine analysis today in clinic shows Negative     Lab Results   Component Value Date    WBC 5.81 04/12/2022    HGB 14.2 04/12/2022    HCT 44.3 04/12/2022    MCV 93 04/12/2022     04/12/2022     Lab Results   Component Value Date    CREATININE 0.9 04/12/2022    BUN 12 04/12/2022     Lab Results   Component Value Date    PSA 0.41  03/15/2021     Lab Results   Component Value Date    PSADIAG 0.63 06/24/2022       Imaging  NA       Assessment/Plan:      1. Frequency of urination    - Seems like OAB component   - Limited response to Flomax   - Ditropan 10mg. Discussed possible SE. Increased frequency/urgency - will increase to 15mg.      2. Nocturia    - Reduce PM fluids   - Ditropan 15mg, continue      3. BPH with obstruction/lower urinary tract symptoms    - Limited response with Flomax   - Okay to stop   - PSA/YOSHI normal   - Recheck PSA 12mths (6/23)     4. ED   - Viagra 100mg. Discussed SE. Printed to take to DOD.    5. PCa screening   - Jump in PSA noted   - Suspect this is related to recent colonoscopy   - PSA down trending - back to normal levels   - Recheck 12 months    6. Dysuria   - Improved   - Continue hydration   - Avoid bladder irritants      Follow up in 12 months with PSA / YOSHI

## 2022-06-30 ENCOUNTER — TELEPHONE (OUTPATIENT)
Dept: UROLOGY | Facility: CLINIC | Age: 57
End: 2022-06-30
Payer: MEDICARE

## 2022-06-30 RX ORDER — OXYBUTYNIN CHLORIDE 15 MG/1
15 TABLET, EXTENDED RELEASE ORAL DAILY
Qty: 90 TABLET | Refills: 3 | Status: SHIPPED | OUTPATIENT
Start: 2022-06-30 | End: 2022-12-30 | Stop reason: SDUPTHER

## 2022-06-30 NOTE — TELEPHONE ENCOUNTER
----- Message from Louann Puente MA sent at 6/29/2022  1:24 PM CDT -----  Regarding: FW: request to have Rx re-written  Can you please reprint this prescription when you are in the office tomorrow?  ----- Message -----  From: Vivienne Pat  Sent: 6/29/2022   9:20 AM CDT  To: [facility] Staff  Subject: request to have Rx re-written                    Type: Patient Call Back    Who called:Rito     What is the request in detail: the patient is calling to request that his Rx from yesterday be rewrote. The patient stated that she accidentally shredded the paper Rx when he got home. The name of the medication is: oxybutynin (DITROPAN XL) 15 MG TR24. He would like a call once it is printed out, to come pick it up from the office.     Can the clinic reply by [facility]?    Would the patient rather a call back or a response via [facility]? Call back     Best call back number:[phone]

## 2022-07-20 DIAGNOSIS — M54.50 CHRONIC LOW BACK PAIN WITHOUT SCIATICA, UNSPECIFIED BACK PAIN LATERALITY: ICD-10-CM

## 2022-07-20 DIAGNOSIS — J30.9 ALLERGIC SINUSITIS: ICD-10-CM

## 2022-07-20 DIAGNOSIS — G89.29 CHRONIC LOW BACK PAIN WITHOUT SCIATICA, UNSPECIFIED BACK PAIN LATERALITY: ICD-10-CM

## 2022-07-20 RX ORDER — ATORVASTATIN CALCIUM 40 MG
40 TABLET ORAL DAILY
Qty: 30 TABLET | Refills: 5 | Status: SHIPPED | OUTPATIENT
Start: 2022-07-20 | End: 2022-10-10

## 2022-07-20 RX ORDER — OXYCODONE AND ACETAMINOPHEN 5; 325 MG/1; MG/1
1 TABLET ORAL EVERY 4 HOURS PRN
Qty: 31 TABLET | Refills: 0 | OUTPATIENT
Start: 2022-07-20

## 2022-07-20 RX ORDER — CETIRIZINE HYDROCHLORIDE 10 MG/1
TABLET ORAL
Qty: 90 TABLET | Refills: 1 | Status: SHIPPED | OUTPATIENT
Start: 2022-07-20 | End: 2022-10-10 | Stop reason: SDUPTHER

## 2022-07-20 NOTE — TELEPHONE ENCOUNTER
Last Office Visit Info:   The patient's last visit with Jennifer Huertas MD was on 4/19/2022.    The patient's last visit in current department was on 4/19/2022.        Last CBC Results:   Lab Results   Component Value Date    WBC 5.81 04/12/2022    HGB 14.2 04/12/2022    HCT 44.3 04/12/2022     04/12/2022       Last CMP Results  Lab Results   Component Value Date     04/12/2022    K 4.1 04/12/2022     04/12/2022    CO2 24 04/12/2022    BUN 12 04/12/2022    CREATININE 0.9 04/12/2022    CALCIUM 9.7 04/12/2022    ALBUMIN 4.0 04/12/2022    AST 38 04/12/2022    ALT 36 04/12/2022       Last Lipids  Lab Results   Component Value Date    CHOL 222 (H) 04/12/2022    TRIG 111 04/12/2022    HDL 54 04/12/2022    LDLCALC 145.8 04/12/2022       Last A1C  Lab Results   Component Value Date    HGBA1C 5.9 04/26/2013       Last TSH  Lab Results   Component Value Date    TSH 1.270 05/14/2019             Current Med Refills  Medication List with Changes/Refills   Current Medications    AMLODIPINE (NORVASC) 10 MG TABLET    Take 1 tablet (10 mg total) by mouth once daily.       Start Date: 3/29/2022 End Date: --    AZELASTINE (ASTELIN) 137 MCG (0.1 %) NASAL SPRAY    1 spray (137 mcg total) by Nasal route 2 (two) times daily.       Start Date: 5/24/2022 End Date: --    BUPROPION (WELLBUTRIN XL) 150 MG TB24 TABLET    Take 150 mg by mouth once daily.        Start Date: 8/23/2016 End Date: --    CETIRIZINE (ZYRTEC) 10 MG TABLET    TAKE ONE TABLET BY MOUTH ONCE DAILY FOR ALLERGIES       Start Date: 4/6/2022  End Date: --    ERYTHROMYCIN (ROMYCIN) OPHTHALMIC OINTMENT    Place into the left eye every 6 (six) hours.       Start Date: 5/17/2022 End Date: --    FLUTICASONE PROPIONATE (FLONASE) 50 MCG/ACTUATION NASAL SPRAY    1 spray (50 mcg total) by Each Nostril route 2 (two) times daily.       Start Date: 2/23/2021 End Date: --    HYDROCORTISONE 2.5 % CREAM    Apply topically 2 (two) times daily.       Start Date: 3/8/2021   End Date: --    LIPITOR 40 MG TABLET    Take 1 tablet (40 mg total) by mouth once daily.       Start Date: 4/6/2022  End Date: --    OMEPRAZOLE (PRILOSEC) 40 MG CAPSULE    Take 1 capsule (40 mg total) by mouth 2 (two) times daily before meals.       Start Date: 3/29/2022 End Date: 6/27/2022    ONDANSETRON (ZOFRAN-ODT) 8 MG TBDL    Take 1 tablet (8 mg total) by mouth every 6 (six) hours as needed. For nausea       Start Date: 10/13/2020End Date: --    OXYBUTYNIN (DITROPAN XL) 15 MG TR24    Take 1 tablet (15 mg total) by mouth once daily.       Start Date: 6/30/2022 End Date: 6/30/2023    OXYCODONE-ACETAMINOPHEN (PERCOCET) 5-325 MG PER TABLET    Take 1 tablet by mouth every 4 (four) hours as needed.       Start Date: 4/6/2022  End Date: --    PAROXETINE (PAXIL) 20 MG TABLET    TAKE ONE-HALF TABLET BY MOUTH EVERY DAY FOR MENTAL HEALTH       Start Date: --        End Date: --    SILDENAFIL (VIAGRA) 100 MG TABLET    Take 1 tablet (100 mg total) by mouth daily as needed for Erectile Dysfunction.       Start Date: 12/28/2021End Date: --    ZOLPIDEM (AMBIEN) 10 MG TAB    Take 1 tablet (10 mg total) by mouth nightly as needed.       Start Date: 9/28/2021 End Date: --

## 2022-07-20 NOTE — TELEPHONE ENCOUNTER
No new care gaps identified.  [facility] Embedded Care Gaps. Reference number: [id number]. 7/20/2022   10:31:01 AM SAMEERT

## 2022-07-21 RX ORDER — SILDENAFIL 100 MG/1
100 TABLET, FILM COATED ORAL DAILY PRN
Qty: 30 TABLET | Refills: 11 | Status: SHIPPED | OUTPATIENT
Start: 2022-07-21 | End: 2022-12-30 | Stop reason: SDUPTHER

## 2022-07-28 ENCOUNTER — TELEPHONE (OUTPATIENT)
Dept: UROLOGY | Facility: CLINIC | Age: 57
End: 2022-07-28
Payer: MEDICARE

## 2022-08-05 ENCOUNTER — OFFICE VISIT (OUTPATIENT)
Dept: FAMILY MEDICINE | Facility: CLINIC | Age: 57
End: 2022-08-05
Payer: MEDICARE

## 2022-08-05 VITALS
SYSTOLIC BLOOD PRESSURE: 120 MMHG | BODY MASS INDEX: 30.56 KG/M2 | WEIGHT: 218.25 LBS | TEMPERATURE: 98 F | DIASTOLIC BLOOD PRESSURE: 80 MMHG | HEART RATE: 76 BPM | HEIGHT: 71 IN | OXYGEN SATURATION: 98 %

## 2022-08-05 DIAGNOSIS — J45.909 ASTHMA WITH ALLERGIC RHINITIS, UNSPECIFIED ASTHMA SEVERITY, UNCOMPLICATED: ICD-10-CM

## 2022-08-05 DIAGNOSIS — G89.29 CHRONIC LOW BACK PAIN WITHOUT SCIATICA, UNSPECIFIED BACK PAIN LATERALITY: ICD-10-CM

## 2022-08-05 DIAGNOSIS — M54.50 CHRONIC LOW BACK PAIN WITHOUT SCIATICA, UNSPECIFIED BACK PAIN LATERALITY: ICD-10-CM

## 2022-08-05 PROCEDURE — 99999 PR PBB SHADOW E&M-EST. PATIENT-LVL III: ICD-10-PCS | Mod: PBBFAC,,, | Performed by: FAMILY MEDICINE

## 2022-08-05 PROCEDURE — 99214 OFFICE O/P EST MOD 30 MIN: CPT | Mod: S$PBB,,, | Performed by: FAMILY MEDICINE

## 2022-08-05 PROCEDURE — 99213 OFFICE O/P EST LOW 20 MIN: CPT | Mod: PBBFAC,PO | Performed by: FAMILY MEDICINE

## 2022-08-05 PROCEDURE — 99214 PR OFFICE/OUTPT VISIT, EST, LEVL IV, 30-39 MIN: ICD-10-PCS | Mod: S$PBB,,, | Performed by: FAMILY MEDICINE

## 2022-08-05 PROCEDURE — 99999 PR PBB SHADOW E&M-EST. PATIENT-LVL III: CPT | Mod: PBBFAC,,, | Performed by: FAMILY MEDICINE

## 2022-08-05 RX ORDER — OXYCODONE AND ACETAMINOPHEN 5; 325 MG/1; MG/1
1 TABLET ORAL EVERY 4 HOURS PRN
Qty: 31 TABLET | Refills: 0 | Status: SHIPPED | OUTPATIENT
Start: 2022-09-05 | End: 2022-12-30 | Stop reason: SDUPTHER

## 2022-08-05 RX ORDER — NALOXONE HYDROCHLORIDE 4 MG/.1ML
SPRAY NASAL
Qty: 1 EACH | Refills: 11 | Status: SHIPPED | OUTPATIENT
Start: 2022-08-05

## 2022-08-05 RX ORDER — OXYCODONE AND ACETAMINOPHEN 5; 325 MG/1; MG/1
1 TABLET ORAL EVERY 4 HOURS PRN
Qty: 31 TABLET | Refills: 0 | Status: SHIPPED | OUTPATIENT
Start: 2022-08-05 | End: 2022-12-30 | Stop reason: SDUPTHER

## 2022-08-05 RX ORDER — OXYCODONE AND ACETAMINOPHEN 5; 325 MG/1; MG/1
1 TABLET ORAL EVERY 4 HOURS PRN
Qty: 31 TABLET | Refills: 0 | Status: SHIPPED | OUTPATIENT
Start: 2022-10-05 | End: 2022-12-30 | Stop reason: SDUPTHER

## 2022-08-05 RX ORDER — FLUTICASONE PROPIONATE 50 MCG
1 SPRAY, SUSPENSION (ML) NASAL 2 TIMES DAILY
Qty: 58 G | Refills: 3 | Status: SHIPPED | OUTPATIENT
Start: 2022-08-05 | End: 2022-12-30 | Stop reason: SDUPTHER

## 2022-08-05 NOTE — PROGRESS NOTES
Subjective:       Patient ID: Rito Conley is a 57 y.o. male.    Chief Complaint: Back Pain/ Renew Medications    57 year old male presents for refills of his medication. He is stable on his medication. He is doing well on this. He denies side effects from the medication. He has chronic back pain that he takes the medication for.           Past Medical History:   Diagnosis Date    Allergy     Class 1 obesity due to excess calories with serious comorbidity in adult 3/14/2022    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    Hyperlipidemia     Hypertension     Lower back pain     PTSD (post-traumatic stress disorder)     Sleep apnea     uses CPAP 1-2x/week      Past Surgical History:   Procedure Laterality Date    COLONOSCOPY      COLONOSCOPY N/A 6/4/2021    Procedure: COLONOSCOPY;  Surgeon: Shanthi Horton MD;  Location: [facility];  Service: Endoscopy;  Laterality: N/A;  prep instr portal,   6/3 pt v/u of earlier arrival time and prep time -ml    ENDOSCOPIC NASAL SEPTOPLASTY N/A 10/13/2020    Procedure: SEPTOPLASTY, NOSE, ENDOSCOPIC;  Surgeon: Nick Gomez MD;  Location: [facility] OR;  Service: ENT;  Laterality: N/A;  RN PRE OP 9- COVID NEGATIVE ON  10-  [place]    ESOPHAGOGASTRODUODENOSCOPY N/A 6/17/2020    Procedure: EGD (ESOPHAGOGASTRODUODENOSCOPY);  Surgeon: Jarad Holm MD;  Location: [facility] ([address] [place] [address]);  Service: Endoscopy;  Laterality: N/A;  covid test 6/15-[facility]    HERNIA REPAIR      NASAL ENDOSCOPY Bilateral 10/13/2020    Procedure: ENDOSCOPY, NOSE;  Surgeon: Nick Gomez MD;  Location: [facility] OR;  Service: ENT;  Laterality: Bilateral;    NASAL STENOSIS REPAIR Bilateral 10/13/2020    Procedure: REPAIR, STENOSIS, NOSE, VESTIBULE;  Surgeon: Nick Gomez MD;  Location: [facility] OR;  Service: ENT;  Laterality: Bilateral;  [employer] console and wand available for use.  NO DISC  10/7/2020@ 247PM-LO     Family History   Problem Relation Age of Onset    Hypertension Mother     Other  Father         agent orange exposure     Heart attacks under age 50 Sister     No Known Problems Brother     No Known Problems Daughter     No Known Problems Son     No Known Problems Son     No Known Problems Son     No Known Problems Son     Cancer Maternal Aunt         breast    Cancer Maternal Grandmother         something in her arm     Amblyopia Neg Hx     Blindness Neg Hx     Cataracts Neg Hx     Diabetes Neg Hx     Glaucoma Neg Hx     Macular degeneration Neg Hx     Retinal detachment Neg Hx     Strabismus Neg Hx     Stroke Neg Hx     Thyroid disease Neg Hx      Social History     Socioeconomic History    Marital status:    Tobacco Use    Smoking status: Never Smoker    Smokeless tobacco: Never Used   Substance and Sexual Activity    Alcohol use: Yes     Alcohol/week: 7.0 standard drinks     Types: 4 Glasses of wine, 1 Cans of beer, 2 Shots of liquor per week    Drug use: Yes     Types: Oxycodone     Comment: sometimes 1 tab weekly as needed; probably 2 tabs a month    Sexual activity: Yes     Partners: Female     Social Determinants of Health     Financial Resource Strain: Low Risk     Difficulty of Paying Living Expenses: Not very hard   Food Insecurity: No Food Insecurity    Worried About Running Out of Food in the Last Year: Never true    Ran Out of Food in the Last Year: Never true   Transportation Needs: No Transportation Needs    Lack of Transportation (Medical): No    Lack of Transportation (Non-Medical): No   Physical Activity: Unknown    Days of Exercise per Week: 4 days   Stress: No Stress Concern Present    Feeling of Stress : Only a little   Social Connections: Unknown    Frequency of Communication with Friends and Family: More than three times a week    Frequency of Social Gatherings with Friends and Family: Once a week    Active Member of Clubs or Organizations: Yes    Attends Club or Organization Meetings: 1 to 4 times per year    Marital Status:  "   Housing Stability: Unknown    Unable to Pay for Housing in the Last Year: No    Unstable Housing in the Last Year: No       Review of Systems      Objective:       Vitals:    08/05/22 0914   BP: 120/80   Pulse: 76   Temp: 98.1 °F (36.7 °C)   TempSrc: Oral   SpO2: 98%   Weight: 99 kg (218 lb 4.1 oz)   Height: 5' 11" (1.803 m)       Physical Exam  Constitutional:       Appearance: Normal appearance.   HENT:      Head: Normocephalic and atraumatic.   Musculoskeletal:      Lumbar back: No swelling, deformity, lacerations, spasms or tenderness. Normal range of motion. No scoliosis.   Neurological:      Mental Status: He is alert.         Assessment:       Problem List Items Addressed This Visit    None     Visit Diagnoses     Chronic low back pain without sciatica, unspecified back pain laterality        Asthma with allergic rhinitis, unspecified asthma severity, uncomplicated              Plan:       Rito was seen today for back pain/ renew medications.    Diagnoses and all orders for this visit:    Chronic low back pain without sciatica, unspecified back pain laterality  The following orders have not been finalized:  -     oxyCODONE-acetaminophen (PERCOCET) 5-325 mg per tablet  -     oxyCODONE-acetaminophen (PERCOCET) 5-325 mg per tablet  -     oxyCODONE-acetaminophen (PERCOCET) 5-325 mg per tablet  Stable. Refilled meds.     Asthma with allergic rhinitis, unspecified asthma severity, uncomplicated  The following orders have not been finalized:  -     fluticasone propionate (FLONASE) 50 mcg/actuation nasal spray             "

## 2022-10-10 ENCOUNTER — OFFICE VISIT (OUTPATIENT)
Dept: FAMILY MEDICINE | Facility: CLINIC | Age: 57
End: 2022-10-10
Payer: MEDICARE

## 2022-10-10 VITALS
HEIGHT: 71 IN | DIASTOLIC BLOOD PRESSURE: 82 MMHG | TEMPERATURE: 98 F | BODY MASS INDEX: 30.74 KG/M2 | SYSTOLIC BLOOD PRESSURE: 138 MMHG | OXYGEN SATURATION: 98 % | WEIGHT: 219.56 LBS | HEART RATE: 69 BPM

## 2022-10-10 DIAGNOSIS — Z23 ENCOUNTER FOR ADMINISTRATION OF VACCINE: ICD-10-CM

## 2022-10-10 DIAGNOSIS — E78.2 MIXED HYPERLIPIDEMIA: ICD-10-CM

## 2022-10-10 DIAGNOSIS — J30.9 ALLERGIC SINUSITIS: ICD-10-CM

## 2022-10-10 DIAGNOSIS — Z87.898 HISTORY OF FATIGUE: Primary | ICD-10-CM

## 2022-10-10 PROCEDURE — G0008 ADMIN INFLUENZA VIRUS VAC: HCPCS | Mod: PBBFAC,PO

## 2022-10-10 PROCEDURE — 99999 PR PBB SHADOW E&M-EST. PATIENT-LVL III: CPT | Mod: PBBFAC,,, | Performed by: FAMILY MEDICINE

## 2022-10-10 PROCEDURE — 99213 OFFICE O/P EST LOW 20 MIN: CPT | Mod: PBBFAC,PO | Performed by: FAMILY MEDICINE

## 2022-10-10 PROCEDURE — 99214 OFFICE O/P EST MOD 30 MIN: CPT | Mod: S$PBB,,, | Performed by: FAMILY MEDICINE

## 2022-10-10 PROCEDURE — 99999 PR PBB SHADOW E&M-EST. PATIENT-LVL III: ICD-10-PCS | Mod: PBBFAC,,, | Performed by: FAMILY MEDICINE

## 2022-10-10 PROCEDURE — 99214 PR OFFICE/OUTPT VISIT, EST, LEVL IV, 30-39 MIN: ICD-10-PCS | Mod: S$PBB,,, | Performed by: FAMILY MEDICINE

## 2022-10-10 RX ORDER — ATORVASTATIN CALCIUM 40 MG/1
40 TABLET, FILM COATED ORAL NIGHTLY
Qty: 90 TABLET | Refills: 3 | Status: SHIPPED | OUTPATIENT
Start: 2022-10-10 | End: 2022-12-30 | Stop reason: SDUPTHER

## 2022-10-10 RX ORDER — CETIRIZINE HYDROCHLORIDE 10 MG/1
TABLET ORAL
Qty: 90 TABLET | Refills: 3 | Status: SHIPPED | OUTPATIENT
Start: 2022-10-10 | End: 2022-12-30 | Stop reason: SDUPTHER

## 2022-10-10 RX ORDER — ATORVASTATIN CALCIUM 40 MG/1
40 TABLET, FILM COATED ORAL DAILY
COMMUNITY
End: 2022-10-10 | Stop reason: SDUPTHER

## 2022-10-10 NOTE — PROGRESS NOTES
"Physical Exam  /82   Pulse 69   Temp 98.4 °F (36.9 °C) (Oral)   Ht 5' 11" (1.803 m)   Wt 99.6 kg (219 lb 9.3 oz)   SpO2 98%   BMI 30.62 kg/m²      Office Visit    Patient Name: Rito Conley    : 1965  MRN: [id number]      Assessment/Plan:  Rito Conley is a 57 y.o. male who presents today for :    History of fatigue  -resolved, exam unremarkable  -advised adeqwuate hydration and dietary intake due to increase metabolic demand given new exercise regimen      Mixed hyperlipidemia  -     atorvastatin (LIPITOR) 40 MG tablet; Take 1 tablet (40 mg total) by mouth every evening.  Dispense: 90 tablet; Refill: 3    Allergic sinusitis  -     cetirizine (ZYRTEC) 10 MG tablet; TAKE ONE TABLET BY MOUTH ONCE DAILY FOR ALLERGIES  Dispense: 90 tablet; Refill: 3    Encounter for administration of vaccine  -     Influenza - Quadrivalent (PF)          Follow up PRN        This note was created by combination of typed  and MModal dictation.  Transcription errors may be present.  If there are any questions, please contact me.      ----------------------------------------------------------------------------------------------------------------------      HPI:  Patient Care Team:  Jennifer Huertas MD as PCP - General (Family Medicine)  Jory Mauricio MD as Consulting Physician (Urology)  TREVOR Colindres MD as Consulting Physician (Urology)  Lorie Gage PA-C (Inactive) as Physician Assistant (Surgery)  Sabra Awad MA (Inactive) as Care Coordinator    Rito is a 57 y.o. male with      Patient Active Problem List   Diagnosis    Benign hypertensive heart disease without heart failure    DJD of shoulder    DJD (degenerative joint disease), lumbar    Allergic rhinitis    Lumbar scoliosis    Back injury    BPH with obstruction/lower urinary tract symptoms    PTSD (post-traumatic stress disorder)    Heartburn    Nasal septal deviation    Nasal valve collapse    Refractory " obstruction of nasal airway    Hypertrophy of both inferior nasal turbinates    Nasal congestion    Encounter for colonoscopy due to history of colonic polyp    Class 1 obesity due to excess calories with serious comorbidity in adult    Hordeolum externum of left upper eyelid    Multiple acquired skin tags       Patient with PMHx as above presents today for follow up of fatigue, for which he initially made the appointment last week, which has since resolved. He states that he felt fatigue and tired for about 3-4 days straight, felt the need to sleep more during the day. Denies any recent new changes in sleep/appetite/wt nor health changes. He does admit that within the past week and a half, he started to go the the gym daily at 5am in the morning, and has been working outdoors around the house more, which may have contributed. No CP/SOB/VIDES/abd pain/N/V/diarrhea/constipation/URI. Otherwise, no other acute issues during this visit.          Additional ROS      CONST: no fever  EYES: no vision change.   ENT: no sore throat. No dysphagia.   CV: no CP with exertion  RESP: no SOB  GI: no N/V/diarrhea/constipation  : no urinary concerns  MSK: no new myalgias or arthralgias.   SKIN: no new rashes  NEURO: no focal deficits.   PSYCH: no new issues.   ENDOCRINE: no polyuria.               Patient Active Problem List   Diagnosis    Benign hypertensive heart disease without heart failure    DJD of shoulder    DJD (degenerative joint disease), lumbar    Allergic rhinitis    Lumbar scoliosis    Back injury    BPH with obstruction/lower urinary tract symptoms    PTSD (post-traumatic stress disorder)    Heartburn    Nasal septal deviation    Nasal valve collapse    Refractory obstruction of nasal airway    Hypertrophy of both inferior nasal turbinates    Nasal congestion    Encounter for colonoscopy due to history of colonic polyp    Class 1 obesity due to excess calories with serious comorbidity in adult    Hordeolum externum of  left upper eyelid    Multiple acquired skin tags       Current Medications  Medications reviewed/updated.     Current Outpatient Medications on File Prior to Visit   Medication Sig Dispense Refill    amLODIPine (NORVASC) 10 MG tablet Take 1 tablet (10 mg total) by mouth once daily. 90 tablet 1    azelastine (ASTELIN) 137 mcg (0.1 %) nasal spray 1 spray (137 mcg total) by Nasal route 2 (two) times daily. 30 mL 1    buPROPion (WELLBUTRIN XL) 150 MG TB24 tablet Take 150 mg by mouth once daily.       erythromycin (ROMYCIN) ophthalmic ointment Place into the left eye every 6 (six) hours. 3.5 g 0    fluticasone propionate (FLONASE) 50 mcg/actuation nasal spray 1 spray (50 mcg total) by Each Nostril route 2 (two) times daily. 58 g 3    hydrocortisone 2.5 % cream Apply topically 2 (two) times daily. 20 g 2    naloxone (NARCAN) 4 mg/actuation Spry 4mg by nasal route as needed for opioid overdose; may repeat every 2-3 minutes in alternating nostrils until medical help arrives. Call 911 1 each 11    omeprazole (PRILOSEC) 40 MG capsule Take 1 capsule (40 mg total) by mouth 2 (two) times daily before meals. 180 capsule 1    ondansetron (ZOFRAN-ODT) 8 MG TbDL Take 1 tablet (8 mg total) by mouth every 6 (six) hours as needed. For nausea 15 tablet 0    oxybutynin (DITROPAN XL) 15 MG TR24 Take 1 tablet (15 mg total) by mouth once daily. 90 tablet 3    oxyCODONE-acetaminophen (PERCOCET) 5-325 mg per tablet Take 1 tablet by mouth every 4 (four) hours as needed. 31 tablet 0    oxyCODONE-acetaminophen (PERCOCET) 5-325 mg per tablet Take 1 tablet by mouth every 4 (four) hours as needed for Pain. 31 tablet 0    oxyCODONE-acetaminophen (PERCOCET) 5-325 mg per tablet Take 1 tablet by mouth every 4 (four) hours as needed for Pain. 31 tablet 0    paroxetine (PAXIL) 20 MG tablet TAKE ONE-HALF TABLET BY MOUTH EVERY DAY FOR MENTAL HEALTH      sildenafiL (VIAGRA) 100 MG tablet Take 1 tablet (100 mg total) by mouth daily as needed for Erectile  Dysfunction. 30 tablet 11    zolpidem (AMBIEN) 10 mg Tab Take 1 tablet (10 mg total) by mouth nightly as needed. 30 tablet 5    [DISCONTINUED] atorvastatin (LIPITOR) 40 MG tablet Take 40 mg by mouth once daily.      [DISCONTINUED] cetirizine (ZYRTEC) 10 MG tablet TAKE ONE TABLET BY MOUTH ONCE DAILY FOR ALLERGIES 90 tablet 1    [DISCONTINUED] LIPITOR 40 mg tablet Take 1 tablet (40 mg total) by mouth once daily. 30 tablet 5     No current facility-administered medications on file prior to visit.           Past Surgical History:   Procedure Laterality Date    COLONOSCOPY      COLONOSCOPY N/A 6/4/2021    Procedure: COLONOSCOPY;  Surgeon: Shanthi Horton MD;  Location: [facility] ENDO;  Service: Endoscopy;  Laterality: N/A;  prep instr portal,   6/3 pt v/u of earlier arrival time and prep time -ml    ENDOSCOPIC NASAL SEPTOPLASTY N/A 10/13/2020    Procedure: SEPTOPLASTY, NOSE, ENDOSCOPIC;  Surgeon: Nick Gomez MD;  Location: [facility] OR;  Service: ENT;  Laterality: N/A;  RN PRE OP 9- COVID NEGATIVE ON  10-  [place]    ESOPHAGOGASTRODUODENOSCOPY N/A 6/17/2020    Procedure: EGD (ESOPHAGOGASTRODUODENOSCOPY);  Surgeon: Jarad Holm MD;  Location: [facility] ([address] [place] [address]);  Service: Endoscopy;  Laterality: N/A;  covid test 6/15-[facility]    HERNIA REPAIR      NASAL ENDOSCOPY Bilateral 10/13/2020    Procedure: ENDOSCOPY, NOSE;  Surgeon: Nick Gomez MD;  Location: [facility] OR;  Service: ENT;  Laterality: Bilateral;    NASAL STENOSIS REPAIR Bilateral 10/13/2020    Procedure: REPAIR, STENOSIS, NOSE, VESTIBULE;  Surgeon: Nick Gomez MD;  Location: [facility] OR;  Service: ENT;  Laterality: Bilateral;  [employer] console and wand available for use.  NO DISC  10/7/2020@ 247PM-LO       Family History   Problem Relation Age of Onset    Hypertension Mother     Other Father         agent orange exposure     Heart attacks under age 50 Sister     No Known Problems Brother     No Known Problems Daughter     No Known Problems Son     No  Known Problems Son     No Known Problems Son     No Known Problems Son     Cancer Maternal Aunt         breast    Cancer Maternal Grandmother         something in her arm     Amblyopia Neg Hx     Blindness Neg Hx     Cataracts Neg Hx     Diabetes Neg Hx     Glaucoma Neg Hx     Macular degeneration Neg Hx     Retinal detachment Neg Hx     Strabismus Neg Hx     Stroke Neg Hx     Thyroid disease Neg Hx        Social History     Socioeconomic History    Marital status:    Tobacco Use    Smoking status: Never    Smokeless tobacco: Never   Substance and Sexual Activity    Alcohol use: Yes     Alcohol/week: 7.0 standard drinks     Types: 4 Glasses of wine, 1 Cans of beer, 2 Shots of liquor per week    Drug use: Yes     Types: Oxycodone     Comment: sometimes 1 tab weekly as needed; probably 2 tabs a month    Sexual activity: Yes     Partners: Female     Social Determinants of Health     Financial Resource Strain: Low Risk     Difficulty of Paying Living Expenses: Not very hard   Food Insecurity: No Food Insecurity    Worried About Running Out of Food in the Last Year: Never true    Ran Out of Food in the Last Year: Never true   Transportation Needs: No Transportation Needs    Lack of Transportation (Medical): No    Lack of Transportation (Non-Medical): No   Physical Activity: Unknown    Days of Exercise per Week: 4 days   Stress: No Stress Concern Present    Feeling of Stress : Only a little   Social Connections: Unknown    Frequency of Communication with Friends and Family: More than three times a week    Frequency of Social Gatherings with Friends and Family: Once a week    Active Member of Clubs or Organizations: Yes    Attends Club or Organization Meetings: 1 to 4 times per year    Marital Status:    Housing Stability: Unknown    Unable to Pay for Housing in the Last Year: No    Unstable Housing in the Last Year: No             Allergies   Review of patient's allergies indicates:   Allergen Reactions     "Grass pollen-june grass standard Itching, Other (See Comments) and Rash             Review of Systems  See HPI      [unfilled]  /82   Pulse 69   Temp 98.4 °F (36.9 °C) (Oral)   Ht 5' 11" (1.803 m)   Wt 99.6 kg (219 lb 9.3 oz)   SpO2 98%   BMI 30.62 kg/m²       GEN: NAD, pleasant  HEENT: NCAT, PERRLA, EOMI, sclera clear, anicteric  NECK: normal, supple  LUNGS: CTAB, no w/r/r, normal respiratory effort  HEART: RRR, normal S1 and S2, no m/r/g, no palpitations, no edema  ABD: s/nt/nd, NABS, no organomegaly  SKIN: warm and dry with normal turgor, no rashes, no other lesions.   PSYCH: AOx3, appropriate mood and affect.   MSK: extremities warm/well perfused, normal ROM in all 4 extremities, no c/c/e.   NEURO: normal without focal findings, CN II-XII are intact          "

## 2022-12-30 ENCOUNTER — OFFICE VISIT (OUTPATIENT)
Dept: FAMILY MEDICINE | Facility: CLINIC | Age: 57
End: 2022-12-30
Payer: MEDICARE

## 2022-12-30 VITALS
TEMPERATURE: 98 F | HEIGHT: 71 IN | SYSTOLIC BLOOD PRESSURE: 124 MMHG | OXYGEN SATURATION: 97 % | HEART RATE: 80 BPM | WEIGHT: 220.44 LBS | BODY MASS INDEX: 30.86 KG/M2 | DIASTOLIC BLOOD PRESSURE: 80 MMHG

## 2022-12-30 DIAGNOSIS — N32.81 OVERACTIVE BLADDER: Primary | ICD-10-CM

## 2022-12-30 DIAGNOSIS — K21.9 GASTROESOPHAGEAL REFLUX DISEASE WITHOUT ESOPHAGITIS: ICD-10-CM

## 2022-12-30 DIAGNOSIS — F33.1 MAJOR DEPRESSIVE DISORDER, RECURRENT, MODERATE: ICD-10-CM

## 2022-12-30 DIAGNOSIS — M67.40 GANGLION CYST: ICD-10-CM

## 2022-12-30 DIAGNOSIS — E78.2 MIXED HYPERLIPIDEMIA: ICD-10-CM

## 2022-12-30 DIAGNOSIS — G89.29 CHRONIC LOW BACK PAIN WITHOUT SCIATICA, UNSPECIFIED BACK PAIN LATERALITY: ICD-10-CM

## 2022-12-30 DIAGNOSIS — K21.9 LARYNGOPHARYNGEAL REFLUX (LPR): ICD-10-CM

## 2022-12-30 DIAGNOSIS — J30.9 ALLERGIC SINUSITIS: ICD-10-CM

## 2022-12-30 DIAGNOSIS — J45.909 ASTHMA WITH ALLERGIC RHINITIS, UNSPECIFIED ASTHMA SEVERITY, UNCOMPLICATED: ICD-10-CM

## 2022-12-30 DIAGNOSIS — M54.50 CHRONIC LOW BACK PAIN WITHOUT SCIATICA, UNSPECIFIED BACK PAIN LATERALITY: ICD-10-CM

## 2022-12-30 DIAGNOSIS — I11.9 BENIGN HYPERTENSIVE HEART DISEASE WITHOUT HEART FAILURE: ICD-10-CM

## 2022-12-30 PROCEDURE — 99214 OFFICE O/P EST MOD 30 MIN: CPT | Mod: S$PBB,,, | Performed by: FAMILY MEDICINE

## 2022-12-30 PROCEDURE — 99214 PR OFFICE/OUTPT VISIT, EST, LEVL IV, 30-39 MIN: ICD-10-PCS | Mod: S$PBB,,, | Performed by: FAMILY MEDICINE

## 2022-12-30 PROCEDURE — 99214 OFFICE O/P EST MOD 30 MIN: CPT | Mod: PBBFAC,PO | Performed by: FAMILY MEDICINE

## 2022-12-30 PROCEDURE — 99999 PR PBB SHADOW E&M-EST. PATIENT-LVL IV: ICD-10-PCS | Mod: PBBFAC,,, | Performed by: FAMILY MEDICINE

## 2022-12-30 PROCEDURE — 99999 PR PBB SHADOW E&M-EST. PATIENT-LVL IV: CPT | Mod: PBBFAC,,, | Performed by: FAMILY MEDICINE

## 2022-12-30 RX ORDER — SILDENAFIL 100 MG/1
100 TABLET, FILM COATED ORAL DAILY PRN
Qty: 30 TABLET | Refills: 11 | Status: SHIPPED | OUTPATIENT
Start: 2022-12-30 | End: 2023-11-27 | Stop reason: SDUPTHER

## 2022-12-30 RX ORDER — OXYCODONE AND ACETAMINOPHEN 5; 325 MG/1; MG/1
1 TABLET ORAL EVERY 4 HOURS PRN
Qty: 31 TABLET | Refills: 0 | Status: SHIPPED | OUTPATIENT
Start: 2022-12-30 | End: 2023-05-09 | Stop reason: SDUPTHER

## 2022-12-30 RX ORDER — CETIRIZINE HYDROCHLORIDE 10 MG/1
TABLET ORAL
Qty: 90 TABLET | Refills: 3 | Status: SHIPPED | OUTPATIENT
Start: 2022-12-30 | End: 2024-01-19 | Stop reason: SDUPTHER

## 2022-12-30 RX ORDER — OXYCODONE AND ACETAMINOPHEN 5; 325 MG/1; MG/1
1 TABLET ORAL EVERY 4 HOURS PRN
Qty: 31 TABLET | Refills: 0 | Status: SHIPPED | OUTPATIENT
Start: 2023-01-30 | End: 2023-02-15 | Stop reason: SDUPTHER

## 2022-12-30 RX ORDER — OXYBUTYNIN CHLORIDE 15 MG/1
15 TABLET, EXTENDED RELEASE ORAL DAILY
Qty: 90 TABLET | Refills: 3 | Status: SHIPPED | OUTPATIENT
Start: 2022-12-30 | End: 2023-11-27 | Stop reason: SDUPTHER

## 2022-12-30 RX ORDER — OMEPRAZOLE 40 MG/1
40 CAPSULE, DELAYED RELEASE ORAL
Qty: 180 CAPSULE | Refills: 1 | Status: SHIPPED | OUTPATIENT
Start: 2022-12-30 | End: 2023-05-09 | Stop reason: SDUPTHER

## 2022-12-30 RX ORDER — ATORVASTATIN CALCIUM 40 MG/1
40 TABLET, FILM COATED ORAL NIGHTLY
Qty: 90 TABLET | Refills: 3 | Status: SHIPPED | OUTPATIENT
Start: 2022-12-30 | End: 2023-05-09 | Stop reason: SDUPTHER

## 2022-12-30 RX ORDER — AMLODIPINE BESYLATE 10 MG/1
10 TABLET ORAL DAILY
Qty: 90 TABLET | Refills: 1 | Status: SHIPPED | OUTPATIENT
Start: 2022-12-30 | End: 2023-05-09 | Stop reason: SDUPTHER

## 2022-12-30 RX ORDER — OXYCODONE AND ACETAMINOPHEN 5; 325 MG/1; MG/1
1 TABLET ORAL EVERY 4 HOURS PRN
Qty: 31 TABLET | Refills: 0 | Status: SHIPPED | OUTPATIENT
Start: 2023-02-28 | End: 2023-02-15 | Stop reason: SDUPTHER

## 2022-12-30 RX ORDER — FLUTICASONE PROPIONATE 50 MCG
1 SPRAY, SUSPENSION (ML) NASAL 2 TIMES DAILY
Qty: 58 G | Refills: 3 | Status: SHIPPED | OUTPATIENT
Start: 2022-12-30 | End: 2024-01-19 | Stop reason: SDUPTHER

## 2022-12-30 NOTE — PROGRESS NOTES
Subjective:       Patient ID: Rito Conley is a 57 y.o. male.    Chief Complaint: Knot on left wrist    57-year-old male presents today for refills of his medications.  He is doing well on his medications.  He takes oxycodone for chronic pain.  He is stable and denies side effects from this medication or confusion.  It allows him to do the things that he needs to get done.    He is hypertension in his blood as well controlled as well as cholesterol.  He is due for labs currently.    He also needs referral to orthopedics as he is a ganglion cyst on his wrist.  He had this before and it was drained, but    Review of Systems   Constitutional:  Negative for activity change and unexpected weight change.   HENT:  Negative for hearing loss, rhinorrhea and trouble swallowing.    Eyes:  Negative for discharge and visual disturbance.   Respiratory:  Negative for chest tightness and wheezing.    Cardiovascular:  Negative for chest pain and palpitations.   Gastrointestinal:  Negative for blood in stool, constipation, diarrhea and vomiting.   Endocrine: Negative for polydipsia and polyuria.   Genitourinary:  Negative for difficulty urinating, hematuria and urgency.   Musculoskeletal:  Negative for arthralgias, joint swelling and neck pain.   Neurological:  Negative for weakness and headaches.   Psychiatric/Behavioral:  Negative for confusion and dysphoric mood.        Objective:      Vitals:    12/30/22 1331   BP: 124/80   Pulse: 80   Temp: 98.2 °F (36.8 °C)       Physical Exam  Constitutional:       General: He is not in acute distress.     Appearance: Normal appearance. He is well-developed. He is not ill-appearing or diaphoretic.   HENT:      Head: Normocephalic and atraumatic.   Eyes:      Conjunctiva/sclera: Conjunctivae normal.   Neck:      Thyroid: No thyromegaly.      Vascular: No carotid bruit or JVD.   Cardiovascular:      Rate and Rhythm: Normal rate and regular rhythm.      Heart sounds: Normal heart  sounds. No murmur heard.    No friction rub. No gallop.   Pulmonary:      Effort: Pulmonary effort is normal. No respiratory distress.      Breath sounds: Normal breath sounds. No stridor. No wheezing, rhonchi or rales.   Musculoskeletal:         General: No swelling.      Cervical back: Normal range of motion and neck supple.   Neurological:      Mental Status: He is alert and oriented to person, place, and time.   Psychiatric:         Mood and Affect: Mood normal.         Behavior: Behavior normal.       Assessment:       Problem List Items Addressed This Visit       Benign hypertensive heart disease without heart failure    Relevant Medications    amLODIPine (NORVASC) 10 MG tablet    Other Relevant Orders    Comprehensive Metabolic Panel    Lipid Panel    CBC Auto Differential    CBC Auto Differential    Comprehensive Metabolic Panel    Lipid Panel     Other Visit Diagnoses       Overactive bladder    -  Primary    Relevant Medications    oxybutynin (DITROPAN XL) 15 MG TR24    Chronic low back pain without sciatica, unspecified back pain laterality        Relevant Medications    oxyCODONE-acetaminophen (PERCOCET) 5-325 mg per tablet (Start on 2/28/2023)    oxyCODONE-acetaminophen (PERCOCET) 5-325 mg per tablet (Start on 1/30/2023)    oxyCODONE-acetaminophen (PERCOCET) 5-325 mg per tablet    Mixed hyperlipidemia        Relevant Medications    atorvastatin (LIPITOR) 40 MG tablet    Other Relevant Orders    Comprehensive Metabolic Panel    Lipid Panel    Gastroesophageal reflux disease without esophagitis        Relevant Medications    omeprazole (PRILOSEC) 40 MG capsule    Laryngopharyngeal reflux (LPR)        Relevant Medications    omeprazole (PRILOSEC) 40 MG capsule    Asthma with allergic rhinitis, unspecified asthma severity, uncomplicated        Relevant Medications    fluticasone propionate (FLONASE) 50 mcg/actuation nasal spray    Allergic sinusitis        Relevant Medications    cetirizine (ZYRTEC) 10 MG  tablet    Ganglion cyst        Relevant Orders    Ambulatory referral/consult to Orthopedics              Plan:       Rito was seen today for knot on left wrist.    Diagnoses and all orders for this visit:    Overactive bladder  -     oxybutynin (DITROPAN XL) 15 MG TR24; Take 1 tablet (15 mg total) by mouth once daily.    Chronic low back pain without sciatica, unspecified back pain laterality  -     oxyCODONE-acetaminophen (PERCOCET) 5-325 mg per tablet; Take 1 tablet by mouth every 4 (four) hours as needed.  -     oxyCODONE-acetaminophen (PERCOCET) 5-325 mg per tablet; Take 1 tablet by mouth every 4 (four) hours as needed for Pain.  -     oxyCODONE-acetaminophen (PERCOCET) 5-325 mg per tablet; Take 1 tablet by mouth every 4 (four) hours as needed for Pain.  Stable. Refilled meds and due for labs    Mixed hyperlipidemia  -     atorvastatin (LIPITOR) 40 MG tablet; Take 1 tablet (40 mg total) by mouth every evening.  -     Comprehensive Metabolic Panel; Future  -     Lipid Panel; Future  Stable. Refilled meds and due for labs    Benign hypertensive heart disease without heart failure  -     amLODIPine (NORVASC) 10 MG tablet; Take 1 tablet (10 mg total) by mouth once daily.  -     Comprehensive Metabolic Panel; Future  -     Lipid Panel; Future  -     CBC Auto Differential; Future  -     CBC Auto Differential; Future  -     Comprehensive Metabolic Panel; Future  -     Lipid Panel; Future  Stable. Refilled meds and due for labs    Gastroesophageal reflux disease without esophagitis  -     omeprazole (PRILOSEC) 40 MG capsule; Take 1 capsule (40 mg total) by mouth 2 (two) times daily before meals.    Laryngopharyngeal reflux (LPR)  -     omeprazole (PRILOSEC) 40 MG capsule; Take 1 capsule (40 mg total) by mouth 2 (two) times daily before meals.    Asthma with allergic rhinitis, unspecified asthma severity, uncomplicated  -     fluticasone propionate (FLONASE) 50 mcg/actuation nasal spray; 1 spray (50 mcg total) by  Each Nostril route 2 (two) times daily.    Allergic sinusitis  -     cetirizine (ZYRTEC) 10 MG tablet; TAKE ONE TABLET BY MOUTH ONCE DAILY FOR ALLERGIES    Ganglion cyst  -     Ambulatory referral/consult to Orthopedics; Future    Other orders  -     sildenafiL (VIAGRA) 100 MG tablet; Take 1 tablet (100 mg total) by mouth daily as needed for Erectile Dysfunction.

## 2022-12-30 NOTE — PROGRESS NOTES
Answers submitted by the patient for this visit:  Review of Systems Questionnaire (Submitted on 12/29/2022)  activity change: No  unexpected weight change: No  neck pain: No  hearing loss: No  rhinorrhea: No  trouble swallowing: No  eye discharge: No  visual disturbance: No  chest tightness: No  wheezing: No  chest pain: No  palpitations: No  blood in stool: No  constipation: No  vomiting: No  diarrhea: No  polydipsia: No  polyuria: No  difficulty urinating: No  urgency: No  hematuria: No  joint swelling: No  arthralgias: No  headaches: No  weakness: No  confusion: No  dysphoric mood: No  Answers submitted by the patient for this visit:  Review of Systems Questionnaire (Submitted on 12/29/2022)  activity change: No  unexpected weight change: No  neck pain: No  hearing loss: No  rhinorrhea: No  trouble swallowing: No  eye discharge: No  visual disturbance: No  chest tightness: No  wheezing: No  chest pain: No  palpitations: No  blood in stool: No  constipation: No  vomiting: No  diarrhea: No  polydipsia: No  polyuria: No  difficulty urinating: No  urgency: No  hematuria: No  joint swelling: No  arthralgias: No  headaches: No  weakness: No  confusion: No  dysphoric mood: No

## 2023-01-10 ENCOUNTER — LAB VISIT (OUTPATIENT)
Dept: LAB | Facility: HOSPITAL | Age: 58
End: 2023-01-10
Attending: FAMILY MEDICINE
Payer: MEDICARE

## 2023-01-10 DIAGNOSIS — I11.9 BENIGN HYPERTENSIVE HEART DISEASE WITHOUT HEART FAILURE: ICD-10-CM

## 2023-01-10 DIAGNOSIS — M25.531 RIGHT WRIST PAIN: Primary | ICD-10-CM

## 2023-01-10 DIAGNOSIS — E78.2 MIXED HYPERLIPIDEMIA: ICD-10-CM

## 2023-01-10 LAB
ALBUMIN SERPL BCP-MCNC: 4 G/DL (ref 3.5–5.2)
ALP SERPL-CCNC: 108 U/L (ref 55–135)
ALT SERPL W/O P-5'-P-CCNC: 38 U/L (ref 10–44)
ANION GAP SERPL CALC-SCNC: 9 MMOL/L (ref 8–16)
AST SERPL-CCNC: 32 U/L (ref 10–40)
BASOPHILS # BLD AUTO: 0.03 K/UL (ref 0–0.2)
BASOPHILS NFR BLD: 0.6 % (ref 0–1.9)
BILIRUB SERPL-MCNC: 0.9 MG/DL (ref 0.1–1)
BUN SERPL-MCNC: 13 MG/DL (ref 6–20)
CALCIUM SERPL-MCNC: 9.6 MG/DL (ref 8.7–10.5)
CHLORIDE SERPL-SCNC: 106 MMOL/L (ref 95–110)
CHOLEST SERPL-MCNC: 128 MG/DL (ref 120–199)
CHOLEST/HDLC SERPL: 2.8 {RATIO} (ref 2–5)
CO2 SERPL-SCNC: 24 MMOL/L (ref 23–29)
CREAT SERPL-MCNC: 0.8 MG/DL (ref 0.5–1.4)
DIFFERENTIAL METHOD: ABNORMAL
EOSINOPHIL # BLD AUTO: 0.4 K/UL (ref 0–0.5)
EOSINOPHIL NFR BLD: 6.8 % (ref 0–8)
ERYTHROCYTE [DISTWIDTH] IN BLOOD BY AUTOMATED COUNT: 13.6 % (ref 11.5–14.5)
EST. GFR  (NO RACE VARIABLE): >60 ML/MIN/1.73 M^2
GLUCOSE SERPL-MCNC: 96 MG/DL (ref 70–110)
HCT VFR BLD AUTO: 43.9 % (ref 40–54)
HDLC SERPL-MCNC: 45 MG/DL (ref 40–75)
HDLC SERPL: 35.2 % (ref 20–50)
HGB BLD-MCNC: 13.7 G/DL (ref 14–18)
IMM GRANULOCYTES # BLD AUTO: 0.01 K/UL (ref 0–0.04)
IMM GRANULOCYTES NFR BLD AUTO: 0.2 % (ref 0–0.5)
LDLC SERPL CALC-MCNC: 69.8 MG/DL (ref 63–159)
LYMPHOCYTES # BLD AUTO: 2.2 K/UL (ref 1–4.8)
LYMPHOCYTES NFR BLD: 42 % (ref 18–48)
MCH RBC QN AUTO: 29 PG (ref 27–31)
MCHC RBC AUTO-ENTMCNC: 31.2 G/DL (ref 32–36)
MCV RBC AUTO: 93 FL (ref 82–98)
MONOCYTES # BLD AUTO: 0.6 K/UL (ref 0.3–1)
MONOCYTES NFR BLD: 11.8 % (ref 4–15)
NEUTROPHILS # BLD AUTO: 2 K/UL (ref 1.8–7.7)
NEUTROPHILS NFR BLD: 38.6 % (ref 38–73)
NONHDLC SERPL-MCNC: 83 MG/DL
NRBC BLD-RTO: 0 /100 WBC
PLATELET # BLD AUTO: 312 K/UL (ref 150–450)
PMV BLD AUTO: 10.4 FL (ref 9.2–12.9)
POTASSIUM SERPL-SCNC: 3.8 MMOL/L (ref 3.5–5.1)
PROT SERPL-MCNC: 7.8 G/DL (ref 6–8.4)
RBC # BLD AUTO: 4.73 M/UL (ref 4.6–6.2)
SODIUM SERPL-SCNC: 139 MMOL/L (ref 136–145)
TRIGL SERPL-MCNC: 66 MG/DL (ref 30–150)
WBC # BLD AUTO: 5.17 K/UL (ref 3.9–12.7)

## 2023-01-10 PROCEDURE — 80061 LIPID PANEL: CPT | Performed by: FAMILY MEDICINE

## 2023-01-10 PROCEDURE — 85025 COMPLETE CBC W/AUTO DIFF WBC: CPT | Performed by: FAMILY MEDICINE

## 2023-01-10 PROCEDURE — 36415 COLL VENOUS BLD VENIPUNCTURE: CPT | Mod: PO | Performed by: FAMILY MEDICINE

## 2023-01-10 PROCEDURE — 80053 COMPREHEN METABOLIC PANEL: CPT | Performed by: FAMILY MEDICINE

## 2023-01-11 ENCOUNTER — TELEPHONE (OUTPATIENT)
Dept: ORTHOPEDICS | Facility: CLINIC | Age: 58
End: 2023-01-11

## 2023-01-11 DIAGNOSIS — M25.532 LEFT WRIST PAIN: Primary | ICD-10-CM

## 2023-01-11 NOTE — TELEPHONE ENCOUNTER
----- Message from Katie Clark sent at 1/11/2023  9:14 AM CST -----  Regarding: Return Call  .Type:  Patient Returning Call    Who Called: Self     Who Left Message for Patient: not sure     Does the patient know what this is regarding?: Xray     Would the patient rather a call back or a response via [facility]? Call     Best Call Back Number: [phone]      Additional Information: It's his L wrist

## 2023-01-12 ENCOUNTER — OFFICE VISIT (OUTPATIENT)
Dept: ORTHOPEDICS | Facility: CLINIC | Age: 58
End: 2023-01-12
Payer: MEDICARE

## 2023-01-12 ENCOUNTER — APPOINTMENT (OUTPATIENT)
Dept: RADIOLOGY | Facility: HOSPITAL | Age: 58
End: 2023-01-12
Attending: ORTHOPAEDIC SURGERY
Payer: MEDICARE

## 2023-01-12 DIAGNOSIS — M25.532 LEFT WRIST PAIN: ICD-10-CM

## 2023-01-12 DIAGNOSIS — M67.40 GANGLION CYST: ICD-10-CM

## 2023-01-12 PROCEDURE — 99999 PR PBB SHADOW E&M-EST. PATIENT-LVL V: ICD-10-PCS | Mod: PBBFAC,,, | Performed by: ORTHOPAEDIC SURGERY

## 2023-01-12 PROCEDURE — 73110 X-RAY EXAM OF WRIST: CPT | Mod: TC,FY,PN,LT

## 2023-01-12 PROCEDURE — 73110 XR WRIST COMPLETE 3 VIEWS LEFT: ICD-10-PCS | Mod: 26,LT,, | Performed by: RADIOLOGY

## 2023-01-12 PROCEDURE — 73110 X-RAY EXAM OF WRIST: CPT | Mod: 26,LT,, | Performed by: RADIOLOGY

## 2023-01-12 PROCEDURE — 99999 PR PBB SHADOW E&M-EST. PATIENT-LVL V: CPT | Mod: PBBFAC,,, | Performed by: ORTHOPAEDIC SURGERY

## 2023-01-12 PROCEDURE — 99204 PR OFFICE/OUTPT VISIT, NEW, LEVL IV, 45-59 MIN: ICD-10-PCS | Mod: S$PBB,,, | Performed by: ORTHOPAEDIC SURGERY

## 2023-01-12 PROCEDURE — 99204 OFFICE O/P NEW MOD 45 MIN: CPT | Mod: S$PBB,,, | Performed by: ORTHOPAEDIC SURGERY

## 2023-01-12 PROCEDURE — 99215 OFFICE O/P EST HI 40 MIN: CPT | Mod: PBBFAC,PN | Performed by: ORTHOPAEDIC SURGERY

## 2023-01-12 NOTE — PROGRESS NOTES
Assessment: 57 y.o. male with L dorsal ganglion cyst     I explained my diagnostic impression and the reasoning behind it in detail, using layman's terms.      Plan:   - Discussed treatment options> I recommended non op treatment but he would like to proceed with excision. Discussed risk of recurrence     The spectrum of treatment options were discussed with the patient, including nonoperative and operative options.  After thorough discussion, the patient has elected to undergo surgical treatment to include:    Left ganglion cyst excision     We have discussed the surgery and anticipated recovery.  He understands that there may be limited mobility up to several weeks after surgery depending on procedures that are performed at the time of surgery.    The details of the surgical procedure were explained, including the location of probable incisions and a description of likely hardware and/or grafts to be used.  The patient understands the likely convalescence after surgery, in particular the expected postop rehab and recovery course. Alternatives both operative and non-operative with associated risks and benefits discussed. The outlined risks and potential complications of the proposed procedure include but are not limited to:  scar sensitivity, bleeding, infection, vessel and/or nerve damage, pain, numbness, need for additional surgery,  complex regional pain syndrome, weakness, partial and/or incomplete relief of symptoms, persistence of and/or worsening of symptoms, stiffness,  decreased  strength, need for prolonged postoperative rehabilitation, recurrence of cyst , deep venous thrombosis, pulmonary embolism, arthritis and death.  The patient states an understanding and wishes to proceed with surgery.   All questions were answered.  No guarantees were implied or stated.  Written informed consent was obtained.    He was also informed and understands the risks of surgery are greater for patients with a current  condition or history of heart disease, obesity, clotting disorders, recurrent infections, steroid use, current or past smoking, and factors such as sedentary lifestyle and noncompliance with medications, therapy or follow-up. The degree of the increased risk is hard to estimate with any degree of precision.    All questions were answered. The patient has verbalized understanding of these issues and wishes to proceed as discussed.   Will proceed with medical clearance.    Scheduled 1/31       All questions were answered in detail. The patient is in full agreement with the treatment plan and will proceed accordingly.    Chief Complaint   Patient presents with    Left Wrist - Ganglion Cyst, Swelling       Initial visit (1/12/23): Rito Conley is a 57 y.o. male who presents today complaining of Ganglion Cyst and Swelling of the Left Wrist     Duration of symptoms:  about 6 months  Increasing in size  Had it drained several years ago  Not painful      This patient was seen in consultation at the request of Dr. Jennifer Huertas    This is the extent of the patient's complaints at this time.     Hand dominance: Right     Occupation: Retired       Review of patient's allergies indicates:   Allergen Reactions    Grass pollen-june grass standard Itching, Other (See Comments) and Rash         Current Outpatient Medications:     amLODIPine (NORVASC) 10 MG tablet, Take 1 tablet (10 mg total) by mouth once daily., Disp: 90 tablet, Rfl: 1    atorvastatin (LIPITOR) 40 MG tablet, Take 1 tablet (40 mg total) by mouth every evening., Disp: 90 tablet, Rfl: 3    azelastine (ASTELIN) 137 mcg (0.1 %) nasal spray, 1 spray (137 mcg total) by Nasal route 2 (two) times daily., Disp: 30 mL, Rfl: 1    buPROPion (WELLBUTRIN XL) 150 MG TB24 tablet, Take 150 mg by mouth once daily. , Disp: , Rfl:     cetirizine (ZYRTEC) 10 MG tablet, TAKE ONE TABLET BY MOUTH ONCE DAILY FOR ALLERGIES, Disp: 90 tablet, Rfl: 3    erythromycin (ROMYCIN)  ophthalmic ointment, Place into the left eye every 6 (six) hours., Disp: 3.5 g, Rfl: 0    fluticasone propionate (FLONASE) 50 mcg/actuation nasal spray, 1 spray (50 mcg total) by Each Nostril route 2 (two) times daily., Disp: 58 g, Rfl: 3    hydrocortisone 2.5 % cream, Apply topically 2 (two) times daily., Disp: 20 g, Rfl: 2    naloxone (NARCAN) 4 mg/actuation Spry, 4mg by nasal route as needed for opioid overdose; may repeat every 2-3 minutes in alternating nostrils until medical help arrives. Call 911, Disp: 1 each, Rfl: 11    omeprazole (PRILOSEC) 40 MG capsule, Take 1 capsule (40 mg total) by mouth 2 (two) times daily before meals., Disp: 180 capsule, Rfl: 1    ondansetron (ZOFRAN-ODT) 8 MG TbDL, Take 1 tablet (8 mg total) by mouth every 6 (six) hours as needed. For nausea, Disp: 15 tablet, Rfl: 0    oxybutynin (DITROPAN XL) 15 MG TR24, Take 1 tablet (15 mg total) by mouth once daily., Disp: 90 tablet, Rfl: 3    [START ON 2/28/2023] oxyCODONE-acetaminophen (PERCOCET) 5-325 mg per tablet, Take 1 tablet by mouth every 4 (four) hours as needed., Disp: 31 tablet, Rfl: 0    [START ON 1/30/2023] oxyCODONE-acetaminophen (PERCOCET) 5-325 mg per tablet, Take 1 tablet by mouth every 4 (four) hours as needed for Pain., Disp: 31 tablet, Rfl: 0    oxyCODONE-acetaminophen (PERCOCET) 5-325 mg per tablet, Take 1 tablet by mouth every 4 (four) hours as needed for Pain., Disp: 31 tablet, Rfl: 0    paroxetine (PAXIL) 20 MG tablet, TAKE ONE-HALF TABLET BY MOUTH EVERY DAY FOR MENTAL HEALTH, Disp: , Rfl:     sildenafiL (VIAGRA) 100 MG tablet, Take 1 tablet (100 mg total) by mouth daily as needed for Erectile Dysfunction., Disp: 30 tablet, Rfl: 11    zolpidem (AMBIEN) 10 mg Tab, Take 1 tablet (10 mg total) by mouth nightly as needed., Disp: 30 tablet, Rfl: 5    Physical Exam:   Vitals:    01/12/23 1412   PainSc: 0-No pain   PainLoc: Wrist       General:   Patient is alert, awake and oriented to time, place and person. Mood and affect  are appropriate.  Patient does not appear to be in any distress, denies any constitutional symptoms and appears stated age.   HEENT:  Pupils are equal and round, sclera are not injected. External examination of ears and nose reveals no abnormalities. Cranial nerves II-X are grossly intact  Skin:  no rashes, abrasions or open wounds on the affected extremity   Resp:  No respiratory distress or audible wheezing   CV: 2+  pulses, all extremities warm and well perfused   Left Hand/Wrist Examination:  Cyst to dorsal radial wrist - about 3 cm in diameter. Firm, mobile, non tender   LTSI m/u/r  2+ RP  + EPL, IO, FDS, FDP     ROM hand/wrist/elbow full, painless        Imaging: 3 views of the left wrist negative for fractures, degenerative changes     I personally reviewed and interpreted the patient's imaging obtained today in clinic       A note notifying Dr. Jennifer Huertas of my findings was sent via the electronic medical record     This note was created by combination of typed  and M-Modal dictation. Transcription and phonetic errors may be present.  If there are any questions, please contact me.    Past Medical History:   Diagnosis Date    Allergy     Class 1 obesity due to excess calories with serious comorbidity in adult 3/14/2022    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    Hyperlipidemia     Hypertension     Lower back pain     Major depressive disorder, recurrent, moderate 12/30/2022    PTSD (post-traumatic stress disorder)     Sleep apnea     uses CPAP 1-2x/week       Active Problem List with Overview Notes    Diagnosis Date Noted    Major depressive disorder, recurrent, moderate 12/30/2022    Hordeolum externum of left upper eyelid 05/17/2022    Multiple acquired skin tags 05/17/2022    Class 1 obesity due to excess calories with serious comorbidity in adult 03/14/2022    Encounter for colonoscopy due to history of colonic polyp 06/04/2021    Nasal septal deviation 10/13/2020    Nasal valve collapse 10/13/2020     Refractory obstruction of nasal airway 10/13/2020    Hypertrophy of both inferior nasal turbinates 10/13/2020    Nasal congestion 10/13/2020    Heartburn 06/17/2020    PTSD (post-traumatic stress disorder) 03/11/2019    BPH with obstruction/lower urinary tract symptoms 03/04/2019    Back injury 03/09/2017    Lumbar scoliosis 03/14/2015    DJD of shoulder 05/07/2014    DJD (degenerative joint disease), lumbar 05/07/2014     Has back brace      Allergic rhinitis 05/07/2014    Benign hypertensive heart disease without heart failure 04/26/2013       Past Surgical History:   Procedure Laterality Date    COLONOSCOPY      COLONOSCOPY N/A 6/4/2021    Procedure: COLONOSCOPY;  Surgeon: Shanthi Horton MD;  Location: [facility] ENDO;  Service: Endoscopy;  Laterality: N/A;  prep instr portal,   6/3 pt v/u of earlier arrival time and prep time -ml    ENDOSCOPIC NASAL SEPTOPLASTY N/A 10/13/2020    Procedure: SEPTOPLASTY, NOSE, ENDOSCOPIC;  Surgeon: Nick Gomez MD;  Location: [facility] OR;  Service: ENT;  Laterality: N/A;  RN PRE OP 9- COVID NEGATIVE ON  10-  [place]    ESOPHAGOGASTRODUODENOSCOPY N/A 6/17/2020    Procedure: EGD (ESOPHAGOGASTRODUODENOSCOPY);  Surgeon: Jarad Holm MD;  Location: [facility] ([address] [place] [address]);  Service: Endoscopy;  Laterality: N/A;  covid test 6/15-[facility]    HERNIA REPAIR      NASAL ENDOSCOPY Bilateral 10/13/2020    Procedure: ENDOSCOPY, NOSE;  Surgeon: Nick Gomez MD;  Location: [facility] OR;  Service: ENT;  Laterality: Bilateral;    NASAL STENOSIS REPAIR Bilateral 10/13/2020    Procedure: REPAIR, STENOSIS, NOSE, VESTIBULE;  Surgeon: Nick Gomez MD;  Location: [facility] OR;  Service: ENT;  Laterality: Bilateral;  Vivaer console and wand available for use.  NO DISC  10/7/2020@ 247PM-LO       Family History   Problem Relation Age of Onset    Hypertension Mother     Other Father         agent orange exposure     Heart attacks under age 50 Sister     No Known Problems Brother     No Known  Problems Daughter     No Known Problems Son     No Known Problems Son     No Known Problems Son     No Known Problems Son     Cancer Maternal Aunt         breast    Cancer Maternal Grandmother         something in her arm     Amblyopia Neg Hx     Blindness Neg Hx     Cataracts Neg Hx     Diabetes Neg Hx     Glaucoma Neg Hx     Macular degeneration Neg Hx     Retinal detachment Neg Hx     Strabismus Neg Hx     Stroke Neg Hx     Thyroid disease Neg Hx

## 2023-01-12 NOTE — H&P (VIEW-ONLY)
Assessment: 57 y.o. male with L dorsal ganglion cyst     I explained my diagnostic impression and the reasoning behind it in detail, using layman's terms.      Plan:   - Discussed treatment options> I recommended non op treatment but he would like to proceed with excision. Discussed risk of recurrence     The spectrum of treatment options were discussed with the patient, including nonoperative and operative options.  After thorough discussion, the patient has elected to undergo surgical treatment to include:    Left ganglion cyst excision     We have discussed the surgery and anticipated recovery.  He understands that there may be limited mobility up to several weeks after surgery depending on procedures that are performed at the time of surgery.    The details of the surgical procedure were explained, including the location of probable incisions and a description of likely hardware and/or grafts to be used.  The patient understands the likely convalescence after surgery, in particular the expected postop rehab and recovery course. Alternatives both operative and non-operative with associated risks and benefits discussed. The outlined risks and potential complications of the proposed procedure include but are not limited to:  scar sensitivity, bleeding, infection, vessel and/or nerve damage, pain, numbness, need for additional surgery,  complex regional pain syndrome, weakness, partial and/or incomplete relief of symptoms, persistence of and/or worsening of symptoms, stiffness,  decreased  strength, need for prolonged postoperative rehabilitation, recurrence of cyst , deep venous thrombosis, pulmonary embolism, arthritis and death.  The patient states an understanding and wishes to proceed with surgery.   All questions were answered.  No guarantees were implied or stated.  Written informed consent was obtained.    He was also informed and understands the risks of surgery are greater for patients with a current  condition or history of heart disease, obesity, clotting disorders, recurrent infections, steroid use, current or past smoking, and factors such as sedentary lifestyle and noncompliance with medications, therapy or follow-up. The degree of the increased risk is hard to estimate with any degree of precision.    All questions were answered. The patient has verbalized understanding of these issues and wishes to proceed as discussed.   Will proceed with medical clearance.    Scheduled 1/31       All questions were answered in detail. The patient is in full agreement with the treatment plan and will proceed accordingly.    Chief Complaint   Patient presents with    Left Wrist - Ganglion Cyst, Swelling       Initial visit (1/12/23): Rito Conley is a 57 y.o. male who presents today complaining of Ganglion Cyst and Swelling of the Left Wrist     Duration of symptoms:  about 6 months  Increasing in size  Had it drained several years ago  Not painful      This patient was seen in consultation at the request of Dr. Jennifer Huertas    This is the extent of the patient's complaints at this time.     Hand dominance: Right     Occupation: Retired       Review of patient's allergies indicates:   Allergen Reactions    Grass pollen-june grass standard Itching, Other (See Comments) and Rash         Current Outpatient Medications:     amLODIPine (NORVASC) 10 MG tablet, Take 1 tablet (10 mg total) by mouth once daily., Disp: 90 tablet, Rfl: 1    atorvastatin (LIPITOR) 40 MG tablet, Take 1 tablet (40 mg total) by mouth every evening., Disp: 90 tablet, Rfl: 3    azelastine (ASTELIN) 137 mcg (0.1 %) nasal spray, 1 spray (137 mcg total) by Nasal route 2 (two) times daily., Disp: 30 mL, Rfl: 1    buPROPion (WELLBUTRIN XL) 150 MG TB24 tablet, Take 150 mg by mouth once daily. , Disp: , Rfl:     cetirizine (ZYRTEC) 10 MG tablet, TAKE ONE TABLET BY MOUTH ONCE DAILY FOR ALLERGIES, Disp: 90 tablet, Rfl: 3    erythromycin (ROMYCIN)  ophthalmic ointment, Place into the left eye every 6 (six) hours., Disp: 3.5 g, Rfl: 0    fluticasone propionate (FLONASE) 50 mcg/actuation nasal spray, 1 spray (50 mcg total) by Each Nostril route 2 (two) times daily., Disp: 58 g, Rfl: 3    hydrocortisone 2.5 % cream, Apply topically 2 (two) times daily., Disp: 20 g, Rfl: 2    naloxone (NARCAN) 4 mg/actuation Spry, 4mg by nasal route as needed for opioid overdose; may repeat every 2-3 minutes in alternating nostrils until medical help arrives. Call 911, Disp: 1 each, Rfl: 11    omeprazole (PRILOSEC) 40 MG capsule, Take 1 capsule (40 mg total) by mouth 2 (two) times daily before meals., Disp: 180 capsule, Rfl: 1    ondansetron (ZOFRAN-ODT) 8 MG TbDL, Take 1 tablet (8 mg total) by mouth every 6 (six) hours as needed. For nausea, Disp: 15 tablet, Rfl: 0    oxybutynin (DITROPAN XL) 15 MG TR24, Take 1 tablet (15 mg total) by mouth once daily., Disp: 90 tablet, Rfl: 3    [START ON 2/28/2023] oxyCODONE-acetaminophen (PERCOCET) 5-325 mg per tablet, Take 1 tablet by mouth every 4 (four) hours as needed., Disp: 31 tablet, Rfl: 0    [START ON 1/30/2023] oxyCODONE-acetaminophen (PERCOCET) 5-325 mg per tablet, Take 1 tablet by mouth every 4 (four) hours as needed for Pain., Disp: 31 tablet, Rfl: 0    oxyCODONE-acetaminophen (PERCOCET) 5-325 mg per tablet, Take 1 tablet by mouth every 4 (four) hours as needed for Pain., Disp: 31 tablet, Rfl: 0    paroxetine (PAXIL) 20 MG tablet, TAKE ONE-HALF TABLET BY MOUTH EVERY DAY FOR MENTAL HEALTH, Disp: , Rfl:     sildenafiL (VIAGRA) 100 MG tablet, Take 1 tablet (100 mg total) by mouth daily as needed for Erectile Dysfunction., Disp: 30 tablet, Rfl: 11    zolpidem (AMBIEN) 10 mg Tab, Take 1 tablet (10 mg total) by mouth nightly as needed., Disp: 30 tablet, Rfl: 5    Physical Exam:   Vitals:    01/12/23 1412   PainSc: 0-No pain   PainLoc: Wrist       General:   Patient is alert, awake and oriented to time, place and person. Mood and affect  are appropriate.  Patient does not appear to be in any distress, denies any constitutional symptoms and appears stated age.   HEENT:  Pupils are equal and round, sclera are not injected. External examination of ears and nose reveals no abnormalities. Cranial nerves II-X are grossly intact  Skin:  no rashes, abrasions or open wounds on the affected extremity   Resp:  No respiratory distress or audible wheezing   CV: 2+  pulses, all extremities warm and well perfused   Left Hand/Wrist Examination:  Cyst to dorsal radial wrist - about 3 cm in diameter. Firm, mobile, non tender   LTSI m/u/r  2+ RP  + EPL, IO, FDS, FDP     ROM hand/wrist/elbow full, painless        Imaging: 3 views of the left wrist negative for fractures, degenerative changes     I personally reviewed and interpreted the patient's imaging obtained today in clinic       A note notifying Dr. Jennifer Huertas of my findings was sent via the electronic medical record     This note was created by combination of typed  and M-Modal dictation. Transcription and phonetic errors may be present.  If there are any questions, please contact me.    Past Medical History:   Diagnosis Date    Allergy     Class 1 obesity due to excess calories with serious comorbidity in adult 3/14/2022    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    Hyperlipidemia     Hypertension     Lower back pain     Major depressive disorder, recurrent, moderate 12/30/2022    PTSD (post-traumatic stress disorder)     Sleep apnea     uses CPAP 1-2x/week       Active Problem List with Overview Notes    Diagnosis Date Noted    Major depressive disorder, recurrent, moderate 12/30/2022    Hordeolum externum of left upper eyelid 05/17/2022    Multiple acquired skin tags 05/17/2022    Class 1 obesity due to excess calories with serious comorbidity in adult 03/14/2022    Encounter for colonoscopy due to history of colonic polyp 06/04/2021    Nasal septal deviation 10/13/2020    Nasal valve collapse 10/13/2020     Refractory obstruction of nasal airway 10/13/2020    Hypertrophy of both inferior nasal turbinates 10/13/2020    Nasal congestion 10/13/2020    Heartburn 06/17/2020    PTSD (post-traumatic stress disorder) 03/11/2019    BPH with obstruction/lower urinary tract symptoms 03/04/2019    Back injury 03/09/2017    Lumbar scoliosis 03/14/2015    DJD of shoulder 05/07/2014    DJD (degenerative joint disease), lumbar 05/07/2014     Has back brace      Allergic rhinitis 05/07/2014    Benign hypertensive heart disease without heart failure 04/26/2013       Past Surgical History:   Procedure Laterality Date    COLONOSCOPY      COLONOSCOPY N/A 6/4/2021    Procedure: COLONOSCOPY;  Surgeon: Shanthi Horton MD;  Location: [facility] ENDO;  Service: Endoscopy;  Laterality: N/A;  prep instr portal,   6/3 pt v/u of earlier arrival time and prep time -ml    ENDOSCOPIC NASAL SEPTOPLASTY N/A 10/13/2020    Procedure: SEPTOPLASTY, NOSE, ENDOSCOPIC;  Surgeon: Nick Gomez MD;  Location: [facility] OR;  Service: ENT;  Laterality: N/A;  RN PRE OP 9- COVID NEGATIVE ON  10-  [place]    ESOPHAGOGASTRODUODENOSCOPY N/A 6/17/2020    Procedure: EGD (ESOPHAGOGASTRODUODENOSCOPY);  Surgeon: Jarad Holm MD;  Location: [facility] ([address] [place] [address]);  Service: Endoscopy;  Laterality: N/A;  covid test 6/15-[facility]    HERNIA REPAIR      NASAL ENDOSCOPY Bilateral 10/13/2020    Procedure: ENDOSCOPY, NOSE;  Surgeon: Nick Gomez MD;  Location: [facility] OR;  Service: ENT;  Laterality: Bilateral;    NASAL STENOSIS REPAIR Bilateral 10/13/2020    Procedure: REPAIR, STENOSIS, NOSE, VESTIBULE;  Surgeon: Nick Gomez MD;  Location: [facility] OR;  Service: ENT;  Laterality: Bilateral;  Vivaer console and wand available for use.  NO DISC  10/7/2020@ 247PM-LO       Family History   Problem Relation Age of Onset    Hypertension Mother     Other Father         agent orange exposure     Heart attacks under age 50 Sister     No Known Problems Brother     No Known  Problems Daughter     No Known Problems Son     No Known Problems Son     No Known Problems Son     No Known Problems Son     Cancer Maternal Aunt         breast    Cancer Maternal Grandmother         something in her arm     Amblyopia Neg Hx     Blindness Neg Hx     Cataracts Neg Hx     Diabetes Neg Hx     Glaucoma Neg Hx     Macular degeneration Neg Hx     Retinal detachment Neg Hx     Strabismus Neg Hx     Stroke Neg Hx     Thyroid disease Neg Hx

## 2023-01-25 ENCOUNTER — OFFICE VISIT (OUTPATIENT)
Dept: FAMILY MEDICINE | Facility: CLINIC | Age: 58
End: 2023-01-25
Payer: MEDICARE

## 2023-01-25 ENCOUNTER — ANESTHESIA EVENT (OUTPATIENT)
Dept: SURGERY | Facility: HOSPITAL | Age: 58
End: 2023-01-25
Payer: MEDICARE

## 2023-01-25 VITALS
BODY MASS INDEX: 30.34 KG/M2 | WEIGHT: 216.69 LBS | HEART RATE: 83 BPM | HEIGHT: 71 IN | TEMPERATURE: 99 F | OXYGEN SATURATION: 97 % | SYSTOLIC BLOOD PRESSURE: 122 MMHG | DIASTOLIC BLOOD PRESSURE: 80 MMHG

## 2023-01-25 DIAGNOSIS — Z01.818 PREOP EXAMINATION: Primary | ICD-10-CM

## 2023-01-25 PROCEDURE — 99214 PR OFFICE/OUTPT VISIT, EST, LEVL IV, 30-39 MIN: ICD-10-PCS | Mod: S$PBB,,, | Performed by: FAMILY MEDICINE

## 2023-01-25 PROCEDURE — 93005 ELECTROCARDIOGRAM TRACING: CPT | Mod: PBBFAC,PO | Performed by: INTERNAL MEDICINE

## 2023-01-25 PROCEDURE — 93010 ELECTROCARDIOGRAM REPORT: CPT | Mod: S$PBB,,, | Performed by: INTERNAL MEDICINE

## 2023-01-25 PROCEDURE — 99999 PR PBB SHADOW E&M-EST. PATIENT-LVL IV: ICD-10-PCS | Mod: PBBFAC,,, | Performed by: FAMILY MEDICINE

## 2023-01-25 PROCEDURE — 93010 EKG 12-LEAD: ICD-10-PCS | Mod: S$PBB,,, | Performed by: INTERNAL MEDICINE

## 2023-01-25 PROCEDURE — 99214 OFFICE O/P EST MOD 30 MIN: CPT | Mod: PBBFAC,PO | Performed by: FAMILY MEDICINE

## 2023-01-25 PROCEDURE — 99999 PR PBB SHADOW E&M-EST. PATIENT-LVL IV: CPT | Mod: PBBFAC,,, | Performed by: FAMILY MEDICINE

## 2023-01-25 PROCEDURE — 99214 OFFICE O/P EST MOD 30 MIN: CPT | Mod: S$PBB,,, | Performed by: FAMILY MEDICINE

## 2023-01-25 NOTE — PROGRESS NOTES
Subjective:       Patient ID: Rito Conley is a 57 y.o. male.    Chief Complaint: Preop exam/ Wrist Surgery    57 year-old male he re for preoperative wrist surgery for ganglion cyst removal.     Past Medical History:   Diagnosis Date    Allergy     Class 1 obesity due to excess calories with serious comorbidity in adult 3/14/2022    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    Hyperlipidemia     Hypertension     Lower back pain     Major depressive disorder, recurrent, moderate 12/30/2022    PTSD (post-traumatic stress disorder)     Sleep apnea     uses CPAP 1-2x/week      Past Surgical History:   Procedure Laterality Date    COLONOSCOPY      COLONOSCOPY N/A 6/4/2021    Procedure: COLONOSCOPY;  Surgeon: Shanthi Horton MD;  Location: [facility] ENDO;  Service: Endoscopy;  Laterality: N/A;  prep instr portal,   6/3 pt v/u of earlier arrival time and prep time -ml    ENDOSCOPIC NASAL SEPTOPLASTY N/A 10/13/2020    Procedure: SEPTOPLASTY, NOSE, ENDOSCOPIC;  Surgeon: Nick Gomez MD;  Location: [facility] OR;  Service: ENT;  Laterality: N/A;  RN PRE OP 9- COVID NEGATIVE ON  10-  [place]    ESOPHAGOGASTRODUODENOSCOPY N/A 6/17/2020    Procedure: EGD (ESOPHAGOGASTRODUODENOSCOPY);  Surgeon: Jarad Holm MD;  Location: [facility] ([address] [place] [address]);  Service: Endoscopy;  Laterality: N/A;  covid test 6/15-[facility]apalco    HERNIA REPAIR      NASAL ENDOSCOPY Bilateral 10/13/2020    Procedure: ENDOSCOPY, NOSE;  Surgeon: Nick Gomez MD;  Location: [facility] OR;  Service: ENT;  Laterality: Bilateral;    NASAL STENOSIS REPAIR Bilateral 10/13/2020    Procedure: REPAIR, STENOSIS, NOSE, VESTIBULE;  Surgeon: Nick Gomez MD;  Location: [facility] OR;  Service: ENT;  Laterality: Bilateral;  [facility] use.  NO DISC  10/7/2020@ 247PM-LO     Family History   Problem Relation Age of Onset    Hypertension Mother     Other Father         agent orange exposure     Heart attacks under age 50 Sister     No Known Problems Brother      No Known Problems Daughter     No Known Problems Son     No Known Problems Son     No Known Problems Son     No Known Problems Son     Cancer Maternal Aunt         breast    Cancer Maternal Grandmother         something in her arm     Amblyopia Neg Hx     Blindness Neg Hx     Cataracts Neg Hx     Diabetes Neg Hx     Glaucoma Neg Hx     Macular degeneration Neg Hx     Retinal detachment Neg Hx     Strabismus Neg Hx     Stroke Neg Hx     Thyroid disease Neg Hx      Social History     Socioeconomic History    Marital status:    Tobacco Use    Smoking status: Never    Smokeless tobacco: Never   Substance and Sexual Activity    Alcohol use: Yes     Alcohol/week: 7.0 standard drinks     Types: 4 Glasses of wine, 1 Cans of beer, 2 Shots of liquor per week    Drug use: Yes     Types: Oxycodone     Comment: sometimes 1 tab weekly as needed; probably 2 tabs a month    Sexual activity: Yes     Partners: Female     Social Determinants of Health     Financial Resource Strain: Low Risk     Difficulty of Paying Living Expenses: Not hard at all   Food Insecurity: No Food Insecurity    Worried About Running Out of Food in the Last Year: Never true    Ran Out of Food in the Last Year: Never true   Transportation Needs: No Transportation Needs    Lack of Transportation (Medical): No    Lack of Transportation (Non-Medical): No   Physical Activity: Unknown    Days of Exercise per Week: 3 days   Stress: No Stress Concern Present    Feeling of Stress : Only a little   Social Connections: Unknown    Frequency of Communication with Friends and Family: Three times a week    Frequency of Social Gatherings with Friends and Family: Twice a week    Active Member of Clubs or Organizations: Yes    Attends Club or Organization Meetings: More than 4 times per year    Marital Status:    Housing Stability: Unknown    Unable to Pay for Housing in the Last Year: No    Unstable Housing in the Last Year: No       Review of Systems  "  Constitutional:  Negative for chills, fatigue and fever.   Respiratory:  Negative for cough, chest tightness, shortness of breath and wheezing.    Cardiovascular:  Negative for chest pain and leg swelling.   Gastrointestinal:  Negative for abdominal pain, blood in stool, diarrhea, nausea and vomiting.   Genitourinary:  Negative for dysuria, frequency and hematuria.   Neurological:  Negative for dizziness, syncope, light-headedness and headaches.       Objective:      Vitals:    01/25/23 0928   BP: 122/80   Pulse: 83   Temp: 98.5 °F (36.9 °C)   TempSrc: Oral   SpO2: 97%   Weight: 98.3 kg (216 lb 11.4 oz)   Height: 5' 11" (1.803 m)       Physical Exam  Constitutional:       General: He is not in acute distress.     Appearance: Normal appearance. He is well-developed. He is not ill-appearing, toxic-appearing or diaphoretic.   HENT:      Head: Normocephalic and atraumatic.      Right Ear: External ear normal.      Left Ear: External ear normal.      Mouth/Throat:      Pharynx: No oropharyngeal exudate.   Eyes:      Conjunctiva/sclera: Conjunctivae normal.   Neck:      Thyroid: No thyromegaly.      Vascular: No carotid bruit.   Cardiovascular:      Rate and Rhythm: Normal rate and regular rhythm.      Heart sounds: Normal heart sounds. No murmur heard.    No friction rub. No gallop.   Pulmonary:      Effort: Pulmonary effort is normal. No respiratory distress.      Breath sounds: Normal breath sounds. No stridor. No wheezing, rhonchi or rales.   Abdominal:      General: Bowel sounds are normal. There is no distension.      Palpations: Abdomen is soft. There is no mass.      Tenderness: There is no abdominal tenderness. There is no guarding or rebound.      Hernia: No hernia is present.   Musculoskeletal:      Cervical back: Normal range of motion and neck supple.      Right lower leg: No edema.      Left lower leg: No edema.   Lymphadenopathy:      Cervical: No cervical adenopathy.   Skin:     Findings: No rash. "   Neurological:      Mental Status: He is alert and oriented to person, place, and time.   Psychiatric:         Mood and Affect: Mood normal.         Behavior: Behavior normal.       Assessment:       Problem List Items Addressed This Visit    None  Visit Diagnoses       Preop examination    -  Primary    Relevant Orders    EKG 12-lead            Plan:       .Rito was seen today for preop exam/ wrist surgery.    Diagnoses and all orders for this visit:    Preop examination  -     EKG 12-lead          Labs and EKG reviewed and normal. He is cleared for surgery.

## 2023-01-26 ENCOUNTER — HOSPITAL ENCOUNTER (OUTPATIENT)
Dept: PREADMISSION TESTING | Facility: HOSPITAL | Age: 58
Discharge: HOME | End: 2023-01-26
Attending: ORTHOPAEDIC SURGERY
Payer: MEDICARE

## 2023-01-26 VITALS
WEIGHT: 216.69 LBS | BODY MASS INDEX: 30.34 KG/M2 | SYSTOLIC BLOOD PRESSURE: 126 MMHG | DIASTOLIC BLOOD PRESSURE: 79 MMHG | HEIGHT: 71 IN | RESPIRATION RATE: 16 BRPM | HEART RATE: 63 BPM | TEMPERATURE: 97 F

## 2023-01-26 NOTE — DISCHARGE INSTRUCTIONS
Before 7 AM, enter through the Emergency Entrance..   After 7 AM enter through the Main Entrance.      Your procedure  is scheduled for __1/31/2023________.    Call [phone] between 2pm and 5pm on __1/30/2023_____to find out your arrival time for the day of surgery.    You may have one visitor.  No children allowed.     You will be going to the Same Day Surgery Unit on the 2nd floor of the hospital.    Important instructions:  Do not eat anything after midnight.  You may have plain water, non carbonated.  You may also have Gatorade or Powerade after midnight.    Stop all fluids 2 hours before your surgery.    It is okay to brush your teeth.  Do not have gum, candy or mints.    SEE MEDICATION SHEET.   TAKE MEDICATIONS AS DIRECTED WITH SIPS OF WATER.    STOP taking Aspirin, Ibuprofen,  Advil, Motrin, Mobic(meloxicam), Aleve (naproxen), Fish oil, and Vitamin E for at least 7 days before your surgery.     You may take Tylenol if needed which is not a blood thinner.    Please shower the night before and the morning of your surgery.      Use Chlorhexidine soap as instructed by your pre op nurse.   Please place clean linens on your bed the night before surgery. Please wear fresh clean clothing after each shower.    No shaving of procedural area at least 4-5 days before surgery due to increased risk of skin irritation and/or possible infection.    Contact lenses and removable denture work may not be worn during your procedure.    You may wear deodorant only. If you are having breast surgery, do not wear deodorant on the operative side.    Do not wear powder, body lotion, perfume/cologne or make-up.    Do not wear any jewelry or have any metal on your body.    You will be asked to remove any dentures or partials for the procedure.    If you are going home on the same day of surgery, you must arrange for a family member or a friend to drive you home.  Public transportation is prohibited.  You will not be able to  drive home if you were given anesthesia or sedation.    Patients who want to have their Post-op prescriptions filled from our in-house [facility], bring a Credit/Debit Card or cash with you. A co-pay may be required.  The pharmacy closes at 5:30 pm.    Wear loose fitting clothes allowing for bandages.    Please leave money and valuables home.      You may bring your cell phone.    Call the doctor if fever or illness should occur before your surgery.    Call [phone] to contact us here if needed.                            CLOTHES ON DAY OF SURGERY    SHOULDER surgery:  you must have a very oversized shirt.  Very, Very large.  You will probably have a large sling on with your arm strapped to your chest.  You will not be able to put the arm of the operated shoulder into a sleeve.  You can put the arm of the un-operated shoulder into the sleeve, but the shirt will need to be draped over the operated shoulder.       ARM or HAND surgery:  make sure that your sleeves are large and loose enough to pass over large dressings or cast.      BREAST or UNDERARM surgery:  wear a loose, button down shirt so that you can dress without raising your arms over your head.    ABDOMINAL surgery:  wear loose, comfortable clothing.  Nothing tight around the abdomen.  NO JEANS    PENIS or SCROTAL surgery:  loose comfortable clothing.  Large sweat pants, pajama pants or a robe.  ABSOLUTELY NO JEANS      LEG or FOOT surgery:  wear large loose pants that are able to pass over any large dressings or casts.  You could also wear loose shorts or a skirt.

## 2023-01-26 NOTE — ANESTHESIA PREPROCEDURE EVALUATION
01/26/2023  Rito Conley is a 57 y.o., male scheduled for  EXCISION, MASS, EXTREMITY-WRIST (Left)on 1/31/2023.    Past Medical History:   Diagnosis Date    Allergy     Class 1 obesity due to excess calories with serious comorbidity in adult 3/14/2022    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    Hyperlipidemia     Hypertension     Lower back pain     Major depressive disorder, recurrent, moderate 12/30/2022    PTSD (post-traumatic stress disorder)     Sleep apnea     uses CPAP 1-2x/week         Past Surgical History:   Procedure Laterality Date    COLONOSCOPY      COLONOSCOPY N/A 6/4/2021    Procedure: COLONOSCOPY;  Surgeon: Shanthi Horton MD;  Location: [facility];  Service: Endoscopy;  Laterality: N/A;  prep instr portal,   6/3 pt v/u of earlier arrival time and prep time -ml    ENDOSCOPIC NASAL SEPTOPLASTY N/A 10/13/2020    Procedure: SEPTOPLASTY, NOSE, ENDOSCOPIC;  Surgeon: Nick Gomez MD;  Location: [facility] OR;  Service: ENT;  Laterality: N/A;  RN PRE OP 9- COVID NEGATIVE ON  10-  [place]    ESOPHAGOGASTRODUODENOSCOPY N/A 6/17/2020    Procedure: EGD (ESOPHAGOGASTRODUODENOSCOPY);  Surgeon: Jarad Holm MD;  Location: [facility] ([address] [place] [address]);  Service: Endoscopy;  Laterality: N/A;  covid test 6/15-[place]co    HERNIA REPAIR      NASAL ENDOSCOPY Bilateral 10/13/2020    Procedure: ENDOSCOPY, NOSE;  Surgeon: Nick Gomez MD;  Location: [facility] OR;  Service: ENT;  Laterality: Bilateral;    NASAL STENOSIS REPAIR Bilateral 10/13/2020    Procedure: REPAIR, STENOSIS, NOSE, VESTIBULE;  Surgeon: Nick Gomez MD;  Location: [facility] OR;  Service: ENT;  Laterality: Bilateral;  Vivaer console and wand available for use.  NO DISC  10/7/2020@ 247PM-LO           Pre-op Assessment    I have reviewed the Patient Summary Reports.     I have reviewed the Nursing Notes. I have reviewed the NPO  Status.   I have reviewed the Medications.     Review of Systems  Anesthesia Hx:  No problems with previous Anesthesia  Denies Family Hx of Anesthesia complications.    Social:  Non-Smoker, No Alcohol Use    Hematology/Oncology:  Hematology Normal   Oncology Normal     EENT/Dental:EENT/Dental Normal   Cardiovascular:   Exercise tolerance: good Hypertension  Functional Capacity good / => 4 METS    Pulmonary:   Sleep Apnea    Renal/:  Renal/ Normal     Hepatic/GI:   GERD    Musculoskeletal:   Arthritis  Ganglion cyst   Neurological:  Neurology Normal    Endocrine:  Endocrine Normal    Dermatological:  Skin Normal    Psych:   Psychiatric History depression             Anesthesia Plan  Type of Anesthesia, risks & benefits discussed:    Anesthesia Type: Gen Natural Airway  Intra-op Monitoring Plan: Standard ASA Monitors  Informed Consent: Informed consent signed with the Patient and all parties understand the risks and agree with anesthesia plan.  All questions answered.   ASA Score: 2  Anesthesia Plan Notes: Cleared by PCP    Ready For Surgery From Anesthesia Perspective.     .

## 2023-01-31 ENCOUNTER — HOSPITAL ENCOUNTER (OUTPATIENT)
Facility: HOSPITAL | Age: 58
Discharge: HOME | End: 2023-01-31
Attending: ORTHOPAEDIC SURGERY | Admitting: ORTHOPAEDIC SURGERY
Payer: MEDICARE

## 2023-01-31 ENCOUNTER — ANESTHESIA (OUTPATIENT)
Dept: SURGERY | Facility: HOSPITAL | Age: 58
End: 2023-01-31
Payer: MEDICARE

## 2023-01-31 VITALS
WEIGHT: 216.69 LBS | HEART RATE: 84 BPM | SYSTOLIC BLOOD PRESSURE: 129 MMHG | RESPIRATION RATE: 18 BRPM | DIASTOLIC BLOOD PRESSURE: 85 MMHG | BODY MASS INDEX: 30.22 KG/M2 | TEMPERATURE: 98 F | OXYGEN SATURATION: 95 %

## 2023-01-31 DIAGNOSIS — M67.40 GANGLION CYST: Primary | ICD-10-CM

## 2023-01-31 PROCEDURE — 25000003 PHARM REV CODE 250: Performed by: ORTHOPAEDIC SURGERY

## 2023-01-31 PROCEDURE — 63600175 PHARM REV CODE 636 W HCPCS: Performed by: NURSE ANESTHETIST, CERTIFIED REGISTERED

## 2023-01-31 PROCEDURE — 25000003 PHARM REV CODE 250: Performed by: ANESTHESIOLOGY

## 2023-01-31 PROCEDURE — 25000003 PHARM REV CODE 250: Performed by: NURSE ANESTHETIST, CERTIFIED REGISTERED

## 2023-01-31 PROCEDURE — 71000033 HC RECOVERY, INTIAL HOUR: Performed by: ORTHOPAEDIC SURGERY

## 2023-01-31 PROCEDURE — D9220A PRA ANESTHESIA: Mod: ANES,,, | Performed by: ANESTHESIOLOGY

## 2023-01-31 PROCEDURE — 71000016 HC POSTOP RECOV ADDL HR: Performed by: ORTHOPAEDIC SURGERY

## 2023-01-31 PROCEDURE — 36000706: Performed by: ORTHOPAEDIC SURGERY

## 2023-01-31 PROCEDURE — 25111 REMOVE WRIST TENDON LESION: CPT | Mod: LT,,, | Performed by: ORTHOPAEDIC SURGERY

## 2023-01-31 PROCEDURE — D9220A PRA ANESTHESIA: ICD-10-PCS | Mod: CRNA,,, | Performed by: NURSE ANESTHETIST, CERTIFIED REGISTERED

## 2023-01-31 PROCEDURE — D9220A PRA ANESTHESIA: ICD-10-PCS | Mod: ANES,,, | Performed by: ANESTHESIOLOGY

## 2023-01-31 PROCEDURE — 88307 PR  SURG PATH,LEVEL V: ICD-10-PCS | Mod: 26,,, | Performed by: PATHOLOGY

## 2023-01-31 PROCEDURE — D9220A PRA ANESTHESIA: Mod: CRNA,,, | Performed by: NURSE ANESTHETIST, CERTIFIED REGISTERED

## 2023-01-31 PROCEDURE — 37000009 HC ANESTHESIA EA ADD 15 MINS: Performed by: ORTHOPAEDIC SURGERY

## 2023-01-31 PROCEDURE — 88307 TISSUE EXAM BY PATHOLOGIST: CPT | Performed by: PATHOLOGY

## 2023-01-31 PROCEDURE — 25111 PR EXCIS PRIMARY GANGLION WRIST: ICD-10-PCS | Mod: LT,,, | Performed by: ORTHOPAEDIC SURGERY

## 2023-01-31 PROCEDURE — 37000008 HC ANESTHESIA 1ST 15 MINUTES: Performed by: ORTHOPAEDIC SURGERY

## 2023-01-31 PROCEDURE — 88307 TISSUE EXAM BY PATHOLOGIST: CPT | Mod: 26,,, | Performed by: PATHOLOGY

## 2023-01-31 PROCEDURE — 71000015 HC POSTOP RECOV 1ST HR: Performed by: ORTHOPAEDIC SURGERY

## 2023-01-31 PROCEDURE — 63600175 PHARM REV CODE 636 W HCPCS: Performed by: ORTHOPAEDIC SURGERY

## 2023-01-31 PROCEDURE — 36000707: Performed by: ORTHOPAEDIC SURGERY

## 2023-01-31 RX ORDER — KETOROLAC TROMETHAMINE 30 MG/ML
30 INJECTION, SOLUTION INTRAMUSCULAR; INTRAVENOUS ONCE
Status: DISCONTINUED | OUTPATIENT
Start: 2023-01-31 | End: 2023-01-31 | Stop reason: HOSPADM

## 2023-01-31 RX ORDER — SODIUM CHLORIDE 0.9 % (FLUSH) 0.9 %
10 SYRINGE (ML) INJECTION
Status: DISCONTINUED | OUTPATIENT
Start: 2023-01-31 | End: 2023-01-31 | Stop reason: HOSPADM

## 2023-01-31 RX ORDER — EPHEDRINE SULFATE 50 MG/ML
INJECTION, SOLUTION INTRAVENOUS
Status: DISCONTINUED | OUTPATIENT
Start: 2023-01-31 | End: 2023-01-31

## 2023-01-31 RX ORDER — OXYCODONE HYDROCHLORIDE 5 MG/1
5 TABLET ORAL EVERY 6 HOURS PRN
Qty: 15 TABLET | Refills: 0 | Status: SHIPPED | OUTPATIENT
Start: 2023-01-31 | End: 2023-08-17

## 2023-01-31 RX ORDER — LIDOCAINE HYDROCHLORIDE 10 MG/ML
INJECTION, SOLUTION EPIDURAL; INFILTRATION; INTRACAUDAL; PERINEURAL
Status: DISCONTINUED | OUTPATIENT
Start: 2023-01-31 | End: 2023-01-31 | Stop reason: HOSPADM

## 2023-01-31 RX ORDER — IBUPROFEN 800 MG/1
800 TABLET ORAL 3 TIMES DAILY
Qty: 21 TABLET | Refills: 0 | Status: SHIPPED | OUTPATIENT
Start: 2023-01-31 | End: 2023-01-31 | Stop reason: SDUPTHER

## 2023-01-31 RX ORDER — LIDOCAINE HYDROCHLORIDE 10 MG/ML
1 INJECTION, SOLUTION EPIDURAL; INFILTRATION; INTRACAUDAL; PERINEURAL ONCE
Status: DISCONTINUED | OUTPATIENT
Start: 2023-01-31 | End: 2023-01-31 | Stop reason: HOSPADM

## 2023-01-31 RX ORDER — PROPOFOL 10 MG/ML
INJECTION, EMULSION INTRAVENOUS
Status: DISCONTINUED | OUTPATIENT
Start: 2023-01-31 | End: 2023-01-31

## 2023-01-31 RX ORDER — MIDAZOLAM HYDROCHLORIDE 1 MG/ML
INJECTION INTRAMUSCULAR; INTRAVENOUS
Status: DISCONTINUED | OUTPATIENT
Start: 2023-01-31 | End: 2023-01-31

## 2023-01-31 RX ORDER — DEXAMETHASONE SODIUM PHOSPHATE 4 MG/ML
INJECTION, SOLUTION INTRA-ARTICULAR; INTRALESIONAL; INTRAMUSCULAR; INTRAVENOUS; SOFT TISSUE
Status: DISCONTINUED | OUTPATIENT
Start: 2023-01-31 | End: 2023-01-31

## 2023-01-31 RX ORDER — FENTANYL CITRATE 50 UG/ML
25 INJECTION, SOLUTION INTRAMUSCULAR; INTRAVENOUS EVERY 5 MIN PRN
Status: DISCONTINUED | OUTPATIENT
Start: 2023-01-31 | End: 2023-01-31 | Stop reason: HOSPADM

## 2023-01-31 RX ORDER — SODIUM CHLORIDE, SODIUM LACTATE, POTASSIUM CHLORIDE, CALCIUM CHLORIDE 600; 310; 30; 20 MG/100ML; MG/100ML; MG/100ML; MG/100ML
INJECTION, SOLUTION INTRAVENOUS CONTINUOUS
Status: DISCONTINUED | OUTPATIENT
Start: 2023-01-31 | End: 2023-01-31 | Stop reason: HOSPADM

## 2023-01-31 RX ORDER — CEFAZOLIN SODIUM 2 G/50ML
2 SOLUTION INTRAVENOUS
Status: COMPLETED | OUTPATIENT
Start: 2023-01-31 | End: 2023-01-31

## 2023-01-31 RX ORDER — LIDOCAINE HCL/PF 100 MG/5ML
SYRINGE (ML) INTRAVENOUS
Status: DISCONTINUED | OUTPATIENT
Start: 2023-01-31 | End: 2023-01-31

## 2023-01-31 RX ORDER — OXYCODONE HYDROCHLORIDE 5 MG/1
5 TABLET ORAL EVERY 6 HOURS PRN
Qty: 15 TABLET | Refills: 0 | Status: SHIPPED | OUTPATIENT
Start: 2023-01-31 | End: 2023-01-31 | Stop reason: SDUPTHER

## 2023-01-31 RX ORDER — IBUPROFEN 800 MG/1
800 TABLET ORAL 3 TIMES DAILY
Qty: 21 TABLET | Refills: 0 | Status: SHIPPED | OUTPATIENT
Start: 2023-01-31 | End: 2023-02-07

## 2023-01-31 RX ORDER — ONDANSETRON 4 MG/1
4 TABLET, FILM COATED ORAL EVERY 6 HOURS PRN
Qty: 10 TABLET | Refills: 0 | Status: SHIPPED | OUTPATIENT
Start: 2023-01-31 | End: 2023-08-17

## 2023-01-31 RX ORDER — ACETAMINOPHEN 500 MG
1000 TABLET ORAL
Status: DISCONTINUED | OUTPATIENT
Start: 2023-02-01 | End: 2023-01-31

## 2023-01-31 RX ORDER — ONDANSETRON 4 MG/1
4 TABLET, FILM COATED ORAL EVERY 6 HOURS PRN
Qty: 10 TABLET | Refills: 0 | Status: SHIPPED | OUTPATIENT
Start: 2023-01-31 | End: 2023-01-31 | Stop reason: SDUPTHER

## 2023-01-31 RX ORDER — ONDANSETRON 2 MG/ML
INJECTION INTRAMUSCULAR; INTRAVENOUS
Status: DISCONTINUED | OUTPATIENT
Start: 2023-01-31 | End: 2023-01-31

## 2023-01-31 RX ORDER — FENTANYL CITRATE 50 UG/ML
INJECTION, SOLUTION INTRAMUSCULAR; INTRAVENOUS
Status: DISCONTINUED | OUTPATIENT
Start: 2023-01-31 | End: 2023-01-31

## 2023-01-31 RX ORDER — PHENYLEPHRINE HYDROCHLORIDE 10 MG/ML
INJECTION INTRAVENOUS
Status: DISCONTINUED | OUTPATIENT
Start: 2023-01-31 | End: 2023-01-31

## 2023-01-31 RX ORDER — ACETAMINOPHEN 500 MG
1000 TABLET ORAL
Status: COMPLETED | OUTPATIENT
Start: 2023-01-31 | End: 2023-01-31

## 2023-01-31 RX ADMIN — PROPOFOL 220 MG: 10 INJECTION, EMULSION INTRAVENOUS at 07:01

## 2023-01-31 RX ADMIN — FENTANYL CITRATE 50 MCG: 50 INJECTION, SOLUTION INTRAMUSCULAR; INTRAVENOUS at 07:01

## 2023-01-31 RX ADMIN — ONDANSETRON 4 MG: 2 INJECTION, SOLUTION INTRAMUSCULAR; INTRAVENOUS at 08:01

## 2023-01-31 RX ADMIN — DEXAMETHASONE SODIUM PHOSPHATE 4 MG: 4 INJECTION, SOLUTION INTRAMUSCULAR; INTRAVENOUS at 07:01

## 2023-01-31 RX ADMIN — PHENYLEPHRINE HYDROCHLORIDE 50 MCG: 10 INJECTION INTRAVENOUS at 07:01

## 2023-01-31 RX ADMIN — SODIUM CHLORIDE, SODIUM LACTATE, POTASSIUM CHLORIDE, AND CALCIUM CHLORIDE: .6; .31; .03; .02 INJECTION, SOLUTION INTRAVENOUS at 07:01

## 2023-01-31 RX ADMIN — LIDOCAINE HYDROCHLORIDE 100 MG: 20 INJECTION, SOLUTION INTRAVENOUS at 07:01

## 2023-01-31 RX ADMIN — ACETAMINOPHEN 1000 MG: 500 TABLET ORAL at 06:01

## 2023-01-31 RX ADMIN — EPHEDRINE SULFATE 5 MG: 50 INJECTION INTRAVENOUS at 07:01

## 2023-01-31 RX ADMIN — CEFAZOLIN SODIUM 2 G: 2 SOLUTION INTRAVENOUS at 07:01

## 2023-01-31 RX ADMIN — MIDAZOLAM HYDROCHLORIDE 2 MG: 1 INJECTION, SOLUTION INTRAMUSCULAR; INTRAVENOUS at 07:01

## 2023-01-31 NOTE — ANESTHESIA PROCEDURE NOTES
Intubation    Date/Time: 1/31/2023 7:27 AM  Performed by: Jamie Keyes CRNA  Authorized by: Melissa Lombardi MD     Intubation:     Induction:  Intravenous    Intubated:  Postinduction    Mask Ventilation:  Easy mask    Attempts:  1    Attempted By:  CRNA    Difficult Airway Encountered?: No      Complications:  None    Airway Device:  Supraglottic airway/LMA    Airway Device Size:  5.0 (lubricated,easy placement ,no press. to tongue.)    Style/Cuff Inflation:  Cuffed (inflated to minimal occlusive pressure)    Secured at:  The lips    Placement Verified By:  Capnometry    Complicating Factors:  None    Findings Post-Intubation:  BS equal bilateral and atraumatic/condition of teeth unchanged

## 2023-01-31 NOTE — BRIEF OP NOTE
[facility] [place] - Surgery  Brief Operative Note    Surgery Date: 1/31/2023     Surgeon(s) and Role:     * Mallorie Childers MD - Primary    Assisting Surgeon: None    Pre-op Diagnosis:  Ganglion cyst [M67.40]    Post-op Diagnosis:  Post-Op Diagnosis Codes:     * Ganglion cyst [M67.40]    Procedure(s) (LRB):  EXCISION, MASS, EXTREMITY-WRIST (Left)    Anesthesia: General    Operative Findings: left wrist mass    Estimated Blood Loss: <1 cc         Specimens:   Specimen (24h ago, onward)       Start     Ordered    01/31/23 0806  Specimen to Pathology, Surgery Orthopedics  Once        Comments: Pre-op Diagnosis: Ganglion cyst [M67.40]Procedure(s):EXCISION, MASS, EXTREMITY-WRIST Number of specimens: 1Name of specimens: LEFT WRIST MASS     References:    Click here for ordering Quick Tip   Question Answer Comment   Procedure Type: Orthopedics    Which provider would you like to cc? MALLORIE CHILDERS    Release to patient Immediate        01/31/23 0806                      Discharge Note    OUTCOME: Patient tolerated treatment/procedure well without complication and is now ready for discharge.    DISPOSITION: Home or Self Care    FINAL DIAGNOSIS:  Ganglion cyst    FOLLOWUP: In clinic    DISCHARGE INSTRUCTIONS:    Discharge Procedure Orders   Ice to affected area     Keep surgical extremity elevated     Lifting restrictions     Notify your health care provider if you experience any of the following:  temperature >100.4     Notify your health care provider if you experience any of the following:  persistent nausea and vomiting or diarrhea     Notify your health care provider if you experience any of the following:  severe uncontrolled pain     Notify your health care provider if you experience any of the following:  redness, tenderness, or signs of infection (pain, swelling, redness, odor or green/yellow discharge around incision site)     Leave dressing on - Keep it clean, dry, and intact until clinic visit

## 2023-01-31 NOTE — OR NURSING
Patient received in PACU, eyes closed, VSS.  LUE with dressing dry and intact.  Positive movement and sensation, brisk refill, warm to touch.  Denies pain at present secondary to block. LUE elevated, with ice pack to wrist.

## 2023-01-31 NOTE — TRANSFER OF CARE
Anesthesia Transfer of Care Note    Patient: Rito Conley    Procedure(s) Performed: Procedure(s) (LRB):  EXCISION, MASS, EXTREMITY-WRIST (Left)    Patient location: PACU    Anesthesia Type: general    Transport from OR: Transported from OR on room air with adequate spontaneous ventilation    Post pain: adequate analgesia    Post assessment: no apparent anesthetic complications    Post vital signs: stable    Level of consciousness: sedated    Nausea/Vomiting: no nausea/vomiting    Complications: none    Transfer of care protocol was followed      Last vitals:   Visit Vitals  /77 (BP Location: Right arm, Patient Position: Lying)   Pulse 87   Temp 36.4 °C (97.5 °F) (Temporal)   Resp 14   Wt 98.3 kg (216 lb 11 oz)   SpO2 100%   BMI 30.22 kg/m²

## 2023-01-31 NOTE — OP NOTE
Operative Note     Date of Procedure:   1/31/2023    Attending Physician:   Mallorie Hardwick MD    Assistant:  Luz Gann PA-C    Pre-Op Diagnosis:   Left wrist mass     Post-Op Diagnosis:   Left wrist mass    Procedure:   Left wrist mass excision\    Implants:  None    Estimated Blood Loss:   5 cc     Complications:   None     Tourniquet Time:   20 min     Specimens:   Tissue for permanent pathology     Drains:   None     Indications:   This is a 57-year-old male with a mass over the dorsal left wrist.  He desired excision.  We discussed risks and benefits of left wrist mass excision and he elected to proceed in writing.    Procedure in detail:  The patient was identified and marked in same day surgery.  He was brought to the operating room, placed supine on the operating table and underwent general anesthesia. All bony promineces were padded. The left upper extremity was prepped and draped in the normal sterile fashion.  A time out was called confirming the correct site, side, patient procedure and that antibiotics had been given.  A transverse incision was made centered over the mass.  Dissection was carried down through the subcutaneous tissues to the level of the mass.  Full-thickness flaps were raised.  The mass was circumferentially dissected taking care to protect the retinaculum and extensor tendons.  The EPL and EDC were identified and retracted.  The wrist capsule and stalk of the cyst were identified.  The capsule was excised circumferentially around the stalk.  No synovial leaders were identified.  Care was taken during excision of the capsule to protect the scapholunate ligament.  Hemostasis was obtained using the bipolar.  The wound was copiously irrigated with sterile saline.  10 cc of 1% lidocaine was injected in the area for local anesthesia.  The wound was closed with 3-0 nylon, 4-0 Monocryl, and Dermabond.  A sterile dressing was applied and the patient was awakened from anesthesia without  complication    All counts were correct at the end of the procedure.     Post operative plan:  Start immediate range of motion  Return to clinic in 2 weeks for wound check

## 2023-01-31 NOTE — PATIENT INSTRUCTIONS
Ganglion Excision Postoperative Instructions  Mallorie Hardwick MD  Clinic phone number: [phone]    You may use the left hand for simple activities of daily living (writing, brushing teeth etc)  No strenuous activity or heavy lifting with left upper extremity   Start range of motion of fingers (open and close a fist) to prevent stiffness and help swelling resolve  Ice to the hand as needed -- 30 minutes on, 10 minutes off   Keep left upper extremity elevated above the heart as much as possible - this will help with swelling and keep pain under control  Keep dressing clean and dry and leave it on until you are seen in clinic.  If it gets wet please call clinic as soon as possible so the dressing can be changed.     You will be seen in clinic 2 weeks after your surgery.  The dressing will be removed.  After this visit you can advance the range of motion and use of your hand as tolerated with a 5 pound lifting restriction.   After 6 weeks you may return to full activity as tolerated     You may notice that the incision remains sensitive after it is healed.  This is normal and will improve over time.

## 2023-01-31 NOTE — DISCHARGE INSTRUCTIONS
Discharge Instructions for Same Day Surgery     Call the office for and appointment if one has not already been made.     Diet: Drink plenty of fluids the first 48 hours and you may resume your usual diet.     Activity: No heavy lifting. Your doctor's office may have told you to limit your lifting to less weight, or even no weight.  Be sure to follow those instructions.    Note: You may ride in a car and you may drive when comfortable.     Do not drive, drink alcohol, or sign legal documents for 24 hours, or if taking narcotic pain medication.    Dressings: Leave dressing on - Keep it clean, dry, and intact until clinic visit.     Medical: Call the doctor for any of the following problems:   severe pain, bleeding, or abdominal distention (swelling).   If constipated you may take any stool softener you choose.       Notify your health care provider if you experience any of the following:    redness, tenderness, or signs of infection (pain, swelling, redness, odor or green/yellow discharge around incision site)   severe uncontrolled pain   temperature > 100.4     Fall Prevention  Millions of people fall every year and injure themselves. You may have had anesthesia or sedation which may increase your risk of falling. You may have health issues that put you at an increased risk of falling.     Here are ways to reduce your risk of falling.    Make your home safe by keeping walkways clear of objects you may trip over.  Use non-slip pads under rugs. Do not use area rugs or small throw rugs.  Use non-slip mats in bathtubs and showers.  Install handrails and lights on staircases.  Do not walk in poorly lit areas.  Do not stand on chairs or wobbly ladders.  Use caution when reaching overhead or looking upward. This position can cause a loss of balance.  Be sure your shoes fit properly, have non-slip bottoms and are in good condition.   Wear shoes both inside and out. Avoid going barefoot or wearing slippers.  Be cautious when  going up and down stairs, curbs, and when walking on uneven sidewalks.  If your balance is poor, consider using a cane or walker.  If your fall was related to alcohol use, stop or limit alcohol intake.   If your fall was related to use of sleeping medicines, talk to your doctor about this. You may need to reduce your dosage at bedtime if you awaken during the night to go to the bathroom.    To reduce the need for nighttime bathroom trips:  Avoid drinking fluids for several hours before going to bed  Empty your bladder before going to bed  Men can keep a urinal at the bedside  Stay as active as you can. Balance, flexibility, strength, and endurance all come from exercise. They all play a role in preventing falls. Ask your healthcare provider which types of activity are right for you.  Get your vision checked on a regular basis.  If you have pets, know where they are before you stand up or walk so you don't trip over them.  Use night lights.

## 2023-02-01 NOTE — ANESTHESIA POSTPROCEDURE EVALUATION
Anesthesia Post Evaluation    Patient: Rito Conley    Procedure(s) Performed: Procedure(s) (LRB):  EXCISION, MASS, EXTREMITY-WRIST (Left)    Final Anesthesia Type: general      Patient location during evaluation: PACU  Patient participation: Yes- Able to Participate  Level of consciousness: awake and alert, oriented and awake  Post-procedure vital signs: reviewed and stable  Airway patency: patent    PONV status at discharge: No PONV  Anesthetic complications: no      Cardiovascular status: blood pressure returned to baseline  Respiratory status: unassisted, spontaneous ventilation and room air  Hydration status: euvolemic  Follow-up not needed.          Vitals Value Taken Time   /85 01/31/23 1100   Temp 36.7 °C (98.1 °F) 01/31/23 1100   Pulse 84 01/31/23 1100   Resp 18 01/31/23 1100   SpO2 95 % 01/31/23 1100         Event Time   Out of Recovery 09:24:51         Pain/Rodo Score: Pain Rating Prior to Med Admin: 0 (1/31/2023  6:16 AM)  Rodo Score: 10 (1/31/2023 11:00 AM)

## 2023-02-02 LAB
FINAL PATHOLOGIC DIAGNOSIS: NORMAL
GROSS: NORMAL
Lab: NORMAL

## 2023-02-08 ENCOUNTER — OFFICE VISIT (OUTPATIENT)
Dept: ORTHOPEDICS | Facility: CLINIC | Age: 58
End: 2023-02-08
Payer: MEDICARE

## 2023-02-08 DIAGNOSIS — M67.40 GANGLION CYST: Primary | ICD-10-CM

## 2023-02-08 PROCEDURE — 99024 POSTOP FOLLOW-UP VISIT: CPT | Mod: POP,,, | Performed by: ORTHOPAEDIC SURGERY

## 2023-02-08 PROCEDURE — 99999 PR PBB SHADOW E&M-EST. PATIENT-LVL III: ICD-10-PCS | Mod: PBBFAC,,, | Performed by: ORTHOPAEDIC SURGERY

## 2023-02-08 PROCEDURE — 99999 PR PBB SHADOW E&M-EST. PATIENT-LVL III: CPT | Mod: PBBFAC,,, | Performed by: ORTHOPAEDIC SURGERY

## 2023-02-08 PROCEDURE — 99213 OFFICE O/P EST LOW 20 MIN: CPT | Mod: PBBFAC,PN | Performed by: ORTHOPAEDIC SURGERY

## 2023-02-08 PROCEDURE — 99024 PR POST-OP FOLLOW-UP VISIT: ICD-10-PCS | Mod: POP,,, | Performed by: ORTHOPAEDIC SURGERY

## 2023-02-08 NOTE — PROGRESS NOTES
Postoperative Visit    History of Present Illness:   Rito is 2 weeks s/p left ganglion cyst removal  (DOS-1/12/23)  He is doing well -- no pain  Physical Examination:    NAD  left upper extremity:  Incision over the wrist is well healed  No erythema, drainage or other signs of infection. Appropriate post operative swelling is present  No recurrence of cyst   LTSI m/u/r  2+ RP  + EPL, IO, FDS, FDP     Pathology: Ganglion cyst     Assessment/Plan:  57 y.o. male   2 weeks s/p left ganglion cyst removal  (DOS-1/12/23)    Plan  Sutures tails were removed today   Activity as tolerated   Follow up PRN    All questions were answered in detail. The patient is in full agreement with the treatment plan and will proceed accordingly.

## 2023-02-15 ENCOUNTER — OFFICE VISIT (OUTPATIENT)
Dept: FAMILY MEDICINE | Facility: CLINIC | Age: 58
End: 2023-02-15
Payer: MEDICARE

## 2023-02-15 DIAGNOSIS — M54.50 CHRONIC LOW BACK PAIN WITHOUT SCIATICA, UNSPECIFIED BACK PAIN LATERALITY: ICD-10-CM

## 2023-02-15 DIAGNOSIS — G89.29 CHRONIC LOW BACK PAIN WITHOUT SCIATICA, UNSPECIFIED BACK PAIN LATERALITY: ICD-10-CM

## 2023-02-15 PROCEDURE — 99214 PR OFFICE/OUTPT VISIT, EST, LEVL IV, 30-39 MIN: ICD-10-PCS | Mod: 95,,, | Performed by: FAMILY MEDICINE

## 2023-02-15 PROCEDURE — 99214 OFFICE O/P EST MOD 30 MIN: CPT | Mod: 95,,, | Performed by: FAMILY MEDICINE

## 2023-02-15 RX ORDER — OXYCODONE AND ACETAMINOPHEN 5; 325 MG/1; MG/1
1 TABLET ORAL EVERY 4 HOURS PRN
Qty: 31 TABLET | Refills: 0 | Status: SHIPPED | OUTPATIENT
Start: 2023-02-15 | End: 2023-02-15 | Stop reason: SDUPTHER

## 2023-02-15 RX ORDER — OXYCODONE AND ACETAMINOPHEN 5; 325 MG/1; MG/1
1 TABLET ORAL EVERY 4 HOURS PRN
Qty: 31 TABLET | Refills: 0 | Status: SHIPPED | OUTPATIENT
Start: 2023-03-15 | End: 2023-05-09 | Stop reason: SDUPTHER

## 2023-02-15 RX ORDER — OXYCODONE AND ACETAMINOPHEN 5; 325 MG/1; MG/1
1 TABLET ORAL EVERY 4 HOURS PRN
Qty: 31 TABLET | Refills: 0 | Status: SHIPPED | OUTPATIENT
Start: 2023-03-15 | End: 2023-02-15 | Stop reason: SDUPTHER

## 2023-02-15 RX ORDER — OXYCODONE AND ACETAMINOPHEN 5; 325 MG/1; MG/1
1 TABLET ORAL EVERY 4 HOURS PRN
Qty: 31 TABLET | Refills: 0 | Status: SHIPPED | OUTPATIENT
Start: 2023-02-15 | End: 2023-05-09 | Stop reason: SDUPTHER

## 2023-02-15 NOTE — PROGRESS NOTES
Answers submitted by the patient for this visit:  Back Pain Questionnaire (Submitted on 2/15/2023)  Chief Complaint: Back painAnswers submitted by the patient for this visit:  Back Pain Questionnaire (Submitted on 2/15/2023)  Chief Complaint: Back pain  Chronicity: recurrent  Onset: more than 1 month ago  Frequency: constantly  Progression since onset: gradually worsening  Pain location: lumbar spine  Pain quality: aching, cramping  Radiates to: left thigh  Pain is: worse during the day  Aggravated by: position, sitting, standing  Stiffness is present: in the morning  abdominal pain: No  bladder incontinence: No  bowel incontinence: No  chest pain: No  dysuria: No  fever: No  headaches: Yes  leg pain: Yes  numbness: No  paresis: No  paresthesias: No  pelvic pain: No  perianal numbness: No  tingling: No  weakness: Yes  weight loss: No  genital pain: No  hematuria: No  Pain severity: severe  Improvement on treatment: mild    leg pain: Yes  numbness: No  paresis: No  paresthesias: No  pelvic pain: No  perianal numbness: No  tingling: No  weakness: Yes  weight loss: No  genital pain: No  hematuria: No  Pain severity: severe  Improvement on treatment: mild

## 2023-02-15 NOTE — PROGRESS NOTES
Subjective:       Patient ID: Rito Conley is a 57 y.o. male.    Chief Complaint: No chief complaint on file.    The patient location is: [place]  The chief complaint leading to consultation is: medication refills    Visit type: audiovisual    Face to Face time with patient:   8 minutes of total time spent on the encounter, which includes face to face time and non-face to face time preparing to see the patient (eg, review of tests), Obtaining and/or reviewing separately obtained history, Documenting clinical information in the electronic or other health record, Independently interpreting results (not separately reported) and communicating results to the patient/family/caregiver, or Care coordination (not separately reported).         Each patient to whom he or she provides medical services by telemedicine is:  (1) informed of the relationship between the physician and patient and the respective role of any other health care provider with respect to management of the patient; and (2) notified that he or she may decline to receive medical services by telemedicine and may withdraw from such care at any time.    Notes:   57 year old female presents for refills of his pain medication. He states his back is hurting more now. He has it in his lower back and now is achy. He [place] [facility] .     He a      Back Pain  This is a recurrent problem. The current episode started more than 1 month ago. The problem occurs constantly. The problem has been gradually worsening since onset. The pain is present in the lumbar spine. The quality of the pain is described as aching and cramping. The pain radiates to the left thigh. The pain is severe. The pain is Worse during the day. The symptoms are aggravated by position, sitting and standing. Stiffness is present In the morning. Associated symptoms include headaches, leg pain and weakness. Pertinent negatives include no abdominal  pain, bladder incontinence, bowel incontinence, chest pain, dysuria, fever, numbness, paresis, paresthesias, pelvic pain, perianal numbness, tingling or weight loss. The treatment provided mild relief.   Review of Systems   Constitutional:  Negative for fever and weight loss.   Cardiovascular:  Negative for chest pain.   Gastrointestinal:  Negative for abdominal pain and bowel incontinence.   Genitourinary:  Negative for bladder incontinence, dysuria, hematuria and pelvic pain.   Musculoskeletal:  Positive for back pain and leg pain.   Neurological:  Positive for weakness and headaches. Negative for tingling, numbness and paresthesias.       Objective:      Physical Exam    Assessment:       Problem List Items Addressed This Visit    None      Plan:       Diagnoses and all orders for this visit:    Chronic low back pain without sciatica, unspecified back pain laterality  -     oxyCODONE-acetaminophen (PERCOCET) 5-325 mg per tablet; Take 1 tablet by mouth every 4 (four) hours as needed.  -     oxyCODONE-acetaminophen (PERCOCET) 5-325 mg per tablet; Take 1 tablet by mouth every 4 (four) hours as needed for Pain.            Answers submitted by the patient for this visit:  Back Pain Questionnaire (Submitted on 2/15/2023)  Chief Complaint: Back pain  genital pain: No

## 2023-02-24 ENCOUNTER — PES CALL (OUTPATIENT)
Dept: ADMINISTRATIVE | Facility: CLINIC | Age: 58
End: 2023-02-24
Payer: MEDICARE

## 2023-03-14 ENCOUNTER — TELEPHONE (OUTPATIENT)
Dept: ADMINISTRATIVE | Facility: CLINIC | Age: 58
End: 2023-03-14
Payer: MEDICARE

## 2023-03-14 NOTE — TELEPHONE ENCOUNTER
Called pt, informed pt I was calling to remind pt of his in office EAWV on 3/16/23; clinic location provided to patient; pt confirmed appointment

## 2023-03-16 PROBLEM — E66.01 MORBID OBESITY: Status: ACTIVE | Noted: 2023-03-16

## 2023-03-27 ENCOUNTER — TELEPHONE (OUTPATIENT)
Dept: ADMINISTRATIVE | Facility: CLINIC | Age: 58
End: 2023-03-27
Payer: MEDICARE

## 2023-03-29 ENCOUNTER — OFFICE VISIT (OUTPATIENT)
Dept: FAMILY MEDICINE | Facility: CLINIC | Age: 58
End: 2023-03-29
Payer: MEDICARE

## 2023-03-29 VITALS
OXYGEN SATURATION: 97 % | BODY MASS INDEX: 30.71 KG/M2 | HEART RATE: 78 BPM | SYSTOLIC BLOOD PRESSURE: 126 MMHG | DIASTOLIC BLOOD PRESSURE: 76 MMHG | WEIGHT: 219.38 LBS | HEIGHT: 71 IN | TEMPERATURE: 98 F

## 2023-03-29 DIAGNOSIS — Z00.00 ENCOUNTER FOR PREVENTIVE HEALTH EXAMINATION: Primary | ICD-10-CM

## 2023-03-29 DIAGNOSIS — N13.8 BPH WITH OBSTRUCTION/LOWER URINARY TRACT SYMPTOMS: ICD-10-CM

## 2023-03-29 DIAGNOSIS — E66.01 MORBID OBESITY: ICD-10-CM

## 2023-03-29 DIAGNOSIS — Z71.89 ADVANCED DIRECTIVES, COUNSELING/DISCUSSION: ICD-10-CM

## 2023-03-29 DIAGNOSIS — F43.10 PTSD (POST-TRAUMATIC STRESS DISORDER): ICD-10-CM

## 2023-03-29 DIAGNOSIS — F33.1 MAJOR DEPRESSIVE DISORDER, RECURRENT, MODERATE: ICD-10-CM

## 2023-03-29 DIAGNOSIS — N40.1 BPH WITH OBSTRUCTION/LOWER URINARY TRACT SYMPTOMS: ICD-10-CM

## 2023-03-29 PROCEDURE — 99215 OFFICE O/P EST HI 40 MIN: CPT | Mod: PBBFAC,PO | Performed by: NURSE PRACTITIONER

## 2023-03-29 PROCEDURE — 99999 PR PBB SHADOW E&M-EST. PATIENT-LVL V: CPT | Mod: PBBFAC,,, | Performed by: NURSE PRACTITIONER

## 2023-03-29 PROCEDURE — G0439 PR MEDICARE ANNUAL WELLNESS SUBSEQUENT VISIT: ICD-10-PCS | Mod: ,,, | Performed by: NURSE PRACTITIONER

## 2023-03-29 PROCEDURE — 99999 PR PBB SHADOW E&M-EST. PATIENT-LVL V: ICD-10-PCS | Mod: PBBFAC,,, | Performed by: NURSE PRACTITIONER

## 2023-03-29 PROCEDURE — G0439 PPPS, SUBSEQ VISIT: HCPCS | Mod: ,,, | Performed by: NURSE PRACTITIONER

## 2023-03-29 NOTE — PROGRESS NOTES
HPI     Chief Complaint:  AWV    Rito Conley is a 57 y.o. male with multiple medical diagnoses as listed in the medical history and problem list that presented for a Medicare AWV and comprehensive Health Risk Assessment today.  Pt is new to me but is known to this clinic with his last appointment being 10/10/2022.      The following components were reviewed and updated:    Medical history  Family History  Social history  Allergies and Current Medications  Health Risk Assessment  Health Maintenance  Care Team       Assessment & Plan   (all problems are new to me)    Diagnoses and health risks identified today and associated recommendations/orders:    Problem List Items Addressed This Visit          Psychiatric    PTSD (post-traumatic stress disorder)    The current medical regimen is effective;  continue present plan      Major depressive disorder, recurrent, moderate    Reports mood is stable. Denies thoughts of self harm.    The current medical regimen is effective;  continue present plan         Renal/    BPH with obstruction/lower urinary tract symptoms    The current medical regimen is effective;  continue present plan         Endocrine    Morbid obesity    We discussed weight issues and safe, effective ways of losing pounds, includin) diet:  low carbohydrate, low fat diet, stay away from fast food, fried and processed food, use whole grain, lot of fruits and vegetables, use healthy fat such as avocado, nuts and olive oil in reasonable quantity, stay away from sodas. Regular meals with lean proteins.  2) physical activity: ideally 150 min a week, with cardiovascular and resistance activity.  Patient was encouraged to set realistic attainable goals for weight loss, and we will follow up periodically.       Other Visit Diagnoses       Encounter for preventive health examination    -  Primary    Counseled on age appropriate medical preventative services including age appropriate cancer  screenings, age appropriate eye and dental exams, over all nutritional health, need for a consistent exercise regimen, and an over all push towards maintaining a vigorous and active lifestyle.  Counseled on age appropriate vaccines and discussed upcoming health care needs based on age/gender. Discussed good sleep hygiene and stress management.      Relevant Orders    Ambulatory referral/consult to the [facility]    Advanced directives, counseling/discussion      I offered to discuss advanced care planning, including how to pick a person who would make decisions for you if you were unable to make them for yourself, called a health care power of , and what kind of decisions you might make such as use of life sustaining treatments such as ventilators and tube feeding when faced with a life limiting illness recorded on a living will that they will need to know. (How you want to be cared for as you near the end of your natural life)     X Patient is interested in learning more about how to make advanced directives.  I provided them paperwork and offered to discuss this with them.            Review for Opioid Screening: Pt does have Rx for Opioids     Review for Substance Use Disorders: Patient does not abuse use substances    --------------------------------------------    ** See Completed Assessments for Annual Wellness Visit within the encounter summary.**    The following assessments were completed:  Living Situation  CAGE  Depression Screening  Timed Get Up and Go  Whisper Test  Cognitive Function Screening  Nutrition Screening  ADL Screening  PAQ Screening      Provided Rito Conley  with a 5-10 year written screening schedule and personal prevention plan. Recommendations were developed using the USPSTF age appropriate recommendations. Education, counseling, and referrals were provided as needed. After Visit Summary printed and given to patient which includes a list of additional  "screenings\tests needed.    Exam     Review of Systems:  (as noted above)  Review of Systems   Constitutional:  Negative for fever.   HENT:  Negative for trouble swallowing.    Eyes:  Negative for visual disturbance.   Respiratory:  Negative for chest tightness and shortness of breath.    Cardiovascular:  Negative for chest pain.   Gastrointestinal:  Negative for blood in stool.     Physical Exam:   Physical Exam  Constitutional:       General: He is not in acute distress.     Appearance: He is not ill-appearing or diaphoretic.   HENT:      Head: Normocephalic and atraumatic.   Eyes:      General: No scleral icterus.  Cardiovascular:      Rate and Rhythm: Normal rate and regular rhythm.      Pulses: Normal pulses.      Heart sounds: No murmur heard.    No friction rub. No gallop.   Pulmonary:      Effort: No respiratory distress.   Chest:      Chest wall: No tenderness.   Musculoskeletal:      Cervical back: No rigidity.   Skin:     Capillary Refill: Capillary refill takes 2 to 3 seconds.   Neurological:      General: No focal deficit present.      Mental Status: He is alert and oriented to person, place, and time.     Vitals:    03/29/23 1513   BP: 126/76   BP Location: Right arm   Patient Position: Sitting   BP Method: Large (Manual)   Pulse: 78   Temp: 98 °F (36.7 °C)   TempSrc: Oral   SpO2: 97%   Weight: 99.5 kg (219 lb 5.7 oz)   Height: 5' 11" (1.803 m)      Body mass index is 30.59 kg/m².    Clock:                Health Maintenance:  Health Maintenance         Date Due Completion Date    HIV Screening Never done ---    Sign Pain Contract Never done ---    Urine Drug Screen Never done ---    Hemoglobin A1c (Diabetic Prevention Screening) 04/26/2016 4/26/2013    Naloxone Prescription 08/05/2023 8/5/2022    TETANUS VACCINE 10/02/2023 10/2/2013    Lipid Panel 01/10/2028 1/10/2023    Colorectal Cancer Screening 06/04/2031 6/4/2021            Advised patient on the importance of completing overdue health maintenance " items      Follow Up:  Follow up in about 3 months (around 6/29/2023), or if symptoms worsen or fail to improve.    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    Hyperlipidemia     Hypertension     Lower back pain     Major depressive disorder, recurrent, moderate 12/30/2022    PTSD (post-traumatic stress disorder)     Sleep apnea     uses CPAP 1-2x/week       Past Surgical History:  Past Surgical History:   Procedure Laterality Date    COLONOSCOPY      COLONOSCOPY N/A 6/4/2021    Procedure: COLONOSCOPY;  Surgeon: Shanthi Horton MD;  Location: [facility] ENDO;  Service: Endoscopy;  Laterality: N/A;  prep instr portal,   6/3 pt v/u of earlier arrival time and prep time -ml    ENDOSCOPIC NASAL SEPTOPLASTY N/A 10/13/2020    Procedure: SEPTOPLASTY, NOSE, ENDOSCOPIC;  Surgeon: Nick Gomez MD;  Location: [facility] OR;  Service: ENT;  Laterality: N/A;  RN PRE OP 9- COVID NEGATIVE ON  10-  [place]    ESOPHAGOGASTRODUODENOSCOPY N/A 6/17/2020    Procedure: EGD (ESOPHAGOGASTRODUODENOSCOPY);  Surgeon: Jarad Holm MD;  [facility]  Service: Endoscopy;  Laterality: N/A;  covid test 6/15-[place]    EXCISION OF MASS OF EXTREMITY Left 1/31/2023    Procedure: EXCISION, MASS, EXTREMITY-WRIST;  Surgeon: Mallorie Hardwick MD;  Location: [facility] OR;  Service: Orthopedics;  Laterality: Left;  RN PREOP 1/26/2023---CLEARED BY PCP    HERNIA REPAIR      NASAL ENDOSCOPY Bilateral 10/13/2020    Procedure: ENDOSCOPY, NOSE;  Surgeon: Nick Gomez MD;  Location: [facility] OR;  Service: ENT;  Laterality: Bilateral;    NASAL STENOSIS REPAIR Bilateral 10/13/2020    Procedure: REPAIR, STENOSIS, NOSE, VESTIBULE;  Surgeon: Nick Gomez MD;  Location: [facility] OR;  Service: ENT;  Laterality: Bilateral;  Radiusaer console and wand available for use.  NO DISC  10/7/2020@  247PM-LO       Social History:  Social History     Socioeconomic History    Marital status:    Tobacco Use    Smoking status: Never    Smokeless tobacco: Never   Substance and Sexual Activity    Alcohol use: Yes     Alcohol/week: 7.0 standard drinks     Types: 4 Glasses of wine, 1 Cans of beer, 2 Shots of liquor per week    Drug use: Yes     Types: Oxycodone     Comment: sometimes 1 tab weekly as needed; probably 2 tabs a month    Sexual activity: Yes     Partners: Female     Social Determinants of Health     Financial Resource Strain: Low Risk     Difficulty of Paying Living Expenses: Not hard at all   Food Insecurity: No Food Insecurity    Worried About Running Out of Food in the Last Year: Never true    Ran Out of Food in the Last Year: Never true   Transportation Needs: No Transportation Needs    Lack of Transportation (Medical): No    Lack of Transportation (Non-Medical): No   Physical Activity: Insufficiently Active    Days of Exercise per Week: 2 days    Minutes of Exercise per Session: 30 min   Stress: No Stress Concern Present    Feeling of Stress : Only a little   Social Connections: Moderately Isolated    Frequency of Communication with Friends and Family: Twice a week    Frequency of Social Gatherings with Friends and Family: Once a week    Attends [employer] Services: Never    Active Member of Clubs or Organizations: No    Attends Club or Organization Meetings: Never    Marital Status:    Housing Stability: Low Risk     Unable to Pay for Housing in the Last Year: No    Number of Places Lived in the Last Year: 1    Unstable Housing in the Last Year: No       Family History:  Family History   Problem Relation Age of Onset    Hypertension Mother     Other Father         agent orange exposure     Heart attacks under age 50 Sister     No Known Problems Brother     No Known Problems Daughter     No Known Problems Son     No Known Problems Son     No Known Problems Son     No Known Problems Son      Cancer Maternal Aunt         breast    Cancer Maternal Grandmother         something in her arm     Amblyopia Neg Hx     Blindness Neg Hx     Cataracts Neg Hx     Diabetes Neg Hx     Glaucoma Neg Hx     Macular degeneration Neg Hx     Retinal detachment Neg Hx     Strabismus Neg Hx     Stroke Neg Hx     Thyroid disease Neg Hx        Allergies and Medications: (updated and reviewed)  Review of patient's allergies indicates:   Allergen Reactions    Grass pollen-june grass standard Itching, Other (See Comments) and Rash     Current Outpatient Medications   Medication Sig Dispense Refill    amLODIPine (NORVASC) 10 MG tablet Take 1 tablet (10 mg total) by mouth once daily. 90 tablet 1    atorvastatin (LIPITOR) 40 MG tablet Take 1 tablet (40 mg total) by mouth every evening. 90 tablet 3    azelastine (ASTELIN) 137 mcg (0.1 %) nasal spray 1 spray (137 mcg total) by Nasal route 2 (two) times daily. 30 mL 1    buPROPion (WELLBUTRIN XL) 150 MG TB24 tablet Take 150 mg by mouth once daily.       cetirizine (ZYRTEC) 10 MG tablet TAKE ONE TABLET BY MOUTH ONCE DAILY FOR ALLERGIES 90 tablet 3    erythromycin (ROMYCIN) ophthalmic ointment Place into the left eye every 6 (six) hours. 3.5 g 0    fluticasone propionate (FLONASE) 50 mcg/actuation nasal spray 1 spray (50 mcg total) by Each Nostril route 2 (two) times daily. 58 g 3    hydrocortisone 2.5 % cream Apply topically 2 (two) times daily. 20 g 2    naloxone (NARCAN) 4 mg/actuation Spry 4mg by nasal route as needed for opioid overdose; may repeat every 2-3 minutes in alternating nostrils until medical help arrives. Call 911 1 each 11    omeprazole (PRILOSEC) 40 MG capsule Take 1 capsule (40 mg total) by mouth 2 (two) times daily before meals. 180 capsule 1    ondansetron (ZOFRAN) 4 MG tablet Take 1 tablet (4 mg total) by mouth every 6 (six) hours as needed for Nausea. 10 tablet 0    ondansetron (ZOFRAN-ODT) 8 MG TbDL Take 1 tablet (8 mg total) by mouth every 6 (six) hours as  needed. For nausea 15 tablet 0    oxybutynin (DITROPAN XL) 15 MG TR24 Take 1 tablet (15 mg total) by mouth once daily. 90 tablet 3    oxyCODONE (ROXICODONE) 5 MG immediate release tablet Take 1 tablet (5 mg total) by mouth every 6 (six) hours as needed for Pain. 15 tablet 0    oxyCODONE-acetaminophen (PERCOCET) 5-325 mg per tablet Take 1 tablet by mouth every 4 (four) hours as needed for Pain. 31 tablet 0    oxyCODONE-acetaminophen (PERCOCET) 5-325 mg per tablet Take 1 tablet by mouth every 4 (four) hours as needed. 31 tablet 0    oxyCODONE-acetaminophen (PERCOCET) 5-325 mg per tablet Take 1 tablet by mouth every 4 (four) hours as needed for Pain. 31 tablet 0    paroxetine (PAXIL) 20 MG tablet TAKE ONE-HALF TABLET BY MOUTH EVERY DAY FOR MENTAL HEALTH      sildenafiL (VIAGRA) 100 MG tablet Take 1 tablet (100 mg total) by mouth daily as needed for Erectile Dysfunction. 30 tablet 11    zolpidem (AMBIEN) 10 mg Tab Take 1 tablet (10 mg total) by mouth nightly as needed. 30 tablet 5     No current facility-administered medications for this visit.           - The patient is given an After Visit Summary that lists all medications with directions, allergies, education, orders placed during this encounter and follow-up instructions.      - I have reviewed the patient's medical information including past medical, family, and social history sections including the medications and allergies.      - We discussed the patient's current medications.     This note was created by combination of typed  and MModal dictation.  Transcription errors may be present.  If there are any questions, please contact me.       Mp Conley NP

## 2023-03-29 NOTE — PATIENT INSTRUCTIONS
Counseling and Referral of Other Preventative  (Italic type indicates deductible and co-insurance are waived)    Patient Name: Rito Conley  Today's Date: 3/29/2023    Health Maintenance       Date Due Completion Date    HIV Screening Never done ---    Sign Pain Contract Never done ---    Urine Drug Screen Never done ---    Hemoglobin A1c (Diabetic Prevention Screening) 04/26/2016 4/26/2013    Naloxone Prescription 08/05/2023 8/5/2022    TETANUS VACCINE 10/02/2023 10/2/2013    Lipid Panel 01/10/2028 1/10/2023    Colorectal Cancer Screening 06/04/2031 6/4/2021        No orders of the defined types were placed in this encounter.      The following information is provided to all patients.  This information is to help you find resources for any of the problems found today that may be affecting your health:                Living healthy guide: www.[facility].[place].gov      Understanding Diabetes: www.diabetes.org      Eating healthy: www.cdc.gov/healthyweight      CDC home safety checklist: www.cdc.gov/steadi/patient.html      Agency on Aging: www.goea.[place].[facility]      Alcoholics anonymous (AA): www.aa.org      Physical Activity: www.[place].nih.gov/tq9yelb      Tobacco use: www.quitwithusla.org

## 2023-04-13 ENCOUNTER — PATIENT MESSAGE (OUTPATIENT)
Dept: SURGERY | Facility: HOSPITAL | Age: 58
End: 2023-04-13
Payer: MEDICARE

## 2023-05-08 ENCOUNTER — LAB VISIT (OUTPATIENT)
Dept: LAB | Facility: HOSPITAL | Age: 58
End: 2023-05-08
Attending: FAMILY MEDICINE
Payer: MEDICARE

## 2023-05-08 DIAGNOSIS — I11.9 BENIGN HYPERTENSIVE HEART DISEASE WITHOUT HEART FAILURE: ICD-10-CM

## 2023-05-08 LAB
ALBUMIN SERPL BCP-MCNC: 3.8 G/DL (ref 3.5–5.2)
ALP SERPL-CCNC: 115 U/L (ref 55–135)
ALT SERPL W/O P-5'-P-CCNC: 35 U/L (ref 10–44)
ANION GAP SERPL CALC-SCNC: 9 MMOL/L (ref 8–16)
AST SERPL-CCNC: 26 U/L (ref 10–40)
BASOPHILS # BLD AUTO: 0.04 K/UL (ref 0–0.2)
BASOPHILS NFR BLD: 0.7 % (ref 0–1.9)
BILIRUB SERPL-MCNC: 0.3 MG/DL (ref 0.1–1)
BUN SERPL-MCNC: 16 MG/DL (ref 6–20)
CALCIUM SERPL-MCNC: 9.1 MG/DL (ref 8.7–10.5)
CHLORIDE SERPL-SCNC: 105 MMOL/L (ref 95–110)
CHOLEST SERPL-MCNC: 154 MG/DL (ref 120–199)
CHOLEST/HDLC SERPL: 3.4 {RATIO} (ref 2–5)
CO2 SERPL-SCNC: 26 MMOL/L (ref 23–29)
CREAT SERPL-MCNC: 0.9 MG/DL (ref 0.5–1.4)
DIFFERENTIAL METHOD: ABNORMAL
EOSINOPHIL # BLD AUTO: 0.2 K/UL (ref 0–0.5)
EOSINOPHIL NFR BLD: 3.9 % (ref 0–8)
ERYTHROCYTE [DISTWIDTH] IN BLOOD BY AUTOMATED COUNT: 14.1 % (ref 11.5–14.5)
EST. GFR  (NO RACE VARIABLE): >60 ML/MIN/1.73 M^2
GLUCOSE SERPL-MCNC: 94 MG/DL (ref 70–110)
HCT VFR BLD AUTO: 44.3 % (ref 40–54)
HDLC SERPL-MCNC: 45 MG/DL (ref 40–75)
HDLC SERPL: 29.2 % (ref 20–50)
HGB BLD-MCNC: 13.8 G/DL (ref 14–18)
IMM GRANULOCYTES # BLD AUTO: 0.01 K/UL (ref 0–0.04)
IMM GRANULOCYTES NFR BLD AUTO: 0.2 % (ref 0–0.5)
LDLC SERPL CALC-MCNC: 87.6 MG/DL (ref 63–159)
LYMPHOCYTES # BLD AUTO: 3.1 K/UL (ref 1–4.8)
LYMPHOCYTES NFR BLD: 52 % (ref 18–48)
MCH RBC QN AUTO: 29.1 PG (ref 27–31)
MCHC RBC AUTO-ENTMCNC: 31.2 G/DL (ref 32–36)
MCV RBC AUTO: 93 FL (ref 82–98)
MONOCYTES # BLD AUTO: 0.6 K/UL (ref 0.3–1)
MONOCYTES NFR BLD: 9.7 % (ref 4–15)
NEUTROPHILS # BLD AUTO: 2 K/UL (ref 1.8–7.7)
NEUTROPHILS NFR BLD: 33.5 % (ref 38–73)
NONHDLC SERPL-MCNC: 109 MG/DL
NRBC BLD-RTO: 0 /100 WBC
PLATELET # BLD AUTO: 307 K/UL (ref 150–450)
PMV BLD AUTO: 10.2 FL (ref 9.2–12.9)
POTASSIUM SERPL-SCNC: 3.8 MMOL/L (ref 3.5–5.1)
PROT SERPL-MCNC: 7.4 G/DL (ref 6–8.4)
RBC # BLD AUTO: 4.75 M/UL (ref 4.6–6.2)
SODIUM SERPL-SCNC: 140 MMOL/L (ref 136–145)
TRIGL SERPL-MCNC: 107 MG/DL (ref 30–150)
WBC # BLD AUTO: 5.86 K/UL (ref 3.9–12.7)

## 2023-05-08 PROCEDURE — 80061 LIPID PANEL: CPT | Performed by: FAMILY MEDICINE

## 2023-05-08 PROCEDURE — 80053 COMPREHEN METABOLIC PANEL: CPT | Performed by: FAMILY MEDICINE

## 2023-05-08 PROCEDURE — 85025 COMPLETE CBC W/AUTO DIFF WBC: CPT | Performed by: FAMILY MEDICINE

## 2023-05-08 PROCEDURE — 36415 COLL VENOUS BLD VENIPUNCTURE: CPT | Mod: PO | Performed by: FAMILY MEDICINE

## 2023-05-09 ENCOUNTER — OFFICE VISIT (OUTPATIENT)
Dept: FAMILY MEDICINE | Facility: CLINIC | Age: 58
End: 2023-05-09
Payer: MEDICARE

## 2023-05-09 VITALS
OXYGEN SATURATION: 97 % | DIASTOLIC BLOOD PRESSURE: 74 MMHG | BODY MASS INDEX: 31.02 KG/M2 | HEIGHT: 71 IN | WEIGHT: 221.56 LBS | TEMPERATURE: 98 F | SYSTOLIC BLOOD PRESSURE: 136 MMHG | HEART RATE: 68 BPM

## 2023-05-09 DIAGNOSIS — E78.2 MIXED HYPERLIPIDEMIA: ICD-10-CM

## 2023-05-09 DIAGNOSIS — K21.9 LARYNGOPHARYNGEAL REFLUX (LPR): ICD-10-CM

## 2023-05-09 DIAGNOSIS — G47.33 OSA (OBSTRUCTIVE SLEEP APNEA): ICD-10-CM

## 2023-05-09 DIAGNOSIS — R06.83 SNORING: Primary | ICD-10-CM

## 2023-05-09 DIAGNOSIS — M54.50 CHRONIC LOW BACK PAIN WITHOUT SCIATICA, UNSPECIFIED BACK PAIN LATERALITY: ICD-10-CM

## 2023-05-09 DIAGNOSIS — I11.9 BENIGN HYPERTENSIVE HEART DISEASE WITHOUT HEART FAILURE: ICD-10-CM

## 2023-05-09 DIAGNOSIS — K21.9 GASTROESOPHAGEAL REFLUX DISEASE WITHOUT ESOPHAGITIS: ICD-10-CM

## 2023-05-09 DIAGNOSIS — G89.29 CHRONIC LOW BACK PAIN WITHOUT SCIATICA, UNSPECIFIED BACK PAIN LATERALITY: ICD-10-CM

## 2023-05-09 PROCEDURE — 99214 OFFICE O/P EST MOD 30 MIN: CPT | Mod: S$PBB,,, | Performed by: FAMILY MEDICINE

## 2023-05-09 PROCEDURE — 99214 OFFICE O/P EST MOD 30 MIN: CPT | Mod: PBBFAC,PO | Performed by: FAMILY MEDICINE

## 2023-05-09 PROCEDURE — 99214 PR OFFICE/OUTPT VISIT, EST, LEVL IV, 30-39 MIN: ICD-10-PCS | Mod: S$PBB,,, | Performed by: FAMILY MEDICINE

## 2023-05-09 PROCEDURE — 99999 PR PBB SHADOW E&M-EST. PATIENT-LVL IV: ICD-10-PCS | Mod: PBBFAC,,, | Performed by: FAMILY MEDICINE

## 2023-05-09 PROCEDURE — 99999 PR PBB SHADOW E&M-EST. PATIENT-LVL IV: CPT | Mod: PBBFAC,,, | Performed by: FAMILY MEDICINE

## 2023-05-09 RX ORDER — ATORVASTATIN CALCIUM 40 MG/1
40 TABLET, FILM COATED ORAL NIGHTLY
Qty: 90 TABLET | Refills: 1 | Status: SHIPPED | OUTPATIENT
Start: 2023-05-09 | End: 2024-01-19 | Stop reason: SDUPTHER

## 2023-05-09 RX ORDER — ATORVASTATIN CALCIUM 40 MG/1
40 TABLET, FILM COATED ORAL NIGHTLY
Qty: 90 TABLET | Refills: 1 | Status: SHIPPED | OUTPATIENT
Start: 2023-05-09 | End: 2023-05-09 | Stop reason: SDUPTHER

## 2023-05-09 RX ORDER — AMLODIPINE BESYLATE 10 MG/1
10 TABLET ORAL DAILY
Qty: 90 TABLET | Refills: 1 | Status: SHIPPED | OUTPATIENT
Start: 2023-05-09 | End: 2023-05-09 | Stop reason: SDUPTHER

## 2023-05-09 RX ORDER — AMLODIPINE BESYLATE 10 MG/1
10 TABLET ORAL DAILY
Qty: 90 TABLET | Refills: 1 | Status: SHIPPED | OUTPATIENT
Start: 2023-05-09 | End: 2024-01-19 | Stop reason: SDUPTHER

## 2023-05-09 RX ORDER — OXYCODONE AND ACETAMINOPHEN 5; 325 MG/1; MG/1
1 TABLET ORAL EVERY 4 HOURS PRN
Qty: 31 TABLET | Refills: 0 | Status: SHIPPED | OUTPATIENT
Start: 2023-05-09 | End: 2024-02-02 | Stop reason: SDUPTHER

## 2023-05-09 RX ORDER — OXYCODONE AND ACETAMINOPHEN 5; 325 MG/1; MG/1
1 TABLET ORAL EVERY 4 HOURS PRN
Qty: 31 TABLET | Refills: 0 | Status: SHIPPED | OUTPATIENT
Start: 2023-06-09 | End: 2023-05-09 | Stop reason: SDUPTHER

## 2023-05-09 RX ORDER — OXYCODONE AND ACETAMINOPHEN 5; 325 MG/1; MG/1
1 TABLET ORAL EVERY 4 HOURS PRN
Qty: 31 TABLET | Refills: 0 | Status: SHIPPED | OUTPATIENT
Start: 2023-07-09

## 2023-05-09 RX ORDER — OXYCODONE AND ACETAMINOPHEN 5; 325 MG/1; MG/1
1 TABLET ORAL EVERY 4 HOURS PRN
Qty: 31 TABLET | Refills: 0 | Status: SHIPPED | OUTPATIENT
Start: 2023-05-09 | End: 2023-05-09 | Stop reason: SDUPTHER

## 2023-05-09 RX ORDER — OMEPRAZOLE 40 MG/1
40 CAPSULE, DELAYED RELEASE ORAL
Qty: 180 CAPSULE | Refills: 1 | Status: SHIPPED | OUTPATIENT
Start: 2023-05-09 | End: 2024-01-19 | Stop reason: SDUPTHER

## 2023-05-09 RX ORDER — OXYCODONE AND ACETAMINOPHEN 5; 325 MG/1; MG/1
1 TABLET ORAL EVERY 4 HOURS PRN
Qty: 31 TABLET | Refills: 0 | Status: SHIPPED | OUTPATIENT
Start: 2023-07-09 | End: 2023-05-09 | Stop reason: SDUPTHER

## 2023-05-09 RX ORDER — IBUPROFEN 800 MG/1
TABLET ORAL
COMMUNITY
Start: 2023-01-31 | End: 2024-01-19 | Stop reason: SDUPTHER

## 2023-05-09 RX ORDER — OXYCODONE AND ACETAMINOPHEN 5; 325 MG/1; MG/1
1 TABLET ORAL EVERY 4 HOURS PRN
Qty: 31 TABLET | Refills: 0 | Status: SHIPPED | OUTPATIENT
Start: 2023-06-09 | End: 2023-08-17

## 2023-05-09 RX ORDER — OMEPRAZOLE 40 MG/1
40 CAPSULE, DELAYED RELEASE ORAL
Qty: 180 CAPSULE | Refills: 1 | Status: SHIPPED | OUTPATIENT
Start: 2023-05-09 | End: 2023-05-09 | Stop reason: SDUPTHER

## 2023-05-09 NOTE — PROGRESS NOTES
"Subjective     Patient ID: Rito Conley is a 58 y.o. male.    Chief Complaint: Snoring (Would like to get tested for sleep apnea.) and Medication Refill    58 year old male presents for refills of his pain medication. He is on oxycodone for chronic lower back pain. He is due for refills of his medications.     He would like a referral for sleep apnea.     Medication Refill    Review of Systems       Objective   Vitals:    05/09/23 1509 05/09/23 1528   BP: (!) 140/94 136/74   Pulse: 68    Temp: 98.3 °F (36.8 °C)    TempSrc: Oral    SpO2: 97%    Weight: 100.5 kg (221 lb 9 oz)    Height: 5' 11" (1.803 m)        Physical Exam  Constitutional:       General: He is not in acute distress.     Appearance: Normal appearance. He is well-developed. He is not ill-appearing, toxic-appearing or diaphoretic.   HENT:      Head: Normocephalic and atraumatic.   Eyes:      Conjunctiva/sclera: Conjunctivae normal.   Neck:      Vascular: No carotid bruit.   Cardiovascular:      Rate and Rhythm: Normal rate and regular rhythm.      Heart sounds: Normal heart sounds. No murmur heard.    No friction rub. No gallop.   Pulmonary:      Effort: Pulmonary effort is normal. No respiratory distress.      Breath sounds: Normal breath sounds. No stridor. No wheezing, rhonchi or rales.   Musculoskeletal:      Cervical back: Normal range of motion and neck supple.      Right lower leg: No edema.      Left lower leg: No edema.   Lymphadenopathy:      Cervical: No cervical adenopathy.   Neurological:      Mental Status: He is alert and oriented to person, place, and time.   Psychiatric:         Mood and Affect: Mood normal.         Behavior: Behavior normal.          Assessment and Plan     Problem List Items Addressed This Visit       Benign hypertensive heart disease without heart failure    Relevant Medications    amLODIPine (NORVASC) 10 MG tablet     Other Visit Diagnoses       Snoring    -  Primary    Relevant Orders    Ambulatory " referral/consult to Sleep Disorders    Mixed hyperlipidemia        Relevant Medications    atorvastatin (LIPITOR) 40 MG tablet    Chronic low back pain without sciatica, unspecified back pain laterality        Relevant Medications    oxyCODONE-acetaminophen (PERCOCET) 5-325 mg per tablet    oxyCODONE-acetaminophen (PERCOCET) 5-325 mg per tablet (Start on 6/9/2023)    oxyCODONE-acetaminophen (PERCOCET) 5-325 mg per tablet (Start on 7/9/2023)    Gastroesophageal reflux disease without esophagitis        Relevant Medications    omeprazole (PRILOSEC) 40 MG capsule    Laryngopharyngeal reflux (LPR)        Relevant Medications    omeprazole (PRILOSEC) 40 MG capsule    LADI (obstructive sleep apnea)        Relevant Orders    Ambulatory referral/consult to Sleep Disorders            Rito was seen today for snoring and medication refill.    Diagnoses and all orders for this visit:    Snoring  -     Ambulatory referral/consult to Sleep Disorders; Future    Benign hypertensive heart disease without heart failure  -     Discontinue: amLODIPine (NORVASC) 10 MG tablet; Take 1 tablet (10 mg total) by mouth once daily.  -     amLODIPine (NORVASC) 10 MG tablet; Take 1 tablet (10 mg total) by mouth once daily.    Mixed hyperlipidemia  -     Discontinue: atorvastatin (LIPITOR) 40 MG tablet; Take 1 tablet (40 mg total) by mouth every evening.  -     atorvastatin (LIPITOR) 40 MG tablet; Take 1 tablet (40 mg total) by mouth every evening.    Chronic low back pain without sciatica, unspecified back pain laterality  -     Discontinue: oxyCODONE-acetaminophen (PERCOCET) 5-325 mg per tablet; Take 1 tablet by mouth every 4 (four) hours as needed for Pain.  -     Discontinue: oxyCODONE-acetaminophen (PERCOCET) 5-325 mg per tablet; Take 1 tablet by mouth every 4 (four) hours as needed for Pain.  -     Discontinue: oxyCODONE-acetaminophen (PERCOCET) 5-325 mg per tablet; Take 1 tablet by mouth every 4 (four) hours as needed for Pain.  -      oxyCODONE-acetaminophen (PERCOCET) 5-325 mg per tablet; Take 1 tablet by mouth every 4 (four) hours as needed for Pain.  -     oxyCODONE-acetaminophen (PERCOCET) 5-325 mg per tablet; Take 1 tablet by mouth every 4 (four) hours as needed for Pain.  -     oxyCODONE-acetaminophen (PERCOCET) 5-325 mg per tablet; Take 1 tablet by mouth every 4 (four) hours as needed for Pain.    Gastroesophageal reflux disease without esophagitis  -     Discontinue: omeprazole (PRILOSEC) 40 MG capsule; Take 1 capsule (40 mg total) by mouth 2 (two) times daily before meals.  -     omeprazole (PRILOSEC) 40 MG capsule; Take 1 capsule (40 mg total) by mouth 2 (two) times daily before meals.    Laryngopharyngeal reflux (LPR)  -     Discontinue: omeprazole (PRILOSEC) 40 MG capsule; Take 1 capsule (40 mg total) by mouth 2 (two) times daily before meals.  -     omeprazole (PRILOSEC) 40 MG capsule; Take 1 capsule (40 mg total) by mouth 2 (two) times daily before meals.    LADI (obstructive sleep apnea)  -     Ambulatory referral/consult to Sleep Disorders; Future      ]\

## 2023-05-17 ENCOUNTER — OFFICE VISIT (OUTPATIENT)
Dept: FAMILY MEDICINE | Facility: CLINIC | Age: 58
End: 2023-05-17
Payer: MEDICARE

## 2023-05-17 VITALS
SYSTOLIC BLOOD PRESSURE: 126 MMHG | DIASTOLIC BLOOD PRESSURE: 86 MMHG | HEART RATE: 83 BPM | BODY MASS INDEX: 30.65 KG/M2 | WEIGHT: 218.94 LBS | TEMPERATURE: 98 F | HEIGHT: 71 IN | OXYGEN SATURATION: 96 %

## 2023-05-17 DIAGNOSIS — M54.50 LOW BACK PAIN, UNSPECIFIED BACK PAIN LATERALITY, UNSPECIFIED CHRONICITY, UNSPECIFIED WHETHER SCIATICA PRESENT: ICD-10-CM

## 2023-05-17 DIAGNOSIS — M47.816 OSTEOARTHRITIS OF LUMBAR SPINE, UNSPECIFIED SPINAL OSTEOARTHRITIS COMPLICATION STATUS: Primary | ICD-10-CM

## 2023-05-17 DIAGNOSIS — R35.0 URINARY FREQUENCY: ICD-10-CM

## 2023-05-17 PROCEDURE — 99214 PR OFFICE/OUTPT VISIT, EST, LEVL IV, 30-39 MIN: ICD-10-PCS | Mod: S$PBB,,, | Performed by: INTERNAL MEDICINE

## 2023-05-17 PROCEDURE — 99999 PR PBB SHADOW E&M-EST. PATIENT-LVL IV: CPT | Mod: PBBFAC,,, | Performed by: INTERNAL MEDICINE

## 2023-05-17 PROCEDURE — 99214 OFFICE O/P EST MOD 30 MIN: CPT | Mod: PBBFAC,PO | Performed by: INTERNAL MEDICINE

## 2023-05-17 PROCEDURE — 99214 OFFICE O/P EST MOD 30 MIN: CPT | Mod: S$PBB,,, | Performed by: INTERNAL MEDICINE

## 2023-05-17 PROCEDURE — 99999 PR PBB SHADOW E&M-EST. PATIENT-LVL IV: ICD-10-PCS | Mod: PBBFAC,,, | Performed by: INTERNAL MEDICINE

## 2023-05-17 NOTE — PROGRESS NOTES
CHIEF COMPLAINT:   Chief Complaint   Patient presents with    Back Pain    Urinary Frequency        274}  HISTORY OF PRESENT ILLNESS:  Rito Conley is a 58 y.o. male who presents to the clinic today for Back Pain and Urinary Frequency  .      The patient presents to clinic today for complaint of urinary frequency.  This has been present for couple of days.  He states that the frequency only occurs at night.  He also has an ache across his lower back.  He has known degenerative disc disease in his lumbar spine.  Generally it does not give him any problems.  [facility]  He did therapy in the past and has a back brace, but has not needed any intervention recently.  He works out regularly and does back exercises occasionally.  He denies fever or hematuria.  No significant dysuria.      PAST MEDICAL HISTORY:  Past Medical History:   Diagnosis Date    Allergy     Class 1 obesity due to excess calories with serious comorbidity in adult 3/14/2022    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    Hyperlipidemia     Hypertension     Lower back pain     Major depressive disorder, recurrent, moderate 12/30/2022    PTSD (post-traumatic stress disorder)     Sleep apnea     uses CPAP 1-2x/week       PAST SURGICAL HISTORY:  Past Surgical History:   Procedure Laterality Date    COLONOSCOPY      COLONOSCOPY N/A 6/4/2021    Procedure: COLONOSCOPY;  Surgeon: Shanthi Horton MD;  Location: [facility];  Service: Endoscopy;  Laterality: N/A;  prep instr portal,   6/3 pt v/u of earlier arrival time and prep time -ml    ENDOSCOPIC NASAL SEPTOPLASTY N/A 10/13/2020    Procedure: SEPTOPLASTY, NOSE, ENDOSCOPIC;  Surgeon: Nick Gomez MD;  Location: [facility] OR;  Service: ENT;  Laterality: N/A;  RN PRE OP 9- COVID NEGATIVE ON  10-  [place]    ESOPHAGOGASTRODUODENOSCOPY N/A 6/17/2020    Procedure: EGD (ESOPHAGOGASTRODUODENOSCOPY);  Surgeon: Jarad Holm MD;  Location: [facility] ([address] [place] [address]);   Service: Endoscopy;  Laterality: N/A;  covid test 6/15-[place]    EXCISION OF MASS OF EXTREMITY Left 1/31/2023    Procedure: EXCISION, MASS, EXTREMITY-WRIST;  Surgeon: Mallorie Hardwick MD;  Location: [facility] OR;  Service: Orthopedics;  Laterality: Left;  RN PREOP 1/26/2023---CLEARED BY PCP    HERNIA REPAIR      NASAL ENDOSCOPY Bilateral 10/13/2020    Procedure: ENDOSCOPY, NOSE;  Surgeon: Nick Gomez MD;  Location: [facility] OR;  Service: ENT;  Laterality: Bilateral;    NASAL STENOSIS REPAIR Bilateral 10/13/2020    Procedure: REPAIR, STENOSIS, NOSE, VESTIBULE;  Surgeon: Nick Gomez MD;  Location: [facility] OR;  Service: ENT;  Laterality: Bilateral;  Vivaer console and wand available for use.  NO DISC  10/7/2020@ 247PM-LO       SOCIAL HISTORY:  Social History     Socioeconomic History    Marital status:    Tobacco Use    Smoking status: Never    Smokeless tobacco: Never   Substance and Sexual Activity    Alcohol use: Yes     Alcohol/week: 7.0 standard drinks     Types: 4 Glasses of wine, 1 Cans of beer, 2 Shots of liquor per week    Drug use: Yes     Types: Oxycodone     Comment: sometimes 1 tab weekly as needed; probably 2 tabs a month    Sexual activity: Yes     Partners: Female     Social Determinants of Health     Financial Resource Strain: Low Risk     Difficulty of Paying Living Expenses: Not hard at all   Food Insecurity: No Food Insecurity    Worried About Running Out of Food in the Last Year: Never true    Ran Out of Food in the Last Year: Never true   Transportation Needs: No Transportation Needs    Lack of Transportation (Medical): No    Lack of Transportation (Non-Medical): No   Physical Activity: Insufficiently Active    Days of Exercise per Week: 2 days    Minutes of Exercise per Session: 30 min   Stress: No Stress Concern Present    Feeling of Stress : Only a little   Social Connections: Unknown    Frequency of Communication with Friends and Family: Twice a week    Frequency of Social Gatherings  with Friends and Family: Patient refused    Attends [employer] Services: Never    Active Member of Clubs or Organizations: Yes    Attends Club or Organization Meetings: Patient refused    Marital Status:    Housing Stability: Low Risk     Unable to Pay for Housing in the Last Year: No    Number of Places Lived in the Last Year: 1    Unstable Housing in the Last Year: No       FAMILY HISTORY:  Family History   Problem Relation Age of Onset    Hypertension Mother     Other Father         agent orange exposure     Heart attacks under age 50 Sister     No Known Problems Brother     No Known Problems Daughter     No Known Problems Son     No Known Problems Son     No Known Problems Son     No Known Problems Son     Cancer Maternal Aunt         breast    Cancer Maternal Grandmother         something in her arm     Amblyopia Neg Hx     Blindness Neg Hx     Cataracts Neg Hx     Diabetes Neg Hx     Glaucoma Neg Hx     Macular degeneration Neg Hx     Retinal detachment Neg Hx     Strabismus Neg Hx     Stroke Neg Hx     Thyroid disease Neg Hx        ALLERGIES AND MEDICATIONS: updated and reviewed.  Review of patient's allergies indicates:   Allergen Reactions    Grass pollen-june grass standard Itching, Other (See Comments) and Rash     Medication List with Changes/Refills   Current Medications    AMLODIPINE (NORVASC) 10 MG TABLET    Take 1 tablet (10 mg total) by mouth once daily.    ATORVASTATIN (LIPITOR) 40 MG TABLET    Take 1 tablet (40 mg total) by mouth every evening.    AZELASTINE (ASTELIN) 137 MCG (0.1 %) NASAL SPRAY    1 spray (137 mcg total) by Nasal route 2 (two) times daily.    BUPROPION (WELLBUTRIN XL) 150 MG TB24 TABLET    Take 150 mg by mouth once daily.     CETIRIZINE (ZYRTEC) 10 MG TABLET    TAKE ONE TABLET BY MOUTH ONCE DAILY FOR ALLERGIES    FLUTICASONE PROPIONATE (FLONASE) 50 MCG/ACTUATION NASAL SPRAY    1 spray (50 mcg total) by Each Nostril route 2 (two) times daily.    HYDROCORTISONE 2.5 % CREAM     Apply topically 2 (two) times daily.    IBUPROFEN (ADVIL,MOTRIN) 800 MG TABLET    Take with food/milk.Take or use exactly as directed.Obtain advice for OTCs.May cause drowsiness/dizziness.Do not take if pregnant.    NALOXONE (NARCAN) 4 MG/ACTUATION SPRY    4mg by nasal route as needed for opioid overdose; may repeat every 2-3 minutes in alternating nostrils until medical help arrives. Call 911    OMEPRAZOLE (PRILOSEC) 40 MG CAPSULE    Take 1 capsule (40 mg total) by mouth 2 (two) times daily before meals.    ONDANSETRON (ZOFRAN) 4 MG TABLET    Take 1 tablet (4 mg total) by mouth every 6 (six) hours as needed for Nausea.    OXYBUTYNIN (DITROPAN XL) 15 MG TR24    Take 1 tablet (15 mg total) by mouth once daily.    OXYCODONE (ROXICODONE) 5 MG IMMEDIATE RELEASE TABLET    Take 1 tablet (5 mg total) by mouth every 6 (six) hours as needed for Pain.    OXYCODONE-ACETAMINOPHEN (PERCOCET) 5-325 MG PER TABLET    Take 1 tablet by mouth every 4 (four) hours as needed for Pain.    OXYCODONE-ACETAMINOPHEN (PERCOCET) 5-325 MG PER TABLET    Take 1 tablet by mouth every 4 (four) hours as needed for Pain.    OXYCODONE-ACETAMINOPHEN (PERCOCET) 5-325 MG PER TABLET    Take 1 tablet by mouth every 4 (four) hours as needed for Pain.    PAROXETINE (PAXIL) 20 MG TABLET    TAKE ONE-HALF TABLET BY MOUTH EVERY DAY FOR MENTAL HEALTH    SILDENAFIL (VIAGRA) 100 MG TABLET    Take 1 tablet (100 mg total) by mouth daily as needed for Erectile Dysfunction.    ZOLPIDEM (AMBIEN) 10 MG TAB    Take 1 tablet (10 mg total) by mouth nightly as needed.         CARE TEAM:  Patient Care Team:  Jennifer Huertas MD as PCP - General (Family Medicine)  Jory Mauricio MD as Consulting Physician (Urology)  Allen Colindres MD as Consulting Physician (Urology)  Lorie Gage PA-C (Inactive) as Physician Assistant (Surgery)  Sabra Awad MA (Inactive) as Care Coordinator         REVIEW OF SYSTEMS:  Review of Systems   Constitutional:  Negative for  "chills and fever.   Genitourinary:  Positive for frequency. Negative for hematuria and urgency.   Musculoskeletal:  Positive for back pain.       PHYSICAL EXAM: 274}  Vitals:    05/17/23 1503   BP: 126/86   BP Location: Right arm   Patient Position: Sitting   BP Method: Medium (Manual)   Pulse: 83   Temp: 97.9 °F (36.6 °C)   TempSrc: Oral   SpO2: 96%   Weight: 99.3 kg (218 lb 14.7 oz)   Height: 5' 11" (1.803 m)       Body mass index is 30.53 kg/m².      General appearance - alert, well appearing, and in no distress, obese  Psychiatric - alert, oriented to person, place, and time, normal behavior, speech, dress, motor activity and thought process  Chest - clear to auscultation, no wheezes, rales or rhonchi, symmetric air entry  Heart - normal rate and regular rhythm, no gallops noted  Back exam - no CVA tenderness to palpation.  He did report some nonspecific discomfort across his entire lower lumbar spine.      Labs:  Ordered/Scheduled      ASSESSMENT AND PLAN:  274}  1. Osteoarthritis of lumbar spine, unspecified spinal osteoarthritis complication status/2. Low back pain, unspecified back pain laterality, unspecified chronicity, unspecified whether sciatica present  We discussed that he should do a home exercise program regarding his degenerative disc disease on a regular basis.  If urinalysis and culture come back negative for infection he will [facility] as this could then be the cause of his urinary frequency.  Overview:  Has back brace      3. Urinary frequency  I will check a urinalysis and culture.  I will hold off on antibiotics until we get confirmation of infection as his symptoms could be related to his degenerative disc disease. He can also consider following up with his urologist who he saw about a year ago.  -     Urinalysis; Future; Expected date: 05/17/2023  -     Urine culture; Future; Expected date: 05/17/2023            Orders Placed This " Encounter   Procedures    Urine culture    Urinalysis      Follow up if symptoms worsen or fail to improve. or sooner as needed.    The patient will follow-up as per routine with his primary care physician for follow up of his chronic medical condition(s) and routine health maintenance.

## 2023-05-22 ENCOUNTER — OFFICE VISIT (OUTPATIENT)
Dept: UROLOGY | Facility: CLINIC | Age: 58
End: 2023-05-22
Payer: MEDICARE

## 2023-05-22 VITALS — SYSTOLIC BLOOD PRESSURE: 140 MMHG | DIASTOLIC BLOOD PRESSURE: 78 MMHG | HEART RATE: 70 BPM

## 2023-05-22 DIAGNOSIS — N32.81 OAB (OVERACTIVE BLADDER): Primary | ICD-10-CM

## 2023-05-22 PROCEDURE — 99213 PR OFFICE/OUTPT VISIT, EST, LEVL III, 20-29 MIN: ICD-10-PCS | Mod: S$GLB,,, | Performed by: NURSE PRACTITIONER

## 2023-05-22 PROCEDURE — 99213 OFFICE O/P EST LOW 20 MIN: CPT | Mod: S$GLB,,, | Performed by: NURSE PRACTITIONER

## 2023-05-22 NOTE — PROGRESS NOTES
Subjective:      Rito Conley is a 58 y.o. male who returns today regarding his urinary symptoms. Established patient of Dr. Mauricio's and new to me today.    Remains on ditropan 15 mg XL for his OAB. Symptoms are generally well controlled with the ditropan. He reports several days of worsening frequency/urgency about a week ago while traveling. States that he was drinking more caffeine/alcohol during this time with minimal water. Symptoms have now returned to baseline. Drinking mostly water this week. Denies dysuria, gross hematuria, slow stream and VON. Denies flank pain and fever/chills.     Component PSA Diagnostic PSA, Screen   Latest Ref Rng & Units 0.00 - 4.00 ng/mL 0.00 - 4.00 ng/mL   6/24/2022 0.63    11/22/2021 1.4    6/24/2021 1.9    3/15/2021  0.41   6/12/2020 0.53      The following portions of the patient's history were reviewed and updated as appropriate: allergies, current medications, past family history, past medical history, past social history, past surgical history and problem list.    Review of Systems  Constitutional: no fever or chills  ENT: no nasal congestion or sore throat  Respiratory: no cough or shortness of breath  Cardiovascular: no chest pain or palpitations  Gastrointestinal: no nausea or vomiting, tolerating diet  Genitourinary: as per HPI  Hematologic/Lymphatic: no easy bruising or lymphadenopathy  Musculoskeletal: no arthralgias or myalgias  Neurological: no seizures or tremors  Behavioral/Psych: no auditory or visual hallucinations     Objective:   Vitals:   Vitals:    05/22/23 0730   BP: (!) 140/78   Pulse: 70     Physical Exam   General: alert and oriented, no acute distress  Head: normocephalic, atraumatic  Neck: supple, normal ROM  Respiratory: Symmetric expansion, non-labored breathing  Cardiovascular: regular rate and rhythm  Abdomen: soft, non tender, non distended  Genitourinary: deferred   Skin: normal coloration and turgor, no rashes, no suspicious skin  lesions noted  Neuro: alert and oriented x3, no gross deficits  Psych: normal judgment and insight, normal mood/affect, and non-anxious    Lab Review   Urinalysis demonstrates : negative for all components   Lab Results   Component Value Date    WBC 5.86 05/08/2023    HGB 13.8 (L) 05/08/2023    HCT 44.3 05/08/2023    MCV 93 05/08/2023     05/08/2023     Lab Results   Component Value Date    CREATININE 0.9 05/08/2023    BUN 16 05/08/2023     Lab Results   Component Value Date    PSA 0.41 03/15/2021     Imaging   None    Assessment:     1. OAB (overactive bladder)      Plan:   Diagnoses and all orders for this visit:    OAB (overactive bladder)      Plan:  --Doing well  --Discussed common bladder irritants such as caffeine, alcohol, citrus, and acidic and spicy foods. Encouraged to minimize in diet to reduce symptoms.  --Continue ditropan 15 mg XL daily  --PSA due in June  --Follow up with Dr. Mauricio annually

## 2023-06-19 ENCOUNTER — LAB VISIT (OUTPATIENT)
Dept: LAB | Facility: HOSPITAL | Age: 58
End: 2023-06-19
Attending: UROLOGY
Payer: MEDICARE

## 2023-06-19 DIAGNOSIS — Z12.5 PROSTATE CANCER SCREENING: ICD-10-CM

## 2023-06-19 DIAGNOSIS — R35.0 BENIGN PROSTATIC HYPERPLASIA WITH URINARY FREQUENCY: ICD-10-CM

## 2023-06-19 DIAGNOSIS — N40.1 BENIGN PROSTATIC HYPERPLASIA WITH URINARY FREQUENCY: ICD-10-CM

## 2023-06-19 LAB — COMPLEXED PSA SERPL-MCNC: 0.92 NG/ML (ref 0–4)

## 2023-06-19 PROCEDURE — 36415 COLL VENOUS BLD VENIPUNCTURE: CPT | Mod: PO | Performed by: UROLOGY

## 2023-06-19 PROCEDURE — 84153 ASSAY OF PSA TOTAL: CPT | Performed by: UROLOGY

## 2023-08-17 ENCOUNTER — OFFICE VISIT (OUTPATIENT)
Dept: FAMILY MEDICINE | Facility: CLINIC | Age: 58
End: 2023-08-17
Payer: MEDICARE

## 2023-08-17 ENCOUNTER — OFFICE VISIT (OUTPATIENT)
Dept: UROLOGY | Facility: CLINIC | Age: 58
End: 2023-08-17
Payer: MEDICARE

## 2023-08-17 VITALS
HEART RATE: 71 BPM | DIASTOLIC BLOOD PRESSURE: 80 MMHG | WEIGHT: 222 LBS | SYSTOLIC BLOOD PRESSURE: 138 MMHG | BODY MASS INDEX: 31.08 KG/M2 | OXYGEN SATURATION: 97 % | HEIGHT: 71 IN | TEMPERATURE: 98 F

## 2023-08-17 VITALS — WEIGHT: 224.88 LBS | BODY MASS INDEX: 31.36 KG/M2

## 2023-08-17 DIAGNOSIS — R14.1 ABDOMINAL GAS PAIN: Primary | ICD-10-CM

## 2023-08-17 DIAGNOSIS — Z12.5 PROSTATE CANCER SCREENING: ICD-10-CM

## 2023-08-17 DIAGNOSIS — K21.9 GASTROESOPHAGEAL REFLUX DISEASE WITHOUT ESOPHAGITIS: ICD-10-CM

## 2023-08-17 DIAGNOSIS — R35.1 NOCTURIA: ICD-10-CM

## 2023-08-17 DIAGNOSIS — N32.81 OAB (OVERACTIVE BLADDER): Primary | ICD-10-CM

## 2023-08-17 DIAGNOSIS — N40.1 BENIGN PROSTATIC HYPERPLASIA WITH URINARY FREQUENCY: ICD-10-CM

## 2023-08-17 DIAGNOSIS — R35.0 BENIGN PROSTATIC HYPERPLASIA WITH URINARY FREQUENCY: ICD-10-CM

## 2023-08-17 PROCEDURE — 99214 OFFICE O/P EST MOD 30 MIN: CPT | Mod: S$PBB,,, | Performed by: UROLOGY

## 2023-08-17 PROCEDURE — 99999 PR PBB SHADOW E&M-EST. PATIENT-LVL III: ICD-10-PCS | Mod: PBBFAC,,, | Performed by: UROLOGY

## 2023-08-17 PROCEDURE — 99999 PR PBB SHADOW E&M-EST. PATIENT-LVL III: CPT | Mod: PBBFAC,,, | Performed by: FAMILY MEDICINE

## 2023-08-17 PROCEDURE — 99214 PR OFFICE/OUTPT VISIT, EST, LEVL IV, 30-39 MIN: ICD-10-PCS | Mod: S$PBB,,, | Performed by: FAMILY MEDICINE

## 2023-08-17 PROCEDURE — 99213 OFFICE O/P EST LOW 20 MIN: CPT | Mod: PBBFAC,27,PO | Performed by: FAMILY MEDICINE

## 2023-08-17 PROCEDURE — 99213 OFFICE O/P EST LOW 20 MIN: CPT | Mod: PBBFAC | Performed by: UROLOGY

## 2023-08-17 PROCEDURE — 99999 PR PBB SHADOW E&M-EST. PATIENT-LVL III: CPT | Mod: PBBFAC,,, | Performed by: UROLOGY

## 2023-08-17 PROCEDURE — 99999 PR PBB SHADOW E&M-EST. PATIENT-LVL III: ICD-10-PCS | Mod: PBBFAC,,, | Performed by: FAMILY MEDICINE

## 2023-08-17 PROCEDURE — 99214 PR OFFICE/OUTPT VISIT, EST, LEVL IV, 30-39 MIN: ICD-10-PCS | Mod: S$PBB,,, | Performed by: UROLOGY

## 2023-08-17 PROCEDURE — 99214 OFFICE O/P EST MOD 30 MIN: CPT | Mod: S$PBB,,, | Performed by: FAMILY MEDICINE

## 2023-08-17 NOTE — PROGRESS NOTES
"Subjective:       Rito Conley is a 58 y.o. male who is an established patient who was seen for evaluation of urinary issues.      Last saw Morris 4/18. Reported frequency, VON, nocturia x 1. Denies straining, urgency, weak stream. Was on Flomax. Also saw  for ED.    He is referred back now with c/o urinary frequency and worsening nocturia. Nocturia x 2-3. Strong flow. Mild urgency. Denies UI. Out of Flomax x 1 week though symptoms pre-date that. Has not noted change with Flomax. Denies hematuria, dysuria.     PSA 4/18 - 0.43  PSA 4/19 - 0.48  PSA 6/20 - 0.53  PSA 3/21 - 0.41  PSA 6/21 - 1.9   PSA 11/21 - 1.4  PSA 6/22 - 0.63  PSA 6/23 - 0.92    PVR (bladder scan) initial visit - 0cc     4/15/2019  PSA normal. Given trial Ditropan last visit - some improvement. Strong flow. Nocturia x 1.    10/14/2019  Frequency improved with Ditropan. No further issues.   PVR (bladder scan) today - 0cc    12/28/2020  Increased nocturia but admits to drinking more in evening. Given Cialis by NP in 6/20 but unable to get at [facility].     6/28/2021  PSA with significant rise since last check. Ditropan 10mg working better. Nocturia x 0.  PVR (bladder scan) today - 0cc    10/7/2021  Returns with further c/o LUTS. Noted frequency and "tingling" about 2 weeks. Notes this was also around time of drinking more soda and water. Given abx by PCP. UCx ended up being negative. He reports symptoms have improved. Now drinking more water.     12/28/2021  Doing okay. Needs Ditropan refill. PSA trending down.     6/28/2022  PSA trending down. Notes increased frequency but also notes increased fluid intake. Some urgency. Denies weak stream, straining.     8/17/2023  He saw Sarah NP 5/23. On Ditropan 15mg. Returns now with urinary frequency x 1 week. Denies dysuria. Voiding q1h now, was q2-3h. Nocturia not affected. Denies urgency. Strong stream. Occasional VON.   He saw PCP today for abdominal pain - diagnosed with gas. No constipation, " less loose stool recently.   PVR (bladder scan) today - 174cc      The following portions of the patient's history were reviewed and updated as appropriate: allergies, current medications, past family history, past medical history, past social history, past surgical history and problem list.    Review of Systems  Constitutional: no fever or chills  ENT: no nasal congestion or sore throat  Respiratory: no cough or shortness of breath  Cardiovascular: no chest pain or palpitations  Gastrointestinal: no nausea or vomiting, tolerating diet  Genitourinary: as per HPI  Hematologic/Lymphatic: no easy bruising or lymphadenopathy  Musculoskeletal: no arthralgias or myalgias  Skin: no rashes or lesions  Neurological: no seizures or tremors  Behavioral/Psych: no auditory or visual hallucinations       Objective:    Vitals: Wt 102 kg (224 lb 13.9 oz)   BMI 31.36 kg/m²     Physical Exam   General: well developed, well nourished in no acute distress  Head: normocephalic, atraumatic  Neck: supple, trachea midline, no obvious enlargement of thyroid  HEENT: EOMI, mucus membranes moist, sclera anicteric, no hearing impairment  Lungs: symmetric expansion, non-labored breathing  Neuro: alert and oriented x 3, no gross deficits  Psych: normal judgment and insight, normal mood/affect and non-anxious  Genitourinary: (last visit)  patient declined exam   YOSHI: Symmetric 40g prostate without nodularity or tenderness. Normal landmarks. seminal vesicles non palpable, normal sphincter tone without hemorrhoids or rectal masses. Normal appearance of anus and perineum. Difficult to palpate.       Lab Review   Urine analysis today in clinic shows Negative     Lab Results   Component Value Date    WBC 5.86 05/08/2023    HGB 13.8 (L) 05/08/2023    HCT 44.3 05/08/2023    MCV 93 05/08/2023     05/08/2023     Lab Results   Component Value Date    CREATININE 0.9 05/08/2023    BUN 16 05/08/2023     Lab Results   Component Value Date    PSA 0.41  03/15/2021     Lab Results   Component Value Date    PSADIAG 0.92 06/19/2023       Imaging  NA       Assessment/Plan:      1. Frequency of urination    - Seems like OAB component   - Limited response to Flomax   - Ditropan 10mg. Discussed possible SE. Increased frequency/urgency - will increase to 15mg.    - monitor PVR     2. Nocturia    - Reduce PM fluids   - Ditropan 15mg, continue      3. BPH with obstruction/lower urinary tract symptoms    - Limited response with Flomax   - Okay to stop   - PSA/YOSHI normal   - Recheck PSA 12mths (6/23)     4. ED   - Viagra 100mg. Discussed SE. Printed to take to DOD.    5. PCa screening   - Jump in PSA noted   - Suspect this is related to recent colonoscopy   - PSA down trending - back to normal levels   - Recheck 12 months    6. Dysuria   - Improved   - Continue hydration   - Avoid bladder irritants    7. VON   - May be contributing to frequency   - Monitor closely. Recheck 3 months.       Follow up 3 months with PVR  Follow up in 12 months with PSA / YOSHI

## 2023-08-17 NOTE — PROGRESS NOTES
"  Physical Exam  /80   Pulse 71   Temp 98.1 °F (36.7 °C) (Oral)   Ht 5' 11" (1.803 m)   Wt 100.7 kg (222 lb 0.1 oz)   SpO2 97%   BMI 30.96 kg/m²      Office Visit    Patient Name: Rito Conley    : 1965  MRN: [id number]      Assessment/Plan:  Rito Conley is a 58 y.o. male who presents today for :    Abdominal gas pain  -     simethicone 125 mg Tab; Take 1 tablet by mouth 4 (four) times daily as needed.  Dispense: 120 tablet; Refill: 1  Gastroesophageal reflux disease without esophagitis  -abd exam benign. Discussed with patient at length about dietary changes, specifically avoiding certain dietary triggers such as coffee/caffeinated products. He should continue taking Prilosec daily, with smaller meals. May chew TUMs as needed and avoid reclining immediately after meals.         Follow up for worsening Sx. Urgent care/ED precautions provided.      This note was created by combination of typed  and MModal dictation.  Transcription errors may be present.  If there are any questions, please contact me.      ----------------------------------------------------------------------------------------------------------------------      HPI:  Patient Care Team:  Jennifer Huertas MD as PCP - General (Family Medicine)  Jory Mauricio MD as Consulting Physician (Urology)  Allen Colindres MD as Consulting Physician (Urology)  Lorie Gage PA-C (Inactive) as Physician Assistant (Surgery)  Sabra Awad MA (Inactive) as Care Coordinator    Rito is a 58 y.o. male with      Patient Active Problem List   Diagnosis    Benign hypertensive heart disease without heart failure    DJD of shoulder    DJD (degenerative joint disease), lumbar    Allergic rhinitis    Lumbar scoliosis    Back injury    BPH with obstruction/lower urinary tract symptoms    PTSD (post-traumatic stress disorder)    Heartburn    Nasal septal deviation    Nasal valve collapse    Refractory " obstruction of nasal airway    Hypertrophy of both inferior nasal turbinates    Nasal congestion    Encounter for colonoscopy due to history of colonic polyp    Class 1 obesity due to excess calories with serious comorbidity in adult    Hordeolum externum of left upper eyelid    Multiple acquired skin tags    Major depressive disorder, recurrent, moderate    Ganglion cyst    Morbid obesity       Rito presents today for:  abdominal gas disomcomfort    Which has been going on for over a week, associated with epigastric bloating and gas discomfort. Sx are typically after meals, as well as when he lays back too soon after meals. He denies any F/C/N/V/blood in stool/constipation/diarrhea/changes in BM frequency. He does consume coffee daily, as well as increased his bean intake recently. He is otherwise compliant with his Prilosec regularly, but suspects his overall diet contributes his Sx. Otherwise, no other acute issues during this visit.        Answers submitted by the patient for this visit:  Review of Systems Questionnaire (Submitted on 8/14/2023)  activity change: No  unexpected weight change: No  neck pain: No  hearing loss: No  rhinorrhea: No  trouble swallowing: No  eye discharge: No  visual disturbance: No  chest tightness: No  wheezing: No  chest pain: No  palpitations: No  blood in stool: No  constipation: No  vomiting: No  diarrhea: No  polydipsia: No  polyuria: No  difficulty urinating: No  urgency: No  hematuria: No  joint swelling: No  arthralgias: No  headaches: No  weakness: No  confusion: No  dysphoric mood: No                Patient Active Problem List   Diagnosis    Benign hypertensive heart disease without heart failure    DJD of shoulder    DJD (degenerative joint disease), lumbar    Allergic rhinitis    Lumbar scoliosis    Back injury    BPH with obstruction/lower urinary tract symptoms    PTSD (post-traumatic stress disorder)    Heartburn    Nasal septal deviation    Nasal valve collapse     Refractory obstruction of nasal airway    Hypertrophy of both inferior nasal turbinates    Nasal congestion    Encounter for colonoscopy due to history of colonic polyp    Class 1 obesity due to excess calories with serious comorbidity in adult    Hordeolum externum of left upper eyelid    Multiple acquired skin tags    Major depressive disorder, recurrent, moderate    Ganglion cyst    Morbid obesity       Current Medications  Medications reviewed/updated.     Current Outpatient Medications on File Prior to Visit   Medication Sig Dispense Refill    amLODIPine (NORVASC) 10 MG tablet Take 1 tablet (10 mg total) by mouth once daily. 90 tablet 1    atorvastatin (LIPITOR) 40 MG tablet Take 1 tablet (40 mg total) by mouth every evening. 90 tablet 1    azelastine (ASTELIN) 137 mcg (0.1 %) nasal spray 1 spray (137 mcg total) by Nasal route 2 (two) times daily. 30 mL 1    buPROPion (WELLBUTRIN XL) 150 MG TB24 tablet Take 150 mg by mouth once daily.       cetirizine (ZYRTEC) 10 MG tablet TAKE ONE TABLET BY MOUTH ONCE DAILY FOR ALLERGIES 90 tablet 3    fluticasone propionate (FLONASE) 50 mcg/actuation nasal spray 1 spray (50 mcg total) by Each Nostril route 2 (two) times daily. 58 g 3    hydrocortisone 2.5 % cream Apply topically 2 (two) times daily. 20 g 2    ibuprofen (ADVIL,MOTRIN) 800 MG tablet Take with food/milk.Take or use exactly as directed.Obtain advice for OTCs.May cause drowsiness/dizziness.Do not take if pregnant.      naloxone (NARCAN) 4 mg/actuation Spry 4mg by nasal route as needed for opioid overdose; may repeat every 2-3 minutes in alternating nostrils until medical help arrives. Call 911 1 each 11    omeprazole (PRILOSEC) 40 MG capsule Take 1 capsule (40 mg total) by mouth 2 (two) times daily before meals. 180 capsule 1    oxybutynin (DITROPAN XL) 15 MG TR24 Take 1 tablet (15 mg total) by mouth once daily. 90 tablet 3    oxyCODONE-acetaminophen (PERCOCET) 5-325 mg per tablet Take 1 tablet by mouth every 4  (four) hours as needed for Pain. 31 tablet 0    oxyCODONE-acetaminophen (PERCOCET) 5-325 mg per tablet Take 1 tablet by mouth every 4 (four) hours as needed for Pain. 31 tablet 0    paroxetine (PAXIL) 20 MG tablet TAKE ONE-HALF TABLET BY MOUTH EVERY DAY FOR MENTAL HEALTH      sildenafiL (VIAGRA) 100 MG tablet Take 1 tablet (100 mg total) by mouth daily as needed for Erectile Dysfunction. 30 tablet 11    zolpidem (AMBIEN) 10 mg Tab Take 1 tablet (10 mg total) by mouth nightly as needed. 30 tablet 5    [DISCONTINUED] ondansetron (ZOFRAN) 4 MG tablet Take 1 tablet (4 mg total) by mouth every 6 (six) hours as needed for Nausea. 10 tablet 0    [DISCONTINUED] oxyCODONE (ROXICODONE) 5 MG immediate release tablet Take 1 tablet (5 mg total) by mouth every 6 (six) hours as needed for Pain. (Patient not taking: Reported on 8/17/2023) 15 tablet 0    [DISCONTINUED] oxyCODONE-acetaminophen (PERCOCET) 5-325 mg per tablet Take 1 tablet by mouth every 4 (four) hours as needed for Pain. 31 tablet 0     No current facility-administered medications on file prior to visit.           Past Surgical History:   Procedure Laterality Date    COLONOSCOPY      COLONOSCOPY N/A 6/4/2021    Procedure: COLONOSCOPY;  Surgeon: Shanthi Horton MD;  Location: [facility];  Service: Endoscopy;  Laterality: N/A;  prep instr portal,   6/3 pt v/u of earlier arrival time and prep time -ml    ENDOSCOPIC NASAL SEPTOPLASTY N/A 10/13/2020    Procedure: SEPTOPLASTY, NOSE, ENDOSCOPIC;  Surgeon: Nick Gomez MD;  Location: [facility] OR;  Service: ENT;  Laterality: N/A;  RN PRE OP 9- COVID NEGATIVE ON  10-  [place]    ESOPHAGOGASTRODUODENOSCOPY N/A 6/17/2020    Procedure: EGD (ESOPHAGOGASTRODUODENOSCOPY);  Surgeon: Jarad Holm MD;  Location: [facility] ([address] [place] [address]);  Service: Endoscopy;  Laterality: N/A;  covid test 6/15-[place]    EXCISION OF MASS OF EXTREMITY Left 1/31/2023    Procedure: EXCISION, MASS, EXTREMITY-WRIST;  Surgeon: Mallorie Hardwick,  MD;  Location: [facility] OR;  Service: Orthopedics;  Laterality: Left;  RN PREOP 1/26/2023---CLEARED BY PCP    HERNIA REPAIR      NASAL ENDOSCOPY Bilateral 10/13/2020    Procedure: ENDOSCOPY, NOSE;  Surgeon: Nick Gomez MD;  Location: [facility] OR;  Service: ENT;  Laterality: Bilateral;    NASAL STENOSIS REPAIR Bilateral 10/13/2020    Procedure: REPAIR, STENOSIS, NOSE, VESTIBULE;  Surgeon: Nick Gomez MD;  Location: [facility] OR;  Service: ENT;  Laterality: Bilateral;  Vivaer console and wand available for use.  NO DISC  10/7/2020@ 247PM-       Family History   Problem Relation Age of Onset    Hypertension Mother     Other Father         agent orange exposure     Heart attacks under age 50 Sister     No Known Problems Brother     No Known Problems Daughter     No Known Problems Son     No Known Problems Son     No Known Problems Son     No Known Problems Son     Cancer Maternal Aunt         breast    Cancer Maternal Grandmother         something in her arm     Amblyopia Neg Hx     Blindness Neg Hx     Cataracts Neg Hx     Diabetes Neg Hx     Glaucoma Neg Hx     Macular degeneration Neg Hx     Retinal detachment Neg Hx     Strabismus Neg Hx     Stroke Neg Hx     Thyroid disease Neg Hx        Social History     Socioeconomic History    Marital status:    Tobacco Use    Smoking status: Never    Smokeless tobacco: Never   Substance and Sexual Activity    Alcohol use: Yes     Alcohol/week: 7.0 standard drinks of alcohol     Types: 4 Glasses of wine, 1 Cans of beer, 2 Shots of liquor per week    Drug use: Yes     Types: Oxycodone     Comment: sometimes 1 tab weekly as needed; probably 2 tabs a month    Sexual activity: Yes     Partners: Female     Social Determinants of Health     Financial Resource Strain: Unknown (8/14/2023)    Overall Financial Resource Strain (CARDIA)     Difficulty of Paying Living Expenses: Patient refused   Food Insecurity: Unknown (8/14/2023)    Hunger Vital Sign     Worried About Running  "Out of Food in the Last Year: Patient refused     Ran Out of Food in the Last Year: Patient refused   Transportation Needs: Unknown (8/14/2023)    PRAPARE - Transportation     Lack of Transportation (Medical): Patient refused     Lack of Transportation (Non-Medical): Patient refused   Physical Activity: Unknown (8/14/2023)    Exercise Vital Sign     Days of Exercise per Week: Patient refused     Minutes of Exercise per Session: 30 min   Stress: No Stress Concern Present (5/8/2023)    [employer] - Occupational Stress Questionnaire     Feeling of Stress : Only a little   Recent Concern: Stress - Stress Concern Present (2/15/2023)    [employer] - Occupational Stress Questionnaire     Feeling of Stress : Very much   Social Connections: Unknown (8/14/2023)    Social Connection and Isolation Panel [NHANES]     Frequency of Communication with Friends and Family: More than three times a week     Frequency of Social Gatherings with Friends and Family: Three times a week     Attends Oriental orthodox Services: Never     Active Member of Clubs or Organizations: Yes     Attends Club or Organization Meetings: More than 4 times per year     Marital Status: Patient refused   Housing Stability: Low Risk  (8/14/2023)    Housing Stability Vital Sign     Unable to Pay for Housing in the Last Year: No     Number of Places Lived in the Last Year: 1     Unstable Housing in the Last Year: No             Allergies   Review of patient's allergies indicates:   Allergen Reactions    Grass pollen-june grass standard Itching, Other (See Comments) and Rash             Review of Systems  See HPI      [unfilled]  /80   Pulse 71   Temp 98.1 °F (36.7 °C) (Oral)   Ht 5' 11" (1.803 m)   Wt 100.7 kg (222 lb 0.1 oz)   SpO2 97%   BMI 30.96 kg/m²       GEN: NAD, pleasant  HEENT: NCAT, PERRLA, EOMI, sclera clear, anicteric  NECK: normal, supple  LUNGS: CTAB, no w/r/r, normal respiratory " effort  HEART: RRR, normal S1 and S2, no m/r/g, no palpitations, no edema  ABD: no generalized abd TTP, but +mild epigastric TTP, soft/non-distended, NABS, no organomegaly, no masses, no hernias, no rebound, no guarding. No RLQ/LLQ TTP. Suprapubic tenderness absent. No CVA tenderness.  SKIN: warm and dry with normal turgor, no rashes, no other lesions.   PSYCH: AOx3, appropriate mood and affect.   MSK: extremities warm/well perfused, normal ROM in all 4 extremities, no c/c/e.   NEURO: normal without focal findings, CN II-XII are intact

## 2023-09-05 ENCOUNTER — OFFICE VISIT (OUTPATIENT)
Dept: SLEEP MEDICINE | Facility: CLINIC | Age: 58
End: 2023-09-05
Payer: MEDICARE

## 2023-09-05 VITALS
HEIGHT: 71 IN | SYSTOLIC BLOOD PRESSURE: 144 MMHG | TEMPERATURE: 66 F | DIASTOLIC BLOOD PRESSURE: 86 MMHG | WEIGHT: 227.06 LBS | BODY MASS INDEX: 31.79 KG/M2

## 2023-09-05 DIAGNOSIS — Z78.9 INTOLERANCE OF CONTINUOUS POSITIVE AIRWAY PRESSURE (CPAP) VENTILATION: Primary | ICD-10-CM

## 2023-09-05 DIAGNOSIS — G47.33 OSA (OBSTRUCTIVE SLEEP APNEA): ICD-10-CM

## 2023-09-05 DIAGNOSIS — R06.83 SNORING: ICD-10-CM

## 2023-09-05 PROCEDURE — 99213 OFFICE O/P EST LOW 20 MIN: CPT | Mod: PBBFAC | Performed by: INTERNAL MEDICINE

## 2023-09-05 PROCEDURE — 99999 PR PBB SHADOW E&M-EST. PATIENT-LVL III: ICD-10-PCS | Mod: PBBFAC,,, | Performed by: INTERNAL MEDICINE

## 2023-09-05 PROCEDURE — 99204 PR OFFICE/OUTPT VISIT, NEW, LEVL IV, 45-59 MIN: ICD-10-PCS | Mod: S$PBB,,, | Performed by: INTERNAL MEDICINE

## 2023-09-05 PROCEDURE — 99204 OFFICE O/P NEW MOD 45 MIN: CPT | Mod: S$PBB,,, | Performed by: INTERNAL MEDICINE

## 2023-09-05 PROCEDURE — 99999 PR PBB SHADOW E&M-EST. PATIENT-LVL III: CPT | Mod: PBBFAC,,, | Performed by: INTERNAL MEDICINE

## 2023-09-05 NOTE — PROGRESS NOTES
Referred by Jennifer Huertas MD     NEW PATIENT VISIT    Rito Conley  is a pleasant 58 y.o. male  with PMH significant for PTSD, AR, HTN, LADI dx 2006, could not tolerate CPAP due to his PTSD who presents for  loud snoring disturbing his wife.    SLEEP SCHEDULE   Environment    Bed Time 10P   Sleep Latency 30 min   Arousals 2-3   Nocturia 2-3   Back to sleep 20-30min   Wake time 6:30A   Naps 1-2 hrs   Work             No data to display                   No data to display                Past Medical History:   Diagnosis Date    Allergy     Class 1 obesity due to excess calories with serious comorbidity in adult 3/14/2022    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    Hyperlipidemia     Hypertension     Lower back pain     Major depressive disorder, recurrent, moderate 12/30/2022    PTSD (post-traumatic stress disorder)     Sleep apnea     uses CPAP 1-2x/week     Patient Active Problem List   Diagnosis    Benign hypertensive heart disease without heart failure    DJD of shoulder    DJD (degenerative joint disease), lumbar    Allergic rhinitis    Lumbar scoliosis    Back injury    BPH with obstruction/lower urinary tract symptoms    PTSD (post-traumatic stress disorder)    Heartburn    Nasal septal deviation    Nasal valve collapse    Refractory obstruction of nasal airway    Hypertrophy of both inferior nasal turbinates    Nasal congestion    Encounter for colonoscopy due to history of colonic polyp    Class 1 obesity due to excess calories with serious comorbidity in adult    Hordeolum externum of left upper eyelid    Multiple acquired skin tags    Major depressive disorder, recurrent, moderate    Ganglion cyst    Morbid obesity       Current Outpatient Medications:     amLODIPine (NORVASC) 10 MG tablet, Take 1 tablet (10 mg total) by mouth once daily., Disp: 90 tablet, Rfl: 1    atorvastatin (LIPITOR) 40 MG tablet, Take 1 tablet (40 mg total) by mouth every evening., Disp: 90 tablet, Rfl: 1    azelastine (ASTELIN)  137 mcg (0.1 %) nasal spray, 1 spray (137 mcg total) by Nasal route 2 (two) times daily., Disp: 30 mL, Rfl: 1    buPROPion (WELLBUTRIN XL) 150 MG TB24 tablet, Take 150 mg by mouth once daily. , Disp: , Rfl:     cetirizine (ZYRTEC) 10 MG tablet, TAKE ONE TABLET BY MOUTH ONCE DAILY FOR ALLERGIES, Disp: 90 tablet, Rfl: 3    fluticasone propionate (FLONASE) 50 mcg/actuation nasal spray, 1 spray (50 mcg total) by Each Nostril route 2 (two) times daily., Disp: 58 g, Rfl: 3    hydrocortisone 2.5 % cream, Apply topically 2 (two) times daily., Disp: 20 g, Rfl: 2    ibuprofen (ADVIL,MOTRIN) 800 MG tablet, Take with food/milk.Take or use exactly as directed.Obtain advice for OTCs.May cause drowsiness/dizziness.Do not take if pregnant., Disp: , Rfl:     naloxone (NARCAN) 4 mg/actuation Spry, 4mg by nasal route as needed for opioid overdose; may repeat every 2-3 minutes in alternating nostrils until medical help arrives. Call 911, Disp: 1 each, Rfl: 11    omeprazole (PRILOSEC) 40 MG capsule, Take 1 capsule (40 mg total) by mouth 2 (two) times daily before meals., Disp: 180 capsule, Rfl: 1    oxybutynin (DITROPAN XL) 15 MG TR24, Take 1 tablet (15 mg total) by mouth once daily., Disp: 90 tablet, Rfl: 3    oxyCODONE-acetaminophen (PERCOCET) 5-325 mg per tablet, Take 1 tablet by mouth every 4 (four) hours as needed for Pain., Disp: 31 tablet, Rfl: 0    oxyCODONE-acetaminophen (PERCOCET) 5-325 mg per tablet, Take 1 tablet by mouth every 4 (four) hours as needed for Pain., Disp: 31 tablet, Rfl: 0    paroxetine (PAXIL) 20 MG tablet, TAKE ONE-HALF TABLET BY MOUTH EVERY DAY FOR MENTAL HEALTH, Disp: , Rfl:     sildenafiL (VIAGRA) 100 MG tablet, Take 1 tablet (100 mg total) by mouth daily as needed for Erectile Dysfunction., Disp: 30 tablet, Rfl: 11    simethicone 125 mg Tab, Take 1 tablet by mouth 4 (four) times daily as needed., Disp: 120 tablet, Rfl: 1    zolpidem (AMBIEN) 10 mg Tab, Take 1 tablet (10 mg total) by mouth nightly as  "needed., Disp: 30 tablet, Rfl: 5     Vitals:    09/05/23 1403   BP: (!) 144/86   BP Location: Left arm   Patient Position: Sitting   BP Method: Medium (Automatic)   Temp: (!) 66 °F (18.9 °C)   Weight: 103 kg (227 lb 1.2 oz)   Height: 5' 11" (1.803 m)     Physical Exam:    GEN:   Well-appearing  Psych:  Appropriate affect, demonstrates insight  SKIN:  No rash on the face or bridge of the nose      LABS:   Lab Results   Component Value Date    HGB 13.8 (L) 05/08/2023    CO2 26 05/08/2023       RECORDS REVIEWED PREVIOUSLY:    No prior sleep testing.    ASSESSMENT         No data to display              PROBLEM DESCRIPTION/ Sx on Presentation  STATUS   hx LADI   + CPAP intolerance    New   Daytime Sx   + sleepiness when inactive   ESS 14/24 on intake  New   Insomnia     Trouble falling asleep:   Maintenance:         waking with some frequency  Prior hypnotics:        Current hypnotics: ambien 10mg prn     New   Nocturia   x 2-3 per sleep period  New   PTSD + claustrophobia  Occasional nightmares  Has rx for prazosin which helps sometimes  New     Other issues:     PLAN     -recommend sleep testing   -discussed trial therapy if LADI present and the patient is  open to a trial of CPAP therapy    RTC          The patient was given open opportunity to ask questions and/or express concerns about treatment plan.   All questions/concerns were discussed.     Two patient identifiers used prior to evaluation.           "

## 2023-10-03 ENCOUNTER — HOSPITAL ENCOUNTER (OUTPATIENT)
Dept: SLEEP MEDICINE | Facility: HOSPITAL | Age: 58
Discharge: HOME | End: 2023-10-03
Attending: INTERNAL MEDICINE
Payer: MEDICARE

## 2023-10-03 DIAGNOSIS — G47.33 OSA (OBSTRUCTIVE SLEEP APNEA): ICD-10-CM

## 2023-10-03 DIAGNOSIS — R06.83 SNORING: ICD-10-CM

## 2023-10-03 DIAGNOSIS — Z78.9 INTOLERANCE OF CONTINUOUS POSITIVE AIRWAY PRESSURE (CPAP) VENTILATION: ICD-10-CM

## 2023-10-03 PROCEDURE — 95811 POLYSOM 6/>YRS CPAP 4/> PARM: CPT

## 2023-10-04 PROBLEM — R06.83 SNORING: Status: ACTIVE | Noted: 2023-10-04

## 2023-10-04 NOTE — PROGRESS NOTES
End of the night summary    Type of study performed on (Rito Conley-)  Split  Patient education/cpap information prior to study/setup   Pt was informed, of emergency cord in bathroom and given spectra link phone#   EKG Appears to be-  NSR w PACs  Low spo2 - 79%  Any difficulties recording:  NOnE  Optimal pressure#  9 or 10  MASK:  nasal pillows zena MED or large  Pt reaction to CPAP:  pt reports he is claustraphobic, he had a cpap at home before with a full face mask but was not compliant with it  Tech summary Comments:  pt met criteria for split on cpap, soft to moderate snoring observed, pt has frequent events observed supine, pt observed titrated on cpap, pt tolerated cpap pressure and nasal pillows well, optimal pressure observed

## 2023-10-06 PROCEDURE — 95811 PR POLYSOMNOGRAPHY W/CPAP: ICD-10-PCS | Mod: 26,,, | Performed by: INTERNAL MEDICINE

## 2023-10-06 PROCEDURE — 95811 POLYSOM 6/>YRS CPAP 4/> PARM: CPT | Mod: 26,,, | Performed by: INTERNAL MEDICINE

## 2023-10-16 ENCOUNTER — PATIENT MESSAGE (OUTPATIENT)
Dept: SLEEP MEDICINE | Facility: CLINIC | Age: 58
End: 2023-10-16
Payer: MEDICARE

## 2023-10-16 DIAGNOSIS — G47.33 OSA (OBSTRUCTIVE SLEEP APNEA): Primary | ICD-10-CM

## 2023-11-27 ENCOUNTER — OFFICE VISIT (OUTPATIENT)
Dept: UROLOGY | Facility: CLINIC | Age: 58
End: 2023-11-27
Payer: MEDICARE

## 2023-11-27 VITALS — WEIGHT: 224.44 LBS | BODY MASS INDEX: 31.3 KG/M2

## 2023-11-27 DIAGNOSIS — Z12.5 PROSTATE CANCER SCREENING: ICD-10-CM

## 2023-11-27 DIAGNOSIS — N40.1 BENIGN PROSTATIC HYPERPLASIA WITH URINARY FREQUENCY: ICD-10-CM

## 2023-11-27 DIAGNOSIS — N32.81 OVERACTIVE BLADDER: ICD-10-CM

## 2023-11-27 DIAGNOSIS — R35.0 URINARY FREQUENCY: ICD-10-CM

## 2023-11-27 DIAGNOSIS — R33.9 INCOMPLETE BLADDER EMPTYING: ICD-10-CM

## 2023-11-27 DIAGNOSIS — N32.81 OAB (OVERACTIVE BLADDER): Primary | ICD-10-CM

## 2023-11-27 DIAGNOSIS — N52.9 ERECTILE DYSFUNCTION OF ORGANIC ORIGIN: ICD-10-CM

## 2023-11-27 DIAGNOSIS — R35.1 NOCTURIA: ICD-10-CM

## 2023-11-27 DIAGNOSIS — R35.0 BENIGN PROSTATIC HYPERPLASIA WITH URINARY FREQUENCY: ICD-10-CM

## 2023-11-27 PROCEDURE — 99213 PR OFFICE/OUTPT VISIT, EST, LEVL III, 20-29 MIN: ICD-10-PCS | Mod: S$PBB,,, | Performed by: UROLOGY

## 2023-11-27 PROCEDURE — 99213 OFFICE O/P EST LOW 20 MIN: CPT | Mod: S$PBB,,, | Performed by: UROLOGY

## 2023-11-27 PROCEDURE — 99213 OFFICE O/P EST LOW 20 MIN: CPT | Mod: PBBFAC | Performed by: UROLOGY

## 2023-11-27 PROCEDURE — 99999 PR PBB SHADOW E&M-EST. PATIENT-LVL III: CPT | Mod: PBBFAC,,, | Performed by: UROLOGY

## 2023-11-27 PROCEDURE — 99999 PR PBB SHADOW E&M-EST. PATIENT-LVL III: ICD-10-PCS | Mod: PBBFAC,,, | Performed by: UROLOGY

## 2023-11-27 RX ORDER — SILDENAFIL 100 MG/1
100 TABLET, FILM COATED ORAL DAILY PRN
Qty: 30 TABLET | Refills: 11 | Status: SHIPPED | OUTPATIENT
Start: 2023-11-27

## 2023-11-27 RX ORDER — OXYBUTYNIN CHLORIDE 15 MG/1
15 TABLET, EXTENDED RELEASE ORAL DAILY
Qty: 90 TABLET | Refills: 3 | Status: SHIPPED | OUTPATIENT
Start: 2023-11-27 | End: 2024-01-19 | Stop reason: SDUPTHER

## 2023-11-27 NOTE — PROGRESS NOTES
"Subjective:       Rito Conley is a 58 y.o. male who is an established patient who was seen for evaluation of urinary issues.      Last saw [place] 4/18. Reported frequency, VON, nocturia x 1. Denies straining, urgency, weak stream. Was on Flomax. Also saw  for ED.    He is referred back now with c/o urinary frequency and worsening nocturia. Nocturia x 2-3. Strong flow. Mild urgency. Denies UI. Out of Flomax x 1 week though symptoms pre-date that. Has not noted change with Flomax. Denies hematuria, dysuria.     PSA 4/18 - 0.43  PSA 4/19 - 0.48  PSA 6/20 - 0.53  PSA 3/21 - 0.41  PSA 6/21 - 1.9   PSA 11/21 - 1.4  PSA 6/22 - 0.63  PSA 6/23 - 0.92    PVR (bladder scan) initial visit - 0cc     4/15/2019  PSA normal. Given trial Ditropan last visit - some improvement. Strong flow. Nocturia x 1.    10/14/2019  Frequency improved with Ditropan. No further issues.   PVR (bladder scan) today - 0cc    12/28/2020  Increased nocturia but admits to drinking more in evening. Given Cialis by NP in 6/20 but unable to get at [facility].     6/28/2021  PSA with significant rise since last check. Ditropan 10mg working better. Nocturia x 0.  PVR (bladder scan) today - 0cc    10/7/2021  Returns with further c/o LUTS. Noted frequency and "tingling" about 2 weeks. Notes this was also around time of drinking more soda and water. Given abx by PCP. UCx ended up being negative. He reports symptoms have improved. Now drinking more water.     12/28/2021  Doing okay. Needs Ditropan refill. PSA trending down.     6/28/2022  PSA trending down. Notes increased frequency but also notes increased fluid intake. Some urgency. Denies weak stream, straining.     8/17/2023  He saw Sarah NP 5/23. On Ditropan 15mg. Returns now with urinary frequency x 1 week. Denies dysuria. Voiding q1h now, was q2-3h. Nocturia not affected. Denies urgency. Strong stream. Occasional VON.   He saw PCP today for abdominal pain - diagnosed with gas. No constipation, " less loose stool recently.   PVR (bladder scan) today - 174cc    11/27/2023  Here for PVR check - much improved. On Ditropan 15mg.   PVR (bladder scan) today - 0cc      The following portions of the patient's history were reviewed and updated as appropriate: allergies, current medications, past family history, past medical history, past social history, past surgical history and problem list.    Review of Systems  Constitutional: no fever or chills  ENT: no nasal congestion or sore throat  Respiratory: no cough or shortness of breath  Cardiovascular: no chest pain or palpitations  Gastrointestinal: no nausea or vomiting, tolerating diet  Genitourinary: as per HPI  Hematologic/Lymphatic: no easy bruising or lymphadenopathy  Musculoskeletal: no arthralgias or myalgias  Skin: no rashes or lesions  Neurological: no seizures or tremors  Behavioral/Psych: no auditory or visual hallucinations       Objective:    Vitals: Wt 101.8 kg (224 lb 6.9 oz)   BMI 31.30 kg/m²     Physical Exam   General: well developed, well nourished in no acute distress  Head: normocephalic, atraumatic  Neck: supple, trachea midline, no obvious enlargement of thyroid  HEENT: EOMI, mucus membranes moist, sclera anicteric, no hearing impairment  Lungs: symmetric expansion, non-labored breathing  Neuro: alert and oriented x 3, no gross deficits  Psych: normal judgment and insight, normal mood/affect and non-anxious  Genitourinary: (last visit)  patient declined exam   YOSHI: Symmetric 40g prostate without nodularity or tenderness. Normal landmarks. seminal vesicles non palpable, normal sphincter tone without hemorrhoids or rectal masses. Normal appearance of anus and perineum. Difficult to palpate.       Lab Review   Urine analysis today in clinic shows Negative     Lab Results   Component Value Date    WBC 5.86 05/08/2023    HGB 13.8 (L) 05/08/2023    HCT 44.3 05/08/2023    MCV 93 05/08/2023     05/08/2023     Lab Results   Component Value Date     CREATININE 0.9 05/08/2023    BUN 16 05/08/2023     Lab Results   Component Value Date    PSA 0.41 03/15/2021     Lab Results   Component Value Date    PSADIAG 0.92 06/19/2023       Imaging  NA       Assessment/Plan:      1. Frequency of urination    - Seems like OAB component   - Limited response to Flomax   - Ditropan 15mg. Discussed possible SE.    - Monitor PVR     2. Nocturia    - Reduce PM fluids   - Ditropan 15mg, continue      3. BPH with obstruction/lower urinary tract symptoms    - Limited response with Flomax   - Okay to stop   - PSA/YOSHI normal   - Recheck PSA 12mths (6/23)     4. ED   - Viagra 100mg. Discussed SE. Printed to take to DOD.    5. PCa screening   - Jump in PSA noted   - Suspect this is related to recent colonoscopy   - PSA down trending - back to normal levels   - Recheck 12 months    6. Dysuria   - Improved   - Continue hydration   - Avoid bladder irritants    7. VON   - May be contributing to frequency   - Monitor closely. Recheck 3 months.       Follow up in 9 months with PSA / YOSHI / PVR

## 2024-01-19 ENCOUNTER — OFFICE VISIT (OUTPATIENT)
Dept: FAMILY MEDICINE | Facility: CLINIC | Age: 59
End: 2024-01-19
Payer: MEDICARE

## 2024-01-19 VITALS
BODY MASS INDEX: 31.19 KG/M2 | HEART RATE: 82 BPM | OXYGEN SATURATION: 97 % | DIASTOLIC BLOOD PRESSURE: 78 MMHG | WEIGHT: 222.75 LBS | HEIGHT: 71 IN | SYSTOLIC BLOOD PRESSURE: 122 MMHG | TEMPERATURE: 98 F

## 2024-01-19 DIAGNOSIS — M77.12 LATERAL EPICONDYLITIS OF LEFT ELBOW: Primary | ICD-10-CM

## 2024-01-19 DIAGNOSIS — I10 HYPERTENSION, UNSPECIFIED TYPE: ICD-10-CM

## 2024-01-19 DIAGNOSIS — K21.9 GASTROESOPHAGEAL REFLUX DISEASE WITHOUT ESOPHAGITIS: ICD-10-CM

## 2024-01-19 DIAGNOSIS — K21.9 LARYNGOPHARYNGEAL REFLUX (LPR): ICD-10-CM

## 2024-01-19 DIAGNOSIS — N32.81 OVERACTIVE BLADDER: ICD-10-CM

## 2024-01-19 DIAGNOSIS — J45.909 ASTHMA WITH ALLERGIC RHINITIS, UNSPECIFIED ASTHMA SEVERITY, UNCOMPLICATED: ICD-10-CM

## 2024-01-19 DIAGNOSIS — E78.2 MIXED HYPERLIPIDEMIA: ICD-10-CM

## 2024-01-19 PROCEDURE — 99999 PR PBB SHADOW E&M-EST. PATIENT-LVL III: CPT | Mod: PBBFAC,,, | Performed by: FAMILY MEDICINE

## 2024-01-19 PROCEDURE — 99214 OFFICE O/P EST MOD 30 MIN: CPT | Mod: S$PBB,,, | Performed by: FAMILY MEDICINE

## 2024-01-19 PROCEDURE — 99213 OFFICE O/P EST LOW 20 MIN: CPT | Mod: PBBFAC,PO | Performed by: FAMILY MEDICINE

## 2024-01-19 RX ORDER — FLUTICASONE PROPIONATE 50 MCG
1 SPRAY, SUSPENSION (ML) NASAL 2 TIMES DAILY
Qty: 58 G | Refills: 0 | Status: SHIPPED | OUTPATIENT
Start: 2024-01-19

## 2024-01-19 RX ORDER — DICLOFENAC SODIUM 10 MG/G
2 GEL TOPICAL 2 TIMES DAILY PRN
Qty: 100 G | Refills: 1 | Status: SHIPPED | OUTPATIENT
Start: 2024-01-19

## 2024-01-19 RX ORDER — OXYBUTYNIN CHLORIDE 15 MG/1
15 TABLET, EXTENDED RELEASE ORAL DAILY
Qty: 90 TABLET | Refills: 0 | Status: SHIPPED | OUTPATIENT
Start: 2024-01-19 | End: 2024-03-07 | Stop reason: SDUPTHER

## 2024-01-19 RX ORDER — ATORVASTATIN CALCIUM 40 MG/1
40 TABLET, FILM COATED ORAL NIGHTLY
Qty: 90 TABLET | Refills: 0 | Status: SHIPPED | OUTPATIENT
Start: 2024-01-19 | End: 2024-02-02 | Stop reason: SDUPTHER

## 2024-01-19 RX ORDER — OMEPRAZOLE 40 MG/1
40 CAPSULE, DELAYED RELEASE ORAL
Qty: 180 CAPSULE | Refills: 0 | Status: SHIPPED | OUTPATIENT
Start: 2024-01-19 | End: 2024-05-16

## 2024-01-19 RX ORDER — IBUPROFEN 800 MG/1
800 TABLET ORAL 2 TIMES DAILY PRN
Qty: 60 TABLET | Refills: 1 | Status: SHIPPED | OUTPATIENT
Start: 2024-01-19

## 2024-01-19 RX ORDER — CETIRIZINE HYDROCHLORIDE 10 MG/1
TABLET ORAL
Qty: 90 TABLET | Refills: 0 | Status: SHIPPED | OUTPATIENT
Start: 2024-01-19

## 2024-01-19 RX ORDER — AMLODIPINE BESYLATE 10 MG/1
10 TABLET ORAL DAILY
Qty: 90 TABLET | Refills: 0 | Status: SHIPPED | OUTPATIENT
Start: 2024-01-19 | End: 2024-02-02 | Stop reason: SDUPTHER

## 2024-01-19 NOTE — PROGRESS NOTES
"Assessment & Plan:    Lateral epicondylitis of left elbow  -     ibuprofen (ADVIL,MOTRIN) 800 MG tablet; Take 1 tablet (800 mg total) by mouth 2 (two) times daily as needed for Pain.  Dispense: 60 tablet; Refill: 1  -     diclofenac sodium (VOLTAREN) 1 % Gel; Apply 2 g topically 2 (two) times daily as needed (pain).  Dispense: 100 g; Refill: 1    Discussed "tennis elbow" with patient. Recommended Tylenol or po/topical NSAID prn. May alternate heat and ice and try other topical analgesics, such as IcyHot or Biofreeze. Compression brace and rest for the next 1-2 weeks until pain resolves.    Asthma with allergic rhinitis, unspecified asthma severity, uncomplicated  -     cetirizine (ZYRTEC) 10 MG tablet; TAKE ONE TABLET BY MOUTH ONCE DAILY FOR ALLERGIES  Dispense: 90 tablet; Refill: 0  -     fluticasone propionate (FLONASE) 50 mcg/actuation nasal spray; 1 spray (50 mcg total) by Each Nostril route 2 (two) times daily.  Dispense: 58 g; Refill: 0    Controlled. Medication(s) refilled.     Mixed hyperlipidemia  -     atorvastatin (LIPITOR) 40 MG tablet; Take 1 tablet (40 mg total) by mouth every evening.  Dispense: 90 tablet; Refill: 0    Statin refilled.    Gastroesophageal reflux disease without esophagitis  -     omeprazole (PRILOSEC) 40 MG capsule; Take 1 capsule (40 mg total) by mouth 2 (two) times daily before meals.  Dispense: 180 capsule; Refill: 0    Controlled. Medication(s) refilled.     Laryngopharyngeal reflux (LPR)  -     omeprazole (PRILOSEC) 40 MG capsule; Take 1 capsule (40 mg total) by mouth 2 (two) times daily before meals.  Dispense: 180 capsule; Refill: 0    Controlled. Medication(s) refilled.     Hypertension, unspecified type  -     amLODIPine (NORVASC) 10 MG tablet; Take 1 tablet (10 mg total) by mouth once daily.  Dispense: 90 tablet; Refill: 0    Controlled. Medication(s) refilled.     Overactive bladder  -     oxybutynin (DITROPAN XL) 15 MG TR24; Take 1 tablet (15 mg total) by mouth once daily. "  Dispense: 90 tablet; Refill: 0    Controlled. Medication(s) refilled.     Follow-up: Follow up if symptoms worsen or fail to improve.  ______________________________________________________________________    Chief Complaint  Chief Complaint   Patient presents with    Elbow Pain       HPI  Rito Conley is a 58 y.o. male with medical diagnoses as listed in the medical history and problem list that presents to the office for left elbow pain.     Arm Pain   Incident onset: no incident. Incident location: patient may have slept on his left arm. The pain is present in the left elbow (left lateral epicondyle). The pain radiates to the left arm. The pain is mild. The symptoms are aggravated by movement. He has tried nothing for the symptoms.         PAST MEDICAL HISTORY:  Past Medical History:   Diagnosis Date    Allergy     Class 1 obesity due to excess calories with serious comorbidity in adult 3/14/2022    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    Hyperlipidemia     Hypertension     Lower back pain     Major depressive disorder, recurrent, moderate 12/30/2022    PTSD (post-traumatic stress disorder)     Sleep apnea     uses CPAP 1-2x/week       PAST SURGICAL HISTORY:  Past Surgical History:   Procedure Laterality Date    COLONOSCOPY      COLONOSCOPY N/A 6/4/2021    Procedure: COLONOSCOPY;  Surgeon: Shanthi Horton MD;  Location: [facility];  Service: Endoscopy;  Laterality: N/A;  prep instr portal,   6/3 pt v/u of earlier arrival time and prep time -ml    ENDOSCOPIC NASAL SEPTOPLASTY N/A 10/13/2020    Procedure: SEPTOPLASTY, NOSE, ENDOSCOPIC;  Surgeon: Nick Gomez MD;  Location: [facility] OR;  Service: ENT;  Laterality: N/A;  RN PRE OP 9- COVID NEGATIVE ON  10-  [place]    ESOPHAGOGASTRODUODENOSCOPY N/A 6/17/2020    Procedure: EGD (ESOPHAGOGASTRODUODENOSCOPY);  Surgeon: Jarad Holm MD;  Location: [facility] ([address] [place] [address]);  Service: Endoscopy;  Laterality: N/A;  covid test 6/15-[place]    EXCISION OF MASS OF  EXTREMITY Left 1/31/2023    Procedure: EXCISION, MASS, EXTREMITY-WRIST;  Surgeon: Mallorie Hardwick MD;  Location: [facility] OR;  Service: Orthopedics;  Laterality: Left;  RN PREOP 1/26/2023---CLEARED BY PCP    HERNIA REPAIR      NASAL ENDOSCOPY Bilateral 10/13/2020    Procedure: ENDOSCOPY, NOSE;  Surgeon: Nick Gomez MD;  Location: [facility] OR;  Service: ENT;  Laterality: Bilateral;    NASAL STENOSIS REPAIR Bilateral 10/13/2020    Procedure: REPAIR, STENOSIS, NOSE, VESTIBULE;  Surgeon: Nick Gomez MD;  Location: [facility] OR;  Service: ENT;  Laterality: Bilateral;  Vivaer console and wand available for use.  NO DISC  10/7/2020@ 247PM-LO       SOCIAL HISTORY:  Social History     Socioeconomic History    Marital status:    Tobacco Use    Smoking status: Never    Smokeless tobacco: Never   Substance and Sexual Activity    Alcohol use: Yes     Alcohol/week: 7.0 standard drinks of alcohol     Types: 4 Glasses of wine, 1 Cans of beer, 2 Shots of liquor per week    Drug use: Yes     Types: Oxycodone     Comment: sometimes 1 tab weekly as needed; probably 2 tabs a month    Sexual activity: Yes     Partners: Female     Social Determinants of Health     Financial Resource Strain: Patient Declined (8/14/2023)    Overall Financial Resource Strain (CARDIA)     Difficulty of Paying Living Expenses: Patient declined   Food Insecurity: Patient Declined (8/14/2023)    Hunger Vital Sign     Worried About Running Out of Food in the Last Year: Patient declined     Ran Out of Food in the Last Year: Patient declined   Transportation Needs: Patient Declined (8/14/2023)    PRAPARE - Transportation     Lack of Transportation (Medical): Patient declined     Lack of Transportation (Non-Medical): Patient declined   Physical Activity: Unknown (8/14/2023)    Exercise Vital Sign     Days of Exercise per Week: Patient declined     Minutes of Exercise per Session: 30 min   Stress: No Stress Concern Present (5/8/2023)    [place] [employer]  Occupational Health - Occupational Stress Questionnaire     Feeling of Stress : Only a little   Recent Concern: Stress - Stress Concern Present (2/15/2023)    [place] of Occupational Health - Occupational Stress Questionnaire     Feeling of Stress : Very much   Social Connections: Unknown (8/14/2023)    Social Connection and Isolation Panel [NHANES]     Frequency of Communication with Friends and Family: More than three times a week     Frequency of Social Gatherings with Friends and Family: Three times a week     Attends Rastafarian Services: Never     Active Member of Clubs or Organizations: Yes     Attends Club or Organization Meetings: More than 4 times per year     Marital Status: Patient declined   Housing Stability: Low Risk  (8/14/2023)    Housing Stability Vital Sign     Unable to Pay for Housing in the Last Year: No     Number of Places Lived in the Last Year: 1     Unstable Housing in the Last Year: No       FAMILY HISTORY:  Family History   Problem Relation Age of Onset    Hypertension Mother     Other Father         agent orange exposure     Heart attacks under age 50 Sister     No Known Problems Brother     No Known Problems Daughter     No Known Problems Son     No Known Problems Son     No Known Problems Son     No Known Problems Son     Cancer Maternal Aunt         breast    Cancer Maternal Grandmother         something in her arm     Amblyopia Neg Hx     Blindness Neg Hx     Cataracts Neg Hx     Diabetes Neg Hx     Glaucoma Neg Hx     Macular degeneration Neg Hx     Retinal detachment Neg Hx     Strabismus Neg Hx     Stroke Neg Hx     Thyroid disease Neg Hx        ALLERGIES AND MEDICATIONS: updated and reviewed.  Review of patient's allergies indicates:   Allergen Reactions    Grass pollen-minnie grass standard Itching, Other (See Comments) and Rash     Current Outpatient Medications   Medication Sig Dispense Refill    azelastine (ASTELIN) 137 mcg (0.1 %) nasal spray 1 spray (137 mcg total)  by Nasal route 2 (two) times daily. 30 mL 1    buPROPion (WELLBUTRIN XL) 150 MG TB24 tablet Take 150 mg by mouth once daily.       hydrocortisone 2.5 % cream Apply topically 2 (two) times daily. 20 g 2    naloxone (NARCAN) 4 mg/actuation Spry 4mg by nasal route as needed for opioid overdose; may repeat every 2-3 minutes in alternating nostrils until medical help arrives. Call 911 1 each 11    oxyCODONE-acetaminophen (PERCOCET) 5-325 mg per tablet Take 1 tablet by mouth every 4 (four) hours as needed for Pain. 31 tablet 0    oxyCODONE-acetaminophen (PERCOCET) 5-325 mg per tablet Take 1 tablet by mouth every 4 (four) hours as needed for Pain. 31 tablet 0    paroxetine (PAXIL) 20 MG tablet TAKE ONE-HALF TABLET BY MOUTH EVERY DAY FOR MENTAL HEALTH      sildenafiL (VIAGRA) 100 MG tablet Take 1 tablet (100 mg total) by mouth daily as needed for Erectile Dysfunction. 30 tablet 11    simethicone 125 mg Tab Take 1 tablet by mouth 4 (four) times daily as needed. 120 tablet 1    zolpidem (AMBIEN) 10 mg Tab Take 1 tablet (10 mg total) by mouth nightly as needed. 30 tablet 5    amLODIPine (NORVASC) 10 MG tablet Take 1 tablet (10 mg total) by mouth once daily. 90 tablet 0    atorvastatin (LIPITOR) 40 MG tablet Take 1 tablet (40 mg total) by mouth every evening. 90 tablet 0    cetirizine (ZYRTEC) 10 MG tablet TAKE ONE TABLET BY MOUTH ONCE DAILY FOR ALLERGIES 90 tablet 0    diclofenac sodium (VOLTAREN) 1 % Gel Apply 2 g topically 2 (two) times daily as needed (pain). 100 g 1    fluticasone propionate (FLONASE) 50 mcg/actuation nasal spray 1 spray (50 mcg total) by Each Nostril route 2 (two) times daily. 58 g 0    ibuprofen (ADVIL,MOTRIN) 800 MG tablet Take 1 tablet (800 mg total) by mouth 2 (two) times daily as needed for Pain. 60 tablet 1    omeprazole (PRILOSEC) 40 MG capsule Take 1 capsule (40 mg total) by mouth 2 (two) times daily before meals. 180 capsule 0    oxybutynin (DITROPAN XL) 15 MG TR24 Take 1 tablet (15 mg total) by  "mouth once daily. 90 tablet 0     No current facility-administered medications for this visit.         ROS  Review of Systems   Constitutional:  Negative for activity change.   Musculoskeletal:  Negative for arthralgias and joint swelling.           Physical Exam  Vitals:    01/19/24 1026   BP: 122/78   BP Location: Right arm   Patient Position: Sitting   BP Method: Large (Manual)   Pulse: 82   Temp: 98.2 °F (36.8 °C)   TempSrc: Oral   SpO2: 97%   Weight: 101.1 kg (222 lb 12.4 oz)   Height: 5' 11" (1.803 m)    Body mass index is 31.07 kg/m².  Weight: 101.1 kg (222 lb 12.4 oz)   Height: 5' 11" (180.3 cm)   Physical Exam  Constitutional:       General: He is not in acute distress.  HENT:      Head: Normocephalic and atraumatic.   Skin:     General: Skin is warm and dry.   Neurological:      Mental Status: He is alert. Mental status is at baseline.   Psychiatric:         Mood and Affect: Mood normal.         Behavior: Behavior normal.             "

## 2024-01-23 ENCOUNTER — PATIENT MESSAGE (OUTPATIENT)
Dept: FAMILY MEDICINE | Facility: CLINIC | Age: 59
End: 2024-01-23
Payer: MEDICARE

## 2024-01-23 DIAGNOSIS — M77.12 LATERAL EPICONDYLITIS OF LEFT ELBOW: Primary | ICD-10-CM

## 2024-01-23 RX ORDER — KETOROLAC TROMETHAMINE 10 MG/1
10 TABLET, FILM COATED ORAL EVERY 6 HOURS
Qty: 20 TABLET | Refills: 0 | Status: SHIPPED | OUTPATIENT
Start: 2024-01-23 | End: 2024-01-28

## 2024-02-02 ENCOUNTER — OFFICE VISIT (OUTPATIENT)
Dept: FAMILY MEDICINE | Facility: CLINIC | Age: 59
End: 2024-02-02
Payer: MEDICARE

## 2024-02-02 VITALS
HEIGHT: 71 IN | BODY MASS INDEX: 31.82 KG/M2 | DIASTOLIC BLOOD PRESSURE: 86 MMHG | TEMPERATURE: 98 F | WEIGHT: 227.31 LBS | SYSTOLIC BLOOD PRESSURE: 128 MMHG | HEART RATE: 87 BPM | OXYGEN SATURATION: 96 %

## 2024-02-02 DIAGNOSIS — I10 HYPERTENSION, UNSPECIFIED TYPE: ICD-10-CM

## 2024-02-02 DIAGNOSIS — E78.2 MIXED HYPERLIPIDEMIA: Primary | ICD-10-CM

## 2024-02-02 DIAGNOSIS — M77.12 LEFT TENNIS ELBOW: ICD-10-CM

## 2024-02-02 DIAGNOSIS — M54.50 CHRONIC LOW BACK PAIN WITHOUT SCIATICA, UNSPECIFIED BACK PAIN LATERALITY: ICD-10-CM

## 2024-02-02 DIAGNOSIS — G89.29 CHRONIC LOW BACK PAIN WITHOUT SCIATICA, UNSPECIFIED BACK PAIN LATERALITY: ICD-10-CM

## 2024-02-02 DIAGNOSIS — E66.01 MORBID OBESITY: ICD-10-CM

## 2024-02-02 DIAGNOSIS — F33.1 MAJOR DEPRESSIVE DISORDER, RECURRENT, MODERATE: ICD-10-CM

## 2024-02-02 PROCEDURE — 99999PBSHW PR PBB SHADOW TECHNICAL ONLY FILED TO HB: Mod: PBBFAC,,,

## 2024-02-02 PROCEDURE — 99214 OFFICE O/P EST MOD 30 MIN: CPT | Mod: S$PBB,25,, | Performed by: FAMILY MEDICINE

## 2024-02-02 PROCEDURE — 99214 OFFICE O/P EST MOD 30 MIN: CPT | Mod: PBBFAC,PO | Performed by: FAMILY MEDICINE

## 2024-02-02 PROCEDURE — 99999 PR PBB SHADOW E&M-EST. PATIENT-LVL IV: CPT | Mod: PBBFAC,,, | Performed by: FAMILY MEDICINE

## 2024-02-02 PROCEDURE — 96372 THER/PROPH/DIAG INJ SC/IM: CPT | Mod: ,,, | Performed by: FAMILY MEDICINE

## 2024-02-02 RX ORDER — ATORVASTATIN CALCIUM 40 MG/1
40 TABLET, FILM COATED ORAL NIGHTLY
Qty: 90 TABLET | Refills: 1 | Status: SHIPPED | OUTPATIENT
Start: 2024-02-02

## 2024-02-02 RX ORDER — OXYCODONE AND ACETAMINOPHEN 5; 325 MG/1; MG/1
1 TABLET ORAL EVERY 4 HOURS PRN
Qty: 31 TABLET | Refills: 0 | Status: SHIPPED | OUTPATIENT
Start: 2024-02-02

## 2024-02-02 RX ORDER — OXYCODONE AND ACETAMINOPHEN 5; 325 MG/1; MG/1
1 TABLET ORAL EVERY 4 HOURS PRN
Qty: 31 TABLET | Refills: 0 | Status: SHIPPED | OUTPATIENT
Start: 2024-03-02

## 2024-02-02 RX ORDER — METHYLPREDNISOLONE ACETATE 40 MG/ML
40 INJECTION, SUSPENSION INTRA-ARTICULAR; INTRALESIONAL; INTRAMUSCULAR; SOFT TISSUE
Status: COMPLETED | OUTPATIENT
Start: 2024-02-02 | End: 2024-02-02

## 2024-02-02 RX ORDER — OXYCODONE AND ACETAMINOPHEN 5; 325 MG/1; MG/1
1 TABLET ORAL EVERY 4 HOURS PRN
Qty: 31 TABLET | Refills: 0 | Status: SHIPPED | OUTPATIENT
Start: 2024-04-02

## 2024-02-02 RX ORDER — AMLODIPINE BESYLATE 10 MG/1
10 TABLET ORAL DAILY
Qty: 90 TABLET | Refills: 1 | Status: SHIPPED | OUTPATIENT
Start: 2024-02-02

## 2024-02-02 RX ADMIN — METHYLPREDNISOLONE ACETATE 40 MG: 40 INJECTION, SUSPENSION INTRA-ARTICULAR; INTRALESIONAL; INTRAMUSCULAR; INTRASYNOVIAL; SOFT TISSUE at 10:02

## 2024-02-02 NOTE — PROGRESS NOTES
"Subjective     Patient ID: Rito Conley is a 58 y.o. male.    Chief Complaint: Hypertension and Elbow Pain    Hypertension  This is a chronic problem. The current episode started more than 1 year ago. The problem is controlled. Pertinent negatives include no chest pain, headaches, neck pain or palpitations.   Elbow Pain  This is a new problem. The current episode started 1 to 4 weeks ago (4 weeks). The problem has been unchanged. Associated symptoms include arthralgias. Pertinent negatives include no chest pain, headaches, joint swelling, neck pain, vomiting or weakness. The symptoms are aggravated by bending. He has tried NSAIDs for the symptoms. The treatment provided no relief.     Review of Systems   Constitutional:  Negative for activity change and unexpected weight change.   HENT:  Negative for hearing loss, rhinorrhea and trouble swallowing.    Eyes:  Positive for visual disturbance. Negative for discharge.   Respiratory:  Negative for chest tightness and wheezing.    Cardiovascular:  Negative for chest pain and palpitations.   Gastrointestinal:  Negative for blood in stool, constipation, diarrhea and vomiting.   Endocrine: Negative for polydipsia and polyuria.   Genitourinary:  Negative for difficulty urinating, hematuria and urgency.   Musculoskeletal:  Positive for arthralgias. Negative for joint swelling and neck pain.   Neurological:  Negative for weakness and headaches.   Psychiatric/Behavioral:  Negative for confusion and dysphoric mood.           Objective   Vitals:    02/02/24 0931   BP: 128/86   Pulse: 87   Temp: 98.4 °F (36.9 °C)   TempSrc: Oral   SpO2: 96%   Weight: 103.1 kg (227 lb 4.7 oz)   Height: 5' 11" (1.803 m)       Physical Exam  Constitutional:       Appearance: Normal appearance.   HENT:      Head: Normocephalic and atraumatic.   Musculoskeletal:      Left elbow: No swelling, deformity or effusion. Normal range of motion. Tenderness present.   Neurological:      Mental Status: " He is alert.            Assessment and Plan     1. Mixed hyperlipidemia  -     Comprehensive Metabolic Panel; Future; Expected date: 02/02/2024  -     Lipid Panel; Future; Expected date: 02/02/2024  -     atorvastatin (LIPITOR) 40 MG tablet; Take 1 tablet (40 mg total) by mouth every evening.  Dispense: 90 tablet; Refill: 1  Stable. Refilled meds and due for labs   2. Chronic low back pain without sciatica, unspecified back pain laterality  -     oxyCODONE-acetaminophen (PERCOCET) 5-325 mg per tablet; Take 1 tablet by mouth every 4 (four) hours as needed for Pain.  Dispense: 31 tablet; Refill: 0  -     oxyCODONE-acetaminophen (PERCOCET) 5-325 mg per tablet; Take 1 tablet by mouth every 4 (four) hours as needed for Pain.  Dispense: 31 tablet; Refill: 0  -     oxyCODONE-acetaminophen (PERCOCET) 5-325 mg per tablet; Take 1 tablet by mouth every 4 (four) hours as needed for Pain.  Dispense: 31 tablet; Refill: 0    3. Morbid obesity    4. Major depressive disorder, recurrent, moderate  stable  5. Hypertension, unspecified type  -     Comprehensive Metabolic Panel; Future; Expected date: 02/02/2024  -     Lipid Panel; Future; Expected date: 02/02/2024  -     CBC Auto Differential; Future; Expected date: 02/02/2024  -     amLODIPine (NORVASC) 10 MG tablet; Take 1 tablet (10 mg total) by mouth once daily.  Dispense: 90 tablet; Refill: 1  Stable. Refilled meds and due for labs     6. Left tennis elbow  -     methylPREDNISolone acetate injection 40 mg        Colonscopy in 6/2026.         No follow-ups on file.

## 2024-02-02 NOTE — PROGRESS NOTES
Answers submitted by the patient for this visit:  Review of Systems Questionnaire (Submitted on 2/1/2024)  activity change: No  unexpected weight change: No  neck pain: No  hearing loss: No  rhinorrhea: No  trouble swallowing: No  eye discharge: No  visual disturbance: Yes  chest tightness: No  wheezing: No  chest pain: No  palpitations: No  blood in stool: No  constipation: No  vomiting: No  diarrhea: No  polydipsia: No  polyuria: No  difficulty urinating: No  urgency: No  hematuria: No  joint swelling: No  arthralgias: Yes  headaches: No  weakness: No  confusion: No  dysphoric mood: No

## 2024-02-07 ENCOUNTER — LAB VISIT (OUTPATIENT)
Dept: LAB | Facility: HOSPITAL | Age: 59
End: 2024-02-07
Attending: FAMILY MEDICINE
Payer: MEDICARE

## 2024-02-07 DIAGNOSIS — I10 HYPERTENSION, UNSPECIFIED TYPE: ICD-10-CM

## 2024-02-07 DIAGNOSIS — E78.2 MIXED HYPERLIPIDEMIA: ICD-10-CM

## 2024-02-07 LAB
ALBUMIN SERPL BCP-MCNC: 3.9 G/DL (ref 3.5–5.2)
ALP SERPL-CCNC: 120 U/L (ref 55–135)
ALT SERPL W/O P-5'-P-CCNC: 33 U/L (ref 10–44)
ANION GAP SERPL CALC-SCNC: 7 MMOL/L (ref 8–16)
AST SERPL-CCNC: 27 U/L (ref 10–40)
BASOPHILS # BLD AUTO: 0.03 K/UL (ref 0–0.2)
BASOPHILS NFR BLD: 0.5 % (ref 0–1.9)
BILIRUB SERPL-MCNC: 0.6 MG/DL (ref 0.1–1)
BUN SERPL-MCNC: 13 MG/DL (ref 6–20)
CALCIUM SERPL-MCNC: 9.1 MG/DL (ref 8.7–10.5)
CHLORIDE SERPL-SCNC: 107 MMOL/L (ref 95–110)
CHOLEST SERPL-MCNC: 165 MG/DL (ref 120–199)
CHOLEST/HDLC SERPL: 3.2 {RATIO} (ref 2–5)
CO2 SERPL-SCNC: 25 MMOL/L (ref 23–29)
CREAT SERPL-MCNC: 0.9 MG/DL (ref 0.5–1.4)
DIFFERENTIAL METHOD BLD: ABNORMAL
EOSINOPHIL # BLD AUTO: 0.2 K/UL (ref 0–0.5)
EOSINOPHIL NFR BLD: 3.4 % (ref 0–8)
ERYTHROCYTE [DISTWIDTH] IN BLOOD BY AUTOMATED COUNT: 14.5 % (ref 11.5–14.5)
EST. GFR  (NO RACE VARIABLE): >60 ML/MIN/1.73 M^2
GLUCOSE SERPL-MCNC: 99 MG/DL (ref 70–110)
HCT VFR BLD AUTO: 42.8 % (ref 40–54)
HDLC SERPL-MCNC: 51 MG/DL (ref 40–75)
HDLC SERPL: 30.9 % (ref 20–50)
HGB BLD-MCNC: 13.9 G/DL (ref 14–18)
IMM GRANULOCYTES # BLD AUTO: 0.03 K/UL (ref 0–0.04)
IMM GRANULOCYTES NFR BLD AUTO: 0.5 % (ref 0–0.5)
LDLC SERPL CALC-MCNC: 99.4 MG/DL (ref 63–159)
LYMPHOCYTES # BLD AUTO: 2.6 K/UL (ref 1–4.8)
LYMPHOCYTES NFR BLD: 47.6 % (ref 18–48)
MCH RBC QN AUTO: 30 PG (ref 27–31)
MCHC RBC AUTO-ENTMCNC: 32.5 G/DL (ref 32–36)
MCV RBC AUTO: 92 FL (ref 82–98)
MONOCYTES # BLD AUTO: 0.5 K/UL (ref 0.3–1)
MONOCYTES NFR BLD: 9 % (ref 4–15)
NEUTROPHILS # BLD AUTO: 2.2 K/UL (ref 1.8–7.7)
NEUTROPHILS NFR BLD: 39 % (ref 38–73)
NONHDLC SERPL-MCNC: 114 MG/DL
NRBC BLD-RTO: 0 /100 WBC
PLATELET # BLD AUTO: 281 K/UL (ref 150–450)
PMV BLD AUTO: 11 FL (ref 9.2–12.9)
POTASSIUM SERPL-SCNC: 3.8 MMOL/L (ref 3.5–5.1)
PROT SERPL-MCNC: 7.4 G/DL (ref 6–8.4)
RBC # BLD AUTO: 4.63 M/UL (ref 4.6–6.2)
SODIUM SERPL-SCNC: 139 MMOL/L (ref 136–145)
TRIGL SERPL-MCNC: 73 MG/DL (ref 30–150)
WBC # BLD AUTO: 5.55 K/UL (ref 3.9–12.7)

## 2024-02-07 PROCEDURE — 85025 COMPLETE CBC W/AUTO DIFF WBC: CPT | Performed by: FAMILY MEDICINE

## 2024-02-07 PROCEDURE — 80061 LIPID PANEL: CPT | Performed by: FAMILY MEDICINE

## 2024-02-07 PROCEDURE — 36415 COLL VENOUS BLD VENIPUNCTURE: CPT | Mod: PO | Performed by: FAMILY MEDICINE

## 2024-02-07 PROCEDURE — 80053 COMPREHEN METABOLIC PANEL: CPT | Performed by: FAMILY MEDICINE

## 2024-02-15 ENCOUNTER — PATIENT MESSAGE (OUTPATIENT)
Dept: ADMINISTRATIVE | Facility: HOSPITAL | Age: 59
End: 2024-02-15
Payer: MEDICARE

## 2024-03-07 ENCOUNTER — OFFICE VISIT (OUTPATIENT)
Dept: UROLOGY | Facility: CLINIC | Age: 59
End: 2024-03-07
Payer: MEDICARE

## 2024-03-07 VITALS — BODY MASS INDEX: 32.45 KG/M2 | WEIGHT: 232.69 LBS

## 2024-03-07 DIAGNOSIS — Z12.5 PROSTATE CANCER SCREENING: ICD-10-CM

## 2024-03-07 DIAGNOSIS — R35.1 NOCTURIA: Primary | ICD-10-CM

## 2024-03-07 DIAGNOSIS — N40.1 BENIGN PROSTATIC HYPERPLASIA WITH URINARY FREQUENCY: ICD-10-CM

## 2024-03-07 DIAGNOSIS — N32.81 OVERACTIVE BLADDER: ICD-10-CM

## 2024-03-07 DIAGNOSIS — R35.0 BENIGN PROSTATIC HYPERPLASIA WITH URINARY FREQUENCY: ICD-10-CM

## 2024-03-07 PROCEDURE — 99999 PR PBB SHADOW E&M-EST. PATIENT-LVL III: CPT | Mod: PBBFAC,,, | Performed by: UROLOGY

## 2024-03-07 PROCEDURE — 99214 OFFICE O/P EST MOD 30 MIN: CPT | Mod: S$PBB,,, | Performed by: UROLOGY

## 2024-03-07 PROCEDURE — 99213 OFFICE O/P EST LOW 20 MIN: CPT | Mod: PBBFAC | Performed by: UROLOGY

## 2024-03-07 RX ORDER — OXYBUTYNIN CHLORIDE 15 MG/1
15 TABLET, EXTENDED RELEASE ORAL DAILY
Qty: 90 TABLET | Refills: 3 | Status: SHIPPED | OUTPATIENT
Start: 2024-03-07 | End: 2024-06-10 | Stop reason: SDUPTHER

## 2024-03-07 NOTE — PROGRESS NOTES
"Subjective:       Rito Conley is a 58 y.o. male who is an established patient who was seen for evaluation of urinary issues.      Last saw [facility] 4/18. Reported frequency, VON, nocturia x 1. Denies straining, urgency, weak stream. Was on Flomax. Also saw  for ED.    He is referred back now with c/o urinary frequency and worsening nocturia. Nocturia x 2-3. Strong flow. Mild urgency. Denies UI. Out of Flomax x 1 week though symptoms pre-date that. Has not noted change with Flomax. Denies hematuria, dysuria.     PSA 4/18 - 0.43  PSA 4/19 - 0.48  PSA 6/20 - 0.53  PSA 3/21 - 0.41  PSA 6/21 - 1.9   PSA 11/21 - 1.4  PSA 6/22 - 0.63  PSA 6/23 - 0.92    PVR (bladder scan) initial visit - 0cc     4/15/2019  PSA normal. Given trial Ditropan last visit - some improvement. Strong flow. Nocturia x 1.    10/14/2019  Frequency improved with Ditropan. No further issues.   PVR (bladder scan) today - 0cc    12/28/2020  Increased nocturia but admits to drinking more in evening. Given Cialis by NP in 6/20 but unable to get at [facility].     6/28/2021  PSA with significant rise since last check. Ditropan 10mg working better. Nocturia x 0.  PVR (bladder scan) today - 0cc    10/7/2021  Returns with further c/o LUTS. Noted frequency and "tingling" about 2 weeks. Notes this was also around time of drinking more soda and water. Given abx by PCP. UCx ended up being negative. He reports symptoms have improved. Now drinking more water.     12/28/2021  Doing okay. Needs Ditropan refill. PSA trending down.     6/28/2022  PSA trending down. Notes increased frequency but also notes increased fluid intake. Some urgency. Denies weak stream, straining.     8/17/2023  He saw Sarah NP 5/23. On Ditropan 15mg. Returns now with urinary frequency x 1 week. Denies dysuria. Voiding q1h now, was q2-3h. Nocturia not affected. Denies urgency. Strong stream. Occasional VON.   He saw PCP today for abdominal pain - diagnosed with gas. No constipation, " less loose stool recently.   PVR (bladder scan) today - 174cc    11/27/2023  Here for PVR check - much improved. On Ditropan 15mg.   PVR (bladder scan) today - 0cc    3/7/2024  Returns now with urinary frequency/nocturia and anal pain/bowel pain. Denies constipation. Nocturia x 2-3, improving over the last few days. Strong flow. No LE edema. No change in diet/meds. On Ditropan 15mg.  PVR (bladder scan) today - 0cc       The following portions of the patient's history were reviewed and updated as appropriate: allergies, current medications, past family history, past medical history, past social history, past surgical history and problem list.    Review of Systems  Constitutional: no fever or chills  ENT: no nasal congestion or sore throat  Respiratory: no cough or shortness of breath  Cardiovascular: no chest pain or palpitations  Gastrointestinal: no nausea or vomiting, tolerating diet  Genitourinary: as per HPI  Hematologic/Lymphatic: no easy bruising or lymphadenopathy  Musculoskeletal: no arthralgias or myalgias  Skin: no rashes or lesions  Neurological: no seizures or tremors  Behavioral/Psych: no auditory or visual hallucinations       Objective:    Vitals: There were no vitals taken for this visit.    Physical Exam   General: well developed, well nourished in no acute distress  Head: normocephalic, atraumatic  Neck: supple, trachea midline, no obvious enlargement of thyroid  HEENT: EOMI, mucus membranes moist, sclera anicteric, no hearing impairment  Lungs: symmetric expansion, non-labored breathing  Neuro: alert and oriented x 3, no gross deficits  Psych: normal judgment and insight, normal mood/affect and non-anxious  Genitourinary: (last visit)  patient declined exam   YOSHI: Symmetric 40g prostate without nodularity or tenderness. Normal landmarks. seminal vesicles non palpable, normal sphincter tone without hemorrhoids or rectal masses. Normal appearance of anus and perineum. Difficult to palpate.       Lab  Review   Urine analysis today in clinic shows Negative     Lab Results   Component Value Date    WBC 5.55 02/07/2024    HGB 13.9 (L) 02/07/2024    HCT 42.8 02/07/2024    MCV 92 02/07/2024     02/07/2024     Lab Results   Component Value Date    CREATININE 0.9 02/07/2024    BUN 13 02/07/2024     Lab Results   Component Value Date    PSA 0.41 03/15/2021     Lab Results   Component Value Date    PSADIAG 0.92 06/19/2023       Imaging  NA       Assessment/Plan:      1. Frequency of urination    - Seems like OAB component   - Limited response to Flomax   - Ditropan 15mg. Discussed possible SE. Okay to take in evening.    - Monitor PVR     2. Nocturia    - Reduce PM fluids   - Ditropan 15mg, continue      3. BPH with obstruction/lower urinary tract symptoms    - Limited response with Flomax   - Okay to stop   - PSA/YOSHI normal   - Recheck PSA 12mths (6/24)     4. ED   - Viagra 100mg. Discussed SE. Printed to take to DOD.    5. PCa screening   - Jump in PSA noted   - Suspect this is related to recent colonoscopy   - PSA down trending - back to normal levels   - Recheck 12 months (due in June)    6. Dysuria   - Improved   - Continue hydration   - Avoid bladder irritants    7. VON   - May be contributing to frequency   - Monitor closely Improved on subsequent checks.      Follow up in 3 months with PSA / YOSHI

## 2024-03-26 ENCOUNTER — OFFICE VISIT (OUTPATIENT)
Dept: SLEEP MEDICINE | Facility: CLINIC | Age: 59
End: 2024-03-26
Payer: MEDICARE

## 2024-03-26 VITALS — HEIGHT: 71 IN | WEIGHT: 231.5 LBS | BODY MASS INDEX: 32.41 KG/M2

## 2024-03-26 DIAGNOSIS — G47.10 HYPERSOMNOLENCE: ICD-10-CM

## 2024-03-26 DIAGNOSIS — G47.33 SEVERE OBSTRUCTIVE SLEEP APNEA: ICD-10-CM

## 2024-03-26 DIAGNOSIS — F51.09 OTHER INSOMNIA NOT DUE TO A SUBSTANCE OR KNOWN PHYSIOLOGICAL CONDITION: Primary | ICD-10-CM

## 2024-03-26 PROCEDURE — 99214 OFFICE O/P EST MOD 30 MIN: CPT | Mod: S$PBB,,, | Performed by: INTERNAL MEDICINE

## 2024-03-26 PROCEDURE — 99999 PR PBB SHADOW E&M-EST. PATIENT-LVL III: CPT | Mod: PBBFAC,,, | Performed by: INTERNAL MEDICINE

## 2024-03-26 PROCEDURE — 99213 OFFICE O/P EST LOW 20 MIN: CPT | Mod: PBBFAC | Performed by: INTERNAL MEDICINE

## 2024-03-26 NOTE — PROGRESS NOTES
ESTABLISHED  PATIENT VISIT    Rito Conley  is a pleasant 58 y.o. male  with PMH significant for PTSD, AR, HTN, LADI dx 2006, could not tolerate CPAP due to his PTSD who presents for  loud snoring disturbing his wife.    LOV:      Here today for:  follow-up     Since last visit:     Split 10.3.23: AHI 42, mask nasal pillows zena MED or large  7cwp -/+ sN2, 9cwp +/- s REM,   RX= E [id number]  push min to 9 cwp, Ramp quick  -> 7-10 ramp 4 x 10 toby    AV! Setup 12.13.23    3.24.24: 27/30 x 5h9 min, 7-10 (7.4/8.8/9.3), Leak 0/4.5/9.4, AHI 0.1    PAP history   Problems    Sleep position    Mask Nasal pillows (comfortable) p10   Pressure    DME HME   Machine age Dec 2023   Download See download above       SLEEP SCHEDULE   Environment    Bed Time 10P   Sleep Latency 30 min   Arousals 2-3   Nocturia 2-3   Back to sleep 20-30min   Wake time 6:30A   Naps 1-2 hrs   Work        Past Medical History:   Diagnosis Date    Allergy     Class 1 obesity due to excess calories with serious comorbidity in adult 3/14/2022    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    Hyperlipidemia     Hypertension     Lower back pain     Major depressive disorder, recurrent, moderate 12/30/2022    PTSD (post-traumatic stress disorder)     Sleep apnea     uses CPAP 1-2x/week     Patient Active Problem List   Diagnosis    Benign hypertensive heart disease without heart failure    DJD of shoulder    DJD (degenerative joint disease), lumbar    Allergic rhinitis    Lumbar scoliosis    Back injury    BPH with obstruction/lower urinary tract symptoms    PTSD (post-traumatic stress disorder)    Heartburn    Nasal septal deviation    Nasal valve collapse    Refractory obstruction of nasal airway    Hypertrophy of both inferior nasal turbinates    Nasal congestion    Encounter for colonoscopy due to history of colonic polyp    Class 1 obesity due to excess calories with serious comorbidity in adult    Hordeolum externum of left upper eyelid    Multiple acquired skin  tags    Major depressive disorder, recurrent, moderate    Ganglion cyst    Morbid obesity    Snoring       Current Outpatient Medications:     amLODIPine (NORVASC) 10 MG tablet, Take 1 tablet (10 mg total) by mouth once daily., Disp: 90 tablet, Rfl: 1    atorvastatin (LIPITOR) 40 MG tablet, Take 1 tablet (40 mg total) by mouth every evening., Disp: 90 tablet, Rfl: 1    azelastine (ASTELIN) 137 mcg (0.1 %) nasal spray, 1 spray (137 mcg total) by Nasal route 2 (two) times daily., Disp: 30 mL, Rfl: 1    buPROPion (WELLBUTRIN XL) 150 MG TB24 tablet, Take 150 mg by mouth once daily. , Disp: , Rfl:     cetirizine (ZYRTEC) 10 MG tablet, TAKE ONE TABLET BY MOUTH ONCE DAILY FOR ALLERGIES, Disp: 90 tablet, Rfl: 0    diclofenac sodium (VOLTAREN) 1 % Gel, Apply 2 g topically 2 (two) times daily as needed (pain)., Disp: 100 g, Rfl: 1    fluticasone propionate (FLONASE) 50 mcg/actuation nasal spray, 1 spray (50 mcg total) by Each Nostril route 2 (two) times daily., Disp: 58 g, Rfl: 0    hydrocortisone 2.5 % cream, Apply topically 2 (two) times daily., Disp: 20 g, Rfl: 2    ibuprofen (ADVIL,MOTRIN) 800 MG tablet, Take 1 tablet (800 mg total) by mouth 2 (two) times daily as needed for Pain., Disp: 60 tablet, Rfl: 1    naloxone (NARCAN) 4 mg/actuation Spry, 4mg by nasal route as needed for opioid overdose; may repeat every 2-3 minutes in alternating nostrils until medical help arrives. Call 911 (Patient not taking: Reported on 2/2/2024), Disp: 1 each, Rfl: 11    omeprazole (PRILOSEC) 40 MG capsule, Take 1 capsule (40 mg total) by mouth 2 (two) times daily before meals., Disp: 180 capsule, Rfl: 0    oxybutynin (DITROPAN XL) 15 MG TR24, Take 1 tablet (15 mg total) by mouth once daily., Disp: 90 tablet, Rfl: 3    oxyCODONE-acetaminophen (PERCOCET) 5-325 mg per tablet, Take 1 tablet by mouth every 4 (four) hours as needed for Pain., Disp: 31 tablet, Rfl: 0    oxyCODONE-acetaminophen (PERCOCET) 5-325 mg per tablet, Take 1 tablet by mouth  "every 4 (four) hours as needed for Pain., Disp: 31 tablet, Rfl: 0    oxyCODONE-acetaminophen (PERCOCET) 5-325 mg per tablet, Take 1 tablet by mouth every 4 (four) hours as needed for Pain., Disp: 31 tablet, Rfl: 0    [START ON 4/2/2024] oxyCODONE-acetaminophen (PERCOCET) 5-325 mg per tablet, Take 1 tablet by mouth every 4 (four) hours as needed for Pain., Disp: 31 tablet, Rfl: 0    paroxetine (PAXIL) 20 MG tablet, TAKE ONE-HALF TABLET BY MOUTH EVERY DAY FOR MENTAL HEALTH, Disp: , Rfl:     sildenafiL (VIAGRA) 100 MG tablet, Take 1 tablet (100 mg total) by mouth daily as needed for Erectile Dysfunction., Disp: 30 tablet, Rfl: 11    simethicone 125 mg Tab, Take 1 tablet by mouth 4 (four) times daily as needed., Disp: 120 tablet, Rfl: 1    zolpidem (AMBIEN) 10 mg Tab, Take 1 tablet (10 mg total) by mouth nightly as needed., Disp: 30 tablet, Rfl: 5     Vitals:    03/26/24 1610   Weight: 105 kg (231 lb 7.7 oz)   Height: 5' 11" (1.803 m)     Physical Exam:    GEN:   Well-appearing  Psych:  Appropriate affect, demonstrates insight  SKIN:  No rash on the face or bridge of the nose      LABS:   Lab Results   Component Value Date    HGB 13.9 (L) 02/07/2024    CO2 25 02/07/2024       RECORDS REVIEWED PREVIOUSLY:        ASSESSMENT      PROBLEM DESCRIPTION/ Sx on Presentation Interval Hx STATUS   LADI   + CPAP intolerance   Wearing nightly  Good  controlled   Daytime Sx   + sleepiness when inactive   ESS 14/24 on intake Less sleepy with CPAP improved   Insomnia     Trouble falling asleep:   Maintenance:         waking with some frequency  Prior hypnotics:        Current hypnotics: ambien 10mg prn since circa 2008   Waking less frequently improved   Nocturia   x 2-3 per sleep period 2 times A little bit less frequently   PTSD + claustrophobia  Occasional nightmares  Has rx for prazosin which helps sometimes Infrequent  Possibly fewer nightmares stable   Other issues:       PLAN       -discussed trying to wean off of ambien and will " try cutting to 7.5 mg  -Consider trial of nasal mask but likes nasal pillows  -continue auto 7 - 10 cwp with ramp same  -new orders for supplies have been entered  -the patient is using and benefiting from PAP therapy     RTC          The patient was given open opportunity to ask questions and/or express concerns about treatment plan.   All questions/concerns were discussed.     Two patient identifiers used prior to evaluation.

## 2024-04-30 DIAGNOSIS — Z00.00 ENCOUNTER FOR MEDICARE ANNUAL WELLNESS EXAM: ICD-10-CM

## 2024-05-16 ENCOUNTER — OFFICE VISIT (OUTPATIENT)
Dept: FAMILY MEDICINE | Facility: CLINIC | Age: 59
End: 2024-05-16
Payer: MEDICARE

## 2024-05-16 VITALS
WEIGHT: 220.88 LBS | SYSTOLIC BLOOD PRESSURE: 116 MMHG | TEMPERATURE: 98 F | BODY MASS INDEX: 30.92 KG/M2 | HEIGHT: 71 IN | DIASTOLIC BLOOD PRESSURE: 68 MMHG | OXYGEN SATURATION: 95 % | HEART RATE: 81 BPM

## 2024-05-16 DIAGNOSIS — J03.90 TONSILLITIS: Primary | ICD-10-CM

## 2024-05-16 PROCEDURE — 99213 OFFICE O/P EST LOW 20 MIN: CPT | Mod: S$PBB,,, | Performed by: FAMILY MEDICINE

## 2024-05-16 PROCEDURE — 99999 PR PBB SHADOW E&M-EST. PATIENT-LVL IV: CPT | Mod: PBBFAC,,, | Performed by: FAMILY MEDICINE

## 2024-05-16 PROCEDURE — 99214 OFFICE O/P EST MOD 30 MIN: CPT | Mod: PBBFAC,PO | Performed by: FAMILY MEDICINE

## 2024-05-16 RX ORDER — AMOXICILLIN 875 MG/1
875 TABLET, FILM COATED ORAL EVERY 12 HOURS
Qty: 14 TABLET | Refills: 0 | Status: SHIPPED | OUTPATIENT
Start: 2024-05-16 | End: 2024-05-23

## 2024-05-16 NOTE — PROGRESS NOTES
Subjective     Patient ID: Rito Conley is a 59 y.o. male.    Chief Complaint: Nasal Congestion and Ear Drainage    59 year old male presents for cough, post nasal drip. He has no fever or chills. He has no headache. He has no sneezing or rhinorrhea. He is taking claritin. He has not started his astelin. He took flonase yesterday,.       Past Medical History:   Diagnosis Date    Allergy     Class 1 obesity due to excess calories with serious comorbidity in adult 3/14/2022    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    Hyperlipidemia     Hypertension     Lower back pain     Major depressive disorder, recurrent, moderate 12/30/2022    PTSD (post-traumatic stress disorder)     Sleep apnea     uses CPAP 1-2x/week      Past Surgical History:   Procedure Laterality Date    COLONOSCOPY      COLONOSCOPY N/A 06/04/2021    Procedure: COLONOSCOPY;  Surgeon: Shanthi Horton MD;  Location: [facility];  Service: Endoscopy;  Laterality: N/A;  prep instr portal,   6/3 pt v/u of earlier arrival time and prep time -ml    ENDOSCOPIC NASAL SEPTOPLASTY N/A 10/13/2020    Procedure: SEPTOPLASTY, NOSE, ENDOSCOPIC;  Surgeon: Nick Gomez MD;  Location: [facility] OR;  Service: ENT;  Laterality: N/A;  RN PRE OP 9- COVID NEGATIVE ON  10-  [place]    ESOPHAGOGASTRODUODENOSCOPY N/A 06/17/2020    Procedure: EGD (ESOPHAGOGASTRODUODENOSCOPY);  Surgeon: Jarad Holm MD;  Location: [address]);  Service: Endoscopy;  Laterality: N/A;  covid test 6/15-[place]    EXCISION OF MASS OF EXTREMITY Left 01/31/2023    Procedure: EXCISION, MASS, EXTREMITY-WRIST;  Surgeon: Mallorie Hardwick MD;  Location: [facility] OR;  Service: Orthopedics;  Laterality: Left;  RN PREOP 1/26/2023---CLEARED BY PCP    HERNIA REPAIR      NASAL ENDOSCOPY Bilateral 10/13/2020    Procedure: ENDOSCOPY, NOSE;  Surgeon: Nick Gomez MD;  Location: [facility] OR;  Service: ENT;  Laterality: Bilateral;    NASAL STENOSIS REPAIR Bilateral 10/13/2020    Procedure: REPAIR,  STENOSIS, NOSE, VESTIBULE;  Surgeon: Nick Gomez MD;  Location: [facility];  Service: ENT;  Laterality: Bilateral;  Vivaer console and wand available for use.  NO DISC  10/7/2020@ 247PM-LO    VASECTOMY      wisdom tooth removal Right 04/2024    2     Family History   Problem Relation Name Age of Onset    Hypertension Mother Mother     Other Father          agent orange exposure     Heart attacks under age 50 Sister      No Known Problems Brother      No Known Problems Daughter      No Known Problems Son      No Known Problems Son      No Known Problems Son      No Known Problems Son      Cancer Maternal Aunt Maternal Aunt         breast    Cancer Maternal Grandmother Maternal grandmother         something in her arm     Amblyopia Neg Hx      Blindness Neg Hx      Cataracts Neg Hx      Diabetes Neg Hx      Glaucoma Neg Hx      Macular degeneration Neg Hx      Retinal detachment Neg Hx      Strabismus Neg Hx      Stroke Neg Hx      Thyroid disease Neg Hx       Social History     Socioeconomic History    Marital status:    Tobacco Use    Smoking status: Never    Smokeless tobacco: Never   Substance and Sexual Activity    Alcohol use: Yes     Alcohol/week: 2.0 standard drinks of alcohol     Types: 2 Glasses of wine per week    Drug use: Yes     Types: Oxycodone     Comment: sometimes 1 tab weekly as needed; probably 2 tabs a month    Sexual activity: Yes     Partners: Female     Birth control/protection: None     Social Determinants of Health     Financial Resource Strain: Low Risk  (2/1/2024)    Overall Financial Resource Strain (CARDIA)     Difficulty of Paying Living Expenses: Not hard at all   Food Insecurity: No Food Insecurity (2/1/2024)    Hunger Vital Sign     Worried About Running Out of Food in the Last Year: Never true     Ran Out of Food in the Last Year: Never true   Transportation Needs: No Transportation Needs (2/1/2024)    PRAPARE - Transportation     Lack of Transportation (Medical): No      "Lack of Transportation (Non-Medical): No   Physical Activity: Sufficiently Active (2/1/2024)    Exercise Vital Sign     Days of Exercise per Week: 4 days     Minutes of Exercise per Session: 50 min   Stress: Stress Concern Present (2/1/2024)    [employer] [place] [employer] - Occupational Stress Questionnaire     Feeling of Stress : To some extent   Housing Stability: Low Risk  (2/1/2024)    Housing Stability Vital Sign     Unable to Pay for Housing in the Last Year: No     Number of Places Lived in the Last Year: 1     Unstable Housing in the Last Year: No       Review of Systems       Objective   Vitals:    05/16/24 1058   BP: 116/68   Pulse: 81   Temp: 98.1 °F (36.7 °C)   TempSrc: Oral   SpO2: 95%   Weight: 100.2 kg (220 lb 14.4 oz)   Height: 5' 11" (1.803 m)      Physical Exam  Constitutional:       General: He is not in acute distress.     Appearance: He is well-developed. He is not diaphoretic.   HENT:      Head: Normocephalic and atraumatic.      Right Ear: External ear normal.      Left Ear: External ear normal.      Mouth/Throat:      Pharynx: Posterior oropharyngeal erythema present. No oropharyngeal exudate or uvula swelling.      Tonsils: 3+ on the right.   Cardiovascular:      Rate and Rhythm: Normal rate and regular rhythm.      Heart sounds: Normal heart sounds. No murmur heard.     No friction rub. No gallop.   Pulmonary:      Effort: Pulmonary effort is normal. No respiratory distress.      Breath sounds: Normal breath sounds. No wheezing or rales.   Chest:      Chest wall: No tenderness.   Musculoskeletal:      Cervical back: Normal range of motion and neck supple.   Lymphadenopathy:      Cervical: Cervical adenopathy present.   Neurological:      Mental Status: He is alert and oriented to person, place, and time.            Assessment and Plan     1. Tonsillitis  -     amoxicillin (AMOXIL) 875 MG tablet; Take 1 tablet (875 mg total) by mouth every 12 (twelve) hours. for 7 days  " Dispense: 14 tablet; Refill: 0                 No follow-ups on file.

## 2024-05-31 ENCOUNTER — LAB VISIT (OUTPATIENT)
Dept: LAB | Facility: HOSPITAL | Age: 59
End: 2024-05-31
Attending: UROLOGY
Payer: MEDICARE

## 2024-05-31 DIAGNOSIS — N40.1 BENIGN PROSTATIC HYPERPLASIA WITH URINARY FREQUENCY: ICD-10-CM

## 2024-05-31 DIAGNOSIS — Z12.5 PROSTATE CANCER SCREENING: ICD-10-CM

## 2024-05-31 DIAGNOSIS — R35.0 BENIGN PROSTATIC HYPERPLASIA WITH URINARY FREQUENCY: ICD-10-CM

## 2024-05-31 LAB — COMPLEXED PSA SERPL-MCNC: 0.67 NG/ML (ref 0–4)

## 2024-05-31 PROCEDURE — 36415 COLL VENOUS BLD VENIPUNCTURE: CPT | Performed by: UROLOGY

## 2024-05-31 PROCEDURE — 84153 ASSAY OF PSA TOTAL: CPT | Performed by: UROLOGY

## 2024-06-10 ENCOUNTER — OFFICE VISIT (OUTPATIENT)
Dept: UROLOGY | Facility: CLINIC | Age: 59
End: 2024-06-10
Payer: MEDICARE

## 2024-06-10 VITALS — WEIGHT: 221.25 LBS | BODY MASS INDEX: 30.86 KG/M2

## 2024-06-10 DIAGNOSIS — R35.0 BENIGN PROSTATIC HYPERPLASIA WITH URINARY FREQUENCY: ICD-10-CM

## 2024-06-10 DIAGNOSIS — N32.81 OVERACTIVE BLADDER: ICD-10-CM

## 2024-06-10 DIAGNOSIS — R33.9 INCOMPLETE BLADDER EMPTYING: ICD-10-CM

## 2024-06-10 DIAGNOSIS — N32.81 OAB (OVERACTIVE BLADDER): ICD-10-CM

## 2024-06-10 DIAGNOSIS — Z12.5 PROSTATE CANCER SCREENING: ICD-10-CM

## 2024-06-10 DIAGNOSIS — R35.1 NOCTURIA: Primary | ICD-10-CM

## 2024-06-10 DIAGNOSIS — N52.9 ERECTILE DYSFUNCTION OF ORGANIC ORIGIN: ICD-10-CM

## 2024-06-10 DIAGNOSIS — N40.1 BENIGN PROSTATIC HYPERPLASIA WITH URINARY FREQUENCY: ICD-10-CM

## 2024-06-10 PROCEDURE — 99213 OFFICE O/P EST LOW 20 MIN: CPT | Mod: PBBFAC | Performed by: UROLOGY

## 2024-06-10 PROCEDURE — 99214 OFFICE O/P EST MOD 30 MIN: CPT | Mod: S$PBB,,, | Performed by: UROLOGY

## 2024-06-10 PROCEDURE — 99999 PR PBB SHADOW E&M-EST. PATIENT-LVL III: CPT | Mod: PBBFAC,,, | Performed by: UROLOGY

## 2024-06-10 RX ORDER — OXYBUTYNIN CHLORIDE 15 MG/1
15 TABLET, EXTENDED RELEASE ORAL DAILY
Qty: 90 TABLET | Refills: 3 | Status: SHIPPED | OUTPATIENT
Start: 2024-06-10

## 2024-06-10 NOTE — PROGRESS NOTES
"Subjective:       Rito Conley is a 59 y.o. male who is an established patient who was seen for evaluation of urinary issues.      Last saw [place] 4/18. Reported frequency, VON, nocturia x 1. Denies straining, urgency, weak stream. Was on Flomax. Also saw  for ED.    He is referred back now with c/o urinary frequency and worsening nocturia. Nocturia x 2-3. Strong flow. Mild urgency. Denies UI. Out of Flomax x 1 week though symptoms pre-date that. Has not noted change with Flomax. Denies hematuria, dysuria.     PSA 4/18 - 0.43  PSA 4/19 - 0.48  PSA 6/20 - 0.53  PSA 3/21 - 0.41  PSA 6/21 - 1.9   PSA 11/21 - 1.4  PSA 6/22 - 0.63  PSA 6/23 - 0.92  PSA 5/24 - 0.67    PVR (bladder scan) initial visit - 0cc     4/15/2019  PSA normal. Given trial Ditropan last visit - some improvement. Strong flow. Nocturia x 1.    10/14/2019  Frequency improved with Ditropan. No further issues.   PVR (bladder scan) today - 0cc    12/28/2020  Increased nocturia but admits to drinking more in evening. Given Cialis by NP in 6/20 but unable to get at [facility].     6/28/2021  PSA with significant rise since last check. Ditropan 10mg working better. Nocturia x 0.  PVR (bladder scan) today - 0cc    10/7/2021  Returns with further c/o LUTS. Noted frequency and "tingling" about 2 weeks. Notes this was also around time of drinking more soda and water. Given abx by PCP. UCx ended up being negative. He reports symptoms have improved. Now drinking more water.     12/28/2021  Doing okay. Needs Ditropan refill. PSA trending down.     6/28/2022  PSA trending down. Notes increased frequency but also notes increased fluid intake. Some urgency. Denies weak stream, straining.     8/17/2023  He saw Sarah NP 5/23. On Ditropan 15mg. Returns now with urinary frequency x 1 week. Denies dysuria. Voiding q1h now, was q2-3h. Nocturia not affected. Denies urgency. Strong stream. Occasional VON.   He saw PCP today for abdominal pain - diagnosed with gas. No " constipation, less loose stool recently.   PVR (bladder scan) today - 174cc    11/27/2023  Here for PVR check - much improved. On Ditropan 15mg.   PVR (bladder scan) today - 0cc    3/7/2024  Returns now with urinary frequency/nocturia and anal pain/bowel pain. Denies constipation. Nocturia x 2-3, improving over the last few days. Strong flow. No LE edema. No change in diet/meds. On Ditropan 15mg.  PVR (bladder scan) today - 0cc     6/10/2024  Nocturia usually well controlled, some nights can be bothersome. Remains on Ditropan 15mg. PSA remains low.       The following portions of the patient's history were reviewed and updated as appropriate: allergies, current medications, past family history, past medical history, past social history, past surgical history and problem list.    Review of Systems  Constitutional: no fever or chills  ENT: no nasal congestion or sore throat  Respiratory: no cough or shortness of breath  Cardiovascular: no chest pain or palpitations  Gastrointestinal: no nausea or vomiting, tolerating diet  Genitourinary: as per HPI  Hematologic/Lymphatic: no easy bruising or lymphadenopathy  Musculoskeletal: no arthralgias or myalgias  Skin: no rashes or lesions  Neurological: no seizures or tremors  Behavioral/Psych: no auditory or visual hallucinations       Objective:    Vitals: Wt 100.4 kg (221 lb 3.7 oz)   BMI 30.86 kg/m²     Physical Exam   General: well developed, well nourished in no acute distress  Head: normocephalic, atraumatic  Neck: supple, trachea midline, no obvious enlargement of thyroid  HEENT: EOMI, mucus membranes moist, sclera anicteric, no hearing impairment  Lungs: symmetric expansion, non-labored breathing  Neuro: alert and oriented x 3, no gross deficits  Psych: normal judgment and insight, normal mood/affect and non-anxious  Genitourinary:  deferred    YOSHI: Symmetric 40g prostate without nodularity or tenderness. Normal landmarks. seminal vesicles non palpable, normal  sphincter tone without hemorrhoids or rectal masses. Normal appearance of anus and perineum. Difficult to palpate.       Lab Review   Urine analysis today in clinic shows Negative     Lab Results   Component Value Date    WBC 5.55 02/07/2024    HGB 13.9 (L) 02/07/2024    HCT 42.8 02/07/2024    MCV 92 02/07/2024     02/07/2024     Lab Results   Component Value Date    CREATININE 0.9 02/07/2024    BUN 13 02/07/2024     Lab Results   Component Value Date    PSA 0.41 03/15/2021     Lab Results   Component Value Date    PSADIAG 0.67 05/31/2024       Imaging  NA       Assessment/Plan:      1. Frequency of urination    - Seems like OAB component   - Limited response to Flomax   - Ditropan 15mg. Discussed possible SE. Okay to take in evening.    - Monitor PVR     2. Nocturia    - Reduce PM fluids   - Ditropan 15mg, continue      3. BPH with obstruction/lower urinary tract symptoms    - Limited response with Flomax   - Okay to stop   - PSA/YOSHI normal   - Recheck PSA 12mths (5/25)     4. ED   - Viagra 100mg. Discussed SE. Printed to take to DOD.    5. PCa screening   - Jump in PSA noted   - Suspect this is related to recent colonoscopy   - PSA down trending - back to normal levels   - Recheck 12 months (due in June 2025)    6. Dysuria   - Improved   - Continue hydration   - Avoid bladder irritants    7. VON   - May be contributing to frequency   - Monitor closely Improved on subsequent checks.      Follow up in 12 months with PSA/YOSHI

## 2024-08-05 ENCOUNTER — OFFICE VISIT (OUTPATIENT)
Dept: FAMILY MEDICINE | Facility: CLINIC | Age: 59
End: 2024-08-05
Payer: MEDICARE

## 2024-08-05 VITALS
HEART RATE: 78 BPM | TEMPERATURE: 98 F | WEIGHT: 220.69 LBS | DIASTOLIC BLOOD PRESSURE: 64 MMHG | BODY MASS INDEX: 30.9 KG/M2 | SYSTOLIC BLOOD PRESSURE: 118 MMHG | HEIGHT: 71 IN | OXYGEN SATURATION: 97 %

## 2024-08-05 DIAGNOSIS — G89.29 CHRONIC LOW BACK PAIN WITHOUT SCIATICA, UNSPECIFIED BACK PAIN LATERALITY: ICD-10-CM

## 2024-08-05 DIAGNOSIS — M75.51 BURSITIS OF RIGHT SHOULDER: Primary | ICD-10-CM

## 2024-08-05 DIAGNOSIS — M54.50 CHRONIC LOW BACK PAIN WITHOUT SCIATICA, UNSPECIFIED BACK PAIN LATERALITY: ICD-10-CM

## 2024-08-05 PROCEDURE — 99999 PR PBB SHADOW E&M-EST. PATIENT-LVL IV: CPT | Mod: PBBFAC,,, | Performed by: FAMILY MEDICINE

## 2024-08-05 PROCEDURE — 99214 OFFICE O/P EST MOD 30 MIN: CPT | Mod: PBBFAC,PO | Performed by: FAMILY MEDICINE

## 2024-08-05 PROCEDURE — 99214 OFFICE O/P EST MOD 30 MIN: CPT | Mod: S$PBB,,, | Performed by: FAMILY MEDICINE

## 2024-08-05 RX ORDER — OXYCODONE AND ACETAMINOPHEN 5; 325 MG/1; MG/1
1 TABLET ORAL EVERY 4 HOURS PRN
Qty: 31 TABLET | Refills: 0 | Status: SHIPPED | OUTPATIENT
Start: 2024-08-05

## 2024-08-05 RX ORDER — METHYLPREDNISOLONE 4 MG/1
TABLET ORAL
Qty: 1 EACH | Refills: 0 | Status: SHIPPED | OUTPATIENT
Start: 2024-08-05 | End: 2024-08-26

## 2024-08-05 RX ORDER — OXYCODONE AND ACETAMINOPHEN 5; 325 MG/1; MG/1
1 TABLET ORAL EVERY 4 HOURS PRN
Qty: 31 TABLET | Refills: 0 | Status: SHIPPED | OUTPATIENT
Start: 2024-09-05

## 2024-12-17 ENCOUNTER — OFFICE VISIT (OUTPATIENT)
Dept: FAMILY MEDICINE | Facility: CLINIC | Age: 59
End: 2024-12-17
Payer: MEDICARE

## 2024-12-17 VITALS
OXYGEN SATURATION: 97 % | HEIGHT: 71 IN | WEIGHT: 222.69 LBS | DIASTOLIC BLOOD PRESSURE: 62 MMHG | BODY MASS INDEX: 31.18 KG/M2 | HEART RATE: 78 BPM | SYSTOLIC BLOOD PRESSURE: 110 MMHG | TEMPERATURE: 98 F

## 2024-12-17 DIAGNOSIS — K21.9 GASTROESOPHAGEAL REFLUX DISEASE WITHOUT ESOPHAGITIS: ICD-10-CM

## 2024-12-17 DIAGNOSIS — T75.3XXA MOTION SICKNESS, INITIAL ENCOUNTER: ICD-10-CM

## 2024-12-17 DIAGNOSIS — J01.00 ACUTE NON-RECURRENT MAXILLARY SINUSITIS: Primary | ICD-10-CM

## 2024-12-17 DIAGNOSIS — M54.50 CHRONIC LOW BACK PAIN WITHOUT SCIATICA, UNSPECIFIED BACK PAIN LATERALITY: ICD-10-CM

## 2024-12-17 DIAGNOSIS — K21.9 LARYNGOPHARYNGEAL REFLUX (LPR): ICD-10-CM

## 2024-12-17 DIAGNOSIS — G89.29 CHRONIC LOW BACK PAIN WITHOUT SCIATICA, UNSPECIFIED BACK PAIN LATERALITY: ICD-10-CM

## 2024-12-17 PROCEDURE — 99215 OFFICE O/P EST HI 40 MIN: CPT | Mod: PBBFAC,PO | Performed by: FAMILY MEDICINE

## 2024-12-17 PROCEDURE — 99214 OFFICE O/P EST MOD 30 MIN: CPT | Mod: S$PBB,,, | Performed by: FAMILY MEDICINE

## 2024-12-17 PROCEDURE — 99999 PR PBB SHADOW E&M-EST. PATIENT-LVL V: CPT | Mod: PBBFAC,,, | Performed by: FAMILY MEDICINE

## 2024-12-17 RX ORDER — OXYCODONE AND ACETAMINOPHEN 5; 325 MG/1; MG/1
1 TABLET ORAL EVERY 4 HOURS PRN
Qty: 31 TABLET | Refills: 0 | Status: SHIPPED | OUTPATIENT
Start: 2025-02-17 | End: 2024-12-17 | Stop reason: SDUPTHER

## 2024-12-17 RX ORDER — PROMETHAZINE HYDROCHLORIDE AND DEXTROMETHORPHAN HYDROBROMIDE 6.25; 15 MG/5ML; MG/5ML
5 SYRUP ORAL EVERY 4 HOURS PRN
Qty: 118 ML | Refills: 0 | Status: SHIPPED | OUTPATIENT
Start: 2024-12-17 | End: 2024-12-27

## 2024-12-17 RX ORDER — OXYCODONE AND ACETAMINOPHEN 5; 325 MG/1; MG/1
1 TABLET ORAL EVERY 4 HOURS PRN
Qty: 31 TABLET | Refills: 0 | Status: SHIPPED | OUTPATIENT
Start: 2025-01-17 | End: 2024-12-17 | Stop reason: SDUPTHER

## 2024-12-17 RX ORDER — AZITHROMYCIN 250 MG/1
TABLET, FILM COATED ORAL
Qty: 6 TABLET | Refills: 0 | Status: SHIPPED | OUTPATIENT
Start: 2024-12-17 | End: 2024-12-22

## 2024-12-17 RX ORDER — SCOLOPAMINE TRANSDERMAL SYSTEM 1 MG/1
1 PATCH, EXTENDED RELEASE TRANSDERMAL
Qty: 10 PATCH | Refills: 0 | Status: SHIPPED | OUTPATIENT
Start: 2024-12-17 | End: 2024-12-27

## 2024-12-17 RX ORDER — OXYCODONE AND ACETAMINOPHEN 5; 325 MG/1; MG/1
1 TABLET ORAL EVERY 4 HOURS PRN
Qty: 31 TABLET | Refills: 0 | Status: SHIPPED | OUTPATIENT
Start: 2025-01-17

## 2024-12-17 RX ORDER — OXYCODONE AND ACETAMINOPHEN 5; 325 MG/1; MG/1
1 TABLET ORAL EVERY 4 HOURS PRN
Qty: 31 TABLET | Refills: 0 | Status: SHIPPED | OUTPATIENT
Start: 2024-12-17

## 2024-12-17 RX ORDER — OXYCODONE AND ACETAMINOPHEN 5; 325 MG/1; MG/1
1 TABLET ORAL EVERY 4 HOURS PRN
Qty: 31 TABLET | Refills: 0 | Status: SHIPPED | OUTPATIENT
Start: 2025-02-17

## 2024-12-17 RX ORDER — OMEPRAZOLE 40 MG/1
40 CAPSULE, DELAYED RELEASE ORAL
Qty: 180 CAPSULE | Refills: 0 | Status: SHIPPED | OUTPATIENT
Start: 2024-12-17 | End: 2025-03-17

## 2024-12-17 NOTE — PROGRESS NOTES
"Subjective     Patient ID: Rito Conley is a 59 y.o. male.    Chief Complaint: Back Pain, Nasal Congestion (Started a week ago ), and Chest Congestion    59 year old male presents with lower back pain. It is flared up today, but needs a refill of his percocet. He states he did slip and fell on his buttock    He also has some chest congestion, post nasal drip x 1 week. He states he is hoarse as well. He doesn't feel bad. He has no fever or chills. He took theraflu.     He also needs a refill of his acid reflux medication.         History of Present Illness               Review of Systems         Objective     Vitals:    12/17/24 1429   BP: 110/62   Pulse: 78   Temp: 98.3 °F (36.8 °C)   TempSrc: Oral   SpO2: 97%   Weight: 101 kg (222 lb 10.6 oz)   Height: 5' 11" (1.803 m)        Physical Exam  Constitutional:       General: He is not in acute distress.     Appearance: He is well-developed. He is not diaphoretic.   HENT:      Head: Normocephalic and atraumatic.      Right Ear: External ear normal.      Left Ear: External ear normal.      Mouth/Throat:      Pharynx: No oropharyngeal exudate.   Cardiovascular:      Rate and Rhythm: Normal rate and regular rhythm.      Heart sounds: Normal heart sounds. No murmur heard.     No friction rub. No gallop.   Pulmonary:      Effort: Pulmonary effort is normal. No respiratory distress.      Breath sounds: Normal breath sounds. No stridor. No wheezing, rhonchi or rales.   Chest:      Chest wall: No tenderness.   Musculoskeletal:      Cervical back: Normal range of motion and neck supple.      Right lower leg: No edema.      Left lower leg: No edema.   Lymphadenopathy:      Cervical: Cervical adenopathy present.   Neurological:      Mental Status: He is alert and oriented to person, place, and time.       Physical Exam                Assessment and Plan     1. Acute non-recurrent maxillary sinusitis  -     azithromycin (Z-TWAN) 250 MG tablet; Take 2 tablets by mouth on " day 1; Take 1 tablet by mouth on days 2-5  Dispense: 6 tablet; Refill: 0  -     promethazine-dextromethorphan (PROMETHAZINE-DM) 6.25-15 mg/5 mL Syrp; Take 5 mLs by mouth every 4 (four) hours as needed.  Dispense: 118 mL; Refill: 0    2. Gastroesophageal reflux disease without esophagitis  -     omeprazole (PRILOSEC) 40 MG capsule; Take 1 capsule (40 mg total) by mouth 2 (two) times daily before meals.  Dispense: 180 capsule; Refill: 0    3. Laryngopharyngeal reflux (LPR)  -     omeprazole (PRILOSEC) 40 MG capsule; Take 1 capsule (40 mg total) by mouth 2 (two) times daily before meals.  Dispense: 180 capsule; Refill: 0  Stable. Refilled meds.     4. Chronic low back pain without sciatica, unspecified back pain laterality  -     oxyCODONE-acetaminophen (PERCOCET) 5-325 mg per tablet; Take 1 tablet by mouth every 4 (four) hours as needed for Pain.  Dispense: 31 tablet; Refill: 0  -     oxyCODONE-acetaminophen (PERCOCET) 5-325 mg per tablet; Take 1 tablet by mouth every 4 (four) hours as needed for Pain.  Dispense: 31 tablet; Refill: 0  -     oxyCODONE-acetaminophen (PERCOCET) 5-325 mg per tablet; Take 1 tablet by mouth every 4 (four) hours as needed for Pain.  Dispense: 31 tablet; Refill: 0  Stable. Refilled meds.   Given 3 month supply.     5. Motion sickness, initial encounter  -     scopolamine (TRANSDERM-SCOP) 1.3-1.5 mg (1 mg over 3 days); Place 1 patch onto the skin Every 3 (three) days. Apply behind ear 30 min prior to event. Replace q3 days. for 10 days  Dispense: 10 patch; Refill: 0      Rito was seen today for back pain, nasal congestion and chest congestion.    Diagnoses and all orders for this visit:    Acute non-recurrent maxillary sinusitis  -     azithromycin (Z-TWAN) 250 MG tablet; Take 2 tablets by mouth on day 1; Take 1 tablet by mouth on days 2-5  -     promethazine-dextromethorphan (PROMETHAZINE-DM) 6.25-15 mg/5 mL Syrp; Take 5 mLs by mouth every 4 (four) hours as needed.    Gastroesophageal  reflux disease without esophagitis  -     omeprazole (PRILOSEC) 40 MG capsule; Take 1 capsule (40 mg total) by mouth 2 (two) times daily before meals.    Laryngopharyngeal reflux (LPR)  -     omeprazole (PRILOSEC) 40 MG capsule; Take 1 capsule (40 mg total) by mouth 2 (two) times daily before meals.    Chronic low back pain without sciatica, unspecified back pain laterality  -     oxyCODONE-acetaminophen (PERCOCET) 5-325 mg per tablet; Take 1 tablet by mouth every 4 (four) hours as needed for Pain.  -     oxyCODONE-acetaminophen (PERCOCET) 5-325 mg per tablet; Take 1 tablet by mouth every 4 (four) hours as needed for Pain.  -     oxyCODONE-acetaminophen (PERCOCET) 5-325 mg per tablet; Take 1 tablet by mouth every 4 (four) hours as needed for Pain.    Motion sickness, initial encounter  -     scopolamine (TRANSDERM-SCOP) 1.3-1.5 mg (1 mg over 3 days); Place 1 patch onto the skin Every 3 (three) days. Apply behind ear 30 min prior to event. Replace q3 days. for 10 days        Assessment & Plan                      No follow-ups on file.        This note was generated with the assistance of ambient listening technology. Verbal consent was obtained by the patient and accompanying visitor(s) for the recording of patient appointment to facilitate this note. I attest to having reviewed and edited the generated note for accuracy, though some syntax or spelling errors may persist. Please contact the author of this note for any clarification.

## 2025-01-02 NOTE — PROGRESS NOTES
January 2, 2025      [facility]  [address]  [facility] [address]  Phone: [phone]       Patient: Syeda Gonsalves   YOB: 1949  Date of Visit: 01/02/2025    To Whom It May Concern:    Mable Gonsalves  was at [facility] on 01/02/2025. The patient may return to work/school on January 4, 2025 with no restrictions. If you have any questions or concerns, or if I can be of further assistance, please do not hesitate to contact me.    Sincerely,    Kalin Baker Jr., JOHN PAULP-C      Administered Depo - Medrol 40 mg IM to left upper outer quad gluteus .  Patient tolerated injection well, no adverse reactions noted.

## 2025-02-03 ENCOUNTER — OFFICE VISIT (OUTPATIENT)
Dept: FAMILY MEDICINE | Facility: CLINIC | Age: 60
End: 2025-02-03
Payer: MEDICARE

## 2025-02-03 VITALS
SYSTOLIC BLOOD PRESSURE: 120 MMHG | DIASTOLIC BLOOD PRESSURE: 70 MMHG | BODY MASS INDEX: 31.26 KG/M2 | OXYGEN SATURATION: 96 % | TEMPERATURE: 98 F | WEIGHT: 224.13 LBS | HEART RATE: 85 BPM

## 2025-02-03 DIAGNOSIS — F33.1 MAJOR DEPRESSIVE DISORDER, RECURRENT, MODERATE: ICD-10-CM

## 2025-02-03 DIAGNOSIS — J30.9 CHRONIC ALLERGIC RHINITIS: ICD-10-CM

## 2025-02-03 DIAGNOSIS — J01.11 ACUTE RECURRENT FRONTAL SINUSITIS: Primary | ICD-10-CM

## 2025-02-03 DIAGNOSIS — J45.909 ASTHMA WITH ALLERGIC RHINITIS, UNSPECIFIED ASTHMA SEVERITY, UNCOMPLICATED: ICD-10-CM

## 2025-02-03 DIAGNOSIS — Z11.4 ENCOUNTER FOR SCREENING FOR HIV: ICD-10-CM

## 2025-02-03 DIAGNOSIS — E66.01 MORBID OBESITY: ICD-10-CM

## 2025-02-03 DIAGNOSIS — J32.9 SINUSITIS, UNSPECIFIED CHRONICITY, UNSPECIFIED LOCATION: ICD-10-CM

## 2025-02-03 PROCEDURE — 96372 THER/PROPH/DIAG INJ SC/IM: CPT | Mod: PBBFAC,PO

## 2025-02-03 PROCEDURE — 99214 OFFICE O/P EST MOD 30 MIN: CPT | Mod: S$PBB,,, | Performed by: NURSE PRACTITIONER

## 2025-02-03 PROCEDURE — G2211 COMPLEX E/M VISIT ADD ON: HCPCS | Mod: S$PBB,,, | Performed by: NURSE PRACTITIONER

## 2025-02-03 PROCEDURE — 99999PBSHW PR PBB SHADOW TECHNICAL ONLY FILED TO HB: Mod: PBBFAC,,,

## 2025-02-03 PROCEDURE — 99999 PR PBB SHADOW E&M-EST. PATIENT-LVL IV: CPT | Mod: PBBFAC,,, | Performed by: NURSE PRACTITIONER

## 2025-02-03 PROCEDURE — 99214 OFFICE O/P EST MOD 30 MIN: CPT | Mod: PBBFAC,PO | Performed by: NURSE PRACTITIONER

## 2025-02-03 RX ORDER — AZELASTINE 1 MG/ML
1 SPRAY, METERED NASAL 2 TIMES DAILY
Qty: 30 ML | Refills: 1 | Status: SHIPPED | OUTPATIENT
Start: 2025-02-03

## 2025-02-03 RX ORDER — CETIRIZINE HYDROCHLORIDE 10 MG/1
TABLET ORAL
Qty: 90 TABLET | Refills: 0 | Status: SHIPPED | OUTPATIENT
Start: 2025-02-03

## 2025-02-03 RX ORDER — METHYLPREDNISOLONE ACETATE 40 MG/ML
40 INJECTION, SUSPENSION INTRA-ARTICULAR; INTRALESIONAL; INTRAMUSCULAR; SOFT TISSUE
Status: DISCONTINUED | OUTPATIENT
Start: 2025-02-03 | End: 2025-02-03

## 2025-02-03 RX ORDER — TRIAMCINOLONE ACETONIDE 40 MG/ML
40 INJECTION, SUSPENSION INTRA-ARTICULAR; INTRAMUSCULAR
Status: DISCONTINUED | OUTPATIENT
Start: 2025-02-03 | End: 2025-02-03

## 2025-02-03 RX ORDER — TRIAMCINOLONE ACETONIDE 40 MG/ML
40 INJECTION, SUSPENSION INTRA-ARTICULAR; INTRAMUSCULAR
Status: COMPLETED | OUTPATIENT
Start: 2025-02-03 | End: 2025-02-03

## 2025-02-03 RX ORDER — FLUTICASONE PROPIONATE 50 MCG
1 SPRAY, SUSPENSION (ML) NASAL 2 TIMES DAILY
Qty: 64 G | Refills: 0 | Status: SHIPPED | OUTPATIENT
Start: 2025-02-03

## 2025-02-03 RX ORDER — TRIAMCINOLONE ACETONIDE 40 MG/ML
30 INJECTION, SUSPENSION INTRA-ARTICULAR; INTRAMUSCULAR
Status: DISCONTINUED | OUTPATIENT
Start: 2025-02-03 | End: 2025-02-03

## 2025-02-03 RX ADMIN — TRIAMCINOLONE ACETONIDE 40 MG: 40 INJECTION, SUSPENSION INTRA-ARTICULAR; INTRAMUSCULAR at 11:02

## 2025-02-03 NOTE — PROGRESS NOTES
HPI     Rito Conley is a 59 y.o. male with multiple medical diagnoses as listed in the medical history and problem list that presents for   Chief Complaint   Patient presents with    Facial Pain    Ear Fullness       Sinusitis  This is a recurrent problem. The current episode started in the past 7 days. The problem has been gradually worsening since onset. There has been no fever. Associated symptoms include congestion and sinus pressure. Pertinent negatives include no shortness of breath. (Post nasal drip  ) Past treatments include oral decongestants and nasal decongestants. The treatment provided mild relief.     Pt reports a flare up of his sinusitis. He recently attended an outdoor sports event out of town and believes exposure to allergens have caused a flare up.   He has been using flonase and zyrtec with mild improvement. Previously used astelin with good effect.   In the past he has been treated with IM steroid injections for symptoms with good effect.      Assessment & Plan     1. Acute recurrent frontal sinusitis  Again sinus pressure and tenderness minimal improvement with antihistamines and nasal sprays.  Avoid triggers  Refill medications  Triamcinolone as below  - triamcinolone acetonide injection 32 mg    2. Sinusitis, unspecified chronicity, unspecified location  As above    3. Morbid obesity  We discussed weight issues and safe, effective ways of losing pounds, includin) diet:  low carbohydrate, low fat diet, stay away from fast food, fried and processed food, use whole grain, lot of fruits and vegetables, use healthy fat such as avocado, nuts and olive oil in reasonable quantity, stay away from sodas. Regular meals with lean proteins.  2) physical activity: ideally 150 min a week, with cardiovascular and resistance activity.  Patient was encouraged to set realistic attainable goals for weight loss, and we will follow up periodically.  Mediterranean Diet recommendations (Adopted  from Joe, [facility], 2018.)  - Eat primarily plant-based foods, such as fruits and vegetables, whole grains, legumes (beans) and nuts  - Limit refined carbohydrates (white pasta, bread, rice).  - Replace butter with healthy fats such as olive oil.  - Use herbs and spices instead of salt to flavor foods.  - Limit red meat and processed meats to no more than a few times a month.  - Avoid sugary sodas, bakery goods, and sweets.  - Eat fish and poultry at least twice a week.     4. Major depressive disorder, recurrent, moderate  Reports mood is stable. Denies thoughts of self harm.The current medical regimen is effective;  continue present plan     5. Encounter for screening for HIV  - HIV 1/2 Ag/Ab (4th Gen); Future    6. Chronic allergic rhinitis  As above  - azelastine (ASTELIN) 137 mcg (0.1 %) nasal spray; 1 spray (137 mcg total) by Nasal route 2 (two) times daily.  Dispense: 30 mL; Refill: 1    7. Asthma with allergic rhinitis, unspecified asthma severity, uncomplicated  As above  - cetirizine (ZYRTEC) 10 MG tablet; TAKE ONE TABLET BY MOUTH ONCE DAILY FOR ALLERGIES  Dispense: 90 tablet; Refill: 0  - fluticasone propionate (FLONASE) 50 mcg/actuation nasal spray; 1 spray (50 mcg total) by Each Nostril route 2 (two) times daily.  Dispense: 64 g; Refill: 0          --------------------------------------------      Health Maintenance:  Health Maintenance         Date Due Completion Date    HIV Screening Never done ---    Hemoglobin A1c (Diabetic Prevention Screening) 04/26/2016 4/26/2013    TETANUS VACCINE 10/02/2023 10/2/2013    Influenza Vaccine (1) 09/01/2024 10/13/2022    COVID-19 Vaccine (6 - 2024-25 season) 09/01/2024 7/9/2022    Lipid Panel 02/07/2029 2/7/2024    Colorectal Cancer Screening 06/04/2031 6/4/2021    RSV Vaccine (Age 60+ and Pregnant patients) (1 - 1-dose 75+ series) 05/03/2040 ---            Advised patient on the importance of completing overdue health maintenance items    Follow Up:  Follow  up in about 2 weeks (around 2/17/2025), or if symptoms worsen or fail to improve.    Exam     Review of Systems:  (as noted above)  Review of Systems   Constitutional:  Negative for fever.   HENT:  Positive for congestion and sinus pressure. Negative for trouble swallowing.    Eyes:  Negative for visual disturbance.   Respiratory:  Negative for chest tightness and shortness of breath.    Cardiovascular:  Negative for chest pain.   Gastrointestinal:  Negative for blood in stool.       Physical Exam:   Physical Exam  Constitutional:       General: He is not in acute distress.     Appearance: He is not ill-appearing or diaphoretic.   HENT:      Head: Normocephalic and atraumatic.   Eyes:      General: No scleral icterus.  Cardiovascular:      Rate and Rhythm: Normal rate and regular rhythm.   Pulmonary:      Effort: No respiratory distress.   Chest:      Chest wall: No tenderness.   Neurological:      General: No focal deficit present.      Mental Status: He is alert and oriented to person, place, and time.     Vitals:    02/03/25 1044   BP: 120/70   BP Location: Left arm   Patient Position: Sitting   Pulse: 85   Temp: 97.9 °F (36.6 °C)   TempSrc: Oral   SpO2: 96%   Weight: 101.6 kg (224 lb 1.6 oz)      Body mass index is 31.26 kg/m².        History     Past Medical History:  Past Medical History:   Diagnosis Date    Allergy     Class 1 obesity due to excess calories with serious comorbidity in adult 3/14/2022    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    Hyperlipidemia     Hypertension     Lower back pain     Major depressive disorder, recurrent, moderate 12/30/2022    PTSD (post-traumatic stress disorder)     Sleep apnea     uses CPAP 1-2x/week       Past Surgical History:  Past Surgical History:   Procedure Laterality Date    COLONOSCOPY      COLONOSCOPY N/A 06/04/2021    Procedure: COLONOSCOPY;  Surgeon: Shanthi Horton MD;  Location[facility];  Service: Endoscopy;  Laterality: N/A;  prep instr portal,   6/3 pt v/u  of earlier arrival time and prep time -ml    ENDOSCOPIC NASAL SEPTOPLASTY N/A 10/13/2020    Procedure: SEPTOPLASTY, NOSE, ENDOSCOPIC;  Surgeon: Nick Gomez MD;  Location: [facility] OR;  Service: ENT;  Laterality: N/A;  RN PRE OP 9- COVID NEGATIVE ON  10-  [place]    ESOPHAGOGASTRODUODENOSCOPY N/A 06/17/2020    Procedure: EGD (ESOPHAGOGASTRODUODENOSCOPY);  Surgeon: Jarad Holm MD;  [facility]  Service: Endoscopy;  Laterality: N/A;  covid test 6/15-[place]    EXCISION OF MASS OF EXTREMITY Left 01/31/2023    Procedure: EXCISION, MASS, EXTREMITY-WRIST;  Surgeon: Mallorie Hardwick MD;  [facility];  Service: Orthopedics;  Laterality: Left;  RN PREOP 1/26/2023---CLEARED BY PCP    HERNIA REPAIR      NASAL ENDOSCOPY Bilateral 10/13/2020    Procedure: ENDOSCOPY, NOSE;  Surgeon: Nick Gomez MD;  Location: [facility] OR;  Service: ENT;  Laterality: Bilateral;    NASAL STENOSIS REPAIR Bilateral 10/13/2020    Procedure: REPAIR, STENOSIS, NOSE, VESTIBULE;  Surgeon: Nick Gomez MD;  Location: [facility] OR;  Service: ENT;  Laterality: Bilateral;  Vivaer console and wand available for use.  NO DISC  10/7/2020@ 247PM-LO    VASECTOMY      wisdom tooth removal Right 04/2024    2       Social History:  Social History     Socioeconomic History    Marital status:    Tobacco Use    Smoking status: Never    Smokeless tobacco: Never   Substance and Sexual Activity    Alcohol use: Yes     Alcohol/week: 2.0 standard drinks of alcohol     Types: 2 Glasses of wine per week    Drug use: Yes     Types: Oxycodone     Comment: sometimes 1 tab weekly as needed; probably 2 tabs a month    Sexual activity: Yes     Partners: Female     Birth control/protection: None     Social Drivers of Health     Financial Resource Strain: Low Risk  (2/1/2024)    Overall Financial Resource Strain (CARDIA)     Difficulty of Paying Living Expenses: Not hard at all   Food Insecurity: No Food Insecurity  (2/1/2024)    Hunger Vital Sign     Worried About Running Out of Food in the Last Year: Never true     Ran Out of Food in the Last Year: Never true   Transportation Needs: No Transportation Needs (2/1/2024)    PRAPARE - Transportation     Lack of Transportation (Medical): No     Lack of Transportation (Non-Medical): No   Physical Activity: Sufficiently Active (2/1/2024)    Exercise Vital Sign     Days of Exercise per Week: 4 days     Minutes of Exercise per Session: 50 min   Stress: Stress Concern Present (2/1/2024)    [facility] - Occupational Stress Questionnaire     Feeling of Stress : To some extent   Housing Stability: Low Risk  (2/1/2024)    Housing Stability Vital Sign     Unable to Pay for Housing in the Last Year: No     Number of Places Lived in the Last Year: 1     Unstable Housing in the Last Year: No       Family History:  Family History   Problem Relation Name Age of Onset    Hypertension Mother Mother     Other Father          agent orange exposure     Heart attacks under age 50 Sister      No Known Problems Brother      No Known Problems Daughter      No Known Problems Son      No Known Problems Son      No Known Problems Son      No Known Problems Son      Cancer Maternal Aunt Maternal Aunt         breast    Cancer Maternal Grandmother Maternal grandmother         something in her arm     Amblyopia Neg Hx      Blindness Neg Hx      Cataracts Neg Hx      Diabetes Neg Hx      Glaucoma Neg Hx      Macular degeneration Neg Hx      Retinal detachment Neg Hx      Strabismus Neg Hx      Stroke Neg Hx      Thyroid disease Neg Hx         Allergies and Medications: (updated and reviewed)  Review of patient's allergies indicates:   Allergen Reactions    Grass pollen-june grass standard Itching, Other (See Comments) and Rash     Current Outpatient Medications   Medication Sig Dispense Refill    amLODIPine (NORVASC) 10 MG tablet Take 1 tablet (10 mg  total) by mouth once daily. 90 tablet 1    atorvastatin (LIPITOR) 40 MG tablet Take 1 tablet (40 mg total) by mouth every evening. 90 tablet 1    buPROPion (WELLBUTRIN XL) 150 MG TB24 tablet Take 150 mg by mouth once daily.       diclofenac sodium (VOLTAREN) 1 % Gel Apply 2 g topically 2 (two) times daily as needed (pain). 100 g 1    hydrocortisone 2.5 % cream Apply topically 2 (two) times daily. 20 g 2    ibuprofen (ADVIL,MOTRIN) 800 MG tablet Take 1 tablet (800 mg total) by mouth 2 (two) times daily as needed for Pain. 60 tablet 1    naloxone (NARCAN) 4 mg/actuation Spry 4mg by nasal route as needed for opioid overdose; may repeat every 2-3 minutes in alternating nostrils until medical help arrives. Call 911 1 each 11    omeprazole (PRILOSEC) 40 MG capsule Take 1 capsule (40 mg total) by mouth 2 (two) times daily before meals. 180 capsule 0    oxybutynin (DITROPAN XL) 15 MG TR24 Take 1 tablet (15 mg total) by mouth once daily. 90 tablet 3    oxyCODONE-acetaminophen (PERCOCET) 5-325 mg per tablet Take 1 tablet by mouth every 4 (four) hours as needed for Pain. 31 tablet 0    oxyCODONE-acetaminophen (PERCOCET) 5-325 mg per tablet Take 1 tablet by mouth every 4 (four) hours as needed for Pain. 31 tablet 0    [START ON 2/17/2025] oxyCODONE-acetaminophen (PERCOCET) 5-325 mg per tablet Take 1 tablet by mouth every 4 (four) hours as needed for Pain. 31 tablet 0    oxyCODONE-acetaminophen (PERCOCET) 5-325 mg per tablet Take 1 tablet by mouth every 4 (four) hours as needed for Pain. 31 tablet 0    paroxetine (PAXIL) 20 MG tablet TAKE ONE-HALF TABLET BY MOUTH EVERY DAY FOR MENTAL HEALTH      sildenafiL (VIAGRA) 100 MG tablet Take 1 tablet (100 mg total) by mouth daily as needed for Erectile Dysfunction. 30 tablet 11    simethicone 125 mg Tab Take 1 tablet by mouth 4 (four) times daily as needed. 120 tablet 1    zolpidem (AMBIEN) 10 mg Tab Take 1 tablet (10 mg total) by mouth nightly as needed. 30 tablet 5     azelastine (ASTELIN) 137 mcg (0.1 %) nasal spray 1 spray (137 mcg total) by Nasal route 2 (two) times daily. 30 mL 1    cetirizine (ZYRTEC) 10 MG tablet TAKE ONE TABLET BY MOUTH ONCE DAILY FOR ALLERGIES 90 tablet 0    fluticasone propionate (FLONASE) 50 mcg/actuation nasal spray 1 spray (50 mcg total) by Each Nostril route 2 (two) times daily. 64 g 0     Current Facility-Administered Medications   Medication Dose Route Frequency Provider Last Rate Last Admin    triamcinolone acetonide injection 40 mg  40 mg Intramuscular 1 time in Clinic/HOD            Patient Care Team:  Jennifer Huertas MD as PCP - General (Family Medicine)  Jory Mauricio MD as Consulting Physician (Urology)  Allen Colindres MD as Consulting Physician (Urology)  Lorie Gage PA-C (Inactive) as Physician Assistant (Surgery)         - The patient is given an After Visit Summary that lists all medications with directions, allergies, education, orders placed during this encounter and follow-up instructions.      - I have reviewed the patient's medical information including past medical, family, and social history sections including the medications and allergies.      - We discussed the patient's current medications.     This note was created by combination of typed  and MModal dictation.  Transcription errors may be present.  If there are any questions, please contact me.

## 2025-02-03 NOTE — PATIENT INSTRUCTIONS
Medical Fitness--[phone]  Imaging, Xray, CT, MRI, Ultrasound---[phone]  Bariatrics---[phone]  Breast Surgery---[phone]  Case Management---[phone]  Colonoscopy---[phone]  DME---[phone]  Infectious Disease---[phone]  Interventional Radiology---[phone]  Medical Records---[phone]  [facility] On [facility]---[phone]  Optometry/Ophthalmology---[phone]  [facility]---[phone]  Physical Therapy---[phone]  Psychiatry---[phone] or [phone]  Plastic Surgery---[phone]  Recovery--[phone] option 2, or [phone].  Sleep Study---[phone]  Smoking Cessation---[phone]  Wound Care---[phone]  Referral Desk---[phone]  Patient Education       Sinusitis Discharge Instructions, Adult   About this topic   Your sinuses are hollow areas in the bones of your face. They have a thin lining that normally makes a small amount of mucus. When you have sinusitis, the lining gets swollen and makes extra mucus. You may have sinusitis with or after a cold. Most of the time sinusitis will get better in 1 to 2 weeks.  Sinusitis is most often caused by a virus, so antibiotics wont help. But some people do need antibiotics. If the doctor ordered antibiotics for you, be sure to follow the instructions. It is important to take all of your antibiotics even if you start to feel better.       What care is needed at home?   Ask your doctor what you need to do when you go home. Make sure you ask questions if you do not understand what you need to do.  Try to thin the mucus.  Drink lots of liquids to stay hydrated.  Use a cool mist humidifier to avoid dry air.  Use saline nose drops or a saline nose rinse to relieve stuffiness.  Wash your hands often. This will help keep others healthy.  Do not smoke or be in smoke-filled places. Avoid things that may cause breathing problems like fumes, pollution, dust, and other common allergens and irritants.  You may want to take  medicine like ibuprofen, naproxen, or acetaminophen to help with pain.  What follow-up care is needed?   Your doctor may ask you to make visits to the office to check on your progress. Be sure to keep your visits.  Your doctor will tell you if other tests are needed.  Your doctor may send you for allergy tests or to an allergy expert.   What lifestyle changes are needed?   Avoid drinks that contain caffeine or alcohol.  Try to stop smoking. Avoid being around others who smoke. Smoke can damage the small hairs inside your sinuses.  What drugs may be needed?   The doctor may order drugs to:  Help with pain and swelling  Fight an infection  Control coughing  Dry up the sinuses  Help a runny or stuffy nose  Will physical activity be limited?   You do not have to limit your activity. You may want to rest more if you have a fever or headache.   What problems could happen?   Infections that happen again and again  Asthma attack  Coughing  Loss of voice  What can be done to prevent this health problem?   Keep your nose as moist as possible. Use saline sprays, washes, and a humidifier often.  Avoid being around cigarette and cigar smoke or strong odors from chemicals.  Avoid long periods of swimming in pools treated with chlorine. This can bother the lining of the nose and sinuses.  Avoid water diving. This forces water into the sinuses from the nasal passages.  Manage your allergies with your doctor's help.  Use an air conditioner if allergies are a problem.  Before air travel, use a drug to dry up mucus. As the plane takes off or lands, the pressure can cause sinus pain.  When do I need to call the doctor?   You have a stiff neck, especially if you also have fever, chills, vomiting, or severe headache  You have trouble thinking clearly.  You have trouble seeing or have double vision.  You have swelling or redness or pain around one or both eyes.  You have a fever of 102°F (38.9°C) or higher, or have shaking chills or  sweats.  You have an upset stomach and throwing up.  You have more pain in your face and head.  You are not getting better within 1 to 2 weeks.  Teach Back: Helping You Understand   The Teach Back Method helps you understand the information we are giving you. After you talk with the staff, tell them in your own words what you learned. This helps to make sure the staff has covered each thing clearly. It also helps to explain things that may have been confusing. Before going home, make sure you can do these:  I can tell you about my condition.  I can tell you what may help ease my breathing.  I can tell you what I will do if I have a fever, chills, or trouble breathing.  Where can I learn more?   American Academy of Family Physicians  https://familydoctor.org/condition/sinusitis/   Centers for Disease Control and Prevention  https://www.cdc.gov/antibiotic-use/community/for-hcp/outpatient-hcp/adult-treatment-rec.html   Last Reviewed Date   2021-06-09  Consumer Information Use and Disclaimer   This information is not specific medical advice and does not replace information you receive from your health care provider. This is only a brief summary of general information. It does NOT include all information about conditions, illnesses, injuries, tests, procedures, treatments, therapies, discharge instructions or life-style choices that may apply to you. You must talk with your health care provider for complete information about your health and treatment options. This information should not be used to decide whether or not to accept your health care providers advice, instructions or recommendations. Only your health care provider has the knowledge and training to provide advice that is right for you.  Copyright   Copyright © 2021 UpToDate, Inc. and its affiliates and/or licensors. All rights reserved.  Patient Education       Sinusitis in Adults   The Basics   Written by the doctors and editors at [employer]   What is  sinusitis? -- Sinusitis is a condition that can cause a stuffy nose, pain in the face, and discharge (mucus) from the nose. The sinuses are hollow areas in the bones of the face (figure 1). They have a thin lining that normally makes a small amount of mucus. When this lining gets irritated or infected, it swells and makes extra mucus. This causes symptoms.  Sinusitis can occur when a person gets sick with a cold. The germs causing the cold can also infect the sinuses. Many times, a person feels like their cold is getting better. But then they get sinusitis and begin to feel sick again.  What are the symptoms of sinusitis? -- Common symptoms of sinusitis include:  Stuffy or blocked nose  Thick white, yellow, or green discharge from the nose  Pain in the teeth  Pain or pressure in the face - This often feels worse when a person bends forward.  People with sinusitis can also have other symptoms that include:  Fever  Cough  Trouble smelling  Ear pressure or fullness  Headache  Bad breath  Feeling tired  Most of the time, symptoms start to improve in 7 to 10 days.  Should I see a doctor or nurse? -- See your doctor or nurse if your symptoms last more than 10 days, or if your symptoms first get better but then get worse.  Rarely, sinusitis can lead to serious problems. See your doctor or nurse right away (do not wait 10 days) if you have:  Fever higher than 102°F (38.9°C)  Sudden and severe pain in the face and head  Trouble seeing or seeing double  Trouble thinking clearly  Swelling or redness around one or both eyes  A stiff neck  Is there anything I can do on my own to feel better? -- Yes. To reduce your symptoms, you can:  Take an over-the-counter pain reliever to reduce the pain  Rinse your nose and sinuses with salt water a few times a day - Ask your doctor or nurse about the best way to do this.  Your doctor might also prescribe a steroid nose spray to reduce the swelling in your nose. (These kinds of steroid nose  "sprays are safe to take, and do not contain the same steroids that some athletes take illegally.)  How is sinusitis treated? -- Most of the time, sinusitis does not need to be treated with antibiotic medicines. This is because most sinusitis is caused by viruses, not bacteria, and antibiotics do not kill viruses. In fact, even sinusitis caused by bacteria will usually get better on its own without antibiotics.   Some people with sinusitis do need treatment with antibiotics. If your symptoms have not improved after 10 days, ask your doctor if you should take antibiotics. Your doctor might recommend that you wait 1 more week to see if your symptoms improve. But if you have symptoms such as a fever or a lot of pain, they might prescribe antibiotics. It is important to follow your doctor's instructions about taking your antibiotics.  What if my symptoms do not get better? -- If your symptoms do not get better, talk with your doctor or nurse. They might order tests to figure out why you still have symptoms. These can include:  CT scan or other imaging tests - Imaging tests create pictures of the inside of the body.  A test to look inside the sinuses - For this test, a doctor puts a thin tube with a camera on the end into the nose and up into the sinuses.  Some people get a lot of sinus infections or have symptoms that last at least 3 months. These people can have a different type of sinusitis called "chronic sinusitis." Chronic sinusitis can be caused by different things. For example, some people have growths inside their nose or sinuses that are called "polyps." Other people have allergies that cause their symptoms.  Chronic sinusitis can be treated in different ways. If you have chronic sinusitis, talk with your doctor about which treatments are right for you.  All topics are updated as new evidence becomes available and our peer review process is complete.  This topic retrieved from [employer] on: Sep 21, 2021.  Topic " 16956 Version 17.0  Release: 29.4.2 - C29.263  © 2021 UpToDate, Inc. and/or its affiliates. All rights reserved.  figure 1: Sinuses of the face     This drawing shows the sinuses of the face, from the side and front views.  Graphic 496431 Version 1.0    Consumer Information Use and Disclaimer   This information is not specific medical advice and does not replace information you receive from your health care provider. This is only a brief summary of general information. It does NOT include all information about conditions, illnesses, injuries, tests, procedures, treatments, therapies, discharge instructions or life-style choices that may apply to you. You must talk with your health care provider for complete information about your health and treatment options. This information should not be used to decide whether or not to accept your health care provider's advice, instructions or recommendations. Only your health care provider has the knowledge and training to provide advice that is right for you. The use of this information is governed by the [employer] End User License Agreement, available at https://www.[employer]/en/solutions/[employer]/about/monica.The use of [employer] content is governed by the [employer] Terms of Use. ©2021 UpToDate, Inc. All rights reserved.  Copyright   © 2021 UpToDate, Inc. and/or its affiliates. All rights reserved.

## 2025-02-19 DIAGNOSIS — I10 HYPERTENSION, UNSPECIFIED TYPE: ICD-10-CM

## 2025-02-24 DIAGNOSIS — Z00.00 ENCOUNTER FOR MEDICARE ANNUAL WELLNESS EXAM: ICD-10-CM

## 2025-03-05 ENCOUNTER — TELEPHONE (OUTPATIENT)
Dept: FAMILY MEDICINE | Facility: CLINIC | Age: 60
End: 2025-03-05

## 2025-03-05 ENCOUNTER — OFFICE VISIT (OUTPATIENT)
Dept: FAMILY MEDICINE | Facility: CLINIC | Age: 60
End: 2025-03-05
Payer: MEDICARE

## 2025-03-05 VITALS
HEART RATE: 74 BPM | TEMPERATURE: 98 F | BODY MASS INDEX: 30.13 KG/M2 | HEIGHT: 71 IN | WEIGHT: 215.19 LBS | SYSTOLIC BLOOD PRESSURE: 122 MMHG | DIASTOLIC BLOOD PRESSURE: 78 MMHG | OXYGEN SATURATION: 98 %

## 2025-03-05 DIAGNOSIS — J06.9 VIRAL URI: Primary | ICD-10-CM

## 2025-03-05 PROCEDURE — 99214 OFFICE O/P EST MOD 30 MIN: CPT | Mod: PBBFAC,PO | Performed by: INTERNAL MEDICINE

## 2025-03-05 PROCEDURE — 99213 OFFICE O/P EST LOW 20 MIN: CPT | Mod: S$PBB,,, | Performed by: INTERNAL MEDICINE

## 2025-03-05 PROCEDURE — 99999 PR PBB SHADOW E&M-EST. PATIENT-LVL IV: CPT | Mod: PBBFAC,,, | Performed by: INTERNAL MEDICINE

## 2025-03-05 RX ORDER — IPRATROPIUM BROMIDE 42 UG/1
2 SPRAY, METERED NASAL 3 TIMES DAILY
Qty: 15 ML | Refills: 0 | Status: SHIPPED | OUTPATIENT
Start: 2025-03-05 | End: 2025-03-12

## 2025-03-05 RX ORDER — BENZONATATE 100 MG/1
100 CAPSULE ORAL 3 TIMES DAILY PRN
Qty: 30 CAPSULE | Refills: 0 | Status: SHIPPED | OUTPATIENT
Start: 2025-03-05 | End: 2025-03-15

## 2025-03-05 NOTE — PROGRESS NOTES
This note was created by combination of typed  and M-Modal dictation.  Transcription errors may be present.   This note was also generated with the assistance of ambient listening technology. Verbal consent was obtained by the patient and accompanying visitor(s) for the recording of patient appointment to facilitate this note. I attest to having reviewed and edited the generated note for accuracy, though some syntax or spelling errors may persist. Please contact the author of this note for any clarification.    Assessment and Plan:   Assessment and Plan   Assessment & Plan  Viral URI  2025 suspect viral illness superimposed on history of allergic rhinitis.  No indication for antibiotic.  Supportive care with Atrovent and benzonatate.  May continue Flonase, Astelin, Zyrtec  If signs and symptoms of superimposed bacterial sinusitis develop, he may message me for Augmentin or similar    Orders:    ipratropium (ATROVENT) 42 mcg (0.06 %) nasal spray; 2 sprays by Each Nostril route 3 (three) times daily. AS NEEDED FOR NASAL CONGESTION AND DRIP for 7 days    benzonatate (TESSALON) 100 MG capsule; Take 1 capsule (100 mg total) by mouth 3 (three) times daily as needed for Cough.      There are no discontinued medications.    meds sent this encounter:  Medications Ordered This Encounter   Medications    benzonatate (TESSALON) 100 MG capsule     Sig: Take 1 capsule (100 mg total) by mouth 3 (three) times daily as needed for Cough.     Dispense:  30 capsule     Refill:  0    ipratropium (ATROVENT) 42 mcg (0.06 %) nasal spray     Si sprays by Each Nostril route 3 (three) times daily. AS NEEDED FOR NASAL CONGESTION AND DRIP for 7 days     Dispense:  15 mL     Refill:  0         Follow Up:   Future Appointments   Date Time Provider Department Center   2025  9:40 AM Jennifer Huertas MD [facility]a Ayoub          Subjective:   Subjective   Chief Complaint   Patient presents with    Cough    Fatigue        ESTEVAN Veras is a 59 y.o. male.     Social History     Socioeconomic History    Marital status:    Tobacco Use    Smoking status: Never    Smokeless tobacco: Never   Substance and Sexual Activity    Alcohol use: Yes     Alcohol/week: 2.0 standard drinks of alcohol     Types: 2 Glasses of wine per week    Drug use: Yes     Types: Oxycodone     Comment: sometimes 1 tab weekly as needed; probably 2 tabs a month    Sexual activity: Yes     Partners: Female     Birth control/protection: None     Social Drivers of Health     Financial Resource Strain: Low Risk  (3/5/2025)    Overall Financial Resource Strain (CARDIA)     Difficulty of Paying Living Expenses: Not very hard   Food Insecurity: Food Insecurity Present (3/5/2025)    Hunger Vital Sign     Worried About Running Out of Food in the Last Year: Sometimes true     Ran Out of Food in the Last Year: Never true   Transportation Needs: No Transportation Needs (3/5/2025)    PRAPARE - Transportation     Lack of Transportation (Medical): No     Lack of Transportation (Non-Medical): No   Physical Activity: Insufficiently Active (3/5/2025)    Exercise Vital Sign     Days of Exercise per Week: 2 days     Minutes of Exercise per Session: 30 min   Stress: Stress Concern Present (3/5/2025)    [employer] [place] - Occupational Stress Questionnaire     Feeling of Stress : To some extent   Housing Stability: High Risk (3/5/2025)    Housing Stability Vital Sign     Unable to Pay for Housing in the Last Year: Yes     Number of Times Moved in the Last Year: 0     Homeless in the Last Year: No       Last appointment with this clinic was 2/3/2025. Last visit with me Visit date not found   To summarize last visit and events leading up to today:  New to me   Dyslipidemia  Hypertension  Obesity  LADI  MDD  Chronic low back pain with chronic narcotic dependence  Asthma with allergic rhinitis    12/17/2024 office visit for sinusitis.  Zithromax   02/03/2025  office visit for sinus infection type symptoms.  Triamcinolone    Today's visit:    History of Present Illness    HPI:  Rito reports symptom onset since Saturday night after outdoor exposure to grass. On Sunday, he had chills without fever and felt dehydrated. He was fatigued and rested throughout the day. On Monday, symptoms persisted with minimal improvement. He took Theraflu for relief. By Tuesday, he felt significantly better but developed a dry cough. Today, he woke up feeling much improved, without fever, but still has some fatigue and soreness around his waist. Throughout the illness, he had mild chest congestion and took some Mucinex. About a month ago, he was evaluated at the clinic for a suspected sinus flare-up and received a steroid injection, which provided relief. He regularly takes Zyrtec for allergies and occasionally uses nasal sprays, though he had not used them on the day of symptom onset.    He denies having fever, being exposed to sick individuals, or having anyone sick in his household.    MEDICATIONS:  - Zyrtec, as needed, for allergies  - Oxycodone, prescribed by Dr. Ahn  - Prilosec, twice daily, for stomach issues  - Amlodipine, for blood pressure  - EMB (likely Ambien), for sleep  - Ibuprofen, as needed  - Atorvastatin, for cholesterol  - Oxybutynin, to help with urination  - Bupropion (Wellbutrin), for mood/PTSD  - Paroxetine, for mood/PTSD  - Nasal spray, as needed, for allergies    ROS:  General: no fever, reports chills, reports fatigue         Patient Care Team:  Jennifer Huertas MD as PCP - General (Family Medicine)  Jory Mauricio MD as Consulting Physician (Urology)  Allen Colindres MD as Consulting Physician (Urology)  Lorie Gage PA-C (Inactive) as Physician Assistant (Surgery)    Patient Active Problem List    Diagnosis Date Noted    Snoring 10/04/2023    Morbid obesity 03/16/2023    Ganglion cyst 01/31/2023    Major depressive disorder, recurrent,  moderate 12/30/2022    Hordeolum externum of left upper eyelid 05/17/2022    Multiple acquired skin tags 05/17/2022    Class 1 obesity due to excess calories with serious comorbidity in adult 03/14/2022    Encounter for colonoscopy due to history of colonic polyp 06/04/2021    Nasal septal deviation 10/13/2020    Nasal valve collapse 10/13/2020    Refractory obstruction of nasal airway 10/13/2020    Hypertrophy of both inferior nasal turbinates 10/13/2020    Nasal congestion 10/13/2020    Heartburn 06/17/2020    PTSD (post-traumatic stress disorder) 03/11/2019    BPH with obstruction/lower urinary tract symptoms 03/04/2019    Back injury 03/09/2017    Lumbar scoliosis 03/14/2015    DJD of shoulder 05/07/2014    DJD (degenerative joint disease), lumbar 05/07/2014     Has back brace      Allergic rhinitis 05/07/2014    Benign hypertensive heart disease without heart failure 04/26/2013       PAST MEDICAL PROBLEMS, PAST SURGICAL HISTORY: please see relevant portions of the electronic medical record    ALLERGIES AND MEDICATIONS: updated and reviewed.  Medication List with Changes/Refills   Current Medications    AMLODIPINE (NORVASC) 10 MG TABLET    Take 1 tablet (10 mg total) by mouth once daily.    ATORVASTATIN (LIPITOR) 40 MG TABLET    Take 1 tablet (40 mg total) by mouth every evening.    AZELASTINE (ASTELIN) 137 MCG (0.1 %) NASAL SPRAY    1 spray (137 mcg total) by Nasal route 2 (two) times daily.    BUPROPION (WELLBUTRIN XL) 150 MG TB24 TABLET    Take 150 mg by mouth once daily.     CETIRIZINE (ZYRTEC) 10 MG TABLET    TAKE ONE TABLET BY MOUTH ONCE DAILY FOR ALLERGIES    DICLOFENAC SODIUM (VOLTAREN) 1 % GEL    Apply 2 g topically 2 (two) times daily as needed (pain).    FLUTICASONE PROPIONATE (FLONASE) 50 MCG/ACTUATION NASAL SPRAY    1 spray (50 mcg total) by Each Nostril route 2 (two) times daily.    HYDROCORTISONE 2.5 % CREAM    Apply topically 2 (two) times daily.    IBUPROFEN (ADVIL,MOTRIN) 800 MG TABLET    Take  "1 tablet (800 mg total) by mouth 2 (two) times daily as needed for Pain.    NALOXONE (NARCAN) 4 MG/ACTUATION SPRY    4mg by nasal route as needed for opioid overdose; may repeat every 2-3 minutes in alternating nostrils until medical help arrives. Call 911    OMEPRAZOLE (PRILOSEC) 40 MG CAPSULE    Take 1 capsule (40 mg total) by mouth 2 (two) times daily before meals.    OXYBUTYNIN (DITROPAN XL) 15 MG TR24    Take 1 tablet (15 mg total) by mouth once daily.    OXYCODONE-ACETAMINOPHEN (PERCOCET) 5-325 MG PER TABLET    Take 1 tablet by mouth every 4 (four) hours as needed for Pain.    OXYCODONE-ACETAMINOPHEN (PERCOCET) 5-325 MG PER TABLET    Take 1 tablet by mouth every 4 (four) hours as needed for Pain.    OXYCODONE-ACETAMINOPHEN (PERCOCET) 5-325 MG PER TABLET    Take 1 tablet by mouth every 4 (four) hours as needed for Pain.    OXYCODONE-ACETAMINOPHEN (PERCOCET) 5-325 MG PER TABLET    Take 1 tablet by mouth every 4 (four) hours as needed for Pain.    PAROXETINE (PAXIL) 20 MG TABLET    TAKE ONE-HALF TABLET BY MOUTH EVERY DAY FOR MENTAL HEALTH    SILDENAFIL (VIAGRA) 100 MG TABLET    Take 1 tablet (100 mg total) by mouth daily as needed for Erectile Dysfunction.    SIMETHICONE 125 MG TAB    Take 1 tablet by mouth 4 (four) times daily as needed.    ZOLPIDEM (AMBIEN) 10 MG TAB    Take 1 tablet (10 mg total) by mouth nightly as needed.         Objective:   Objective   Physical Exam   Vitals:    03/05/25 1048   BP: 122/78   Pulse: 74   Temp: 98.1 °F (36.7 °C)   TempSrc: Oral   SpO2: 98%   Weight: 97.6 kg (215 lb 2.7 oz)   Height: 5' 11" (1.803 m)    Body mass index is 30.01 kg/m².  Weight: 97.6 kg (215 lb 2.7 oz)   Height: 5' 11" (180.3 cm)     Physical Exam  Constitutional:       Appearance: He is well-developed.   HENT:      Head: Normocephalic.      Right Ear: Tympanic membrane and ear canal normal.      Left Ear: Tympanic membrane and ear canal normal.      Mouth/Throat:      Pharynx: No oropharyngeal exudate or " posterior oropharyngeal erythema.   Eyes:      General: No scleral icterus.        Right eye: No discharge.         Left eye: No discharge.   Cardiovascular:      Rate and Rhythm: Normal rate and regular rhythm.      Heart sounds: Normal heart sounds.   Pulmonary:      Effort: Pulmonary effort is normal.      Breath sounds: Normal breath sounds. No wheezing.   Musculoskeletal:      Cervical back: Neck supple.   Lymphadenopathy:      Cervical: No cervical adenopathy.   Skin:     General: Skin is warm and dry.   Neurological:      Mental Status: He is alert and oriented to person, place, and time.   Psychiatric:         Behavior: Behavior normal.

## 2025-03-05 NOTE — TELEPHONE ENCOUNTER
Attempted to contact patient. Unable to leave a voicemail. Cough medicine and nasal spray was sent to pharmacy

## 2025-03-05 NOTE — TELEPHONE ENCOUNTER
----- Message from Rosi sent at 3/5/2025 11:49 AM CST -----  Who called: self  What is the request in detail: pt says the dr was suppose to give his a cough medicine and not a nasal spray wants to check and see  Can the clinic reply by [facility]? No  Would the patient rather a call back or a response via [facility]? Call Back  Best call back number: .[phone] Additional Information:

## 2025-03-06 ENCOUNTER — TELEPHONE (OUTPATIENT)
Dept: FAMILY MEDICINE | Facility: CLINIC | Age: 60
End: 2025-03-06
Payer: MEDICARE

## 2025-03-06 NOTE — TELEPHONE ENCOUNTER
----- Message from Med Assistant Andrea sent at 3/6/2025  7:40 AM CST -----  Type: Patient Call BackWho called: selfWhat is the request in detail: pt. States he was told to call if the medication he was prescribed didn't help him feel better .. He's asking for antibiotics to be called in .. Can the clinic reply by [facility]?NOWould the patient rather a call back or a response via [facility]? Yes,c all Best call back number: [phone] (home)

## 2025-03-06 NOTE — TELEPHONE ENCOUNTER
I just saw him for this yesterday. Symptoms started on Sunday so it's been about 4 days.    Needs to give it more time.  Call next week if no improvement.

## 2025-03-07 ENCOUNTER — PATIENT MESSAGE (OUTPATIENT)
Dept: FAMILY MEDICINE | Facility: CLINIC | Age: 60
End: 2025-03-07
Payer: MEDICARE

## 2025-03-07 DIAGNOSIS — J06.9 VIRAL URI: Primary | ICD-10-CM

## 2025-03-07 RX ORDER — PROMETHAZINE HYDROCHLORIDE AND DEXTROMETHORPHAN HYDROBROMIDE 6.25; 15 MG/5ML; MG/5ML
5 SYRUP ORAL EVERY 12 HOURS PRN
Qty: 120 ML | Refills: 0 | Status: SHIPPED | OUTPATIENT
Start: 2025-03-07 | End: 2025-03-17

## 2025-03-11 DIAGNOSIS — M77.12 LATERAL EPICONDYLITIS OF LEFT ELBOW: ICD-10-CM

## 2025-03-11 RX ORDER — DICLOFENAC SODIUM 10 MG/G
2 GEL TOPICAL 2 TIMES DAILY PRN
Qty: 100 G | Refills: 1 | Status: SHIPPED | OUTPATIENT
Start: 2025-03-11

## 2025-03-11 NOTE — TELEPHONE ENCOUNTER
Care Due:                  Date            Visit Type   Department     Provider  --------------------------------------------------------------------------------                                [facility]  [facility]  Last Visit: 12-      [facility]   [facility]                              [facility]/INTERN  Next Visit: 03-      [facility]                                                            Last  Test          Frequency    Reason                     Performed    Due Date  --------------------------------------------------------------------------------    CBC.........  12 months..  diclofenac, ibuprofen....  02- 02-    CMP.........  12 months..  atorvastatin, diclofenac,   02- 02-                             ibuprofen................    Lipid Panel.  12 months..  atorvastatin.............  02- 02-    [facility] Embedded Care Due Messages. Reference number: [id number].   3/11/2025 9:40:11 AM CDT

## 2025-03-12 ENCOUNTER — OFFICE VISIT (OUTPATIENT)
Dept: FAMILY MEDICINE | Facility: CLINIC | Age: 60
End: 2025-03-12
Payer: MEDICARE

## 2025-03-12 VITALS
HEIGHT: 71 IN | HEART RATE: 85 BPM | BODY MASS INDEX: 30.47 KG/M2 | DIASTOLIC BLOOD PRESSURE: 60 MMHG | TEMPERATURE: 99 F | SYSTOLIC BLOOD PRESSURE: 114 MMHG | WEIGHT: 217.63 LBS | OXYGEN SATURATION: 98 %

## 2025-03-12 DIAGNOSIS — I10 HYPERTENSION, UNSPECIFIED TYPE: ICD-10-CM

## 2025-03-12 DIAGNOSIS — E78.2 MIXED HYPERLIPIDEMIA: ICD-10-CM

## 2025-03-12 DIAGNOSIS — J01.00 ACUTE NON-RECURRENT MAXILLARY SINUSITIS: Primary | ICD-10-CM

## 2025-03-12 PROCEDURE — 99213 OFFICE O/P EST LOW 20 MIN: CPT | Mod: S$PBB,,, | Performed by: FAMILY MEDICINE

## 2025-03-12 PROCEDURE — 99999 PR PBB SHADOW E&M-EST. PATIENT-LVL IV: CPT | Mod: PBBFAC,,, | Performed by: FAMILY MEDICINE

## 2025-03-12 PROCEDURE — 99214 OFFICE O/P EST MOD 30 MIN: CPT | Mod: PBBFAC,PO | Performed by: FAMILY MEDICINE

## 2025-03-12 RX ORDER — SILDENAFIL 100 MG/1
100 TABLET, FILM COATED ORAL DAILY PRN
Qty: 30 TABLET | Refills: 11 | Status: SHIPPED | OUTPATIENT
Start: 2025-03-12

## 2025-03-12 RX ORDER — AMOXICILLIN 500 MG/1
500 TABLET, FILM COATED ORAL EVERY 12 HOURS
Qty: 14 TABLET | Refills: 0 | Status: SHIPPED | OUTPATIENT
Start: 2025-03-12 | End: 2025-03-19

## 2025-03-12 NOTE — PROGRESS NOTES
Subjective     Patient ID: Rito Conley is a 59 y.o. male.    Chief Complaint: Cough    Cough  This is a new problem. The current episode started 1 to 4 weeks ago (2 weeks). The problem has been unchanged. The problem occurs constantly. The cough is Non-productive. Associated symptoms include postnasal drip. Pertinent negatives include no chills, ear pain, fever, headaches, rhinorrhea, shortness of breath or wheezing. Nothing aggravates the symptoms. Treatments tried: ipratropium nasal spray, tessalon perles. The treatment provided no relief.       History of Present Illness             Past Medical History:   Diagnosis Date    Allergy     Class 1 obesity due to excess calories with serious comorbidity in adult 3/14/2022    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    Hyperlipidemia     Hypertension     Lower back pain     Major depressive disorder, recurrent, moderate 12/30/2022    PTSD (post-traumatic stress disorder)     Sleep apnea     uses CPAP 1-2x/week      Past Surgical History:   Procedure Laterality Date    COLONOSCOPY      COLONOSCOPY N/A 06/04/2021    Procedure: COLONOSCOPY;  Surgeon: Shanthi Horton MD;  Location: [facility];  Service: Endoscopy;  Laterality: N/A;  prep instr portal,   6/3 pt v/u of earlier arrival time and prep time -ml    ENDOSCOPIC NASAL SEPTOPLASTY N/A 10/13/2020    Procedure: SEPTOPLASTY, NOSE, ENDOSCOPIC;  Surgeon: Nick Gomez MD;  Location: [facility];  Service: ENT;  Laterality: N/A;  RN PRE OP 9- COVID NEGATIVE ON  10-  [place]    ESOPHAGOGASTRODUODENOSCOPY N/A 06/17/2020    Procedure: EGD (ESOPHAGOGASTRODUODENOSCOPY);  Surgeon: Jarad Holm MD;  Location: [address]);  Service: Endoscopy;  Laterality: N/A;  covid test 6/15-[place]    EXCISION OF MASS OF EXTREMITY Left 01/31/2023    Procedure: EXCISION, MASS, EXTREMITY-WRIST;  Surgeon: Mallorie Hardwick MD;  Location: [facility];  Service: Orthopedics;  Laterality: Left;  RN PREOP 1/26/2023---CLEARED BY PCP  "   HERNIA REPAIR      NASAL ENDOSCOPY Bilateral 10/13/2020    Procedure: ENDOSCOPY, NOSE;  Surgeon: Nick Gomez MD;  Location: [facility] OR;  Service: ENT;  Laterality: Bilateral;    NASAL STENOSIS REPAIR Bilateral 10/13/2020    Procedure: REPAIR, STENOSIS, NOSE, VESTIBULE;  Surgeon: Nick Gomez MD;  Location: [facility] OR;  Service: ENT;  Laterality: Bilateral;  Vivaer console and wand available for use.  NO DISC  10/7/2020@ 247PM-LO    VASECTOMY      wisdom tooth removal Right 04/2024    2     Family History   Problem Relation Name Age of Onset    Hypertension Mother Mother     Other Father          agent orange exposure     Heart attacks under age 50 Sister      No Known Problems Brother      No Known Problems Daughter      No Known Problems Son      No Known Problems Son      No Known Problems Son      No Known Problems Son      Cancer Maternal Aunt Maternal Aunt         breast    Cancer Maternal Grandmother Maternal grandmother         something in her arm     Amblyopia Neg Hx      Blindness Neg Hx      Cataracts Neg Hx      Diabetes Neg Hx      Glaucoma Neg Hx      Macular degeneration Neg Hx      Retinal detachment Neg Hx      Strabismus Neg Hx      Stroke Neg Hx      Thyroid disease Neg Hx       Social History[1]    Review of Systems   Constitutional:  Negative for chills and fever.   HENT:  Positive for postnasal drip. Negative for ear pain and rhinorrhea.    Respiratory:  Positive for cough. Negative for shortness of breath and wheezing.    Neurological:  Negative for headaches.            Objective     Vitals:    03/12/25 0944   BP: 114/60   Pulse: 85   Temp: 98.6 °F (37 °C)   TempSrc: Oral   SpO2: 98%   Weight: 98.7 kg (217 lb 9.5 oz)   Height: 5' 11" (1.803 m)        Physical Exam  Constitutional:       General: He is not in acute distress.     Appearance: He is well-developed. He is not ill-appearing, toxic-appearing or diaphoretic.   HENT:      Head: Normocephalic and atraumatic.      Right Ear: " External ear normal.      Left Ear: External ear normal.      Mouth/Throat:      Pharynx: No oropharyngeal exudate.   Cardiovascular:      Rate and Rhythm: Normal rate and regular rhythm.      Heart sounds: Normal heart sounds. No murmur heard.     No friction rub. No gallop.   Pulmonary:      Effort: Pulmonary effort is normal. No respiratory distress.      Breath sounds: Normal breath sounds. No stridor. No wheezing, rhonchi or rales.   Chest:      Chest wall: No tenderness.   Musculoskeletal:      Cervical back: Normal range of motion and neck supple.   Lymphadenopathy:      Cervical: Cervical adenopathy present.   Neurological:      Mental Status: He is alert and oriented to person, place, and time.       Physical Exam                Assessment and Plan     1. Acute non-recurrent maxillary sinusitis  -     amoxicillin (AMOXIL) 500 MG Tab; Take 1 tablet (500 mg total) by mouth every 12 (twelve) hours. for 7 days  Dispense: 14 tablet; Refill: 0      Rito was seen today for cough.    Diagnoses and all orders for this visit:    Acute non-recurrent maxillary sinusitis  -     amoxicillin (AMOXIL) 500 MG Tab; Take 1 tablet (500 mg total) by mouth every 12 (twelve) hours. for 7 days        Assessment & Plan                      No follow-ups on file.        This note was generated with the assistance of ambient listening technology. Verbal consent was obtained by the patient and accompanying visitor(s) for the recording of patient appointment to facilitate this note. I attest to having   reviewed and edited the generated note for accuracy, though some syntax or spelling errors may persist. Please contact the author of this note for any clarification.         [1]   Social History  Socioeconomic History    Marital status:    Tobacco Use    Smoking status: Never    Smokeless tobacco: Never   Substance and Sexual Activity    Alcohol use: Yes     Alcohol/week: 2.0 standard drinks of alcohol     Types: 2 Glasses of  wine per week    Drug use: Yes     Types: Oxycodone     Comment: sometimes 1 tab weekly as needed; probably 2 tabs a month    Sexual activity: Yes     Partners: Female     Birth control/protection: None     Social Drivers of Health     Financial Resource Strain: Low Risk  (3/5/2025)    Overall Financial Resource Strain (CARDIA)     Difficulty of Paying Living Expenses: Not very hard   Food Insecurity: Food Insecurity Present (3/5/2025)    Hunger Vital Sign     Worried About Running Out of Food in the Last Year: Sometimes true     Ran Out of Food in the Last Year: Never true   Transportation Needs: No Transportation Needs (3/5/2025)    PRAPARE - Transportation     Lack of Transportation (Medical): No     Lack of Transportation (Non-Medical): No   Physical Activity: Insufficiently Active (3/5/2025)    Exercise Vital Sign     Days of Exercise per Week: 2 days     Minutes of Exercise per Session: 30 min   Stress: Stress Concern Present (3/5/2025)    [employer] - Occupational Stress Questionnaire     Feeling of Stress : To some extent   Housing Stability: High Risk (3/5/2025)    Housing Stability Vital Sign     Unable to Pay for Housing in the Last Year: Yes     Number of Times Moved in the Last Year: 0     Homeless in the Last Year: No

## 2025-03-17 ENCOUNTER — TELEPHONE (OUTPATIENT)
Dept: FAMILY MEDICINE | Facility: CLINIC | Age: 60
End: 2025-03-17
Payer: MEDICARE

## 2025-03-17 NOTE — TELEPHONE ENCOUNTER
----- Message from [employer] sent at 3/17/2025  1:06 PM CDT -----  Regarding: Appt  Contact: [phone]  Patient is calling to schedule enhanced wellness visit. Please contact pt

## 2025-03-24 ENCOUNTER — TELEPHONE (OUTPATIENT)
Dept: FAMILY MEDICINE | Facility: CLINIC | Age: 60
End: 2025-03-24
Payer: MEDICARE

## 2025-03-24 NOTE — TELEPHONE ENCOUNTER
Pt states he has jury duty on 3/31 and not able to make that appt; informed him I will send a message to that department so they can call him to reschedule; he verbalized understanding

## 2025-03-24 NOTE — TELEPHONE ENCOUNTER
----- Message from Aiden sent at 3/24/2025 12:57 PM CDT -----  Who called:SELF  What is the request in detail:PT WOULD LIKE FOR YOU TO GIVE HIM A CALL IN REGARDS OF HIS UPCOMING APPT ON 03-31-25 Can the clinic reply by [facility]?CALLBACK  Best call back number:[phone] Additional Information:

## 2025-04-03 ENCOUNTER — OFFICE VISIT (OUTPATIENT)
Dept: FAMILY MEDICINE | Facility: CLINIC | Age: 60
End: 2025-04-03
Payer: MEDICARE

## 2025-04-03 VITALS
WEIGHT: 218.94 LBS | SYSTOLIC BLOOD PRESSURE: 140 MMHG | HEIGHT: 71 IN | OXYGEN SATURATION: 98 % | BODY MASS INDEX: 30.65 KG/M2 | DIASTOLIC BLOOD PRESSURE: 92 MMHG | RESPIRATION RATE: 19 BRPM | HEART RATE: 90 BPM | TEMPERATURE: 97 F

## 2025-04-03 DIAGNOSIS — Z71.89 COUNSELING REGARDING ADVANCE DIRECTIVES AND GOALS OF CARE: ICD-10-CM

## 2025-04-03 DIAGNOSIS — Z00.00 ENCOUNTER FOR MEDICARE ANNUAL WELLNESS EXAM: Primary | ICD-10-CM

## 2025-04-03 PROCEDURE — 99215 OFFICE O/P EST HI 40 MIN: CPT | Mod: PBBFAC,PO

## 2025-04-03 PROCEDURE — 99999 PR PBB SHADOW E&M-EST. PATIENT-LVL V: CPT | Mod: PBBFAC,,,

## 2025-04-03 NOTE — PROGRESS NOTES
Health Maintenance Due   Topic Date Due    HIV Screening  Consult pcp    Sign Pain Contract  Consult pcp    Urine Drug Screen  Consult pcp    Naloxone Prescription  Consult pcp    Hemoglobin A1c (Diabetic Prevention Screening)  04/26/2016    Influenza Vaccine (1) 09/01/2024

## 2025-04-03 NOTE — PROGRESS NOTES
Family Medicine      Patient Name: Rito Conley  MRN: [id number]  : 1965    PCP: Jennifer Huertas MD      [facility]     Chief Complaint:  AWV    Rito Conley is a 59 y.o. male with multiple medical diagnoses as listed in the medical history and problem list that presented for a Medicare AWV and comprehensive Health Risk Assessment today.      The following components were reviewed and updated:    Medical history  Family History  Social history  Allergies and Current Medications  Health Risk Assessment  Health Maintenance  Care Team       History of Present Illness    Patient presents today for Medicare annual wellness visit. He experiences seasonal allergic cough lasting less than 3 months. He uses CPAP machine at home. He slipped on steps resulting in a fall onto his buttocks, which was evaluated by his PCP. He has prescription for Percocet with recent refills.      ROS:  General: -fever, -chills, -fatigue, -weight gain, -weight loss  Eyes: -vision changes, -redness, -discharge  ENT: -ear pain, -nasal congestion, -sore throat  Cardiovascular: -chest pain, -palpitations, -lower extremity edema  Respiratory: +cough, -shortness of breath, +seasonal allergies  Gastrointestinal: -abdominal pain, -nausea, -vomiting, -diarrhea, -constipation, -blood in stool  Genitourinary: -dysuria, -hematuria, -frequency  Musculoskeletal: -joint pain, -muscle pain, +falling  Skin: -rash, -lesion  Neurological: -headache, -dizziness, +numbness, +tingling  Psychiatric: -anxiety, -depression, -sleep difficulty           History     Past Medical History:  Past Medical History:   Diagnosis Date    Allergy     Class 1 obesity due to excess calories with serious comorbidity in adult 3/14/2022    DJD of shoulder 2014    Hyperlipidemia     Hypertension     Lower back pain     Major depressive disorder, recurrent, moderate 2022    PTSD (post-traumatic stress disorder)     Sleep apnea     uses CPAP  1-2x/week       Past Surgical History:  Past Surgical History:   Procedure Laterality Date    COLONOSCOPY      COLONOSCOPY N/A 06/04/2021    Procedure: COLONOSCOPY;  Surgeon: Shanthi Horton MD;  Location: [facility];  Service: Endoscopy;  Laterality: N/A;  prep instr portal,   6/3 pt v/u of earlier arrival time and prep time -ml    ENDOSCOPIC NASAL SEPTOPLASTY N/A 10/13/2020    Procedure: SEPTOPLASTY, NOSE, ENDOSCOPIC;  Surgeon: Nick Gomez MD;  Location: [facility] OR;  Service: ENT;  Laterality: N/A;  RN PRE OP 9- COVID NEGATIVE ON  10-  [place]    ESOPHAGOGASTRODUODENOSCOPY N/A 06/17/2020    Procedure: EGD (ESOPHAGOGASTRODUODENOSCOPY);  Surgeon: Jarad Holm MD;  Location: [facility] ([address] [place] [address]);  Service: Endoscopy;  Laterality: N/A;  covid test 6/15-[facility]    EXCISION OF MASS OF EXTREMITY Left 01/31/2023    Procedure: EXCISION, MASS, EXTREMITY-WRIST;  Surgeon: Mallorie Hardwick MD;  Location: [facility] OR;  Service: Orthopedics;  Laterality: Left;  RN PREOP 1/26/2023---CLEARED BY PCP    HERNIA REPAIR      NASAL ENDOSCOPY Bilateral 10/13/2020    Procedure: ENDOSCOPY, NOSE;  Surgeon: Nick Gomez MD;  Location: [facility] OR;  Service: ENT;  Laterality: Bilateral;    NASAL STENOSIS REPAIR Bilateral 10/13/2020    Procedure: REPAIR, STENOSIS, NOSE, VESTIBULE;  Surgeon: Nick Gomez MD;  Location: [facility] OR;  Service: ENT;  Laterality: Bilateral;  Vivaer console and wand available for use.  NO DISC  10/7/2020@ 247PM-LO    VASECTOMY      wisdom tooth removal Right 04/2024    2       Social History:  Social History[1]    Family History:  Family History   Problem Relation Name Age of Onset    Hypertension Mother Mother     Other Father          agent orange exposure     Heart attacks under age 50 Sister      No Known Problems Brother      No Known Problems Daughter      No Known Problems Son      No Known Problems Son      No Known Problems Son      No Known Problems Son      Cancer Maternal Aunt Maternal  "Aunt         breast    Cancer Maternal Grandmother Maternal grandmother         something in her arm     Amblyopia Neg Hx      Blindness Neg Hx      Cataracts Neg Hx      Diabetes Neg Hx      Glaucoma Neg Hx      Macular degeneration Neg Hx      Retinal detachment Neg Hx      Strabismus Neg Hx      Stroke Neg Hx      Thyroid disease Neg Hx         Allergies and Medications: (updated and reviewed)  Review of patient's allergies indicates:   Allergen Reactions    Grass pollen-june grass standard Itching, Other (See Comments) and Rash     Current Medications[2]      Exam     Review of Systems:  (as noted above)  Review of Systems    Physical Exam:   Physical Exam  Vitals and nursing note reviewed.   Constitutional:       Appearance: Normal appearance. He is normal weight.   HENT:      Head: Normocephalic and atraumatic.   Eyes:      Extraocular Movements: Extraocular movements intact.      Pupils: Pupils are equal, round, and reactive to light.   Cardiovascular:      Rate and Rhythm: Normal rate and regular rhythm.      Pulses: Normal pulses.      Heart sounds: Normal heart sounds.   Pulmonary:      Effort: Pulmonary effort is normal.      Breath sounds: Normal breath sounds.   Abdominal:      General: Abdomen is flat.      Palpations: Abdomen is soft.   Musculoskeletal:         General: Normal range of motion.      Cervical back: Normal range of motion and neck supple.   Skin:     General: Skin is warm and dry.   Neurological:      Mental Status: He is alert and oriented to person, place, and time. Mental status is at baseline.   Psychiatric:         Mood and Affect: Mood normal.         Behavior: Behavior normal.       Vitals:    04/03/25 1024   BP: (!) 140/92   BP Location: Left arm   Patient Position: Sitting   Pulse: 90   Resp: 19   Temp: 97.1 °F (36.2 °C)   TempSrc: Temporal   SpO2: 98%   Weight: 99.3 kg (218 lb 14.7 oz)   Height: 5' 11" (1.803 m)      Body mass index is 30.53 " kg/m².        Clock:        Assessment & Plan       Diagnoses and health risks identified today and associated recommendations/orders:    1. Encounter for Medicare annual wellness exam  - Counseled on age appropriate medical preventative services including age appropriate cancer screenings, age appropriate eye and dental exams, over all nutritional health, need for a consistent exercise regimen, and an over all push towards maintaining a vigorous and active lifestyle. Counseled on age appropriate vaccines and discussed upcoming health care needs based on age/gender. Discussed good sleep hygiene and stress management.  - Referral to Enhanced Annual Wellness Visit (eAWV) W+1    2. Counseling regarding advance directives and goals of care  - I discussed advanced care planning, including how to pick a person who would make decisions for you if you were unable to make them for yourself, called a health care power of , and what kind of decisions you might make such as use of life sustaining treatments such as ventilators and tube feeding when faced with a life limiting illness recorded on a living will that they will need to know. Patient provided paperwork and and encouraged to bring completed document to next visit.        Assessment & Plan    IMPRESSION:  - Conducted Medicare annual wellness visit and explained its purpose.  - Performed physical exam.  - Discussed advanced care directive and its importance.    OBSTRUCTIVE SLEEP APNEA:  - Confirmed the patient uses a CPAP machine at home for sleep apnea management.  - Advised to continue using the CPAP machine as prescribed for effective sleep apnea control.  - Confirmed the patient's continued use of a CPAP machine at home.    FALL:  - Documented the patient's recent fall incident where they slipped on steps and landed on their buttocks.  - Confirmed the patient did not experience head trauma or loss of consciousness during the fall.  - Verified that the  patient sought medical attention following the fall.    LONG-TERM USE OF OPIATE ANALGESIC:  - Noted the patient's current prescription for Percocet.  - Recommend biannual urine drug screenings, likely due to the ongoing Percocet prescription.    OTHER:  - Patient to review and complete advanced care directive form, including listing up to 3 people for medical power of .  - Patient to return completed advanced care directive form at next doctor's visit for upload to medical record.             --------------------------------------------    ** See Completed Assessments for Annual Wellness Visit within the encounter summary.**    The following assessments were completed:  Living Situation  CAGE  Depression Screening  Timed Get Up and Go  Whisper Test  Cognitive Function Screening  Nutrition Screening  ADL Screening  PAQ Screening      Provided Rito Oscar Jarvis  with a 5-10 year written screening schedule and personal prevention plan. Recommendations were developed using the USPSTF age appropriate recommendations. Education, counseling, and referrals were provided as needed. After Visit Summary printed and given to patient which includes a list of additional screenings\tests needed.      Opioid documentation:      Patient does have a current opioid prescription.      Patient accepted further discussion regarding opioid medication use.      Patient is currently taking oxycodone narcotic for back pain.        Pain level today is 2/10.       In addition to narcotic pain medications, patient is also using acetaminophen for pain control.       Patient is not followed by a specialist currently for their pain and will not be referred today.       Patient's opioid risk potential based on ORT-OUD tool:       [place] each box that applies   No   Yes     Family history of substance abuse   Alcohol [x] []   Illegal drugs [x] []   Rx drugs [x] []     Personal history of substance abuse   Alcohol [x] []   Illegal drugs  [x] []   Rx drugs [x] []     Age between 16-45 years   [x]   []     Patient with ADD, OCD, Bipolar disorder, schizoprenia   [x]   []     Patient with depression   []   [x]                         Scoring total                                                        1         Non-opioid treatment options have been discussed today and added to the patient's after visit summary.          Health Maintenance:  Health Maintenance         Date Due Completion Date    HIV Screening Never done ---    Sign Pain Contract Never done ---    Urine Drug Screen Never done ---    Naloxone Prescription Never done ---    Hemoglobin A1c (Diabetic Prevention Screening) 04/26/2016 4/26/2013    Lipid Panel 02/07/2029 2/7/2024    Colorectal Cancer Screening 06/04/2031 6/4/2021    TETANUS VACCINE 03/27/2035 3/27/2025    RSV Vaccine (Age 60+ and Pregnant patients) (1 - 1-dose 75+ series) 05/03/2040 ---            Health maintenance reviewed      Follow Up:  No follow-ups on file.      - The patient is given an After Visit Summary that lists all medications with directions, allergies, education, orders placed during this encounter and follow-up instructions.      - I have reviewed the patient's medical information including past medical, family, and social history sections including the medications and allergies.      - We discussed the patient's current medications.     This note was created by combination of typed  and MModal dictation.  Transcription errors may be present.  If there are any questions, please contact me.    This note was generated with the assistance of ambient listening technology. Verbal consent was obtained by the patient and accompanying visitor(s) for the recording of patient appointment to facilitate this note. I attest to having reviewed and edited the generated note for accuracy, though some syntax or spelling errors may persist. Please contact the author of this note for any clarification.         Shyann  NAREN Boland  [facility]             [1]   Social History  Socioeconomic History    Marital status:    Tobacco Use    Smoking status: Never    Smokeless tobacco: Never   Substance and Sexual Activity    Alcohol use: Yes     Alcohol/week: 2.0 standard drinks of alcohol     Types: 2 Glasses of wine per week    Drug use: Yes     Types: Oxycodone     Comment: sometimes 1 tab weekly as needed; probably 2 tabs a month    Sexual activity: Yes     Partners: Female     Birth control/protection: None     Social Drivers of Health     Financial Resource Strain: Low Risk  (3/5/2025)    Overall Financial Resource Strain (CARDIA)     Difficulty of Paying Living Expenses: Not very hard   Food Insecurity: Food Insecurity Present (3/5/2025)    Hunger Vital Sign     Worried About Running Out of Food in the Last Year: Sometimes true     Ran Out of Food in the Last Year: Never true   Transportation Needs: No Transportation Needs (3/5/2025)    PRAPARE - Transportation     Lack of Transportation (Medical): No     Lack of Transportation (Non-Medical): No   Physical Activity: Insufficiently Active (3/5/2025)    Exercise Vital Sign     Days of Exercise per Week: 2 days     Minutes of Exercise per Session: 30 min   Stress: Stress Concern Present (3/5/2025)    [place] [employer] - Occupational Stress Questionnaire     Feeling of Stress : To some extent   Housing Stability: High Risk (3/5/2025)    Housing Stability Vital Sign     Unable to Pay for Housing in the Last Year: Yes     Number of Times Moved in the Last Year: 0     Homeless in the Last Year: No   [2]   Current Outpatient Medications   Medication Sig Dispense Refill    amLODIPine (NORVASC) 10 MG tablet Take 1 tablet (10 mg total) by mouth once daily. 90 tablet 1    atorvastatin (LIPITOR) 40 MG tablet Take 1 tablet (40 mg total) by mouth every evening. 90 tablet 1    azelastine (ASTELIN) 137 mcg (0.1 %) nasal spray 1  spray (137 mcg total) by Nasal route 2 (two) times daily. 30 mL 1    buPROPion (WELLBUTRIN XL) 150 MG TB24 tablet Take 150 mg by mouth once daily.       cetirizine (ZYRTEC) 10 MG tablet TAKE ONE TABLET BY MOUTH ONCE DAILY FOR ALLERGIES 90 tablet 0    diclofenac sodium (VOLTAREN) 1 % Gel Apply 2 g topically 2 (two) times daily as needed (pain). 100 g 1    fluticasone propionate (FLONASE) 50 mcg/actuation nasal spray 1 spray (50 mcg total) by Each Nostril route 2 (two) times daily. 64 g 0    hydrocortisone 2.5 % cream Apply topically 2 (two) times daily. 20 g 2    ibuprofen (ADVIL,MOTRIN) 800 MG tablet Take 1 tablet (800 mg total) by mouth 2 (two) times daily as needed for Pain. 60 tablet 1    naloxone (NARCAN) 4 mg/actuation Spry 4mg by nasal route as needed for opioid overdose; may repeat every 2-3 minutes in alternating nostrils until medical help arrives. Call 911 1 each 11    oxybutynin (DITROPAN XL) 15 MG TR24 Take 1 tablet (15 mg total) by mouth once daily. 90 tablet 3    oxyCODONE-acetaminophen (PERCOCET) 5-325 mg per tablet Take 1 tablet by mouth every 4 (four) hours as needed for Pain. 31 tablet 0    oxyCODONE-acetaminophen (PERCOCET) 5-325 mg per tablet Take 1 tablet by mouth every 4 (four) hours as needed for Pain. 31 tablet 0    oxyCODONE-acetaminophen (PERCOCET) 5-325 mg per tablet Take 1 tablet by mouth every 4 (four) hours as needed for Pain. 31 tablet 0    oxyCODONE-acetaminophen (PERCOCET) 5-325 mg per tablet Take 1 tablet by mouth every 4 (four) hours as needed for Pain. 31 tablet 0    paroxetine (PAXIL) 20 MG tablet TAKE ONE-HALF TABLET BY MOUTH EVERY DAY FOR MENTAL HEALTH      sildenafiL (VIAGRA) 100 MG tablet Take 1 tablet (100 mg total) by mouth daily as needed for Erectile Dysfunction. 30 tablet 11    simethicone 125 mg Tab Take 1 tablet by mouth 4 (four) times daily as needed. 120 tablet 1    zolpidem (AMBIEN) 10 mg Tab Take 1 tablet (10 mg total) by mouth nightly as needed. 30 tablet 5     omeprazole (PRILOSEC) 40 MG capsule Take 1 capsule (40 mg total) by mouth 2 (two) times daily before meals. 180 capsule 0     No current facility-administered medications for this visit.

## 2025-04-24 ENCOUNTER — LAB VISIT (OUTPATIENT)
Dept: LAB | Facility: HOSPITAL | Age: 60
End: 2025-04-24
Attending: FAMILY MEDICINE
Payer: MEDICARE

## 2025-04-24 DIAGNOSIS — E78.2 MIXED HYPERLIPIDEMIA: ICD-10-CM

## 2025-04-24 DIAGNOSIS — I10 HYPERTENSION, UNSPECIFIED TYPE: ICD-10-CM

## 2025-04-24 LAB
ABSOLUTE EOSINOPHIL (OHS): 0.16 K/UL
ABSOLUTE MONOCYTE (OHS): 0.6 K/UL (ref 0.3–1)
ABSOLUTE NEUTROPHIL COUNT (OHS): 1.65 K/UL (ref 1.8–7.7)
ALBUMIN SERPL BCP-MCNC: 3.8 G/DL (ref 3.5–5.2)
ALP SERPL-CCNC: 93 UNIT/L (ref 40–150)
ALT SERPL W/O P-5'-P-CCNC: 33 UNIT/L (ref 10–44)
ANION GAP (OHS): 9 MMOL/L (ref 8–16)
AST SERPL-CCNC: 31 UNIT/L (ref 11–45)
BASOPHILS # BLD AUTO: 0.03 K/UL
BASOPHILS NFR BLD AUTO: 0.7 %
BILIRUB SERPL-MCNC: 0.8 MG/DL (ref 0.1–1)
BUN SERPL-MCNC: 16 MG/DL (ref 6–20)
CALCIUM SERPL-MCNC: 8.9 MG/DL (ref 8.7–10.5)
CHLORIDE SERPL-SCNC: 105 MMOL/L (ref 95–110)
CHOLEST SERPL-MCNC: 166 MG/DL (ref 120–199)
CHOLEST/HDLC SERPL: 3.4 {RATIO} (ref 2–5)
CO2 SERPL-SCNC: 25 MMOL/L (ref 23–29)
CREAT SERPL-MCNC: 0.9 MG/DL (ref 0.5–1.4)
ERYTHROCYTE [DISTWIDTH] IN BLOOD BY AUTOMATED COUNT: 14.9 % (ref 11.5–14.5)
GFR SERPLBLD CREATININE-BSD FMLA CKD-EPI: >60 ML/MIN/1.73/M2
GLUCOSE SERPL-MCNC: 94 MG/DL (ref 70–110)
HCT VFR BLD AUTO: 42.2 % (ref 40–54)
HDLC SERPL-MCNC: 49 MG/DL (ref 40–75)
HDLC SERPL: 29.5 % (ref 20–50)
HGB BLD-MCNC: 13.6 GM/DL (ref 14–18)
IMM GRANULOCYTES # BLD AUTO: 0.01 K/UL (ref 0–0.04)
IMM GRANULOCYTES NFR BLD AUTO: 0.2 % (ref 0–0.5)
LDLC SERPL CALC-MCNC: 102.2 MG/DL (ref 63–159)
LYMPHOCYTES # BLD AUTO: 2 K/UL (ref 1–4.8)
MCH RBC QN AUTO: 30 PG (ref 27–31)
MCHC RBC AUTO-ENTMCNC: 32.2 G/DL (ref 32–36)
MCV RBC AUTO: 93 FL (ref 82–98)
NONHDLC SERPL-MCNC: 117 MG/DL
NUCLEATED RBC (/100WBC) (OHS): 0 /100 WBC
PLATELET # BLD AUTO: 274 K/UL (ref 150–450)
PMV BLD AUTO: 10.3 FL (ref 9.2–12.9)
POTASSIUM SERPL-SCNC: 3.4 MMOL/L (ref 3.5–5.1)
PROT SERPL-MCNC: 7.5 GM/DL (ref 6–8.4)
RBC # BLD AUTO: 4.53 M/UL (ref 4.6–6.2)
RELATIVE EOSINOPHIL (OHS): 3.6 %
RELATIVE LYMPHOCYTE (OHS): 44.9 % (ref 18–48)
RELATIVE MONOCYTE (OHS): 13.5 % (ref 4–15)
RELATIVE NEUTROPHIL (OHS): 37.1 % (ref 38–73)
SODIUM SERPL-SCNC: 139 MMOL/L (ref 136–145)
TRIGL SERPL-MCNC: 74 MG/DL (ref 30–150)
TSH SERPL-ACNC: 0.89 UIU/ML (ref 0.4–4)
WBC # BLD AUTO: 4.45 K/UL (ref 3.9–12.7)

## 2025-04-24 PROCEDURE — 36415 COLL VENOUS BLD VENIPUNCTURE: CPT | Mod: PO

## 2025-04-24 PROCEDURE — 84443 ASSAY THYROID STIM HORMONE: CPT

## 2025-04-24 PROCEDURE — 82040 ASSAY OF SERUM ALBUMIN: CPT

## 2025-04-24 PROCEDURE — 85025 COMPLETE CBC W/AUTO DIFF WBC: CPT

## 2025-04-24 PROCEDURE — 80061 LIPID PANEL: CPT

## 2025-04-30 ENCOUNTER — OFFICE VISIT (OUTPATIENT)
Dept: FAMILY MEDICINE | Facility: CLINIC | Age: 60
End: 2025-04-30
Payer: MEDICARE

## 2025-04-30 VITALS
HEART RATE: 86 BPM | WEIGHT: 217.81 LBS | DIASTOLIC BLOOD PRESSURE: 70 MMHG | TEMPERATURE: 98 F | SYSTOLIC BLOOD PRESSURE: 138 MMHG | BODY MASS INDEX: 30.49 KG/M2 | HEIGHT: 71 IN | OXYGEN SATURATION: 95 %

## 2025-04-30 DIAGNOSIS — M54.50 CHRONIC LOW BACK PAIN WITHOUT SCIATICA, UNSPECIFIED BACK PAIN LATERALITY: ICD-10-CM

## 2025-04-30 DIAGNOSIS — Z00.00 ANNUAL PHYSICAL EXAM: Primary | ICD-10-CM

## 2025-04-30 DIAGNOSIS — G89.29 CHRONIC LOW BACK PAIN WITHOUT SCIATICA, UNSPECIFIED BACK PAIN LATERALITY: ICD-10-CM

## 2025-04-30 PROCEDURE — 99215 OFFICE O/P EST HI 40 MIN: CPT | Mod: PBBFAC,PO | Performed by: FAMILY MEDICINE

## 2025-04-30 PROCEDURE — 99999 PR PBB SHADOW E&M-EST. PATIENT-LVL V: CPT | Mod: PBBFAC,,, | Performed by: FAMILY MEDICINE

## 2025-04-30 RX ORDER — OXYCODONE AND ACETAMINOPHEN 5; 325 MG/1; MG/1
1 TABLET ORAL EVERY 4 HOURS PRN
Qty: 31 TABLET | Refills: 0 | Status: SHIPPED | OUTPATIENT
Start: 2025-04-30

## 2025-04-30 RX ORDER — OXYCODONE AND ACETAMINOPHEN 5; 325 MG/1; MG/1
1 TABLET ORAL EVERY 4 HOURS PRN
Qty: 31 TABLET | Refills: 0 | Status: SHIPPED | OUTPATIENT
Start: 2025-05-30

## 2025-04-30 RX ORDER — OXYCODONE AND ACETAMINOPHEN 5; 325 MG/1; MG/1
1 TABLET ORAL EVERY 4 HOURS PRN
Qty: 31 TABLET | Refills: 0 | Status: SHIPPED | OUTPATIENT
Start: 2025-06-30

## 2025-04-30 NOTE — PROGRESS NOTES
Subjective     Patient ID: Rito Conley is a 59 y.o. male.    Chief Complaint: Annual Exam    59 year old male presents for sangeeta nnual exam.         History of Present Illness             Past Medical History:   Diagnosis Date    Allergy     Class 1 obesity due to excess calories with serious comorbidity in adult 3/14/2022    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    Hyperlipidemia     Hypertension     Lower back pain     Major depressive disorder, recurrent, moderate 12/30/2022    PTSD (post-traumatic stress disorder)     Sleep apnea     uses CPAP 1-2x/week      Past Surgical History:   Procedure Laterality Date    COLONOSCOPY      COLONOSCOPY N/A 06/04/2021    Procedure: COLONOSCOPY;  Surgeon: Shanthi Horton MD;  Location: [facility];  Service: Endoscopy;  Laterality: N/A;  prep instr portal,   6/3 pt v/u of earlier arrival time and prep time -ml    ENDOSCOPIC NASAL SEPTOPLASTY N/A 10/13/2020    Procedure: SEPTOPLASTY, NOSE, ENDOSCOPIC;  Surgeon: Nick Gomez MD;  Location: [facility] OR;  Service: ENT;  Laterality: N/A;  RN PRE OP 9- COVID NEGATIVE [place]  10-  [place]    ESOPHAGOGASTRODUODENOSCOPY N/A 06/17/2020    Procedure: EGD (ESOPHAGOGASTRODUODENOSCOPY);  Surgeon: Jarad Holm MD;  [facility]  Service: Endoscopy;  Laterality: N/A;  covid test 6/15-[facility]    EXCISION OF MASS OF EXTREMITY Left 01/31/2023    Procedure: EXCISION, MASS, EXTREMITY-WRIST;  Surgeon: Mallorie Hardwick MD;  Location: [facility];  Service: Orthopedics;  Laterality: Left;  RN PREOP 1/26/2023---CLEARED BY PCP    HERNIA REPAIR      NASAL ENDOSCOPY Bilateral 10/13/2020    Procedure: ENDOSCOPY, NOSE;  Surgeon: Nick Gomez MD;  Location: [facility] OR;  Service: ENT;  Laterality: Bilateral;    NASAL STENOSIS REPAIR Bilateral 10/13/2020    Procedure: REPAIR, STENOSIS, NOSE, VESTIBULE;  Surgeon: Nick Gomze MD;  Location: [facility] OR;  Service: ENT;  Laterality: Bilateral;  [employer] console and wand available for  "use.  NO DISC  10/7/2020@ 247PM-LO    VASECTOMY      wisdom tooth removal Right 04/2024    2     Family History   Problem Relation Name Age of Onset    Hypertension Mother Mother     Other Father          agent orange exposure     Heart attacks under age 50 Sister      No Known Problems Brother      No Known Problems Daughter      No Known Problems Son      No Known Problems Son      No Known Problems Son      No Known Problems Son      Cancer Maternal Aunt Maternal Aunt         breast    Cancer Maternal Grandmother Maternal grandmother         something in her arm     Amblyopia Neg Hx      Blindness Neg Hx      Cataracts Neg Hx      Diabetes Neg Hx      Glaucoma Neg Hx      Macular degeneration Neg Hx      Retinal detachment Neg Hx      Strabismus Neg Hx      Stroke Neg Hx      Thyroid disease Neg Hx       Social History[1]      Review of Systems   Constitutional:  Negative for fatigue.   HENT:  Negative for hearing loss, rhinorrhea, sneezing and sore throat.    Eyes:  Negative for visual disturbance.   Respiratory:  Negative for cough, chest tightness, shortness of breath and wheezing.    Cardiovascular:  Negative for chest pain, palpitations and leg swelling.   Gastrointestinal:  Negative for abdominal pain, constipation, diarrhea, nausea and vomiting.   Endocrine: Positive for polyuria. Negative for polydipsia and polyphagia.   Genitourinary:  Negative for dysuria, frequency, hematuria and urgency.   Musculoskeletal:  Negative for arthralgias and back pain.   Integumentary:  Negative for rash.   Neurological:  Negative for dizziness, syncope, light-headedness and headaches.            Objective     Vitals:    04/30/25 0924   BP: 138/70   Pulse: 86   Temp: 98.4 °F (36.9 °C)   TempSrc: Oral   SpO2: 95%   Weight: 98.8 kg (217 lb 13 oz)   Height: 5' 11" (1.803 m)        Physical Exam  Constitutional:       General: He is not in acute distress.     Appearance: Normal appearance. He is well-developed. He is not " ill-appearing, toxic-appearing or diaphoretic.   HENT:      Head: Normocephalic and atraumatic.      Right Ear: External ear normal.      Left Ear: External ear normal.      Mouth/Throat:      Pharynx: No oropharyngeal exudate.   Eyes:      Conjunctiva/sclera: Conjunctivae normal.   Neck:      Thyroid: No thyromegaly.   Cardiovascular:      Rate and Rhythm: Normal rate and regular rhythm.      Heart sounds: Normal heart sounds. No murmur heard.     No friction rub. No gallop.   Pulmonary:      Effort: Pulmonary effort is normal. No respiratory distress.      Breath sounds: Normal breath sounds. No stridor. No wheezing, rhonchi or rales.   Abdominal:      General: Bowel sounds are normal. There is no distension.      Palpations: Abdomen is soft. There is no mass.      Tenderness: There is no abdominal tenderness. There is no guarding or rebound.      Hernia: No hernia is present.   Musculoskeletal:      Cervical back: Normal range of motion and neck supple.      Right lower leg: No edema.      Left lower leg: No edema.   Lymphadenopathy:      Cervical: No cervical adenopathy.   Skin:     Findings: No rash.   Neurological:      General: No focal deficit present.      Mental Status: He is alert and oriented to person, place, and time.   Psychiatric:         Mood and Affect: Mood normal.         Behavior: Behavior normal.       Physical Exam                Assessment and Plan     1. Annual physical exam  Labs reviewed and normal. Given list of potassium rich foods to eat    2. Chronic low back pain without sciatica, unspecified back pain laterality      Rito was seen today for annual exam.    Diagnoses and all orders for this visit:    Annual physical exam    Chronic low back pain without sciatica, unspecified back pain laterality  The following orders have not been finalized:  -     oxyCODONE-acetaminophen (PERCOCET) 5-325 mg per tablet  -     oxyCODONE-acetaminophen (PERCOCET) 5-325 mg per tablet  -      oxyCODONE-acetaminophen (PERCOCET) 5-325 mg per tablet        Assessment & Plan                     No follow-ups on file.        This note was generated with the assistance of ambient listening technology. Verbal consent was obtained by the patient and accompanying visitor(s) for the recording of patient appointment to facilitate this note. I attest to having reviewed and edited the generated note for accuracy, though some syntax or spelling errors may persist. Please contact the author of this note for any clarification.         [1]   Social History  Socioeconomic History    Marital status:    Tobacco Use    Smoking status: Never    Smokeless tobacco: Never   Substance and Sexual Activity    Alcohol use: Yes     Alcohol/week: 2.0 standard drinks of alcohol     Types: 2 Glasses of wine per week    Drug use: Yes     Types: Oxycodone     Comment: sometimes 1 tab weekly as needed; probably 2 tabs a month    Sexual activity: Yes     Partners: Female     Birth control/protection: None     Social Drivers of Health     Financial Resource Strain: Low Risk  (3/5/2025)    Overall Financial Resource Strain (CARDIA)     Difficulty of Paying Living Expenses: Not very hard   Food Insecurity: Food Insecurity Present (3/5/2025)    Hunger Vital Sign     Worried About Running Out of Food in the Last Year: Sometimes true     Ran Out of Food in the Last Year: Never true   Transportation Needs: No Transportation Needs (3/5/2025)    PRAPARE - Transportation     Lack of Transportation (Medical): No     Lack of Transportation (Non-Medical): No   Physical Activity: Insufficiently Active (3/5/2025)    Exercise Vital Sign     Days of Exercise per Week: 2 days     Minutes of Exercise per Session: 30 min   Stress: Stress Concern Present (3/5/2025)    [place] of [employer] - Occupational Stress Questionnaire     Feeling of Stress : To some extent   Housing Stability: High Risk (3/5/2025)    Housing Stability Vital Sign      Unable to Pay for Housing in the Last Year: Yes     Number of Times Moved in the Last Year: 0     Homeless in the Last Year: No

## 2025-04-30 NOTE — PATIENT INSTRUCTIONS
potassium rich foods like:  Bananas, oranges, cantaloupe, honeydew, apricots, grapefruit (some dried fruits, such as prunes, raisins, and dates, are also high in potassium)  Cooked spinach.  Cooked broccoli.  Potatoes.  Sweet potatoes.  Mushrooms.  Peas.  Cucumbers  
4

## 2025-05-19 ENCOUNTER — OFFICE VISIT (OUTPATIENT)
Dept: UROLOGY | Facility: CLINIC | Age: 60
End: 2025-05-19
Payer: MEDICARE

## 2025-05-19 VITALS — BODY MASS INDEX: 30.61 KG/M2 | WEIGHT: 219.44 LBS

## 2025-05-19 DIAGNOSIS — R35.1 NOCTURIA: Primary | ICD-10-CM

## 2025-05-19 DIAGNOSIS — N32.81 OVERACTIVE BLADDER: ICD-10-CM

## 2025-05-19 DIAGNOSIS — N52.9 ERECTILE DYSFUNCTION OF ORGANIC ORIGIN: ICD-10-CM

## 2025-05-19 DIAGNOSIS — Z12.5 PROSTATE CANCER SCREENING: ICD-10-CM

## 2025-05-19 PROCEDURE — 99213 OFFICE O/P EST LOW 20 MIN: CPT | Mod: PBBFAC | Performed by: UROLOGY

## 2025-05-19 PROCEDURE — 99999 PR PBB SHADOW E&M-EST. PATIENT-LVL III: CPT | Mod: PBBFAC,,, | Performed by: UROLOGY

## 2025-05-19 PROCEDURE — 99214 OFFICE O/P EST MOD 30 MIN: CPT | Mod: S$PBB,,, | Performed by: UROLOGY

## 2025-05-19 RX ORDER — OXYBUTYNIN CHLORIDE 15 MG/1
15 TABLET, EXTENDED RELEASE ORAL DAILY
Qty: 90 TABLET | Refills: 3 | Status: SHIPPED | OUTPATIENT
Start: 2025-05-19

## 2025-05-19 NOTE — PROGRESS NOTES
"Subjective:       Rito Conley is a 60 y.o. male who is an established patient who was seen for evaluation of urinary issues.      Last saw [place] 4/18. Reported frequency, VON, nocturia x 1. Denies straining, urgency, weak stream. Was on Flomax. Also saw  for ED.    He is referred back now with c/o urinary frequency and worsening nocturia. Nocturia x 2-3. Strong flow. Mild urgency. Denies UI. Out of Flomax x 1 week though symptoms pre-date that. Has not noted change with Flomax. Denies hematuria, dysuria.     PSA 4/18 - 0.43  PSA 4/19 - 0.48  PSA 6/20 - 0.53  PSA 3/21 - 0.41  PSA 6/21 - 1.9   PSA 11/21 - 1.4  PSA 6/22 - 0.63  PSA 6/23 - 0.92  PSA 5/24 - 0.67    PVR (bladder scan) initial visit - 0cc     4/15/2019  PSA normal. Given trial Ditropan last visit - some improvement. Strong flow. Nocturia x 1.    10/14/2019  Frequency improved with Ditropan. No further issues.   PVR (bladder scan) today - 0cc    12/28/2020  Increased nocturia but admits to drinking more in evening. Given Cialis by NP in 6/20 but unable to get at [facility].     6/28/2021  PSA with significant rise since last check. Ditropan 10mg working better. Nocturia x 0.  PVR (bladder scan) today - 0cc    10/7/2021  Returns with further c/o LUTS. Noted frequency and "tingling" about 2 weeks. Notes this was also around time of drinking more soda and water. Given abx by PCP. UCx ended up being negative. He reports symptoms have improved. Now drinking more water.     12/28/2021  Doing okay. Needs Ditropan refill. PSA trending down.     6/28/2022  PSA trending down. Notes increased frequency but also notes increased fluid intake. Some urgency. Denies weak stream, straining.     8/17/2023  He saw Sarah NP 5/23. On Ditropan 15mg. Returns now with urinary frequency x 1 week. Denies dysuria. Voiding q1h now, was q2-3h. Nocturia not affected. Denies urgency. Strong stream. Occasional VON.   He saw PCP today for abdominal pain - diagnosed with gas. No " constipation, less loose stool recently.   PVR (bladder scan) today - 174cc    11/27/2023  Here for PVR check - much improved. On Ditropan 15mg.   PVR (bladder scan) today - 0cc    3/7/2024  Returns now with urinary frequency/nocturia and anal pain/bowel pain. Denies constipation. Nocturia x 2-3, improving over the last few days. Strong flow. No LE edema. No change in diet/meds. On Ditropan 15mg.  PVR (bladder scan) today - 0cc     6/10/2024  Nocturia usually well controlled, some nights can be bothersome. Remains on Ditropan 15mg. PSA remains low.     5/19/2025  Annual visit. PSA not yet done. Notes more nocturia x 2-3. Admits drinking fluids prior to bed. Taking Ditropan in AM.       The following portions of the patient's history were reviewed and updated as appropriate: allergies, current medications, past family history, past medical history, past social history, past surgical history and problem list.    Review of Systems  Constitutional: no fever or chills  ENT: no nasal congestion or sore throat  Respiratory: no cough or shortness of breath  Cardiovascular: no chest pain or palpitations  Gastrointestinal: no nausea or vomiting, tolerating diet  Genitourinary: as per HPI  Hematologic/Lymphatic: no easy bruising or lymphadenopathy  Musculoskeletal: no arthralgias or myalgias  Skin: no rashes or lesions  Neurological: no seizures or tremors  Behavioral/Psych: no auditory or visual hallucinations       Objective:    Vitals: Wt 99.6 kg (219 lb 7.5 oz)   BMI 30.61 kg/m²     Physical Exam   General: well developed, well nourished in no acute distress  Head: normocephalic, atraumatic  Neck: supple, trachea midline, no obvious enlargement of thyroid  HEENT: EOMI, mucus membranes moist, sclera anicteric, no hearing impairment  Lungs: symmetric expansion, non-labored breathing  Neuro: alert and oriented x 3, no gross deficits  Psych: normal judgment and insight, normal mood/affect and non-anxious  Genitourinary: (last  visit)  deferred   YOSHI: Symmetric 40g prostate without nodularity or tenderness. Normal landmarks. seminal vesicles non palpable, normal sphincter tone without hemorrhoids or rectal masses. Normal appearance of anus and perineum. Difficult to palpate.       Lab Review   Urine analysis today in clinic shows - trace protein    Lab Results   Component Value Date    WBC 4.45 04/24/2025    HGB 13.6 (L) 04/24/2025    HGB 13.9 (L) 02/07/2024    HCT 42.2 04/24/2025    HCT 42.8 02/07/2024    MCV 93 04/24/2025    MCV 92 02/07/2024     04/24/2025     02/07/2024     Lab Results   Component Value Date    CREATININE 0.9 04/24/2025    BUN 16 04/24/2025     Lab Results   Component Value Date    PSA 0.41 03/15/2021     Lab Results   Component Value Date    PSADIAG 0.67 05/31/2024       Imaging  NA       Assessment/Plan:      1. Frequency of urination    - Seems like OAB component   - Limited response to Flomax   - Ditropan 15mg. Discussed possible SE. Okay to take in evening.    - Monitor PVR     2. Nocturia    - Reduce PM fluids   - Ditropan 15mg, continue      3. BPH with obstruction/lower urinary tract symptoms    - Limited response with Flomax   - Okay to stop   - PSA/YOSHI normal   - Recheck PSA 12mths (5/25)     4. ED   - Viagra 100mg. Discussed SE. Printed to take to DOD.    5. PCa screening   - Jump in PSA noted   - Suspect this is related to recent colonoscopy   - PSA down trending - back to normal levels   - Recheck 12 months (due now)   - Plan for q2y YOSHI if PSA remains low    6. Dysuria   - Improved   - Continue hydration   - Avoid bladder irritants    7. VON   - May be contributing to frequency   - Monitor closely Improved on subsequent checks.      Follow up in 12 months with PSA/YOSHI

## 2025-05-20 ENCOUNTER — LAB VISIT (OUTPATIENT)
Dept: LAB | Facility: HOSPITAL | Age: 60
End: 2025-05-20
Attending: UROLOGY
Payer: MEDICARE

## 2025-05-20 DIAGNOSIS — Z12.5 PROSTATE CANCER SCREENING: ICD-10-CM

## 2025-05-20 DIAGNOSIS — R35.1 NOCTURIA: ICD-10-CM

## 2025-05-20 LAB — PSA SERPL-MCNC: 0.84 NG/ML

## 2025-05-20 PROCEDURE — 36415 COLL VENOUS BLD VENIPUNCTURE: CPT | Mod: PO

## 2025-05-20 PROCEDURE — 84153 ASSAY OF PSA TOTAL: CPT

## 2025-05-21 ENCOUNTER — RESULTS FOLLOW-UP (OUTPATIENT)
Dept: UROLOGY | Facility: CLINIC | Age: 60
End: 2025-05-21

## (undated) DEVICE — DRESSING TEGADERM IV 3.5 X 4.5

## (undated) DEVICE — SUT PLN GUT 4-0 SC-1SC-1 1

## (undated) DEVICE — STYLUS VIVAER

## (undated) DEVICE — DRESSING TELFA N ADH 3X8

## (undated) DEVICE — ADHESIVE MASTISOL VIAL 48/BX

## (undated) DEVICE — SEE MEDLINE ITEM 157173

## (undated) DEVICE — DRAPE STERI INSTRUMENT 1018

## (undated) DEVICE — SEE MEDLINE ITEM 157148

## (undated) DEVICE — SOL NS 1000CC

## (undated) DEVICE — SEE MEDLINE ITEM 146313

## (undated) DEVICE — NDL 27G X 1 1/4

## (undated) DEVICE — KIT ANTIFOG

## (undated) DEVICE — SYR 12CC CNTRL L-L NO NDL

## (undated) DEVICE — SUT STRATAFIX 4-0 30CM PS-2

## (undated) DEVICE — COVER OVERHEAD SURG LT BLUE

## (undated) DEVICE — SUPPORT ULNA NERVE PROTECTOR

## (undated) DEVICE — CLOSURE SKIN 1X5 STERI-STRIP

## (undated) DEVICE — SUT VICRYL PLUS 3-0 SH 18IN

## (undated) DEVICE — HEMOSTAT SURGICEL FIBRLR 1X2IN

## (undated) DEVICE — ADHESIVE DERMABOND ADVANCED

## (undated) DEVICE — SOL 9P NACL IRR PIC IL

## (undated) DEVICE — BLANKET LOWER BODY 55.9X40.2IN

## (undated) DEVICE — SUT MONOCRYL 4.0 PS2 CP496G

## (undated) DEVICE — SPLINT INTRANASAL POSISEP .6X2

## (undated) DEVICE — GOWN SURGICAL X-LARGE

## (undated) DEVICE — SUT PROLENE 3-0 36 V-7V-7

## (undated) DEVICE — SEE MEDLINE ITEM 157110

## (undated) DEVICE — BLADE INFERIOR TURBINATE 2MM

## (undated) DEVICE — SUT MCRYL PLUS 4-0 PS2 27IN

## (undated) DEVICE — DRESSING TRANS 4X4 TEGADERM

## (undated) DEVICE — CLOSURE SKIN STERI STRIP 1/2X4

## (undated) DEVICE — SPONGE GAUZE 4X4 12 PLY STRL

## (undated) DEVICE — SUT ETHILON 3-0 PS2 18 BLK

## (undated) DEVICE — PAD CAST SPECIALIST STRL 3

## (undated) DEVICE — APPLICATOR CHLORAPREP ORN 26ML

## (undated) DEVICE — SET EXT W/2 VLVE PORTS 40

## (undated) DEVICE — SEE MEDLINE ITEM 152622

## (undated) DEVICE — SEE MEDLINE ITEM 157117

## (undated) DEVICE — SUT 3-0 CTD VICRYL 27IN PS

## (undated) DEVICE — CONTAINER SPECIMEN STRL 4OZ

## (undated) DEVICE — ELECTRODE REM PLYHSV RETURN 9

## (undated) DEVICE — NDL SPINAL SPINOCAN 22GX3.5

## (undated) DEVICE — SYR 50CC LL

## (undated) DEVICE — TRAY MINOR GEN SURG

## (undated) DEVICE — SEE MEDLINE ITEM 156913

## (undated) DEVICE — SUT D SPECIAL VICRYL 2-0

## (undated) DEVICE — SHEATH LENS CLN 4MM 0D A70940A

## (undated) DEVICE — SEE MEDLINE ITEM 157194

## (undated) DEVICE — SUT 4-0 CHROMIC GUT / P-3

## (undated) DEVICE — PACK ARTHROSCOPY W/ISO BAC

## (undated) DEVICE — Device

## (undated) DEVICE — NDL HYPO REG 25G X 1 1/2

## (undated) DEVICE — SOL IRR SOD CHL .9% POUR

## (undated) DEVICE — SUT 1 36IN PDS II VIO MONO

## (undated) DEVICE — PAD PREP 50/CA

## (undated) DEVICE — SEE MEDLINE ITEM 146347

## (undated) DEVICE — GLOVE SURG BIOGEL LATEX SZ 7.5

## (undated) DEVICE — BLADE SCALP OPHTL RND TIP

## (undated) DEVICE — POSITIONER HEAD DONUT 9IN FOAM

## (undated) DEVICE — TUBING XPS IRRIG TO STRAIGHTSH

## (undated) DEVICE — SYS LABLNG CORECT MED 4 FLG

## (undated) DEVICE — BANDAGE ACE ELASTIC 6"

## (undated) DEVICE — NASAL AIRWAY SPLINT

## (undated) DEVICE — SUT VICRYL 3-0 27 SH

## (undated) DEVICE — GLOVE BIOGEL PI MICRO SZ 6.5

## (undated) DEVICE — TOWEL OR DISP STRL BLUE 4/PK

## (undated) DEVICE — UNDERGLOVE BIOGEL PI SZ 6.5 LF

## (undated) DEVICE — CANISTER SUCTION 2 LTR

## (undated) DEVICE — GAUZE SPONGE 4X4 12PLY

## (undated) DEVICE — SPONGE LAP 18X18 PREWASHED